# Patient Record
Sex: FEMALE | Race: WHITE | NOT HISPANIC OR LATINO | Employment: FULL TIME | URBAN - METROPOLITAN AREA
[De-identification: names, ages, dates, MRNs, and addresses within clinical notes are randomized per-mention and may not be internally consistent; named-entity substitution may affect disease eponyms.]

---

## 2017-01-04 ENCOUNTER — TRANSCRIBE ORDERS (OUTPATIENT)
Dept: ADMINISTRATIVE | Facility: HOSPITAL | Age: 42
End: 2017-01-04

## 2017-01-04 ENCOUNTER — HOSPITAL ENCOUNTER (OUTPATIENT)
Dept: RADIOLOGY | Facility: HOSPITAL | Age: 42
Discharge: HOME | End: 2017-01-04
Payer: COMMERCIAL

## 2017-01-04 DIAGNOSIS — R05.9 COUGH: ICD-10-CM

## 2017-01-04 DIAGNOSIS — R05.9 COUGH: Primary | ICD-10-CM

## 2017-01-04 PROCEDURE — 71020 HB CHEST X-RAY 2VW FRONTAL&LATL: CPT

## 2017-12-08 ENCOUNTER — HOSPITAL ENCOUNTER (EMERGENCY)
Facility: HOSPITAL | Age: 42
Discharge: HOME | End: 2017-12-08
Attending: EMERGENCY MEDICINE | Admitting: EMERGENCY MEDICINE
Payer: COMMERCIAL

## 2017-12-08 ENCOUNTER — APPOINTMENT (EMERGENCY)
Dept: RADIOLOGY | Facility: HOSPITAL | Age: 42
End: 2017-12-08
Payer: COMMERCIAL

## 2017-12-08 VITALS
TEMPERATURE: 97.8 F | DIASTOLIC BLOOD PRESSURE: 64 MMHG | WEIGHT: 253.8 LBS | RESPIRATION RATE: 18 BRPM | HEART RATE: 68 BPM | OXYGEN SATURATION: 98 % | SYSTOLIC BLOOD PRESSURE: 111 MMHG

## 2017-12-08 DIAGNOSIS — J20.9 ACUTE BRONCHITIS: Primary | ICD-10-CM

## 2017-12-08 LAB
ALBUMIN SERPL BCP-MCNC: 3.5 G/DL (ref 3.5–5)
ALP SERPL-CCNC: 90 U/L (ref 46–116)
ALT SERPL W P-5'-P-CCNC: 27 U/L (ref 12–78)
ANION GAP SERPL CALCULATED.3IONS-SCNC: 8 MMOL/L (ref 4–13)
AST SERPL W P-5'-P-CCNC: 14 U/L (ref 5–45)
ATRIAL RATE: 64 BPM
BASOPHILS # BLD AUTO: 0.04 THOUSANDS/ΜL (ref 0–0.1)
BASOPHILS NFR BLD AUTO: 0 % (ref 0–1)
BILIRUB SERPL-MCNC: 0.6 MG/DL (ref 0.2–1)
BUN SERPL-MCNC: 11 MG/DL (ref 5–25)
CALCIUM SERPL-MCNC: 8.5 MG/DL (ref 8.3–10.1)
CHLORIDE SERPL-SCNC: 106 MMOL/L (ref 100–108)
CO2 SERPL-SCNC: 24 MMOL/L (ref 21–32)
CREAT SERPL-MCNC: 0.55 MG/DL (ref 0.6–1.3)
DEPRECATED D DIMER PPP: <270 NG/ML (FEU) (ref 0–424)
EOSINOPHIL # BLD AUTO: 0.17 THOUSAND/ΜL (ref 0–0.61)
EOSINOPHIL NFR BLD AUTO: 2 % (ref 0–6)
ERYTHROCYTE [DISTWIDTH] IN BLOOD BY AUTOMATED COUNT: 12.5 % (ref 11.6–15.1)
GFR SERPL CREATININE-BSD FRML MDRD: 118 ML/MIN/1.73SQ M
GLUCOSE SERPL-MCNC: 91 MG/DL (ref 65–140)
HCT VFR BLD AUTO: 42.6 % (ref 34.8–46.1)
HGB BLD-MCNC: 14.1 G/DL (ref 11.5–15.4)
LYMPHOCYTES # BLD AUTO: 2.97 THOUSANDS/ΜL (ref 0.6–4.47)
LYMPHOCYTES NFR BLD AUTO: 29 % (ref 14–44)
MCH RBC QN AUTO: 30.8 PG (ref 26.8–34.3)
MCHC RBC AUTO-ENTMCNC: 33.1 G/DL (ref 31.4–37.4)
MCV RBC AUTO: 93 FL (ref 82–98)
MONOCYTES # BLD AUTO: 0.73 THOUSAND/ΜL (ref 0.17–1.22)
MONOCYTES NFR BLD AUTO: 7 % (ref 4–12)
NEUTROPHILS # BLD AUTO: 6.3 THOUSANDS/ΜL (ref 1.85–7.62)
NEUTS SEG NFR BLD AUTO: 62 % (ref 43–75)
P AXIS: 29 DEGREES
PLATELET # BLD AUTO: 297 THOUSANDS/UL (ref 149–390)
PMV BLD AUTO: 11.5 FL (ref 8.9–12.7)
POTASSIUM SERPL-SCNC: 4 MMOL/L (ref 3.5–5.3)
PR INTERVAL: 158 MS
PROT SERPL-MCNC: 7.7 G/DL (ref 6.4–8.2)
QRS AXIS: 51 DEGREES
QRSD INTERVAL: 90 MS
QT INTERVAL: 402 MS
QTC INTERVAL: 409 MS
RBC # BLD AUTO: 4.58 MILLION/UL (ref 3.81–5.12)
SODIUM SERPL-SCNC: 138 MMOL/L (ref 136–145)
T WAVE AXIS: 47 DEGREES
TROPONIN I SERPL-MCNC: <0.02 NG/ML
VENTRICULAR RATE: 62 BPM
WBC # BLD AUTO: 10.21 THOUSAND/UL (ref 4.31–10.16)

## 2017-12-08 PROCEDURE — 96360 HYDRATION IV INFUSION INIT: CPT

## 2017-12-08 PROCEDURE — 99285 EMERGENCY DEPT VISIT HI MDM: CPT

## 2017-12-08 PROCEDURE — 85379 FIBRIN DEGRADATION QUANT: CPT | Performed by: EMERGENCY MEDICINE

## 2017-12-08 PROCEDURE — 71020 HB CHEST X-RAY 2VW FRONTAL&LATL: CPT

## 2017-12-08 PROCEDURE — 80053 COMPREHEN METABOLIC PANEL: CPT | Performed by: EMERGENCY MEDICINE

## 2017-12-08 PROCEDURE — 93005 ELECTROCARDIOGRAM TRACING: CPT | Performed by: EMERGENCY MEDICINE

## 2017-12-08 PROCEDURE — 96361 HYDRATE IV INFUSION ADD-ON: CPT

## 2017-12-08 PROCEDURE — 85025 COMPLETE CBC W/AUTO DIFF WBC: CPT | Performed by: EMERGENCY MEDICINE

## 2017-12-08 PROCEDURE — 84484 ASSAY OF TROPONIN QUANT: CPT | Performed by: EMERGENCY MEDICINE

## 2017-12-08 PROCEDURE — 36415 COLL VENOUS BLD VENIPUNCTURE: CPT | Performed by: EMERGENCY MEDICINE

## 2017-12-08 RX ORDER — AZITHROMYCIN 250 MG/1
TABLET, FILM COATED ORAL
Qty: 6 TABLET | Refills: 0 | Status: SHIPPED | OUTPATIENT
Start: 2017-12-08 | End: 2018-07-18 | Stop reason: ALTCHOICE

## 2017-12-08 RX ADMIN — SODIUM CHLORIDE 1000 ML: 0.9 INJECTION, SOLUTION INTRAVENOUS at 13:30

## 2017-12-08 NOTE — ED PROVIDER NOTES
History  Chief Complaint   Patient presents with    Shortness of Breath     Pt  c/o shortness of breath and cough, fatigue, onset 2 weeks ago  History provided by:  Patient   used: No    Shortness of Breath   Severity:  Moderate  Onset quality:  Gradual  Duration:  2 weeks  Timing:  Constant  Progression:  Worsening  Chronicity:  New  Context: URI    Relieved by:  Nothing  Worsened by: Activity  Ineffective treatments:  None tried  Associated symptoms: chest pain    Associated symptoms: no cough, no fever and no vomiting        None       Past Medical History:   Diagnosis Date    Pneumonia        Past Surgical History:   Procedure Laterality Date    APPENDECTOMY      TONSILLECTOMY         History reviewed  No pertinent family history  I have reviewed and agree with the history as documented  Social History   Substance Use Topics    Smoking status: Never Smoker    Smokeless tobacco: Never Used    Alcohol use No        Review of Systems   Constitutional: Negative for chills and fever  Respiratory: Positive for shortness of breath  Negative for cough  Cardiovascular: Positive for chest pain  Negative for palpitations  Gastrointestinal: Negative for nausea and vomiting  All other systems reviewed and are negative  Physical Exam  ED Triage Vitals [12/08/17 1308]   Temperature Pulse Respirations Blood Pressure SpO2   97 8 °F (36 6 °C) 76 18 132/70 98 %      Temp Source Heart Rate Source Patient Position - Orthostatic VS BP Location FiO2 (%)   Oral Monitor Sitting Left arm --      Pain Score       6           Orthostatic Vital Signs  Vitals:    12/08/17 1308 12/08/17 1345 12/08/17 1400   BP: 132/70  111/64   Pulse: 76 68 68   Patient Position - Orthostatic VS: Sitting         Physical Exam   Constitutional: She is oriented to person, place, and time  She appears well-developed and well-nourished  She appears distressed  HENT:   Head: Normocephalic and atraumatic  Eyes: EOM are normal  Pupils are equal, round, and reactive to light  Neck: Normal range of motion  No JVD present  Cardiovascular: Normal rate, regular rhythm, normal heart sounds and intact distal pulses  Exam reveals no gallop and no friction rub  No murmur heard  Pulmonary/Chest: Effort normal and breath sounds normal  No respiratory distress  She has no wheezes  She has no rales  She exhibits no tenderness  Musculoskeletal: Normal range of motion  She exhibits no tenderness  Neurological: She is alert and oriented to person, place, and time  Skin: Skin is warm and dry  Psychiatric: She has a normal mood and affect  Her behavior is normal  Judgment and thought content normal    Nursing note and vitals reviewed  ED Medications  Medications   sodium chloride 0 9 % bolus 1,000 mL (0 mL Intravenous Stopped 12/8/17 1526)       Diagnostic Studies  Results Reviewed     Procedure Component Value Units Date/Time    D-Dimer [[id number]]  (Normal) Collected:  12/08/17 1330    Lab Status:  Final result Specimen:  Blood from Arm, Right Updated:  12/08/17 1400     D-Dimer, Quant <270 ng/ml (FEU)     Troponin I [[id number]]  (Normal) Collected:  12/08/17 1330    Lab Status:  Final result Specimen:  Blood from Arm, Right Updated:  12/08/17 1359     Troponin I <0 02 ng/mL     Narrative:         [employer] Chemistry analyzer 99% cutoff is > 0 04 ng/mL in network labs    o cTnI 99% cutoff is useful only when applied to patients in the clinical setting of myocardial ischemia  o cTnI 99% cutoff should be interpreted in the context of clinical history, ECG findings and possibly cardiac imaging to establish correct diagnosis  o cTnI 99% cutoff may be suggestive but clearly not indicative of a coronary event without the clinical setting of myocardial ischemia      Comprehensive metabolic panel [[id number]]  (Abnormal) Collected:  12/08/17 1330    Lab Status:  Final result Specimen:  Blood from Arm, Right Updated: 12/08/17 1358     Sodium 138 mmol/L      Potassium 4 0 mmol/L      Chloride 106 mmol/L      CO2 24 mmol/L      Anion Gap 8 mmol/L      BUN 11 mg/dL      Creatinine 0 55 (L) mg/dL      Glucose 91 mg/dL      Calcium 8 5 mg/dL      AST 14 U/L      ALT 27 U/L      Alkaline Phosphatase 90 U/L      Total Protein 7 7 g/dL      Albumin 3 5 g/dL      Total Bilirubin 0 60 mg/dL      eGFR 118 ml/min/1 73sq m     Narrative:         National Kidney Disease Education Program recommendations are as follows:  GFR calculation is accurate only with a steady state creatinine  Chronic Kidney disease less than 60 ml/min/1 73 sq  meters  Kidney failure less than 15 ml/min/1 73 sq  meters  CBC and differential [[id number]]  (Abnormal) Collected:  12/08/17 1330    Lab Status:  Final result Specimen:  Blood from Arm, Right Updated:  12/08/17 1338     WBC 10 21 (H) Thousand/uL      RBC 4 58 Million/uL      Hemoglobin 14 1 g/dL      Hematocrit 42 6 %      MCV 93 fL      MCH 30 8 pg      MCHC 33 1 g/dL      RDW 12 5 %      MPV 11 5 fL      Platelets 610 Thousands/uL      Neutrophils Relative 62 %      Lymphocytes Relative 29 %      Monocytes Relative 7 %      Eosinophils Relative 2 %      Basophils Relative 0 %      Neutrophils Absolute 6 30 Thousands/µL      Lymphocytes Absolute 2 97 Thousands/µL      Monocytes Absolute 0 73 Thousand/µL      Eosinophils Absolute 0 17 Thousand/µL      Basophils Absolute 0 04 Thousands/µL                  XR chest 2 views   ED Interpretation by Yvette Sanchez MD (12/08 4412)   This film was interpreted independently by me  No infiltrate, cardiac silhouette normal, no pleural effusions or pulmonary edema  Final Result by Miley Escobar DO (12/08 1232)      No active pulmonary disease           Workstation performed: [id number]                    Procedures  ECG 12 Lead Documentation  Date/Time: 12/8/2017 1:26 PM  Performed by: Guilherme Dwyer  Authorized by: Guilherme Dwyer     Indications / Diagnosis: Chest pain  ECG reviewed by me, the ED Provider: no    Patient location:  ED  Previous ECG:     Previous ECG:  Compared to current    Comparison ECG info:  11/25/14    Similarity:  No change    Comparison to cardiac monitor: No    Interpretation:     Interpretation: normal    Rate:     ECG rate:  62    ECG rate assessment: normal    Rhythm:     Rhythm: sinus rhythm    Ectopy:     Ectopy: none    QRS:     QRS axis:  Normal    QRS intervals:  Normal  Conduction:     Conduction: normal    ST segments:     ST segments:  Normal  T waves:     T waves: normal               Phone Contacts  ED Phone Contact    ED Course  ED Course                                MDM  Number of Diagnoses or Management Options  Acute bronchitis: new and requires workup  Diagnosis management comments: Background: 38 y o  female presents with shortness of breath, chest pressure - concern for pneumonia because of past history    Differential DX includes but is not limited to: acs, less likely PE, pneumonia, bronchitis    Plan: chest workup, ddimer, chest xray           Amount and/or Complexity of Data Reviewed  Clinical lab tests: ordered and reviewed  Tests in the radiology section of CPT®: reviewed and ordered  Independent visualization of images, tracings, or specimens: yes    Risk of Complications, Morbidity, and/or Mortality  Presenting problems: high  Diagnostic procedures: high  Management options: high    Patient Progress  Patient progress: stable    CritCare Time    Disposition  Final diagnoses:   Acute bronchitis     Time reflects when diagnosis was documented in both MDM as applicable and the Disposition within this note     Time User Action Codes Description Comment    12/8/2017  3:14 PM Clint MEDLEY Add [J20 9] Acute bronchitis       ED Disposition     ED Disposition Condition Comment    Discharge  Farshad Taylor discharge to home/self care      Condition at discharge: Good        Follow-up Information    None       Discharge Medication List as of 12/8/2017  3:15 PM      START taking these medications    Details   azithromycin (ZITHROMAX) 250 mg tablet Take first 2 tablets together on day one, then 1 tablet every day thereafter until finished , Print           No discharge procedures on file      ED Provider  Electronically Signed by           Gaila Leventhal, MD  12/08/17 5149

## 2017-12-08 NOTE — DISCHARGE INSTRUCTIONS
Acute Bronchitis   WHAT YOU SHOULD KNOW:   Acute bronchitis is swelling and irritation in the air passages of your lungs  This irritation may cause you to cough or have other breathing problems  Acute bronchitis often starts because of another viral illness, such as a cold or the flu  The illness spreads from your nose and throat to your windpipe and airways  Bronchitis is often called a chest cold  Acute bronchitis lasts about 2 weeks and is usually not a serious illness  AFTER YOU LEAVE:   Medicines:   · Ibuprofen or acetaminophen:  These medicines help lower a fever  They are available without a doctor's order  Ask your healthcare provider which medicine is right for you  Ask how much to take and how often to take it  Follow directions  These medicines can cause stomach bleeding if not taken correctly  Ibuprofen can cause kidney damage  Do not take ibuprofen if you have kidney disease, an ulcer, or allergies to aspirin  Acetaminophen can cause liver damage  Do not drink alcohol if you take acetaminophen  · Cough medicine: This medicine helps loosen mucus in your lungs and make it easier to cough up  This can help you breathe easier  · Inhalers: You may need one or more inhalers to help you breathe easier and cough less  An inhaler gives your medicine in a mist form so that you can breathe it into your lungs  Ask your healthcare provider to show you how to use your inhaler correctly  · Steroid medicine:  Steroid medicine helps open your air passages so you can breathe easier  · Take your medicine as directed  Call your healthcare provider if you think your medicine is not helping or if you have side effects  Tell him if you are allergic to any medicine  Keep a list of the medicines, vitamins, and herbs you take  Include the amounts, and when and why you take them  Bring the list or the pill bottles to follow-up visits  Carry your medicine list with you in case of an emergency    How to use an inhaler:   · Shake the inhaler well to make sure you get the correct amount of medicine per puff  Remove the cover from your inhaler's mouthpiece  If you are using a spacer, connect your inhaler to the flat end of the spacer  · Exhale as much air from your lungs as you can  Put the mouthpiece in your mouth past your front teeth and rest it on the top of your tongue  Do not block the mouthpiece opening with your tongue  · Breathe in through your mouth at a slow and steady rate  As you do this, press the inhaler to release the puff of medicine  Finish breathing in slowly and deeply as you inhale the medicine  When your lungs are full, hold your breath for 10 seconds  Then breathe out slowly through puckered lips or through your nose  · If you need to take more puffs, wait at least 1 minute between each puff  · Rinse your mouth with water after you use the inhaler  This may keep you from getting a mouth infection or irritation  · Follow the instructions that come with your inhaler to clean it  You should clean your inhaler at least once a week  Ways to care for yourself:   · Avoid alcohol:  Alcohol dulls your urge to cough and sneeze  When you have bronchitis, you need to be able to cough and sneeze to clear your air passages  Alcohol also causes your body to lose fluid  This can make the mucus in your lungs thicker and harder to cough up  · Avoid irritants in the air:  Do not smoke or allow others to smoke around you  Avoid chemicals, fumes, and dust  Wear a face mask if you must work around dust or fumes  Stay inside on days when air pollution levels are high  If you have allergies, stay inside when pollen counts are high  Avoid aerosol products  This includes spray-on deodorant, bug spray, and hair spray  · Drink more liquids:  Most people should drink at least 8 eight-ounce cups of water a day  You may need to drink more liquids when you have acute bronchitis   Liquids help keep your air passages moist and help you cough up mucus  · Get more rest:  You may feel like resting more  Slowly start to do more each day  Rest when you feel it is needed  · Eat healthy foods:  Eat a variety healthy foods every day  Your diet should include fruits, vegetables, breads, and protein (such as chicken, fish, and beans)  Dairy products (such as milk, cheese, and ice cream) can sometimes increase the amount of mucus your body makes  Ask if you should decrease your intake of dairy products  · Use a humidifier:  Use a cool mist humidifier to increase air moisture in your home  This may make it easier for you to breathe and help decrease your cough  Decrease your risk of acute bronchitis:   · Get the vaccinations you need:  Ask your healthcare provider if you should get vaccinated against the flu or pneumonia  · Avoid things that may irritate your lungs:  Stay inside or cover your mouth and nose with a scarf when you are outside during cold weather  You should also stay inside on days when air pollution levels are high  If you have allergies, stay inside when pollen counts are high  Avoid using aerosol products in your home  This includes spray-on deodorant, bug spray, and hair spray  · Avoid the spread of germs:        [facility] your hands often with soap and water  Carry germ-killing gel with you  You can use the gel to clean your hands when there is no soap and water available  ¨ Do not touch your eyes, nose, or mouth unless you have washed your hands first     ¨ Always cover your mouth when you cough  Cough into a tissue or your shirtsleeve so you do not spread germs from your hands  ¨ Try to avoid people who have a cold or the flu  If you are sick, stay away from others as much as possible  Follow up with your healthcare provider as directed:  Write down questions you have so you will remember to ask them during your follow-up visits    Contact your healthcare provider if:   · You have a fever     · Your skin becomes itchy or you have a rash after you take your medicine  · Your breathing problems do not go away or get worse  · Your cough does not get better with treatment  · You cough up blood  · You have questions or concerns about your condition or care  Seek care immediately or call 911 if:   · You faint  · Your lips or fingernails turn blue  · You feel like you are not getting enough air when you breathe  · You have swelling of your lips, tongue, or throat that makes it hard to breathe or swallow  © 2014 [address] [employer] is for End User's use only and may not be sold, redistributed or otherwise used for commercial purposes  All illustrations and images included in CareNotes® are the copyrighted property of [employer]  or [address]  The above information is an  only  It is not intended as medical advice for individual conditions or treatments  Talk to your doctor, nurse or pharmacist before following any medical regimen to see if it is safe and effective for you

## 2018-07-18 ENCOUNTER — OFFICE VISIT (OUTPATIENT)
Dept: URGENT CARE | Age: 43
End: 2018-07-18
Payer: COMMERCIAL

## 2018-07-18 VITALS
HEIGHT: 69 IN | SYSTOLIC BLOOD PRESSURE: 122 MMHG | OXYGEN SATURATION: 98 % | TEMPERATURE: 97.7 F | WEIGHT: 201 LBS | RESPIRATION RATE: 22 BRPM | BODY MASS INDEX: 29.77 KG/M2 | HEART RATE: 84 BPM | DIASTOLIC BLOOD PRESSURE: 79 MMHG

## 2018-07-18 DIAGNOSIS — R05.9 COUGH: ICD-10-CM

## 2018-07-18 DIAGNOSIS — J06.9 ACUTE URI: Primary | ICD-10-CM

## 2018-07-18 RX ORDER — BENZONATATE 100 MG/1
100 CAPSULE ORAL 3 TIMES DAILY PRN
Qty: 20 CAPSULE | Refills: 0 | Status: ON HOLD | OUTPATIENT
Start: 2018-07-18 | End: 2020-11-27

## 2018-07-18 RX ORDER — ALBUTEROL SULFATE 90 UG/1
2 AEROSOL, METERED RESPIRATORY (INHALATION) EVERY 6 HOURS PRN
COMMUNITY
End: 2021-02-10 | Stop reason: HOSPADM

## 2018-07-18 RX ORDER — AZITHROMYCIN 250 MG/1
TABLET, FILM COATED ORAL
Qty: 6 TABLET | Refills: 0 | Status: SHIPPED | OUTPATIENT
Start: 2018-07-18 | End: 2018-07-23

## 2018-07-19 NOTE — PROGRESS NOTES
[facility]        NAME: Vitaliy Solano is a 41 y o  female  : 1975    MRN: [id number]  DATE: 2018  TIME: 9:55 PM    Assessment and Plan   Cough [R05]  1  Cough  XR chest pa & lateral    azithromycin (ZITHROMAX) 250 mg tablet    benzonatate (TESSALON PERLES) 100 mg capsule   2  Acute URI           Patient Instructions     Patient Instructions   No acute findings on chest xray  As we discussed your illness is viral   No antibiotics are indicated at this time  Will give you script for zpack  Only start if symptoms are not improving over the next 2-3 days or you develop worsening symptoms or fevers  Rest and drink extra fluids  OTC cough and cold medications as needed  Cough medication sent to pharmacy  Tylenol or Motrin as needed for pain or fever  Salt water gargles and throat lozenges for sore throat  Follow up with PCP if no improvement  Go to ER with worsening symptoms  Chief Complaint     Chief Complaint   Patient presents with    Cough     4 days ago starated with scratchy throat, now bad cough, trouble breathing couging clear mucous  having fevers and chills  taking no OTC meds         History of Present Illness   Meaghan Rogerssaman Brandon presents to the clinic c/o    This is a 41year old female here today with complaints of cough, congestion, body aches, chills and feeling feverish  No known fevers  She has sore throat and sneezing  She has not been taking any OTC medications  She has headache and feels flushed  Mucous is clear in color  She has history of having pneumonia in the past x 2, 2014 and 2016  Review of Systems   Review of Systems   Constitutional: Positive for activity change, chills and fatigue  Negative for fever  HENT: Positive for congestion, sinus pain, sinus pressure and sore throat  Eyes: Negative for discharge and redness  Respiratory: Positive for cough  Negative for chest tightness, shortness of breath and wheezing  Cardiovascular: Negative for chest pain  Musculoskeletal: Positive for arthralgias  Neurological: Negative  Psychiatric/Behavioral: Negative  Current Medications     No long-term prescriptions on file  Current Allergies     Allergies as of 07/18/2018 - Reviewed 07/18/2018   Allergen Reaction Noted    Penicillins  05/19/2016            The following portions of the patient's history were reviewed and updated as appropriate: allergies, current medications, past family history, past medical history, past social history, past surgical history and problem list     Objective   /79 (BP Location: Right arm)   Pulse 84   Temp 97 7 °F (36 5 °C) (Temporal)   Resp 22   Ht 5' 9" (1 753 m)   Wt 91 2 kg (201 lb)   LMP 07/10/2018   SpO2 98%   BMI 29 68 kg/m²        Physical Exam     Physical Exam   Constitutional: She is oriented to person, place, and time  She appears well-developed and well-nourished  HENT:   Head: Normocephalic  Right Ear: External ear normal    Left Ear: External ear normal    Mouth/Throat: Oropharynx is clear and moist    TMs clear  Posterior pharynx mildly erythemic   Neck: Normal range of motion  Neck supple  Cardiovascular: Normal rate, regular rhythm and normal heart sounds  Pulmonary/Chest: Effort normal and breath sounds normal    Neurological: She is alert and oriented to person, place, and time  Skin: Skin is warm  Psychiatric: She has a normal mood and affect  Her behavior is normal    Nursing note and vitals reviewed

## 2018-07-19 NOTE — PATIENT INSTRUCTIONS
No acute findings on chest xray  As we discussed your illness is viral   No antibiotics are indicated at this time  Will give you script for zpack  Only start if symptoms are not improving over the next 2-3 days or you develop worsening symptoms or fevers  Rest and drink extra fluids  OTC cough and cold medications as needed  Cough medication sent to pharmacy  Tylenol or Motrin as needed for pain or fever  Salt water gargles and throat lozenges for sore throat  Follow up with PCP if no improvement  Go to ER with worsening symptoms  Cold Symptoms   WHAT YOU NEED TO KNOW:   A cold is an infection caused by a virus  The infection causes your upper respiratory system to become inflamed  Common symptoms of a cold include sneezing, dry throat, a stuffy nose, headache, watery eyes, and a cough  Your cough may be dry, or you may cough up mucus  You may also have muscle aches, joint pain, and tiredness  Rarely, you may have a fever  Most colds go away without treatment  DISCHARGE INSTRUCTIONS:   Return to the emergency department if:   · You have increased tiredness and weakness  · You are unable to eat  · Your heart is beating much faster than usual for you  · You see white spots in the back of your throat and your neck is swollen and sore to the touch  · You see pinpoint or larger reddish-purple dots on your skin  Contact your healthcare provider if:   · You have a fever higher than 102°F (38 9°C)  · You have new or worsening shortness of breath  · You have thick nasal drainage for more than 2 days  · Your symptoms do not improve or get worse within 5 days  · You have questions or concerns about your condition or care  Medicines: The following medicines may be suggested by your healthcare provider to decrease your cold symptoms  These medicines are available without a doctor's order  Ask which medicines to take and when to take them  Follow directions    · NSAIDs or acetaminophen  help to bring down a fever or decrease pain  · Decongestants  help decrease nasal stuffiness  · Antihistamines  help decrease sneezing and a runny nose  · Cough suppressants  help decrease how much you cough  · Expectorants  help loosen mucus so you can cough it up  · Take your medicine as directed  Contact your healthcare provider if you think your medicine is not helping or if you have side effects  Tell him of her if you are allergic to any medicine  Keep a list of the medicines, vitamins, and herbs you take  Include the amounts, and when and why you take them  Bring the list or the pill bottles to follow-up visits  Carry your medicine list with you in case of an emergency  Symptom relief: The following may help relieve cold symptoms, such as a dry throat and congestion:  · Gargle with mouthwash or warm salt water as directed  · Suck on throat lozenges or hard candy  · Use a cold or warm vaporizer or humidifier to ease your breathing  · Rest for at least 2 days and then as needed to decrease tiredness and weakness  · Use petroleum based jelly around your nostrils to decrease irritation from blowing your nose  Drink liquids:  Liquids will help thin and loosen thick mucus so you can cough it up  Liquids will also keep you hydrated  Ask your healthcare provider which liquids are best for you and how much to drink each day  Prevent the spread of germs: You can spread your cold germs to others for at least 3 days after your symptoms start  Wash your hands often  Do not share items, such as eating utensils  Cover your nose and mouth when you cough or sneeze using the crook of your elbow instead of your hands  Throw used tissues in the garbage  Do not smoke:  Smoking may worsen your symptoms and increase the length of time you feel sick  Talk with your healthcare provider if you need help to stop smoking    Follow up with your healthcare provider as directed:  Write down your questions so you remember to ask them during your visits  © 2017 [employer] [employer] Information is for End User's use only and may not be sold, redistributed or otherwise used for commercial purposes  All illustrations and images included in CareNotes® are the copyrighted property of [employer]  or [employer]  The above information is an  only  It is not intended as medical advice for individual conditions or treatments  Talk to your doctor, nurse or pharmacist before following any medical regimen to see if it is safe and effective for you

## 2019-02-23 ENCOUNTER — OFFICE VISIT (OUTPATIENT)
Dept: URGENT CARE | Age: 44
End: 2019-02-23
Payer: COMMERCIAL

## 2019-02-23 VITALS
WEIGHT: 199 LBS | SYSTOLIC BLOOD PRESSURE: 110 MMHG | RESPIRATION RATE: 18 BRPM | BODY MASS INDEX: 30.16 KG/M2 | HEART RATE: 91 BPM | TEMPERATURE: 97.8 F | DIASTOLIC BLOOD PRESSURE: 62 MMHG | HEIGHT: 68 IN | OXYGEN SATURATION: 99 %

## 2019-02-23 DIAGNOSIS — J02.9 SORE THROAT: ICD-10-CM

## 2019-02-23 DIAGNOSIS — J32.9 SINOBRONCHITIS: Primary | ICD-10-CM

## 2019-02-23 DIAGNOSIS — J40 SINOBRONCHITIS: Primary | ICD-10-CM

## 2019-02-23 LAB — S PYO AG THROAT QL: NEGATIVE

## 2019-02-23 PROCEDURE — 99213 OFFICE O/P EST LOW 20 MIN: CPT | Performed by: FAMILY MEDICINE

## 2019-02-23 PROCEDURE — 87430 STREP A AG IA: CPT | Performed by: FAMILY MEDICINE

## 2019-02-23 RX ORDER — CLARITHROMYCIN 500 MG/1
500 TABLET, COATED ORAL EVERY 12 HOURS SCHEDULED
Qty: 20 TABLET | Refills: 0 | Status: SHIPPED | OUTPATIENT
Start: 2019-02-23 | End: 2019-03-05

## 2019-02-23 NOTE — PROGRESS NOTES
[facility]        NAME: Viridiana Moyer is a 41 y o  female  : 1975    MRN: [id number]  DATE: 2019  TIME: 12:00 PM    Assessment and Plan   Sinobronchitis [J32 9, J40]  1  Sinobronchitis  clarithromycin (BIAXIN) 500 mg tablet   2  Sore throat  POCT rapid strepA         Patient Instructions       Follow up with PCP in 3-5 days  Proceed to  ER if symptoms worsen  Chief Complaint     Chief Complaint   Patient presents with    Cold Like Symptoms     pt states head/chest congestion, sore throat with hoarse voice onset saturday  History of Present Illness       Patient here for evaluation of nasal congestion, sinus pressure, cough, chest congestion for the past week getting worse  Patient is getting mucopurulent fluid out of her nose as well as when she coughs occasionally  Review of Systems   Review of Systems      Current Medications       Current Outpatient Medications:     albuterol (PROVENTIL HFA,VENTOLIN HFA) 90 mcg/act inhaler, Inhale 2 puffs every 6 (six) hours as needed for wheezing, Disp: , Rfl:     benzonatate (TESSALON PERLES) 100 mg capsule, Take 1 capsule (100 mg total) by mouth 3 (three) times a day as needed for cough, Disp: 20 capsule, Rfl: 0    clarithromycin (BIAXIN) 500 mg tablet, Take 1 tablet (500 mg total) by mouth every 12 (twelve) hours for 10 days, Disp: 20 tablet, Rfl: 0    Current Allergies     Allergies as of 2019 - Reviewed 2019   Allergen Reaction Noted    Penicillins  2016            The following portions of the patient's history were reviewed and updated as appropriate: allergies, current medications, past family history, past medical history, past social history, past surgical history and problem list      Past Medical History:   Diagnosis Date    Pneumonia        Past Surgical History:   Procedure Laterality Date    APPENDECTOMY      TONSILLECTOMY         No family history on file        Medications have been verified  Objective   /62 (BP Location: Left arm, Patient Position: Sitting)   Pulse 91   Temp 97 8 °F (36 6 °C) (Temporal)   Resp 18   Ht 5' 8" (1 727 m)   Wt 90 3 kg (199 lb)   LMP 02/04/2019   SpO2 99%   BMI 30 26 kg/m²        Physical Exam     Physical Exam   Constitutional: She is oriented to person, place, and time  She appears well-developed and well-nourished  No distress  HENT:   Head: Normocephalic and atraumatic  Right Ear: External ear normal    Left Ear: External ear normal    TMs intact bilaterally with clear fluid in the middle ear bilaterally  Bilateral nasal congestion erythema with mucopurulent drainage  Bilateral maxillary sinus tenderness  Bilateral tonsillar erythema with no soft tissue swelling  No exudate  Eyes: Pupils are equal, round, and reactive to light  Conjunctivae and EOM are normal    Pulmonary/Chest: Effort normal  No stridor  No respiratory distress  She has no wheezes  She has no rales  Occasional coarse sounds bilateral upper airway  Lymphadenopathy:     She has cervical adenopathy  Neurological: She is alert and oriented to person, place, and time  Skin: Skin is warm  She is not diaphoretic  Psychiatric: She has a normal mood and affect  Her behavior is normal    Nursing note and vitals reviewed

## 2019-05-03 ENCOUNTER — TRANSCRIBE ORDERS (OUTPATIENT)
Dept: ADMINISTRATIVE | Facility: HOSPITAL | Age: 44
End: 2019-05-03

## 2019-05-03 DIAGNOSIS — Z12.39 BREAST SCREENING, UNSPECIFIED: Primary | ICD-10-CM

## 2019-05-06 ENCOUNTER — HOSPITAL ENCOUNTER (OUTPATIENT)
Dept: RADIOLOGY | Age: 44
Discharge: HOME | End: 2019-05-06
Payer: COMMERCIAL

## 2019-05-06 VITALS — BODY MASS INDEX: 30.81 KG/M2 | HEIGHT: 69 IN | WEIGHT: 208 LBS

## 2019-05-06 DIAGNOSIS — Z12.39 BREAST SCREENING, UNSPECIFIED: ICD-10-CM

## 2019-05-06 PROCEDURE — 77067 SCR MAMMO BI INCL CAD: CPT

## 2020-06-25 ENCOUNTER — APPOINTMENT (OUTPATIENT)
Dept: LAB | Facility: MEDICAL CENTER | Age: 45
End: 2020-06-25
Payer: COMMERCIAL

## 2020-06-25 ENCOUNTER — TRANSCRIBE ORDERS (OUTPATIENT)
Dept: ADMINISTRATIVE | Facility: HOSPITAL | Age: 45
End: 2020-06-25

## 2020-06-25 DIAGNOSIS — E78.2 MIXED HYPERLIPIDEMIA: ICD-10-CM

## 2020-06-25 DIAGNOSIS — Z00.00 WELL ADULT EXAM: ICD-10-CM

## 2020-06-25 DIAGNOSIS — C50.919 MALIGNANT NEOPLASM OF FEMALE BREAST, UNSPECIFIED ESTROGEN RECEPTOR STATUS, UNSPECIFIED LATERALITY, UNSPECIFIED SITE OF BREAST (HCC): Primary | ICD-10-CM

## 2020-06-25 DIAGNOSIS — C50.919 MALIGNANT NEOPLASM OF FEMALE BREAST, UNSPECIFIED ESTROGEN RECEPTOR STATUS, UNSPECIFIED LATERALITY, UNSPECIFIED SITE OF BREAST (HCC): ICD-10-CM

## 2020-06-25 LAB
ALBUMIN SERPL BCP-MCNC: 3.4 G/DL (ref 3.5–5)
ALP SERPL-CCNC: 68 U/L (ref 46–116)
ALT SERPL W P-5'-P-CCNC: 16 U/L (ref 12–78)
ANION GAP SERPL CALCULATED.3IONS-SCNC: 5 MMOL/L (ref 4–13)
AST SERPL W P-5'-P-CCNC: 12 U/L (ref 5–45)
BASOPHILS # BLD AUTO: 0.05 THOUSANDS/ΜL (ref 0–0.1)
BASOPHILS NFR BLD AUTO: 1 % (ref 0–1)
BILIRUB SERPL-MCNC: 0.81 MG/DL (ref 0.2–1)
BUN SERPL-MCNC: 9 MG/DL (ref 5–25)
CALCIUM SERPL-MCNC: 8.3 MG/DL (ref 8.3–10.1)
CHLORIDE SERPL-SCNC: 110 MMOL/L (ref 100–108)
CHOLEST SERPL-MCNC: 178 MG/DL (ref 50–200)
CO2 SERPL-SCNC: 25 MMOL/L (ref 21–32)
CREAT SERPL-MCNC: 0.64 MG/DL (ref 0.6–1.3)
EOSINOPHIL # BLD AUTO: 0.21 THOUSAND/ΜL (ref 0–0.61)
EOSINOPHIL NFR BLD AUTO: 2 % (ref 0–6)
ERYTHROCYTE [DISTWIDTH] IN BLOOD BY AUTOMATED COUNT: 12.7 % (ref 11.6–15.1)
GFR SERPL CREATININE-BSD FRML MDRD: 110 ML/MIN/1.73SQ M
GLUCOSE P FAST SERPL-MCNC: 86 MG/DL (ref 65–99)
HCT VFR BLD AUTO: 41.9 % (ref 34.8–46.1)
HDLC SERPL-MCNC: 52 MG/DL
HGB BLD-MCNC: 13.4 G/DL (ref 11.5–15.4)
IMM GRANULOCYTES # BLD AUTO: 0.05 THOUSAND/UL (ref 0–0.2)
IMM GRANULOCYTES NFR BLD AUTO: 1 % (ref 0–2)
LDLC SERPL CALC-MCNC: 101 MG/DL (ref 0–100)
LYMPHOCYTES # BLD AUTO: 2.77 THOUSANDS/ΜL (ref 0.6–4.47)
LYMPHOCYTES NFR BLD AUTO: 32 % (ref 14–44)
MCH RBC QN AUTO: 31.5 PG (ref 26.8–34.3)
MCHC RBC AUTO-ENTMCNC: 32 G/DL (ref 31.4–37.4)
MCV RBC AUTO: 98 FL (ref 82–98)
MONOCYTES # BLD AUTO: 0.7 THOUSAND/ΜL (ref 0.17–1.22)
MONOCYTES NFR BLD AUTO: 8 % (ref 4–12)
NEUTROPHILS # BLD AUTO: 4.92 THOUSANDS/ΜL (ref 1.85–7.62)
NEUTS SEG NFR BLD AUTO: 56 % (ref 43–75)
NONHDLC SERPL-MCNC: 126 MG/DL
NRBC BLD AUTO-RTO: 0 /100 WBCS
PLATELET # BLD AUTO: 268 THOUSANDS/UL (ref 149–390)
PMV BLD AUTO: 11.7 FL (ref 8.9–12.7)
POTASSIUM SERPL-SCNC: 3.9 MMOL/L (ref 3.5–5.3)
PROT SERPL-MCNC: 7.1 G/DL (ref 6.4–8.2)
RBC # BLD AUTO: 4.26 MILLION/UL (ref 3.81–5.12)
SODIUM SERPL-SCNC: 140 MMOL/L (ref 136–145)
TRIGL SERPL-MCNC: 123 MG/DL
WBC # BLD AUTO: 8.7 THOUSAND/UL (ref 4.31–10.16)

## 2020-06-25 PROCEDURE — 85025 COMPLETE CBC W/AUTO DIFF WBC: CPT | Performed by: INTERNAL MEDICINE

## 2020-06-25 PROCEDURE — 80053 COMPREHEN METABOLIC PANEL: CPT | Performed by: INTERNAL MEDICINE

## 2020-06-25 PROCEDURE — 80061 LIPID PANEL: CPT | Performed by: INTERNAL MEDICINE

## 2020-06-25 PROCEDURE — 81162 BRCA1&2 GEN FULL SEQ DUP/DEL: CPT

## 2020-06-25 PROCEDURE — 36415 COLL VENOUS BLD VENIPUNCTURE: CPT | Performed by: INTERNAL MEDICINE

## 2020-07-09 LAB — MISCELLANEOUS LAB TEST RESULT: NORMAL

## 2020-07-22 ENCOUNTER — TELEPHONE (OUTPATIENT)
Dept: SURGICAL ONCOLOGY | Facility: CLINIC | Age: 45
End: 2020-07-22

## 2020-07-22 ENCOUNTER — TRANSCRIBE ORDERS (OUTPATIENT)
Dept: ADMINISTRATIVE | Facility: HOSPITAL | Age: 45
End: 2020-07-22

## 2020-07-22 DIAGNOSIS — N63.0 BREAST LUMP: Primary | ICD-10-CM

## 2020-07-22 NOTE — TELEPHONE ENCOUNTER
New Patient Encounter    New Patient Intake Form   Patient Details:  Isis Taylor  1975  [id number]    Background Information:  [address] starts by opening a telephone encounter and gathering the following information   Who is calling to schedule? If not self, relationship to patient? provider   Referring Provider Dr Cecily Liu   What is the diagnosis? BRAC1 gene positive    Is this diagnosis confirmed? Yes   When was the diagnosis? 7/22   Is there a confirmed diagnosis from a biopsy/tissue reviewed by pathology? na   Is patient aware of diagnosis? Yes   Is there a personal history and what kind? na   Is there a family history and what kind? na   Reason for visit? New Diagnosis   Have you had any imaging or labs done? If so: when, where? yes  [facility]   Are records in [place]? yes   Was the patient told to bring a disk? no   Does the patient smoke or Vape? no   If yes, how many packs or cartridges per day? na   Scheduling Information:   Preferred [place]: [facility]     Are there any dates/time the patient cannot be seen? na   Miscellaneous: Genetic test results are in labs 6/25   After completing the above information, please route to Financial Counselor and the appropriate Nurse Navigator for review

## 2020-07-30 ENCOUNTER — HOSPITAL ENCOUNTER (OUTPATIENT)
Dept: MAMMOGRAPHY | Facility: CLINIC | Age: 45
Discharge: HOME | End: 2020-07-30
Payer: COMMERCIAL

## 2020-07-30 ENCOUNTER — HOSPITAL ENCOUNTER (OUTPATIENT)
Dept: ULTRASOUND IMAGING | Facility: CLINIC | Age: 45
Discharge: HOME | End: 2020-07-30
Payer: COMMERCIAL

## 2020-07-30 VITALS — WEIGHT: 208 LBS | TEMPERATURE: 97.8 F | BODY MASS INDEX: 31.52 KG/M2 | HEIGHT: 68 IN

## 2020-07-30 DIAGNOSIS — N63.0 BREAST LUMP: ICD-10-CM

## 2020-07-30 PROCEDURE — G0279 TOMOSYNTHESIS, MAMMO: HCPCS

## 2020-07-30 PROCEDURE — 76642 ULTRASOUND BREAST LIMITED: CPT

## 2020-07-30 PROCEDURE — 77066 DX MAMMO INCL CAD BI: CPT

## 2020-09-25 PROBLEM — Z15.01 BRCA1 GENE MUTATION POSITIVE: Status: ACTIVE | Noted: 2020-09-25

## 2020-09-25 PROBLEM — Z15.09 BRCA1 GENE MUTATION POSITIVE: Status: ACTIVE | Noted: 2020-09-25

## 2020-09-30 ENCOUNTER — TELEPHONE (OUTPATIENT)
Dept: SURGICAL ONCOLOGY | Facility: CLINIC | Age: 45
End: 2020-09-30

## 2020-09-30 ENCOUNTER — CONSULT (OUTPATIENT)
Dept: SURGICAL ONCOLOGY | Facility: CLINIC | Age: 45
End: 2020-09-30
Payer: COMMERCIAL

## 2020-09-30 VITALS
HEART RATE: 74 BPM | WEIGHT: 232 LBS | BODY MASS INDEX: 35.16 KG/M2 | RESPIRATION RATE: 18 BRPM | HEIGHT: 68 IN | TEMPERATURE: 98.1 F | SYSTOLIC BLOOD PRESSURE: 112 MMHG | DIASTOLIC BLOOD PRESSURE: 80 MMHG

## 2020-09-30 DIAGNOSIS — Z15.01 BRCA1 GENE MUTATION POSITIVE: Primary | ICD-10-CM

## 2020-09-30 DIAGNOSIS — Z12.39 BREAST CANCER SCREENING, HIGH RISK PATIENT: ICD-10-CM

## 2020-09-30 DIAGNOSIS — Z15.09 BRCA1 GENE MUTATION POSITIVE: Primary | ICD-10-CM

## 2020-09-30 PROCEDURE — 99245 OFF/OP CONSLTJ NEW/EST HI 55: CPT | Performed by: SURGERY

## 2020-09-30 RX ORDER — TOPIRAMATE 50 MG/1
50 TABLET, FILM COATED ORAL EVERY 12 HOURS SCHEDULED
Status: ON HOLD | COMMUNITY
End: 2020-11-27

## 2020-09-30 RX ORDER — FLUOXETINE HYDROCHLORIDE 20 MG/1
20 CAPSULE ORAL DAILY
Status: ON HOLD | COMMUNITY
End: 2020-11-27

## 2020-09-30 NOTE — PROGRESS NOTES
Surgical Oncology Consult Note       [address]  [facility]  [address] [facility] [address]    Monet Kauffman Brandon  1975  [id number]  [address] [facility] [facility]  [facility]  [address] [facility] [address]      Chief Complaint:     Chief Complaint   Patient presents with    Consult     BRCA-1 Positive       Assessment and Plan:   Assessment/Plan   Patient has a new diagnosis of BRCA 1  She is interested her options  She has not met with a Genetics counselor  She was just told she was positive by her physician  We reviewed her options  I have recommended a consult with plastic surgery as well as gynecologic oncology  I have also coordinated appointment with our Genetics counselor for further questions and answers  I explained she may bring her daughters to this visit if they wish  She has a normal mammogram and ultrasound of the breast   I have recommended an MRI  We will see her back in 6 months  Oncology History:     Oncology History    No history exists  History of Present Illness: This is a 61-year-old woman whose family history is relatively significant  Her mother passed away at a young age from a unknown cause  The patient was 18 at that time  The mother's brother was tested was positive for BRCA-1 the mid paternal grandmother was diagnosed with ovarian cancer  The patient has 2 sisters 1 was tested positive and the 2nd 1 was tested negative  The patient did not opt for testing when they found a BRCA gene in their family  However she felt a lump essentially outside the breast which turned out to be most likely a skin abnormality however this caused her to have enough concern to get genetic testing and she was found to have a deleterious mutation in the BRCA1 gene  Patient found out that she was positive a couple of weeks ago and presents now for discussion    She works as a  and has 3 children 2 daughters, the oldest is 22years old and the other 3is 25years old  Her son is 15years old  She presents now for information and discussion  Review of Systems:   Review of Systems   Constitutional: Negative for activity change, appetite change and fatigue  HENT: Negative  Eyes: Negative  Respiratory: Negative for cough, shortness of breath and wheezing  Cardiovascular: Negative for chest pain and leg swelling  Gastrointestinal: Negative  Endocrine: Negative  Genitourinary: Negative  Musculoskeletal:        No new changes or complaints of bone pain   Skin: Negative  Allergic/Immunologic: Negative  Neurological: Negative  Hematological: Negative  Psychiatric/Behavioral: Negative          Past Medical History:      Patient Active Problem List   Diagnosis    Sinobronchitis    BRCA1 gene mutation positive        Past Medical History:   Diagnosis Date    Pneumonia         Past Surgical History:   Procedure Laterality Date    APPENDECTOMY      TONSILLECTOMY          Family History   Problem Relation Age of Onset   [facility] Cancer Father         Age at [address] unknown; type unknown    Cancer Maternal Grandfather     No Known Problems Maternal Aunt     No Known Problems Paternal Aunt     No Known Problems Paternal Aunt     Breast cancer Cousin 36    BRCA1 Positive Sister     No Known Problems Daughter     No Known Problems Sister     No Known Problems Daughter     BRCA1 Positive Maternal Uncle     No Known Problems Paternal Uncle     No Known Problems Paternal Aunt     No Known Problems Paternal Uncle     Ovarian cancer Other 46        Social History     Socioeconomic History    Marital status: /Civil Union     Spouse name: Not on file    Number of children: Not on file    Years of education: Not on file    Highest education level: Not on file   Occupational History    Not on file   Social Needs    Financial resource strain: Not on file    Food insecurity     Worry: Not on file     Inability: Not on file    Transportation needs     Medical: Not on file     Non-medical: Not on file   Tobacco Use    Smoking status: Never Smoker    Smokeless tobacco: Never Used   Substance and Sexual Activity    Alcohol use: Yes     Alcohol/week: 1 0 standard drinks     Types: 1 Standard drinks or equivalent per week    Drug use: No    Sexual activity: Not on file   Lifestyle    Physical activity     Days per week: Not on file     Minutes per session: Not on file    Stress: Not on file   Relationships    Social connections     Talks on phone: Not on file     Gets together: Not on file     Attends [employer] service: Not on file     Active member of club or organization: Not on file     Attends meetings of clubs or organizations: Not on file     Relationship status: Not on file    Intimate partner violence     Fear of current or ex partner: Not on file     Emotionally abused: Not on file     Physically abused: Not on file     Forced sexual activity: Not on file   Other Topics Concern    Not on file   Social History Narrative    Not on file        Current Outpatient Medications:     albuterol (PROVENTIL HFA,VENTOLIN HFA) 90 mcg/act inhaler, Inhale 2 puffs every 6 (six) hours as needed for wheezing, Disp: , Rfl:     benzonatate (TESSALON PERLES) 100 mg capsule, Take 1 capsule (100 mg total) by mouth 3 (three) times a day as needed for cough, Disp: 20 capsule, Rfl: 0    FLUoxetine (PROzac) 20 mg capsule, Take 20 mg by mouth daily, Disp: , Rfl:     topiramate (TOPAMAX) 50 MG tablet, Take 50 mg by mouth every 12 (twelve) hours, Disp: , Rfl:      Allergies   Allergen Reactions    Penicillins        Physical Exam:     Vitals:    09/30/20 0811   BP: 112/80   Pulse: 74   Resp: 18   Temp: 98 1 °F (36 7 °C)     Physical Exam  Vitals signs reviewed  Exam conducted with a chaperone present     Constitutional:       Appearance: She is well-developed  HENT:      Head: Normocephalic and atraumatic  Eyes:      Pupils: Pupils are equal, round, and reactive to light  Neck:      Musculoskeletal: Normal range of motion and neck supple  Thyroid: No thyromegaly  Vascular: No JVD  Trachea: No tracheal deviation  Cardiovascular:      Rate and Rhythm: Normal rate and regular rhythm  Heart sounds: Normal heart sounds  No murmur  No friction rub  No gallop  Pulmonary:      Effort: Pulmonary effort is normal  No respiratory distress  Breath sounds: Normal breath sounds  No wheezing or rales  Chest:      Comments: The right breast was examined in the sitting and supine position  There are no worrisome skin changes, tenderness, inverted nipple, nipple discharge, swelling, bleeding or evidence of a mass in any quadrant  Esther survey demonstrated no evidence of any clinically suspicious axillary, pectoral or paraclavicular lymph nodes  The left breast was examined in the sitting and supine position  There are no worrisome skin changes, tenderness, inverted nipple, nipple discharge, swelling, bleeding or evidence of a mass in any quadrant  There is slightly increased parenchymal tissue in the upper outer quadrant of the left breast compared to the right  Esther survey demonstrated no evidence of any clinically suspicious axillary, pectoral or paraclavicular lymph nodes  Abdominal:      General: There is no distension  Palpations: Abdomen is soft  There is no hepatomegaly or mass  Tenderness: There is no abdominal tenderness  There is no guarding or rebound  Musculoskeletal: Normal range of motion  General: No tenderness  Lymphadenopathy:      Cervical: No cervical adenopathy  Skin:     General: Skin is warm and dry  Findings: No erythema or rash  Neurological:      Mental Status: She is alert and oriented to person, place, and time  Cranial Nerves: No cranial nerve deficit  Psychiatric:         Behavior: Behavior normal          Results:   I reviewed her genetic results  The patient has not been provided a copy of that report  We did that for her  Discussion/Summary:   Had a long discussion with the patient lasting approximately 45 minutes reviewing the screening options  I explained an MRI would be recommended in addition to the annual mammography as a minimum this would be done in concert with clinical breast exams twice a year  We talked about chemoprevention though this is cell then used in the scenario  And we talked about prophylactic surgery  In addition of this we talked about different types of reconstruction time required off work  We also talked about the possibility of developing a cancer while in a screening regimen which may require more time off then prophylactic surgery which was a concern of hers  Our plan at this point is for her to see gynecologic oncology to address her ovarian follow-up/treatment, see a plastic surgeon for educational session, obtain an MRI of her breast to clear them, and to see Genetics counselor for further discussion and questions  The patient was instructed to call me with any questions or concerns otherwise we would see her back in 6 months for further planning  Also explained that if she wanted to move that appointment up she is welcome to do so  Advance Care Planning/Advance Directives:  I discussed the disease status, treatment plans and follow-up with the patient

## 2020-09-30 NOTE — TELEPHONE ENCOUNTER
New Patient Encounter    New Patient Intake Form   Patient Details:  Isis Taylor  1975  [id number]    Background Information:  [address] starts by opening a telephone encounter and gathering the following information   Who is calling to schedule? If not self, relationship to patient? provider   Referring Provider Keila Song   What is the diagnosis? BRAC1 gene   Is this diagnosis confirmed? Yes   When was the diagnosis? na   Is there a confirmed diagnosis from a biopsy/tissue reviewed by pathology? na   Were outside slides requested? NA   Is patient aware of diagnosis? Yes   Is there a personal history and what kind? na   Is there a family history and what kind? na   Reason for visit? New Diagnosis   Have you had any imaging or labs done? If so: when, where? yes  [facility]   Are records in [place]? yes   If patient has a prior history of breast cancer were old records obtained? NA   Was the patient told to bring a disk? no   Does the patient smoke or Vape? no   If yes, how many packs or cartridges per day? na   Scheduling Information:   Preferred [place]: [facility]     Are there any dates/time the patient cannot be seen? na   Miscellaneous: na   After completing the above information, please route to Financial Counselor and the appropriate Nurse Navigator for review

## 2020-10-07 ENCOUNTER — TELEPHONE (OUTPATIENT)
Dept: HEMATOLOGY ONCOLOGY | Facility: CLINIC | Age: 45
End: 2020-10-07

## 2020-10-18 ENCOUNTER — HOSPITAL ENCOUNTER (OUTPATIENT)
Dept: RADIOLOGY | Facility: HOSPITAL | Age: 45
Discharge: HOME | End: 2020-10-18
Attending: SURGERY
Payer: COMMERCIAL

## 2020-10-18 DIAGNOSIS — Z15.01 BRCA1 GENE MUTATION POSITIVE: ICD-10-CM

## 2020-10-18 DIAGNOSIS — Z15.09 BRCA1 GENE MUTATION POSITIVE: ICD-10-CM

## 2020-10-18 PROCEDURE — 77049 MRI BREAST C-+ W/CAD BI: CPT

## 2020-10-18 PROCEDURE — G1004 CDSM NDSC: HCPCS

## 2020-10-18 PROCEDURE — C8908 MRI W/O FOL W/CONT, BREAST,: HCPCS

## 2020-10-18 PROCEDURE — A9585 GADOBUTROL INJECTION: HCPCS | Performed by: SURGERY

## 2020-10-18 RX ADMIN — GADOBUTROL 10 ML: 604.72 INJECTION INTRAVENOUS at 14:45

## 2020-10-19 ENCOUNTER — TELEPHONE (OUTPATIENT)
Dept: GYNECOLOGIC ONCOLOGY | Facility: CLINIC | Age: 45
End: 2020-10-19

## 2020-10-22 ENCOUNTER — CONSULT (OUTPATIENT)
Dept: GYNECOLOGIC ONCOLOGY | Facility: CLINIC | Age: 45
End: 2020-10-22
Payer: COMMERCIAL

## 2020-10-22 VITALS
HEIGHT: 67 IN | SYSTOLIC BLOOD PRESSURE: 118 MMHG | BODY MASS INDEX: 36.41 KG/M2 | WEIGHT: 232 LBS | TEMPERATURE: 98.5 F | RESPIRATION RATE: 18 BRPM | HEART RATE: 82 BPM | DIASTOLIC BLOOD PRESSURE: 70 MMHG

## 2020-10-22 DIAGNOSIS — Z15.01 BRCA1 GENE MUTATION POSITIVE: ICD-10-CM

## 2020-10-22 DIAGNOSIS — Z15.09 BRCA1 GENE MUTATION POSITIVE: ICD-10-CM

## 2020-10-22 PROCEDURE — 88175 CYTOPATH C/V AUTO FLUID REDO: CPT | Performed by: OBSTETRICS & GYNECOLOGY

## 2020-10-22 PROCEDURE — 87624 HPV HI-RISK TYP POOLED RSLT: CPT | Performed by: OBSTETRICS & GYNECOLOGY

## 2020-10-22 PROCEDURE — 99245 OFF/OP CONSLTJ NEW/EST HI 55: CPT | Performed by: OBSTETRICS & GYNECOLOGY

## 2020-10-22 RX ORDER — CLINDAMYCIN PHOSPHATE 900 MG/50ML
900 INJECTION INTRAVENOUS ONCE
Status: CANCELLED | OUTPATIENT
Start: 2020-11-27 | End: 2020-10-22

## 2020-10-22 RX ORDER — GABAPENTIN 100 MG/1
100 CAPSULE ORAL ONCE
Status: CANCELLED | OUTPATIENT
Start: 2020-10-22 | End: 2020-10-22

## 2020-10-22 RX ORDER — HEPARIN SODIUM 5000 [USP'U]/ML
5000 INJECTION, SOLUTION INTRAVENOUS; SUBCUTANEOUS
Status: CANCELLED | OUTPATIENT
Start: 2020-10-22 | End: 2020-10-23

## 2020-10-22 RX ORDER — SODIUM CHLORIDE, SODIUM LACTATE, POTASSIUM CHLORIDE, CALCIUM CHLORIDE 600; 310; 30; 20 MG/100ML; MG/100ML; MG/100ML; MG/100ML
125 INJECTION, SOLUTION INTRAVENOUS CONTINUOUS
Status: CANCELLED | OUTPATIENT
Start: 2020-10-22

## 2020-10-22 RX ORDER — ACETAMINOPHEN 325 MG/1
975 TABLET ORAL ONCE
Status: CANCELLED | OUTPATIENT
Start: 2020-10-22 | End: 2020-10-22

## 2020-10-26 LAB
HPV HR 12 DNA CVX QL NAA+PROBE: NEGATIVE
HPV16 DNA CVX QL NAA+PROBE: NEGATIVE
HPV18 DNA CVX QL NAA+PROBE: NEGATIVE

## 2020-10-29 ENCOUNTER — LAB REQUISITION (OUTPATIENT)
Dept: LAB | Facility: HOSPITAL | Age: 45
End: 2020-10-29
Payer: COMMERCIAL

## 2020-10-29 ENCOUNTER — LAB (OUTPATIENT)
Dept: LAB | Facility: CLINIC | Age: 45
End: 2020-10-29
Payer: COMMERCIAL

## 2020-10-29 ENCOUNTER — HOSPITAL ENCOUNTER (OUTPATIENT)
Dept: RADIOLOGY | Facility: HOSPITAL | Age: 45
Discharge: HOME | End: 2020-10-29
Attending: OBSTETRICS & GYNECOLOGY
Payer: COMMERCIAL

## 2020-10-29 ENCOUNTER — OFFICE VISIT (OUTPATIENT)
Dept: LAB | Facility: CLINIC | Age: 45
End: 2020-10-29
Payer: COMMERCIAL

## 2020-10-29 ENCOUNTER — HOSPITAL ENCOUNTER (OUTPATIENT)
Dept: ULTRASOUND IMAGING | Facility: HOSPITAL | Age: 45
Discharge: HOME | End: 2020-10-29
Attending: OBSTETRICS & GYNECOLOGY
Payer: COMMERCIAL

## 2020-10-29 ENCOUNTER — TRANSCRIBE ORDERS (OUTPATIENT)
Dept: LAB | Facility: CLINIC | Age: 45
End: 2020-10-29

## 2020-10-29 DIAGNOSIS — Z15.01 BRCA1 GENE MUTATION POSITIVE: ICD-10-CM

## 2020-10-29 DIAGNOSIS — Z15.09 BRCA1 GENE MUTATION POSITIVE: ICD-10-CM

## 2020-10-29 DIAGNOSIS — Z01.818 OTHER SPECIFIED PRE-OPERATIVE EXAMINATION: ICD-10-CM

## 2020-10-29 DIAGNOSIS — Z01.818 ENCOUNTER FOR OTHER PREPROCEDURAL EXAMINATION: ICD-10-CM

## 2020-10-29 DIAGNOSIS — Z01.818 OTHER SPECIFIED PRE-OPERATIVE EXAMINATION: Primary | ICD-10-CM

## 2020-10-29 LAB
ANION GAP SERPL CALCULATED.3IONS-SCNC: 10 MMOL/L (ref 4–13)
APTT PPP: 29 SECONDS (ref 23–37)
ATRIAL RATE: 62 BPM
BUN SERPL-MCNC: 8 MG/DL (ref 5–25)
CALCIUM SERPL-MCNC: 8.8 MG/DL (ref 8.3–10.1)
CANCER AG125 SERPL-ACNC: 9.2 U/ML (ref 0–30)
CHLORIDE SERPL-SCNC: 107 MMOL/L (ref 100–108)
CO2 SERPL-SCNC: 23 MMOL/L (ref 21–32)
CREAT SERPL-MCNC: 0.6 MG/DL (ref 0.6–1.3)
ERYTHROCYTE [DISTWIDTH] IN BLOOD BY AUTOMATED COUNT: 12.5 % (ref 11.6–15.1)
GFR SERPL CREATININE-BSD FRML MDRD: 112 ML/MIN/1.73SQ M
GLUCOSE SERPL-MCNC: 89 MG/DL (ref 65–140)
HCT VFR BLD AUTO: 43.1 % (ref 34.8–46.1)
HGB BLD-MCNC: 14 G/DL (ref 11.5–15.4)
INR PPP: 0.92 (ref 0.84–1.19)
MCH RBC QN AUTO: 31.2 PG (ref 26.8–34.3)
MCHC RBC AUTO-ENTMCNC: 32.5 G/DL (ref 31.4–37.4)
MCV RBC AUTO: 96 FL (ref 82–98)
P AXIS: 43 DEGREES
PLATELET # BLD AUTO: 311 THOUSANDS/UL (ref 149–390)
PMV BLD AUTO: 11.5 FL (ref 8.9–12.7)
POTASSIUM SERPL-SCNC: 3.5 MMOL/L (ref 3.5–5.3)
PR INTERVAL: 152 MS
PROTHROMBIN TIME: 12.5 SECONDS (ref 11.6–14.5)
QRS AXIS: 64 DEGREES
QRSD INTERVAL: 90 MS
QT INTERVAL: 424 MS
QTC INTERVAL: 430 MS
RBC # BLD AUTO: 4.49 MILLION/UL (ref 3.81–5.12)
SODIUM SERPL-SCNC: 140 MMOL/L (ref 136–145)
T WAVE AXIS: 46 DEGREES
VENTRICULAR RATE: 62 BPM
WBC # BLD AUTO: 7.81 THOUSAND/UL (ref 4.31–10.16)

## 2020-10-29 PROCEDURE — 83036 HEMOGLOBIN GLYCOSYLATED A1C: CPT

## 2020-10-29 PROCEDURE — 86901 BLOOD TYPING SEROLOGIC RH(D): CPT | Performed by: OBSTETRICS & GYNECOLOGY

## 2020-10-29 PROCEDURE — 85610 PROTHROMBIN TIME: CPT

## 2020-10-29 PROCEDURE — 71046 X-RAY EXAM CHEST 2 VIEWS: CPT

## 2020-10-29 PROCEDURE — 85730 THROMBOPLASTIN TIME PARTIAL: CPT

## 2020-10-29 PROCEDURE — 36415 COLL VENOUS BLD VENIPUNCTURE: CPT

## 2020-10-29 PROCEDURE — 85027 COMPLETE CBC AUTOMATED: CPT

## 2020-10-29 PROCEDURE — 76856 US EXAM PELVIC COMPLETE: CPT

## 2020-10-29 PROCEDURE — 86304 IMMUNOASSAY TUMOR CA 125: CPT

## 2020-10-29 PROCEDURE — 76830 TRANSVAGINAL US NON-OB: CPT

## 2020-10-29 PROCEDURE — 86850 RBC ANTIBODY SCREEN: CPT | Performed by: OBSTETRICS & GYNECOLOGY

## 2020-10-29 PROCEDURE — 93005 ELECTROCARDIOGRAM TRACING: CPT

## 2020-10-29 PROCEDURE — 86900 BLOOD TYPING SEROLOGIC ABO: CPT | Performed by: OBSTETRICS & GYNECOLOGY

## 2020-10-29 PROCEDURE — 93010 ELECTROCARDIOGRAM REPORT: CPT | Performed by: INTERNAL MEDICINE

## 2020-10-29 PROCEDURE — 80048 BASIC METABOLIC PNL TOTAL CA: CPT

## 2020-10-30 LAB
EST. AVERAGE GLUCOSE BLD GHB EST-MCNC: 97 MG/DL
HBA1C MFR BLD: 5 %

## 2020-11-02 LAB
LAB AP GYN PRIMARY INTERPRETATION: NORMAL
Lab: NORMAL

## 2020-11-24 ENCOUNTER — TELEPHONE (OUTPATIENT)
Dept: SURGICAL ONCOLOGY | Facility: CLINIC | Age: 45
End: 2020-11-24

## 2020-11-25 LAB
ABO GROUP BLD: NORMAL
BLD GP AB SCN SERPL QL: NEGATIVE
RH BLD: POSITIVE
SPECIMEN EXPIRATION DATE: NORMAL

## 2020-11-26 ENCOUNTER — ANESTHESIA EVENT (OUTPATIENT)
Dept: PERIOP | Facility: HOSPITAL | Age: 45
End: 2020-11-26
Payer: COMMERCIAL

## 2020-11-27 ENCOUNTER — ANESTHESIA (OUTPATIENT)
Dept: PERIOP | Facility: HOSPITAL | Age: 45
End: 2020-11-27
Payer: COMMERCIAL

## 2020-11-27 ENCOUNTER — HOSPITAL ENCOUNTER (OUTPATIENT)
Facility: HOSPITAL | Age: 45
Setting detail: OUTPATIENT SURGERY
Discharge: HOME | End: 2020-11-27
Attending: OBSTETRICS & GYNECOLOGY | Admitting: OBSTETRICS & GYNECOLOGY
Payer: COMMERCIAL

## 2020-11-27 VITALS
DIASTOLIC BLOOD PRESSURE: 56 MMHG | RESPIRATION RATE: 16 BRPM | OXYGEN SATURATION: 97 % | WEIGHT: 232 LBS | SYSTOLIC BLOOD PRESSURE: 116 MMHG | BODY MASS INDEX: 36.41 KG/M2 | TEMPERATURE: 97.8 F | HEART RATE: 76 BPM | HEIGHT: 67 IN

## 2020-11-27 VITALS — HEART RATE: 100 BPM

## 2020-11-27 DIAGNOSIS — Z15.09 BRCA1 GENE MUTATION POSITIVE: Primary | ICD-10-CM

## 2020-11-27 DIAGNOSIS — Z15.01 BRCA1 GENE MUTATION POSITIVE: Primary | ICD-10-CM

## 2020-11-27 LAB
ABO GROUP BLD: NORMAL
EXT PREGNANCY TEST URINE: NEGATIVE
EXT. CONTROL: NORMAL
GLUCOSE SERPL-MCNC: 102 MG/DL (ref 65–140)
RH BLD: POSITIVE

## 2020-11-27 PROCEDURE — 88112 CYTOPATH CELL ENHANCE TECH: CPT | Performed by: PATHOLOGY

## 2020-11-27 PROCEDURE — 88307 TISSUE EXAM BY PATHOLOGIST: CPT | Performed by: PATHOLOGY

## 2020-11-27 PROCEDURE — 99024 POSTOP FOLLOW-UP VISIT: CPT | Performed by: OBSTETRICS & GYNECOLOGY

## 2020-11-27 PROCEDURE — 81025 URINE PREGNANCY TEST: CPT | Performed by: OBSTETRICS & GYNECOLOGY

## 2020-11-27 PROCEDURE — 82948 REAGENT STRIP/BLOOD GLUCOSE: CPT

## 2020-11-27 PROCEDURE — 88305 TISSUE EXAM BY PATHOLOGIST: CPT | Performed by: PATHOLOGY

## 2020-11-27 PROCEDURE — 58571 TLH W/T/O 250 G OR LESS: CPT | Performed by: OBSTETRICS & GYNECOLOGY

## 2020-11-27 RX ORDER — OXYCODONE HYDROCHLORIDE 5 MG/1
5 TABLET ORAL EVERY 4 HOURS PRN
Status: DISCONTINUED | OUTPATIENT
Start: 2020-11-27 | End: 2020-11-27 | Stop reason: HOSPADM

## 2020-11-27 RX ORDER — ALBUTEROL SULFATE 90 UG/1
2 AEROSOL, METERED RESPIRATORY (INHALATION) EVERY 6 HOURS PRN
Status: DISCONTINUED | OUTPATIENT
Start: 2020-11-27 | End: 2020-11-27 | Stop reason: HOSPADM

## 2020-11-27 RX ORDER — ACETAMINOPHEN 325 MG/1
975 TABLET ORAL ONCE
Status: COMPLETED | OUTPATIENT
Start: 2020-11-27 | End: 2020-11-27

## 2020-11-27 RX ORDER — CEFAZOLIN SODIUM 2 G/50ML
2000 SOLUTION INTRAVENOUS ONCE
Status: COMPLETED | OUTPATIENT
Start: 2020-11-27 | End: 2020-11-27

## 2020-11-27 RX ORDER — GABAPENTIN 100 MG/1
100 CAPSULE ORAL ONCE
Status: COMPLETED | OUTPATIENT
Start: 2020-11-27 | End: 2020-11-27

## 2020-11-27 RX ORDER — IBUPROFEN 200 MG
600 TABLET ORAL EVERY 6 HOURS PRN
Refills: 0
Start: 2020-11-27 | End: 2021-01-14 | Stop reason: HOSPADM

## 2020-11-27 RX ORDER — LIDOCAINE HYDROCHLORIDE 10 MG/ML
INJECTION, SOLUTION EPIDURAL; INFILTRATION; INTRACAUDAL; PERINEURAL AS NEEDED
Status: DISCONTINUED | OUTPATIENT
Start: 2020-11-27 | End: 2020-11-27

## 2020-11-27 RX ORDER — IBUPROFEN 600 MG/1
600 TABLET ORAL EVERY 6 HOURS PRN
Status: DISCONTINUED | OUTPATIENT
Start: 2020-11-27 | End: 2020-11-27 | Stop reason: HOSPADM

## 2020-11-27 RX ORDER — ONDANSETRON 2 MG/ML
4 INJECTION INTRAMUSCULAR; INTRAVENOUS ONCE AS NEEDED
Status: DISCONTINUED | OUTPATIENT
Start: 2020-11-27 | End: 2020-11-27 | Stop reason: HOSPADM

## 2020-11-27 RX ORDER — HYDROMORPHONE HCL 110MG/55ML
PATIENT CONTROLLED ANALGESIA SYRINGE INTRAVENOUS AS NEEDED
Status: DISCONTINUED | OUTPATIENT
Start: 2020-11-27 | End: 2020-11-27

## 2020-11-27 RX ORDER — SODIUM CHLORIDE, SODIUM LACTATE, POTASSIUM CHLORIDE, CALCIUM CHLORIDE 600; 310; 30; 20 MG/100ML; MG/100ML; MG/100ML; MG/100ML
125 INJECTION, SOLUTION INTRAVENOUS CONTINUOUS
Status: DISCONTINUED | OUTPATIENT
Start: 2020-11-27 | End: 2020-11-27

## 2020-11-27 RX ORDER — CLINDAMYCIN PHOSPHATE 900 MG/50ML
900 INJECTION INTRAVENOUS ONCE
Status: DISCONTINUED | OUTPATIENT
Start: 2020-11-27 | End: 2020-11-27

## 2020-11-27 RX ORDER — METOCLOPRAMIDE HYDROCHLORIDE 5 MG/ML
INJECTION INTRAMUSCULAR; INTRAVENOUS AS NEEDED
Status: DISCONTINUED | OUTPATIENT
Start: 2020-11-27 | End: 2020-11-27

## 2020-11-27 RX ORDER — CEFAZOLIN SODIUM 2 G/50ML
2000 SOLUTION INTRAVENOUS ONCE
Status: CANCELLED | OUTPATIENT
Start: 2020-11-27

## 2020-11-27 RX ORDER — MIDAZOLAM HYDROCHLORIDE 2 MG/2ML
INJECTION, SOLUTION INTRAMUSCULAR; INTRAVENOUS AS NEEDED
Status: DISCONTINUED | OUTPATIENT
Start: 2020-11-27 | End: 2020-11-27

## 2020-11-27 RX ORDER — ACETAMINOPHEN 325 MG/1
650 TABLET ORAL EVERY 6 HOURS PRN
Status: DISCONTINUED | OUTPATIENT
Start: 2020-11-27 | End: 2020-11-27 | Stop reason: HOSPADM

## 2020-11-27 RX ORDER — FENTANYL CITRATE 50 UG/ML
INJECTION, SOLUTION INTRAMUSCULAR; INTRAVENOUS AS NEEDED
Status: DISCONTINUED | OUTPATIENT
Start: 2020-11-27 | End: 2020-11-27

## 2020-11-27 RX ORDER — ONDANSETRON 2 MG/ML
INJECTION INTRAMUSCULAR; INTRAVENOUS AS NEEDED
Status: DISCONTINUED | OUTPATIENT
Start: 2020-11-27 | End: 2020-11-27

## 2020-11-27 RX ORDER — GLYCOPYRROLATE 0.2 MG/ML
INJECTION INTRAMUSCULAR; INTRAVENOUS AS NEEDED
Status: DISCONTINUED | OUTPATIENT
Start: 2020-11-27 | End: 2020-11-27

## 2020-11-27 RX ORDER — ROCURONIUM BROMIDE 10 MG/ML
INJECTION, SOLUTION INTRAVENOUS AS NEEDED
Status: DISCONTINUED | OUTPATIENT
Start: 2020-11-27 | End: 2020-11-27

## 2020-11-27 RX ORDER — KETOROLAC TROMETHAMINE 30 MG/ML
INJECTION, SOLUTION INTRAMUSCULAR; INTRAVENOUS AS NEEDED
Status: DISCONTINUED | OUTPATIENT
Start: 2020-11-27 | End: 2020-11-27

## 2020-11-27 RX ORDER — ONDANSETRON 2 MG/ML
4 INJECTION INTRAMUSCULAR; INTRAVENOUS EVERY 6 HOURS PRN
Status: DISCONTINUED | OUTPATIENT
Start: 2020-11-27 | End: 2020-11-27 | Stop reason: HOSPADM

## 2020-11-27 RX ORDER — NEOSTIGMINE METHYLSULFATE 1 MG/ML
INJECTION INTRAVENOUS AS NEEDED
Status: DISCONTINUED | OUTPATIENT
Start: 2020-11-27 | End: 2020-11-27

## 2020-11-27 RX ORDER — ACETAMINOPHEN 325 MG/1
650 TABLET ORAL EVERY 6 HOURS PRN
Refills: 0
Start: 2020-11-27 | End: 2021-01-14 | Stop reason: HOSPADM

## 2020-11-27 RX ORDER — LIDOCAINE HYDROCHLORIDE 10 MG/ML
0.5 INJECTION, SOLUTION EPIDURAL; INFILTRATION; INTRACAUDAL; PERINEURAL ONCE AS NEEDED
Status: COMPLETED | OUTPATIENT
Start: 2020-11-27 | End: 2020-11-27

## 2020-11-27 RX ORDER — PROPOFOL 10 MG/ML
INJECTION, EMULSION INTRAVENOUS AS NEEDED
Status: DISCONTINUED | OUTPATIENT
Start: 2020-11-27 | End: 2020-11-27

## 2020-11-27 RX ORDER — OXYCODONE HYDROCHLORIDE 5 MG/1
10 TABLET ORAL EVERY 4 HOURS PRN
Status: DISCONTINUED | OUTPATIENT
Start: 2020-11-27 | End: 2020-11-27 | Stop reason: HOSPADM

## 2020-11-27 RX ORDER — SCOLOPAMINE TRANSDERMAL SYSTEM 1 MG/1
1 PATCH, EXTENDED RELEASE TRANSDERMAL ONCE
Status: DISCONTINUED | OUTPATIENT
Start: 2020-11-27 | End: 2020-11-27

## 2020-11-27 RX ORDER — BUPIVACAINE HYDROCHLORIDE 2.5 MG/ML
INJECTION, SOLUTION EPIDURAL; INFILTRATION; INTRACAUDAL AS NEEDED
Status: DISCONTINUED | OUTPATIENT
Start: 2020-11-27 | End: 2020-11-27 | Stop reason: HOSPADM

## 2020-11-27 RX ORDER — DEXAMETHASONE SODIUM PHOSPHATE 10 MG/ML
INJECTION, SOLUTION INTRAMUSCULAR; INTRAVENOUS AS NEEDED
Status: DISCONTINUED | OUTPATIENT
Start: 2020-11-27 | End: 2020-11-27

## 2020-11-27 RX ORDER — FENTANYL CITRATE/PF 50 MCG/ML
25 SYRINGE (ML) INJECTION
Status: DISCONTINUED | OUTPATIENT
Start: 2020-11-27 | End: 2020-11-27 | Stop reason: HOSPADM

## 2020-11-27 RX ORDER — HEPARIN SODIUM 5000 [USP'U]/ML
5000 INJECTION, SOLUTION INTRAVENOUS; SUBCUTANEOUS
Status: COMPLETED | OUTPATIENT
Start: 2020-11-27 | End: 2020-11-27

## 2020-11-27 RX ADMIN — SODIUM CHLORIDE, SODIUM LACTATE, POTASSIUM CHLORIDE, AND CALCIUM CHLORIDE 125 ML/HR: .6; .31; .03; .02 INJECTION, SOLUTION INTRAVENOUS at 07:29

## 2020-11-27 RX ADMIN — LIDOCAINE HYDROCHLORIDE 0.5 ML: 10 INJECTION, SOLUTION EPIDURAL; INFILTRATION; INTRACAUDAL; PERINEURAL at 07:30

## 2020-11-27 RX ADMIN — FENTANYL CITRATE 50 MCG: 50 INJECTION INTRAMUSCULAR; INTRAVENOUS at 09:59

## 2020-11-27 RX ADMIN — NEOSTIGMINE METHYLSULFATE 4 MG: 1 INJECTION INTRAVENOUS at 11:31

## 2020-11-27 RX ADMIN — CEFAZOLIN SODIUM 2000 MG: 2 SOLUTION INTRAVENOUS at 09:25

## 2020-11-27 RX ADMIN — SCOPALAMINE 1 PATCH: 1 PATCH, EXTENDED RELEASE TRANSDERMAL at 07:09

## 2020-11-27 RX ADMIN — GLYCOPYRROLATE 0.2 MG: 0.2 INJECTION, SOLUTION INTRAMUSCULAR; INTRAVENOUS at 09:59

## 2020-11-27 RX ADMIN — ACETAMINOPHEN 975 MG: 325 TABLET, FILM COATED ORAL at 07:09

## 2020-11-27 RX ADMIN — SODIUM CHLORIDE, SODIUM LACTATE, POTASSIUM CHLORIDE, AND CALCIUM CHLORIDE: .6; .31; .03; .02 INJECTION, SOLUTION INTRAVENOUS at 10:51

## 2020-11-27 RX ADMIN — HEPARIN SODIUM 5000 UNITS: 5000 INJECTION INTRAVENOUS; SUBCUTANEOUS at 08:00

## 2020-11-27 RX ADMIN — DEXAMETHASONE SODIUM PHOSPHATE 10 MG: 10 INJECTION, SOLUTION INTRAMUSCULAR; INTRAVENOUS at 09:47

## 2020-11-27 RX ADMIN — ONDANSETRON 4 MG: 2 INJECTION INTRAMUSCULAR; INTRAVENOUS at 11:31

## 2020-11-27 RX ADMIN — KETOROLAC TROMETHAMINE 15 MG: 30 INJECTION, SOLUTION INTRAMUSCULAR at 11:31

## 2020-11-27 RX ADMIN — MIDAZOLAM 2 MG: 1 INJECTION INTRAMUSCULAR; INTRAVENOUS at 09:25

## 2020-11-27 RX ADMIN — FENTANYL CITRATE 50 MCG: 50 INJECTION INTRAMUSCULAR; INTRAVENOUS at 09:35

## 2020-11-27 RX ADMIN — HYDROMORPHONE HYDROCHLORIDE 0.5 MG: 2 INJECTION, SOLUTION INTRAMUSCULAR; INTRAVENOUS; SUBCUTANEOUS at 11:16

## 2020-11-27 RX ADMIN — HYDROMORPHONE HYDROCHLORIDE 0.5 MG: 2 INJECTION, SOLUTION INTRAMUSCULAR; INTRAVENOUS; SUBCUTANEOUS at 11:27

## 2020-11-27 RX ADMIN — LIDOCAINE HYDROCHLORIDE 50 MG: 10 INJECTION, SOLUTION EPIDURAL; INFILTRATION; INTRACAUDAL; PERINEURAL at 09:35

## 2020-11-27 RX ADMIN — GLYCOPYRROLATE 0.6 MG: 0.2 INJECTION, SOLUTION INTRAMUSCULAR; INTRAVENOUS at 11:31

## 2020-11-27 RX ADMIN — ROCURONIUM BROMIDE 10 MG: 50 INJECTION, SOLUTION INTRAVENOUS at 10:19

## 2020-11-27 RX ADMIN — ROCURONIUM BROMIDE 5 MG: 50 INJECTION, SOLUTION INTRAVENOUS at 11:15

## 2020-11-27 RX ADMIN — PROPOFOL 180 MG: 10 INJECTION, EMULSION INTRAVENOUS at 09:35

## 2020-11-27 RX ADMIN — GABAPENTIN 100 MG: 100 CAPSULE ORAL at 07:09

## 2020-11-27 RX ADMIN — ROCURONIUM BROMIDE 10 MG: 50 INJECTION, SOLUTION INTRAVENOUS at 11:02

## 2020-11-27 RX ADMIN — METOCLOPRAMIDE 10 MG: 5 INJECTION, SOLUTION INTRAMUSCULAR; INTRAVENOUS at 09:57

## 2020-11-27 RX ADMIN — ROCURONIUM BROMIDE 10 MG: 50 INJECTION, SOLUTION INTRAVENOUS at 10:39

## 2020-11-27 RX ADMIN — ROCURONIUM BROMIDE 50 MG: 50 INJECTION, SOLUTION INTRAVENOUS at 09:36

## 2020-12-02 DIAGNOSIS — E89.41 SYMPTOMATIC POSTSURGICAL MENOPAUSE: Primary | ICD-10-CM

## 2020-12-02 RX ORDER — ESTRADIOL 0.04 MG/D
1 FILM, EXTENDED RELEASE TRANSDERMAL 2 TIMES WEEKLY
Qty: 8 PATCH | Refills: 3 | Status: SHIPPED | OUTPATIENT
Start: 2020-12-03 | End: 2020-12-04 | Stop reason: SDUPTHER

## 2020-12-04 ENCOUNTER — TELEPHONE (OUTPATIENT)
Dept: GYNECOLOGIC ONCOLOGY | Facility: CLINIC | Age: 45
End: 2020-12-04

## 2020-12-04 DIAGNOSIS — E89.41 SYMPTOMATIC POSTSURGICAL MENOPAUSE: ICD-10-CM

## 2020-12-04 RX ORDER — ESTRADIOL 0.04 MG/D
1 FILM, EXTENDED RELEASE TRANSDERMAL 2 TIMES WEEKLY
Qty: 8 PATCH | Refills: 3 | Status: SHIPPED | OUTPATIENT
Start: 2020-12-07 | End: 2020-12-17 | Stop reason: ALTCHOICE

## 2020-12-17 ENCOUNTER — TELEMEDICINE (OUTPATIENT)
Dept: GYNECOLOGIC ONCOLOGY | Facility: CLINIC | Age: 45
End: 2020-12-17

## 2020-12-17 DIAGNOSIS — Z15.01 BRCA1 GENE MUTATION POSITIVE: ICD-10-CM

## 2020-12-17 DIAGNOSIS — E89.41 SYMPTOMATIC POSTSURGICAL MENOPAUSE: Primary | ICD-10-CM

## 2020-12-17 DIAGNOSIS — Z15.09 BRCA1 GENE MUTATION POSITIVE: ICD-10-CM

## 2020-12-17 PROCEDURE — 99024 POSTOP FOLLOW-UP VISIT: CPT | Performed by: OBSTETRICS & GYNECOLOGY

## 2020-12-17 RX ORDER — ESTRADIOL 0.05 MG/D
1 FILM, EXTENDED RELEASE TRANSDERMAL 2 TIMES WEEKLY
Qty: 8 PATCH | Refills: 5 | Status: SHIPPED | OUTPATIENT
Start: 2020-12-17 | End: 2021-02-10 | Stop reason: HOSPADM

## 2021-01-13 ENCOUNTER — OFFICE VISIT (OUTPATIENT)
Dept: SURGICAL ONCOLOGY | Facility: CLINIC | Age: 46
End: 2021-01-13
Payer: COMMERCIAL

## 2021-01-13 VITALS
SYSTOLIC BLOOD PRESSURE: 120 MMHG | DIASTOLIC BLOOD PRESSURE: 76 MMHG | RESPIRATION RATE: 18 BRPM | HEART RATE: 74 BPM | TEMPERATURE: 98.5 F | BODY MASS INDEX: 37.35 KG/M2 | HEIGHT: 67 IN | WEIGHT: 238 LBS

## 2021-01-13 DIAGNOSIS — Z01.818 PREOP EXAMINATION: Primary | ICD-10-CM

## 2021-01-13 DIAGNOSIS — Z01.818 PREOPERATIVE TESTING: ICD-10-CM

## 2021-01-13 DIAGNOSIS — Z15.01 BRCA1 GENE MUTATION POSITIVE: ICD-10-CM

## 2021-01-13 DIAGNOSIS — Z15.09 BRCA1 GENE MUTATION POSITIVE: ICD-10-CM

## 2021-01-13 PROCEDURE — 99214 OFFICE O/P EST MOD 30 MIN: CPT | Performed by: SURGERY

## 2021-01-13 NOTE — PROGRESS NOTES
Surgical Oncology Follow Up       [address]  [facility] [place]  [address]  [facility]  [place] [address]    Emely Sheikhrashel Taylor  1975  [id number]  [address] [facility]  [facility] [place]  [address] [facility] [place] [address]    No chief complaint on file  Assessment & Plan:   And presents today for a follow-up visit  She has had a prophylactic, laparoscopic hysterectomy and bilateral salpingo oophorectomy  She tolerated the surgery quite well  She is here undergo the process of informed consent for bilateral mastectomy for her BRCA 1 mutation  She has discussed this previously with Ce Hawley   This would be removing the nipple  I would not perform sentinel lymph node biopsies  The patient I went through the process of informed consent for this surgery  All questions were answered the patient's satisfaction  The surgery is currently scheduled for February 9th  Cancer History:     Oncology History    No history exists  Interval History:   The patient is anxious regarding her surgery  She has had MRI recently of the breast which showed no worrisome findings  Review of Systems:   Review of Systems   Constitutional: Negative for activity change, appetite change and fatigue  HENT: Negative  Eyes: Negative  Respiratory: Negative for cough, shortness of breath and wheezing  Cardiovascular: Negative for chest pain and leg swelling  Gastrointestinal: Negative  Endocrine: Negative  Genitourinary: Negative  Musculoskeletal:        No new changes or complaints of bone pain   Skin: Negative  Allergic/Immunologic: Negative  Neurological: Negative  Hematological: Negative  Psychiatric/Behavioral: Negative          Past Medical History     Patient Active Problem List   Diagnosis    Sinobronchitis    BRCA1 gene mutation positive    Symptomatic postsurgical menopause     Past Medical History:   Diagnosis Date    Pneumonia     PONV (postoperative nausea and vomiting)      Past Surgical History:   Procedure Laterality Date    APPENDECTOMY      DILATION AND CURETTAGE OF UTERUS  1997    HYSTERECTOMY N/A 11/27/2020    Procedure: TOTAL LAPAROSCOPIC HYSTERECTOMY, BILATERAL SALPINGO-OOPHORECTOMY, cystoscopy;  Surgeon: Boris Montiel MD;  [facility]: [facility];  Service: Gynecology Oncology    TONSILLECTOMY      TYMPANOSTOMY TUBE PLACEMENT Bilateral      Family History   Problem Relation Age of Onset    Cancer Father         Age at [address] unknown; type unknown    Cancer Maternal Grandfather     No Known Problems Maternal Aunt     No Known Problems Paternal Aunt     No Known Problems Paternal Aunt     Breast cancer Cousin 36    BRCA1 Positive Sister     No Known Problems Daughter     No Known Problems Sister     No Known Problems Daughter     BRCA1 Positive Maternal Uncle     No Known Problems Paternal Uncle     No Known Problems Paternal Aunt     No Known Problems Paternal Uncle     Ovarian cancer Other 46     Social History     Socioeconomic History    Marital status: /Civil Union     Spouse name: Not on file    Number of children: Not on file    Years of education: Not on file    Highest education level: Not on file   Occupational History    Not on file   Social Needs    Financial resource strain: Not on file    Food insecurity     Worry: Not on file     Inability: Not on file    Transportation needs     Medical: Not on file     Non-medical: Not on file   Tobacco Use    Smoking status: Never Smoker    Smokeless tobacco: Never Used   Substance and Sexual Activity    Alcohol use:  Yes     Alcohol/week: 1 0 standard drinks     Types: 1 Standard drinks or equivalent per week     Comment: social    Drug use: No    Sexual activity: Not on file   Lifestyle    Physical activity     Days per week: Not on file     Minutes per session: Not on file    Stress: Not on file   Relationships    Social connections     Talks on phone: Not on file     Gets together: Not on file     Attends [employer] service: Not on file     Active member of club or organization: Not on file     Attends meetings of clubs or organizations: Not on file     Relationship status: Not on file    Intimate partner violence     Fear of current or ex partner: Not on file     Emotionally abused: Not on file     Physically abused: Not on file     Forced sexual activity: Not on file   Other Topics Concern    Not on file   Social History Narrative    Not on file       Current Outpatient Medications:     acetaminophen (TYLENOL) 325 mg tablet, Take 2 tablets (650 mg total) by mouth every 6 (six) hours as needed for mild pain, Disp:  , Rfl: 0    albuterol (PROVENTIL HFA,VENTOLIN HFA) 90 mcg/act inhaler, Inhale 2 puffs every 6 (six) hours as needed for wheezing, Disp: , Rfl:     estradiol (VIVELLE-DOT) 0 05 MG/24HR, Place 1 patch on the skin 2 (two) times a week, Disp: 8 patch, Rfl: 5    ibuprofen (MOTRIN) 200 mg tablet, Take 3 tablets (600 mg total) by mouth every 6 (six) hours as needed for moderate pain or cramping, Disp: , Rfl: 0  Allergies   Allergen Reactions    Penicillins Rash     Childhood reaction; tolerated Ancef       Physical Exam:     Vitals:    01/13/21 1308   BP: 120/76   Pulse: 74   Resp: 18   Temp: 98 5 °F (36 9 °C)     Physical Exam  Vitals signs reviewed  Constitutional:       Appearance: She is well-developed  HENT:      Head: Normocephalic and atraumatic  Eyes:      Pupils: Pupils are equal, round, and reactive to light  Neck:      Musculoskeletal: Normal range of motion and neck supple  Thyroid: No thyromegaly  Vascular: No JVD  Trachea: No tracheal deviation  Cardiovascular:      Rate and Rhythm: Normal rate and regular rhythm  Heart sounds: Normal heart sounds  No murmur  No friction rub  No gallop      Pulmonary:      Effort: Pulmonary effort is normal  No respiratory distress  Breath sounds: Normal breath sounds  No wheezing or rales  Chest:      Comments: The right breast was examined in the sitting and supine position  There are no worrisome skin changes, tenderness, inverted nipple, nipple discharge, swelling, bleeding or evidence of a mass in any quadrant  Esther survey demonstrated no evidence of any clinically suspicious axillary, pectoral or paraclavicular lymph nodes  The left breast was examined in the sitting and supine position  There are no worrisome skin changes, tenderness, inverted nipple, nipple discharge, swelling, bleeding or evidence of a mass in any quadrant  Esther survey demonstrated no evidence of any clinically suspicious axillary, pectoral or paraclavicular lymph nodes  Abdominal:      General: There is no distension  Palpations: Abdomen is soft  There is no hepatomegaly or mass  Tenderness: There is no abdominal tenderness  There is no guarding or rebound  Musculoskeletal: Normal range of motion  General: No tenderness  Lymphadenopathy:      Cervical: No cervical adenopathy  Skin:     General: Skin is warm and dry  Findings: No erythema or rash  Neurological:      Mental Status: She is alert and oriented to person, place, and time  Cranial Nerves: No cranial nerve deficit  Psychiatric:         Behavior: Behavior normal            Results & Discussion:   The patient I as well as her  went through the description of the procedure as well as removing the nipples  I explained she could have nipple sparing surgery from my perspective however she informs me that this was not advised by Dr Zulma Alatorre       The patient and I underwent the process of consent for bilateral mastectomies    The complications outlined on the consent including relatively minor problems (wound infection, wound healing problems, hematomas, scarring, chronic discomfort/pain), moderate problems (injury to nerves or blood vessels, allergic reactions) and major complications (extensive blood loss requiring transfusions or possible addtional surgeries, cardiac arrest, stroke and possible death)  We also reviewed specific complications as outlined on the consent form including possible development of breast cancer in the future  All questions were answered to the patient's satisfaction  We will coordinate the surgery at our next mutual convience  Advance Care Planning/Advance Directives:  I discussed the disease status, treatment plans and follow-up with the patient

## 2021-01-13 NOTE — PATIENT INSTRUCTIONS
Pre-Surgery Instructions:   Medication Instructions    acetaminophen (TYLENOL) 325 mg tablet Take morning of surgery as needed    albuterol (PROVENTIL HFA,VENTOLIN HFA) 90 mcg/act inhaler Take morning of surgery as needed    estradiol (VIVELLE-DOT) 0 05 MG/24HR Take morning of surgery if that is when you normally take it    ibuprofen (MOTRIN) 200 mg tablet Stop taking 1 week prior to surgery           Mastectomy   AMBULATORY CARE:   What you need to know about a mastectomy:  A mastectomy is surgery to remove all or part of one or both breasts  Tissue, lymph nodes, or muscle near the breast may also be removed  A mastectomy may be done to treat breast cancer or prevent the cancer from spreading  The type of mastectomy used depends on the size of the tumor and if the cancer has spread  A mastectomy can also be done to prevent breast cancer  This may be a choice if you are at high risk for breast cancer  How to prepare for a mastectomy:   · Your surgeon will talk to you about how to prepare for surgery  He or she may tell you not to eat or drink anything after midnight on the day before your surgery  Arrange for someone to drive you home and stay with you after surgery  This person can help care for you and watch for complications from surgery  · Tell your surgeon about all medicines you currently take  He or she will tell you if you need to stop any medicine for the surgery, and when to stop  You may need to stop taking aspirin or blood thinners several days before surgery  He or she will tell you which medicines to take or not take on the day of surgery  · Tell your healthcare provider or surgeon about all your allergies, including allergic reactions to medicine, anesthesia, or antibiotics  What will happen during a mastectomy:   · You will be given general anesthesia to keep you asleep and free from pain during surgery  You may be given an antibiotic to help prevent a bacterial infection      · Your surgeon will make an incision over your breast  He or she will remove the tumor and breast tissue  The nipple and areola (area around the nipple) may be removed  Surgery that leaves these in place is called nipple-sparing mastectomy  Your surgeon may also remove muscle from behind your breast  If lymph nodes will be taken, a small incision will be made in your armpit  The lymph nodes will be removed and tested for cancer  · Your surgeon may leave extra skin if you are having reconstruction surgery  This surgery is called skin-sparing mastectomy  Reconstruction surgery helps make the breast look more natural  It is done right after the mastectomy  · One or more drains may be inserted near your incision  A drain removes extra fluid and helps your incision heal  Your surgeon will close your incision with stitches and cover it with a bandage  He or she may also wrap a tight-fitting bandage around both of your breasts  This may decrease swelling, bleeding, and pain  What to expect after a mastectomy:   · You will be helped to walk around after surgery to help prevent blood clots  · Healthcare providers will monitor you until you are awake  You may need to spend 1 to 2 nights in the hospital     · You may have trouble moving the arm closest to your mastectomy  This should get better in a few days  Risks of a mastectomy:  You may bleed more than expected or develop an infection  Nerves, blood vessels, and muscles may be damaged during your surgery  Blood or fluid may collect under your skin  You may need other procedures to remove the fluid or blood  You may have swelling in the arm closest to the mastectomy or where lymph nodes were removed  This swelling is called lymphedema  Lymphedema may cause tingling, numbness, stiffness, and weakness in your arm  This may be permanent  You may get a blood clot in your arm or leg  The blood clot may travel to your heart, lungs, or brain   This may become life-threatening  Call your local emergency number (911 in the [address]) for any of the following:   · You feel lightheaded, short of breath, and have chest pain  · You have trouble breathing  · You cough up blood  Seek care immediately if:   · Blood soaks through your bandage  · Your stitches come apart  · Your bruise suddenly gets bigger  · Your leg or arm is larger than usual and painful  Call your doctor or surgeon if:   · You have a fever or chills  · Your wound is red, swollen, or draining pus  · You have nausea or are vomiting  · Your skin is itchy, swollen, or you have a rash  · Your pain does not get better after you take pain medicine  · Your drain falls out or stops draining fluid  · Your drain has pus or foul-smelling fluid coming out of it  · You have numbness, tingling, or swelling in your arm or hand  · You feel very sad or anxious, or are having trouble coping with changes from the mastectomy  · You have questions or concerns about your condition or care  Medicines: You may need any of the following:  · Antibiotics  help prevent a bacterial infection  · Prescription pain medicine  may be given  Ask your healthcare provider how to take this medicine safely  Some prescription pain medicines contain acetaminophen  Do not take other medicines that contain acetaminophen without talking to your healthcare provider  Too much acetaminophen may cause liver damage  Prescription pain medicine may cause constipation  Ask your healthcare provider how to prevent or treat constipation  · NSAIDs , such as ibuprofen, help decrease swelling, pain, and fever  NSAIDs can cause stomach bleeding or kidney problems in certain people  If you take blood thinner medicine, always ask your healthcare provider if NSAIDs are safe for you  Always read the medicine label and follow directions  · Take your medicine as directed    Contact your healthcare provider if you think your medicine is not helping or if you have side effects  Tell him or her if you are allergic to any medicine  Keep a list of the medicines, vitamins, and herbs you take  Include the amounts, and when and why you take them  Bring the list or the pill bottles to follow-up visits  Carry your medicine list with you in case of an emergency  Care for your wound as directed: If you have a tight-fitting bandage, you can remove it in 24 to 48 hours, or as directed  Ask your healthcare provider when your incision can get wet  You may need to take a sponge bath until your drain is removed  Carefully wash around the incision with soap and water  It is okay to allow the soap and water to gently run over your incision  Gently pat dry the area and put on new, clean bandages as directed  Change your bandages when they get wet or dirty  If lymph nodes were removed from your armpit, ask your healthcare provider when you can wear deodorant  Check your incision every day for redness, pus, or swelling  Self-care:   · Apply ice  on your incision for 15 to 20 minutes every hour or as directed  Use an ice pack, or put crushed ice in a plastic bag  Cover it with a towel  Ice helps prevent tissue damage and decreases swelling and pain  · Elevate  your arm nearest to your incision above the level of your heart  Do this as often as you can  This will help decrease swelling and pain  Prop your arm on pillows or blankets to keep it elevated comfortably  · Rest  as directed  Do not lift anything heavier than 5 pounds  Do not push or pull with your arms  You can use your arms to groom, eat, and bathe  Take short walks around the house  Gradually walk further as you feel better  Ask your healthcare provider when you can return to your normal activities  · Do not sleep on your stomach  This will put too much pressure on your incision  Sleep on your back or on the opposite side of your incision  · Empty your drain  as directed   You may need to write down how much fluid you empty from your drain each day  Ask your healthcare provider for more information about how to empty your drain  · Wear a supportive bra  as directed  Wait until you remove the tight-fitting bandage to wear a bra  You may be given a surgical bra or told to wear a sports bra  A supportive bra may help hold your bandages in place  It may also help with swelling and pain  Do not  wear bras with lace or underwire  They may rub against your incision and cause discomfort  Arm stretches: Your healthcare provider may show you how to do arm stretches  Arm stretches may prevent stiff arms or shoulders  You may need to wait until after your drains are removed to begin stretching  Do not do arm stretches until your healthcare provider says it is okay  Ask your healthcare provider for more information about arm stretches  Follow up with your doctor or surgeon as directed:  Write down your questions so you remember to ask them during your visits  For support and more information:  You may have difficulty coping with the changes to your body  Talk to your family or friends about how you are feeling  Ask your healthcare provider about support groups  It may be helpful to talk with other women who have had a mastectomy  · [address] [place] [address]  Phone: [phone]  Web Address: http://[employer]/  org  · [address] [place] [address]  Phone: 3- 831 - [phone]  Web Address: http://[employer]/  [place] Information is for End User's use only and may not be sold, redistributed or otherwise used for commercial purposes  All illustrations and images included in CareNotes® are the copyrighted property of [employer]  or [address] [place] [address]  The above information is an  only   It is not intended as medical advice for individual conditions or treatments  Talk to your doctor, nurse or pharmacist before following any medical regimen to see if it is safe and effective for you  Olayinka-Smith Drain Care   AMBULATORY CARE:   A Olayinka-Smith (ELLE) drain  is used to remove fluids that build up in an area of your body after surgery  The ELLE drain is a bulb shaped device connected to a tube  One end of the tube is placed inside you during surgery  The other end comes out through a small cut in your skin  The bulb is connected to this end  You may have a stitch to hold the tube in place  Seek care immediately if:   · Your ELLE drain breaks or comes out  · You have cloudy yellow or brown drainage from your ELLE drain site, or the drainage smells bad  Contact your healthcare provider if:   · You drain less than 30 milliliters (2 tablespoons) in 24 hours  This may mean your drain can be removed  · You suddenly stop draining fluid or think your ELLE drain is blocked  · You have a fever higher than 101 5°F (38 6°C)  · You have increased pain, redness, or swelling around the drain site  · You have questions about your ELLE drain care  How a Olayinka-Smith drain works: The ELLE drain removes fluids by creating suction in the tube  The bulb is squeezed flat and connected to the tube that sticks out of your body  The bulb expands as it fills with fluid  How to change the bandage around your Olayinka-Smith drain:  If you have a bandage, change it once a day  You may need to change your bandage more than once a day if it gets completely wet  · Wash your hands with soap and water  · Loosen the tape and gently remove the old bandage  Throw the old bandage into a plastic trash bag  · Use soap and water or saline (salt water) solution to clean your ELLE drain site  Dip a cotton swab or gauze pad in the solution and gently clean your skin  · Pat the area dry  · Place a new bandage on your ELLE drain site and secure it to your skin with medical tape  · Wash your hands  How to empty the Olayinka-Smith drain:  Empty the bulb when it is half full or every 8 to 12 hours  · Wash your hands with soap and water  · Remove the plug from the bulb  · Pour the fluid into a measuring cup  · Clean the plug with an alcohol swab or a cotton ball dipped in rubbing alcohol  · Squeeze the bulb flat and put the plug back in  The bulb should stay flat until it starts to fill with fluid again  · Measure the amount of fluid you pour out  Write down how much fluid you empty from the ELLE drain and the date and time you collected it  · Flush the fluid down the toilet  Wash your hands  Clear clogged tubing: Use the following steps to clear your Olayinka-Smith tubing:  · Hold the tubing between your thumb and first finger at the place closest to your skin  This hand will prevent the tube from being pulled out of your skin  · Use your other thumb and first finger to slide the clog down the tubing toward the bulb  You may have to repeat the sliding until the tubing is unclogged  Olayinka-Smith drain removal:  The amount of fluid that you drain will decrease as your wound heals  The ELLE drain usually is removed when less than 30 milliliters (2 tablespoons) is collected in 24 hours  Ask your healthcare provider when and how your ELLE drain will be removed  Follow up with your healthcare provider as directed:  Write down your questions so you remember to ask them during your visits  © Copyright [address] Information is for End User's use only and may not be sold, redistributed or otherwise used for commercial purposes  All illustrations and images included in CareNotes® are the copyrighted property of [employer]  or [facility]   The above information is an  only  It is not intended as medical advice for individual conditions or treatments   Talk to your doctor, nurse or pharmacist before following any medical regimen to see if it is safe and effective for you

## 2021-01-13 NOTE — H&P (VIEW-ONLY)
Surgical Oncology Follow Up       [address]  [facility] [place]  [address]  [facility]  [place] [address]    Tereso Guard Difrancesco  1975  [phone]  [address] [facility]  [facility]  [address] [place] [address]    No chief complaint on file  Assessment & Plan:   And presents today for a follow-up visit  She has had a prophylactic, laparoscopic hysterectomy and bilateral salpingo oophorectomy  She tolerated the surgery quite well  She is here undergo the process of informed consent for bilateral mastectomy for her BRCA 1 mutation  She has discussed this previously with Melo Hawley   This would be removing the nipple  I would not perform sentinel lymph node biopsies  The patient I went through the process of informed consent for this surgery  All questions were answered the patient's satisfaction  The surgery is currently scheduled for February 9th  Cancer History:     Oncology History    No history exists  Interval History:   The patient is anxious regarding her surgery  She has had MRI recently of the breast which showed no worrisome findings  Review of Systems:   Review of Systems   Constitutional: Negative for activity change, appetite change and fatigue  HENT: Negative  Eyes: Negative  Respiratory: Negative for cough, shortness of breath and wheezing  Cardiovascular: Negative for chest pain and leg swelling  Gastrointestinal: Negative  Endocrine: Negative  Genitourinary: Negative  Musculoskeletal:        No new changes or complaints of bone pain   Skin: Negative  Allergic/Immunologic: Negative  Neurological: Negative  Hematological: Negative  Psychiatric/Behavioral: Negative          Past Medical History     Patient Active Problem List   Diagnosis    Sinobronchitis    BRCA1 gene mutation positive    Symptomatic postsurgical menopause     Past Medical History:   Diagnosis Date    Pneumonia     PONV (postoperative nausea and vomiting)      Past Surgical History:   Procedure Laterality Date    APPENDECTOMY      DILATION AND CURETTAGE OF UTERUS  1997    HYSTERECTOMY N/A 11/27/2020    Procedure: TOTAL LAPAROSCOPIC HYSTERECTOMY, BILATERAL SALPINGO-OOPHORECTOMY, cystoscopy;  Surgeon: Lisandro Isbell MD;  [facility]: [facility];  Service: Gynecology Oncology    TONSILLECTOMY      TYMPANOSTOMY TUBE PLACEMENT Bilateral      Family History   Problem Relation Age of Onset    Cancer Father         Age at [address] unknown; type unknown    Cancer Maternal Grandfather     No Known Problems Maternal Aunt     No Known Problems Paternal Aunt     No Known Problems Paternal Aunt     Breast cancer Cousin 36    BRCA1 Positive Sister     No Known Problems Daughter     No Known Problems Sister     No Known Problems Daughter     BRCA1 Positive Maternal Uncle     No Known Problems Paternal Uncle     No Known Problems Paternal Aunt     No Known Problems Paternal Uncle     Ovarian cancer Other 46     Social History     Socioeconomic History    Marital status: /Civil Union     Spouse name: Not on file    Number of children: Not on file    Years of education: Not on file    Highest education level: Not on file   Occupational History    Not on file   Social Needs    Financial resource strain: Not on file    Food insecurity     Worry: Not on file     Inability: Not on file    Transportation needs     Medical: Not on file     Non-medical: Not on file   Tobacco Use    Smoking status: Never Smoker    Smokeless tobacco: Never Used   Substance and Sexual Activity    Alcohol use:  Yes     Alcohol/week: 1 0 standard drinks     Types: 1 Standard drinks or equivalent per week     Comment: social    Drug use: No    Sexual activity: Not on file   Lifestyle    Physical activity     Days per week: Not on file     Minutes per session: Not on file    Stress: Not on file   Relationships    Social connections     Talks on phone: Not on file     Gets together: Not on file     Attends [employer] service: Not on file     Active member of club or organization: Not on file     Attends meetings of clubs or organizations: Not on file     Relationship status: Not on file    Intimate partner violence     Fear of current or ex partner: Not on file     Emotionally abused: Not on file     Physically abused: Not on file     Forced sexual activity: Not on file   Other Topics Concern    Not on file   Social History Narrative    Not on file       Current Outpatient Medications:     acetaminophen (TYLENOL) 325 mg tablet, Take 2 tablets (650 mg total) by mouth every 6 (six) hours as needed for mild pain, Disp:  , Rfl: 0    albuterol (PROVENTIL HFA,VENTOLIN HFA) 90 mcg/act inhaler, Inhale 2 puffs every 6 (six) hours as needed for wheezing, Disp: , Rfl:     estradiol (VIVELLE-DOT) 0 05 MG/24HR, Place 1 patch on the skin 2 (two) times a week, Disp: 8 patch, Rfl: 5    ibuprofen (MOTRIN) 200 mg tablet, Take 3 tablets (600 mg total) by mouth every 6 (six) hours as needed for moderate pain or cramping, Disp: , Rfl: 0  Allergies   Allergen Reactions    Penicillins Rash     Childhood reaction; tolerated Ancef       Physical Exam:     Vitals:    01/13/21 1308   BP: 120/76   Pulse: 74   Resp: 18   Temp: 98 5 °F (36 9 °C)     Physical Exam  Vitals signs reviewed  Constitutional:       Appearance: She is well-developed  HENT:      Head: Normocephalic and atraumatic  Eyes:      Pupils: Pupils are equal, round, and reactive to light  Neck:      Musculoskeletal: Normal range of motion and neck supple  Thyroid: No thyromegaly  Vascular: No JVD  Trachea: No tracheal deviation  Cardiovascular:      Rate and Rhythm: Normal rate and regular rhythm  Heart sounds: Normal heart sounds  No murmur  No friction rub  No gallop      Pulmonary:      Effort: Pulmonary effort is normal  No respiratory distress  Breath sounds: Normal breath sounds  No wheezing or rales  Chest:      Comments: The right breast was examined in the sitting and supine position  There are no worrisome skin changes, tenderness, inverted nipple, nipple discharge, swelling, bleeding or evidence of a mass in any quadrant  Esther survey demonstrated no evidence of any clinically suspicious axillary, pectoral or paraclavicular lymph nodes  The left breast was examined in the sitting and supine position  There are no worrisome skin changes, tenderness, inverted nipple, nipple discharge, swelling, bleeding or evidence of a mass in any quadrant  Esther survey demonstrated no evidence of any clinically suspicious axillary, pectoral or paraclavicular lymph nodes  Abdominal:      General: There is no distension  Palpations: Abdomen is soft  There is no hepatomegaly or mass  Tenderness: There is no abdominal tenderness  There is no guarding or rebound  Musculoskeletal: Normal range of motion  General: No tenderness  Lymphadenopathy:      Cervical: No cervical adenopathy  Skin:     General: Skin is warm and dry  Findings: No erythema or rash  Neurological:      Mental Status: She is alert and oriented to person, place, and time  Cranial Nerves: No cranial nerve deficit  Psychiatric:         Behavior: Behavior normal            Results & Discussion:   The patient I as well as her  went through the description of the procedure as well as removing the nipples  I explained she could have nipple sparing surgery from my perspective however she informs me that this was not advised by Dr Marlena Olivas       The patient and I underwent the process of consent for bilateral mastectomies    The complications outlined on the consent including relatively minor problems (wound infection, wound healing problems, hematomas, scarring, chronic discomfort/pain), moderate problems (injury to nerves or blood vessels, allergic reactions) and major complications (extensive blood loss requiring transfusions or possible addtional surgeries, cardiac arrest, stroke and possible death)  We also reviewed specific complications as outlined on the consent form including possible development of breast cancer in the future  All questions were answered to the patient's satisfaction  We will coordinate the surgery at our next mutual convience  Advance Care Planning/Advance Directives:  I discussed the disease status, treatment plans and follow-up with the patient

## 2021-01-14 ENCOUNTER — OFFICE VISIT (OUTPATIENT)
Dept: GYNECOLOGIC ONCOLOGY | Facility: CLINIC | Age: 46
End: 2021-01-14

## 2021-01-14 VITALS
DIASTOLIC BLOOD PRESSURE: 72 MMHG | HEIGHT: 67 IN | BODY MASS INDEX: 37.35 KG/M2 | TEMPERATURE: 99 F | SYSTOLIC BLOOD PRESSURE: 102 MMHG | WEIGHT: 238 LBS | HEART RATE: 68 BPM | RESPIRATION RATE: 18 BRPM

## 2021-01-14 DIAGNOSIS — Z15.09 BRCA1 GENE MUTATION POSITIVE: Primary | ICD-10-CM

## 2021-01-14 DIAGNOSIS — Z15.01 BRCA1 GENE MUTATION POSITIVE: Primary | ICD-10-CM

## 2021-01-14 PROCEDURE — 99024 POSTOP FOLLOW-UP VISIT: CPT | Performed by: OBSTETRICS & GYNECOLOGY

## 2021-01-14 NOTE — PROGRESS NOTES
Assessment/Plan:    Problem List Items Addressed This Visit        Other    BRCA1 gene mutation positive - Primary     61-year-old with a BRCA 1 mutation status post total laparoscopic hysterectomy, bilateral salpingo-oophorectomy, cystoscopy on 11/27/2020  Final pathology was without evidence of malignancy or dysplasia  Cytology was negative  She is recovering well from surgery  Performance status is 0  Menopausal symptoms are controlled well with Vivelle at 0 05 mg transdermally twice weekly  1  I discussed that there is no well defined surveillance regimen and that she should continue to have annual examinations  I discussed warning signs  2  She can return to normal activity  CHIEF COMPLAINT:  Postoperative evaluation       Problem:  Cancer Staging  No matching staging information was found for the patient  Previous therapy:  Oncology History    No history exists  Patient ID: Judi Aguilera is a 39 y o  female  61-year-old returns for postoperative evaluation  She has no new complaints  She is back to her normal level of activity  The following portions of the patient's history were reviewed and updated as appropriate: allergies, current medications, past family history, past medical history, past social history, past surgical history and problem list     Review of Systems      Current Outpatient Medications:     albuterol (PROVENTIL HFA,VENTOLIN HFA) 90 mcg/act inhaler, Inhale 2 puffs every 6 (six) hours as needed for wheezing, Disp: , Rfl:     estradiol (VIVELLE-DOT) 0 05 MG/24HR, Place 1 patch on the skin 2 (two) times a week, Disp: 8 patch, Rfl: 5    Objective:    Blood pressure 102/72, pulse 68, temperature 99 °F (37 2 °C), resp  rate 18, height 5' 7" (1 702 m), weight 108 kg (238 lb), last menstrual period 10/15/2020, not currently breastfeeding  Body mass index is 37 28 kg/m²  Body surface area is 2 18 meters squared      Physical Exam  Constitutional: Appearance: She is well-developed  Abdominal:      General: There is no distension  Palpations: Abdomen is soft  Tenderness: There is no abdominal tenderness  There is no guarding or rebound  Genitourinary:     Comments: Vaginal apex healing well  Mobile     Skin:     Comments: Incisions clean, dry, intact

## 2021-01-14 NOTE — ASSESSMENT & PLAN NOTE
70-year-old with a BRCA 1 mutation status post total laparoscopic hysterectomy, bilateral salpingo-oophorectomy, cystoscopy on 11/27/2020  Final pathology was without evidence of malignancy or dysplasia  Cytology was negative  She is recovering well from surgery  Performance status is 0  Menopausal symptoms are controlled well with Vivelle at 0 05 mg transdermally twice weekly  1  I discussed that there is no well defined surveillance regimen and that she should continue to have annual examinations  I discussed warning signs  2  She can return to normal activity

## 2021-01-21 ENCOUNTER — LAB (OUTPATIENT)
Dept: LAB | Facility: CLINIC | Age: 46
End: 2021-01-21
Payer: COMMERCIAL

## 2021-01-21 DIAGNOSIS — Z01.818 PREOPERATIVE TESTING: ICD-10-CM

## 2021-01-21 DIAGNOSIS — Z15.09 BRCA1 GENE MUTATION POSITIVE: ICD-10-CM

## 2021-01-21 DIAGNOSIS — Z15.01 BRCA1 GENE MUTATION POSITIVE: ICD-10-CM

## 2021-01-21 LAB
ALBUMIN SERPL BCP-MCNC: 3.6 G/DL (ref 3.5–5)
ALP SERPL-CCNC: 59 U/L (ref 46–116)
ALT SERPL W P-5'-P-CCNC: 20 U/L (ref 12–78)
ANION GAP SERPL CALCULATED.3IONS-SCNC: 8 MMOL/L (ref 4–13)
AST SERPL W P-5'-P-CCNC: 15 U/L (ref 5–45)
BASOPHILS # BLD AUTO: 0.06 THOUSANDS/ΜL (ref 0–0.1)
BASOPHILS NFR BLD AUTO: 1 % (ref 0–1)
BILIRUB SERPL-MCNC: 1.01 MG/DL (ref 0.2–1)
BUN SERPL-MCNC: 12 MG/DL (ref 5–25)
CALCIUM SERPL-MCNC: 8.8 MG/DL (ref 8.3–10.1)
CHLORIDE SERPL-SCNC: 105 MMOL/L (ref 100–108)
CO2 SERPL-SCNC: 27 MMOL/L (ref 21–32)
CREAT SERPL-MCNC: 0.58 MG/DL (ref 0.6–1.3)
EOSINOPHIL # BLD AUTO: 0.12 THOUSAND/ΜL (ref 0–0.61)
EOSINOPHIL NFR BLD AUTO: 2 % (ref 0–6)
ERYTHROCYTE [DISTWIDTH] IN BLOOD BY AUTOMATED COUNT: 12.3 % (ref 11.6–15.1)
GFR SERPL CREATININE-BSD FRML MDRD: 113 ML/MIN/1.73SQ M
GLUCOSE SERPL-MCNC: 88 MG/DL (ref 65–140)
HCT VFR BLD AUTO: 41.7 % (ref 34.8–46.1)
HGB BLD-MCNC: 13.3 G/DL (ref 11.5–15.4)
IMM GRANULOCYTES # BLD AUTO: 0.02 THOUSAND/UL (ref 0–0.2)
IMM GRANULOCYTES NFR BLD AUTO: 0 % (ref 0–2)
LYMPHOCYTES # BLD AUTO: 2.81 THOUSANDS/ΜL (ref 0.6–4.47)
LYMPHOCYTES NFR BLD AUTO: 37 % (ref 14–44)
MCH RBC QN AUTO: 30.9 PG (ref 26.8–34.3)
MCHC RBC AUTO-ENTMCNC: 31.9 G/DL (ref 31.4–37.4)
MCV RBC AUTO: 97 FL (ref 82–98)
MONOCYTES # BLD AUTO: 0.53 THOUSAND/ΜL (ref 0.17–1.22)
MONOCYTES NFR BLD AUTO: 7 % (ref 4–12)
NEUTROPHILS # BLD AUTO: 4.15 THOUSANDS/ΜL (ref 1.85–7.62)
NEUTS SEG NFR BLD AUTO: 53 % (ref 43–75)
NRBC BLD AUTO-RTO: 0 /100 WBCS
PLATELET # BLD AUTO: 269 THOUSANDS/UL (ref 149–390)
PMV BLD AUTO: 11.6 FL (ref 8.9–12.7)
POTASSIUM SERPL-SCNC: 4.2 MMOL/L (ref 3.5–5.3)
PROT SERPL-MCNC: 7.6 G/DL (ref 6.4–8.2)
RBC # BLD AUTO: 4.31 MILLION/UL (ref 3.81–5.12)
SODIUM SERPL-SCNC: 140 MMOL/L (ref 136–145)
WBC # BLD AUTO: 7.69 THOUSAND/UL (ref 4.31–10.16)

## 2021-01-21 PROCEDURE — 36415 COLL VENOUS BLD VENIPUNCTURE: CPT

## 2021-01-21 PROCEDURE — 85025 COMPLETE CBC W/AUTO DIFF WBC: CPT

## 2021-01-21 PROCEDURE — 80053 COMPREHEN METABOLIC PANEL: CPT

## 2021-02-01 NOTE — PRE-PROCEDURE INSTRUCTIONS
Pre-Surgery Instructions:   Medication Instructions    albuterol (PROVENTIL HFA,VENTOLIN HFA) 90 mcg/act inhaler Instructed to take as needed including DOS    estradiol (VIVELLE-DOT) 0 05 MG/24HR Instructed to take per normal schedule    Probiotic Product (PROBIOTIC PO) Patient was instructed by Physician and understands  Pre op,medications and showering instructions reviewed-Patient has hibiclens  Pt  States she has been instructed to continue her vit/supp and probiotic by both surgeons  Pt  Verbalized an understanding of all instructions reviewed and offers no concerns at this time

## 2021-02-03 DIAGNOSIS — Z15.01 BRCA1 GENE MUTATION POSITIVE: ICD-10-CM

## 2021-02-03 DIAGNOSIS — Z15.09 BRCA1 GENE MUTATION POSITIVE: ICD-10-CM

## 2021-02-03 DIAGNOSIS — Z01.818 PREOPERATIVE TESTING: ICD-10-CM

## 2021-02-03 DIAGNOSIS — U07.1 COVID-19: Primary | ICD-10-CM

## 2021-02-03 PROCEDURE — U0003 INFECTIOUS AGENT DETECTION BY NUCLEIC ACID (DNA OR RNA); SEVERE ACUTE RESPIRATORY SYNDROME CORONAVIRUS 2 (SARS-COV-2) (CORONAVIRUS DISEASE [COVID-19]), AMPLIFIED PROBE TECHNIQUE, MAKING USE OF HIGH THROUGHPUT TECHNOLOGIES AS DESCRIBED BY CMS-2020-01-R: HCPCS

## 2021-02-03 PROCEDURE — U0005 INFEC AGEN DETEC AMPLI PROBE: HCPCS

## 2021-02-04 LAB — SARS-COV-2 RNA RESP QL NAA+PROBE: NEGATIVE

## 2021-02-08 ENCOUNTER — ANESTHESIA EVENT (OUTPATIENT)
Dept: PERIOP | Facility: HOSPITAL | Age: 46
End: 2021-02-08
Payer: COMMERCIAL

## 2021-02-09 ENCOUNTER — HOSPITAL ENCOUNTER (OUTPATIENT)
Facility: HOSPITAL | Age: 46
Setting detail: OUTPATIENT SURGERY
LOS: 1 days | Discharge: HOME | End: 2021-02-10
Attending: SURGERY | Admitting: PLASTIC SURGERY
Payer: COMMERCIAL

## 2021-02-09 ENCOUNTER — ANESTHESIA (OUTPATIENT)
Dept: PERIOP | Facility: HOSPITAL | Age: 46
End: 2021-02-09
Payer: COMMERCIAL

## 2021-02-09 VITALS — HEART RATE: 96 BPM

## 2021-02-09 DIAGNOSIS — Z15.09 BRCA1 GENE MUTATION POSITIVE: ICD-10-CM

## 2021-02-09 DIAGNOSIS — Z15.01 BRCA1 GENE MUTATION POSITIVE: ICD-10-CM

## 2021-02-09 PROBLEM — J45.909 ASTHMA: Chronic | Status: ACTIVE | Noted: 2021-02-09

## 2021-02-09 PROBLEM — F41.9 ANXIETY: Chronic | Status: ACTIVE | Noted: 2021-02-09

## 2021-02-09 PROBLEM — E66.9 OBESITY: Chronic | Status: ACTIVE | Noted: 2021-02-09

## 2021-02-09 LAB
ANION GAP SERPL CALCULATED.3IONS-SCNC: 10 MMOL/L (ref 4–13)
BUN SERPL-MCNC: 7 MG/DL (ref 5–25)
CALCIUM SERPL-MCNC: 7.8 MG/DL (ref 8.3–10.1)
CHLORIDE SERPL-SCNC: 105 MMOL/L (ref 100–108)
CO2 SERPL-SCNC: 23 MMOL/L (ref 21–32)
CREAT SERPL-MCNC: 0.63 MG/DL (ref 0.6–1.3)
GFR SERPL CREATININE-BSD FRML MDRD: 110 ML/MIN/1.73SQ M
GLUCOSE SERPL-MCNC: 181 MG/DL (ref 65–140)
POTASSIUM SERPL-SCNC: 3.5 MMOL/L (ref 3.5–5.3)
SODIUM SERPL-SCNC: 138 MMOL/L (ref 136–145)

## 2021-02-09 PROCEDURE — 19303 MAST SIMPLE COMPLETE: CPT | Performed by: SURGERY

## 2021-02-09 PROCEDURE — C1781 MESH (IMPLANTABLE): HCPCS | Performed by: SURGERY

## 2021-02-09 PROCEDURE — 88307 TISSUE EXAM BY PATHOLOGIST: CPT | Performed by: PATHOLOGY

## 2021-02-09 PROCEDURE — 80048 BASIC METABOLIC PNL TOTAL CA: CPT | Performed by: PLASTIC SURGERY

## 2021-02-09 PROCEDURE — C1789 PROSTHESIS, BREAST, IMP: HCPCS | Performed by: SURGERY

## 2021-02-09 PROCEDURE — C9290 INJ, BUPIVACAINE LIPOSOME: HCPCS | Performed by: ANESTHESIOLOGY

## 2021-02-09 DEVICE — (320 SQ CM) FLEXHD PLIABLE BRST 16 X 20CM 0.7-1.4MM THCK: Type: IMPLANTABLE DEVICE | Site: CHEST | Status: FUNCTIONAL

## 2021-02-09 DEVICE — (128SQ CM) IMPLANT FLEXHD PLIABLE BRST 8 X 16CM 0.7-1.4MM THCK: Type: IMPLANTABLE DEVICE | Site: CHEST | Status: FUNCTIONAL

## 2021-02-09 DEVICE — SMOOTH, ULTRA HIGH PROFILE, SUTURE TABS, INTEGRAL INJECTION DOME, 850CC
Type: IMPLANTABLE DEVICE | Site: CHEST | Status: FUNCTIONAL
Brand: ARTOURA BREAST TISSUE EXPANDER

## 2021-02-09 RX ORDER — ROCURONIUM BROMIDE 10 MG/ML
INJECTION, SOLUTION INTRAVENOUS AS NEEDED
Status: DISCONTINUED | OUTPATIENT
Start: 2021-02-09 | End: 2021-02-09

## 2021-02-09 RX ORDER — PROPOFOL 10 MG/ML
INJECTION, EMULSION INTRAVENOUS AS NEEDED
Status: DISCONTINUED | OUTPATIENT
Start: 2021-02-09 | End: 2021-02-09

## 2021-02-09 RX ORDER — SODIUM CHLORIDE, SODIUM LACTATE, POTASSIUM CHLORIDE, CALCIUM CHLORIDE 600; 310; 30; 20 MG/100ML; MG/100ML; MG/100ML; MG/100ML
125 INJECTION, SOLUTION INTRAVENOUS CONTINUOUS
Status: DISCONTINUED | OUTPATIENT
Start: 2021-02-09 | End: 2021-02-10 | Stop reason: HOSPADM

## 2021-02-09 RX ORDER — ALBUTEROL SULFATE 90 UG/1
2 AEROSOL, METERED RESPIRATORY (INHALATION) EVERY 6 HOURS PRN
Status: DISCONTINUED | OUTPATIENT
Start: 2021-02-09 | End: 2021-02-10 | Stop reason: HOSPADM

## 2021-02-09 RX ORDER — BUPIVACAINE HYDROCHLORIDE 2.5 MG/ML
INJECTION, SOLUTION EPIDURAL; INFILTRATION; INTRACAUDAL AS NEEDED
Status: DISCONTINUED | OUTPATIENT
Start: 2021-02-09 | End: 2021-02-09 | Stop reason: HOSPADM

## 2021-02-09 RX ORDER — PROMETHAZINE HYDROCHLORIDE 25 MG/ML
12.5 INJECTION, SOLUTION INTRAMUSCULAR; INTRAVENOUS ONCE AS NEEDED
Status: DISCONTINUED | OUTPATIENT
Start: 2021-02-09 | End: 2021-02-09 | Stop reason: HOSPADM

## 2021-02-09 RX ORDER — VANCOMYCIN HYDROCHLORIDE 1 G/200ML
INJECTION, SOLUTION INTRAVENOUS AS NEEDED
Status: DISCONTINUED | OUTPATIENT
Start: 2021-02-09 | End: 2021-02-09

## 2021-02-09 RX ORDER — ALBUTEROL SULFATE 2.5 MG/3ML
2.5 SOLUTION RESPIRATORY (INHALATION) ONCE AS NEEDED
Status: DISCONTINUED | OUTPATIENT
Start: 2021-02-09 | End: 2021-02-09 | Stop reason: HOSPADM

## 2021-02-09 RX ORDER — DEXAMETHASONE SODIUM PHOSPHATE 4 MG/ML
INJECTION, SOLUTION INTRA-ARTICULAR; INTRALESIONAL; INTRAMUSCULAR; INTRAVENOUS; SOFT TISSUE AS NEEDED
Status: DISCONTINUED | OUTPATIENT
Start: 2021-02-09 | End: 2021-02-09

## 2021-02-09 RX ORDER — NEOSTIGMINE METHYLSULFATE 1 MG/ML
INJECTION INTRAVENOUS AS NEEDED
Status: DISCONTINUED | OUTPATIENT
Start: 2021-02-09 | End: 2021-02-09

## 2021-02-09 RX ORDER — MAGNESIUM HYDROXIDE 1200 MG/15ML
LIQUID ORAL AS NEEDED
Status: DISCONTINUED | OUTPATIENT
Start: 2021-02-09 | End: 2021-02-09 | Stop reason: HOSPADM

## 2021-02-09 RX ORDER — SACCHAROMYCES BOULARDII 250 MG
250 CAPSULE ORAL 2 TIMES DAILY
Status: DISCONTINUED | OUTPATIENT
Start: 2021-02-09 | End: 2021-02-10 | Stop reason: HOSPADM

## 2021-02-09 RX ORDER — CLINDAMYCIN PHOSPHATE 900 MG/50ML
900 INJECTION INTRAVENOUS ONCE
Status: DISCONTINUED | OUTPATIENT
Start: 2021-02-09 | End: 2021-02-09 | Stop reason: HOSPADM

## 2021-02-09 RX ORDER — LIDOCAINE HYDROCHLORIDE 10 MG/ML
0.5 INJECTION, SOLUTION EPIDURAL; INFILTRATION; INTRACAUDAL; PERINEURAL ONCE AS NEEDED
Status: DISCONTINUED | OUTPATIENT
Start: 2021-02-09 | End: 2021-02-09 | Stop reason: HOSPADM

## 2021-02-09 RX ORDER — FENTANYL CITRATE/PF 50 MCG/ML
25 SYRINGE (ML) INJECTION
Status: DISCONTINUED | OUTPATIENT
Start: 2021-02-09 | End: 2021-02-09 | Stop reason: HOSPADM

## 2021-02-09 RX ORDER — KETAMINE HYDROCHLORIDE 50 MG/ML
INJECTION, SOLUTION, CONCENTRATE INTRAMUSCULAR; INTRAVENOUS AS NEEDED
Status: DISCONTINUED | OUTPATIENT
Start: 2021-02-09 | End: 2021-02-09

## 2021-02-09 RX ORDER — MIDAZOLAM HYDROCHLORIDE 2 MG/2ML
INJECTION, SOLUTION INTRAMUSCULAR; INTRAVENOUS AS NEEDED
Status: DISCONTINUED | OUTPATIENT
Start: 2021-02-09 | End: 2021-02-09

## 2021-02-09 RX ORDER — ONDANSETRON 2 MG/ML
INJECTION INTRAMUSCULAR; INTRAVENOUS AS NEEDED
Status: DISCONTINUED | OUTPATIENT
Start: 2021-02-09 | End: 2021-02-09

## 2021-02-09 RX ORDER — DIPHENHYDRAMINE HCL 25 MG
50 TABLET ORAL EVERY 6 HOURS PRN
Status: DISCONTINUED | OUTPATIENT
Start: 2021-02-09 | End: 2021-02-10 | Stop reason: HOSPADM

## 2021-02-09 RX ORDER — LIDOCAINE HYDROCHLORIDE 10 MG/ML
INJECTION, SOLUTION EPIDURAL; INFILTRATION; INTRACAUDAL; PERINEURAL AS NEEDED
Status: DISCONTINUED | OUTPATIENT
Start: 2021-02-09 | End: 2021-02-09

## 2021-02-09 RX ORDER — MORPHINE SULFATE 4 MG/ML
3 INJECTION, SOLUTION INTRAMUSCULAR; INTRAVENOUS
Status: DISCONTINUED | OUTPATIENT
Start: 2021-02-09 | End: 2021-02-10 | Stop reason: HOSPADM

## 2021-02-09 RX ORDER — ONDANSETRON 2 MG/ML
4 INJECTION INTRAMUSCULAR; INTRAVENOUS EVERY 6 HOURS PRN
Status: DISCONTINUED | OUTPATIENT
Start: 2021-02-09 | End: 2021-02-10 | Stop reason: HOSPADM

## 2021-02-09 RX ORDER — SODIUM CHLORIDE, SODIUM LACTATE, POTASSIUM CHLORIDE, CALCIUM CHLORIDE 600; 310; 30; 20 MG/100ML; MG/100ML; MG/100ML; MG/100ML
100 INJECTION, SOLUTION INTRAVENOUS CONTINUOUS
Status: DISCONTINUED | OUTPATIENT
Start: 2021-02-09 | End: 2021-02-10 | Stop reason: HOSPADM

## 2021-02-09 RX ORDER — GLYCOPYRROLATE 0.2 MG/ML
INJECTION INTRAMUSCULAR; INTRAVENOUS AS NEEDED
Status: DISCONTINUED | OUTPATIENT
Start: 2021-02-09 | End: 2021-02-09

## 2021-02-09 RX ORDER — DOCUSATE SODIUM 100 MG/1
100 CAPSULE, LIQUID FILLED ORAL 2 TIMES DAILY
Status: DISCONTINUED | OUTPATIENT
Start: 2021-02-09 | End: 2021-02-09 | Stop reason: SDUPTHER

## 2021-02-09 RX ORDER — SODIUM CHLORIDE, SODIUM LACTATE, POTASSIUM CHLORIDE, CALCIUM CHLORIDE 600; 310; 30; 20 MG/100ML; MG/100ML; MG/100ML; MG/100ML
INJECTION, SOLUTION INTRAVENOUS CONTINUOUS PRN
Status: DISCONTINUED | OUTPATIENT
Start: 2021-02-09 | End: 2021-02-09

## 2021-02-09 RX ORDER — EPHEDRINE SULFATE 50 MG/ML
INJECTION INTRAVENOUS AS NEEDED
Status: DISCONTINUED | OUTPATIENT
Start: 2021-02-09 | End: 2021-02-09

## 2021-02-09 RX ORDER — ALBUMIN, HUMAN INJ 5% 5 %
SOLUTION INTRAVENOUS CONTINUOUS PRN
Status: DISCONTINUED | OUTPATIENT
Start: 2021-02-09 | End: 2021-02-09

## 2021-02-09 RX ORDER — HYDROMORPHONE HCL/PF 1 MG/ML
0.2 SYRINGE (ML) INJECTION
Status: DISCONTINUED | OUTPATIENT
Start: 2021-02-09 | End: 2021-02-09 | Stop reason: HOSPADM

## 2021-02-09 RX ORDER — HYDROMORPHONE HCL/PF 1 MG/ML
0.5 SYRINGE (ML) INJECTION
Status: DISCONTINUED | OUTPATIENT
Start: 2021-02-09 | End: 2021-02-09 | Stop reason: HOSPADM

## 2021-02-09 RX ORDER — HYDROMORPHONE HCL/PF 1 MG/ML
SYRINGE (ML) INJECTION AS NEEDED
Status: DISCONTINUED | OUTPATIENT
Start: 2021-02-09 | End: 2021-02-09

## 2021-02-09 RX ORDER — FENTANYL CITRATE 50 UG/ML
INJECTION, SOLUTION INTRAMUSCULAR; INTRAVENOUS AS NEEDED
Status: DISCONTINUED | OUTPATIENT
Start: 2021-02-09 | End: 2021-02-09

## 2021-02-09 RX ORDER — HYDRALAZINE HYDROCHLORIDE 20 MG/ML
5 INJECTION INTRAMUSCULAR; INTRAVENOUS
Status: DISCONTINUED | OUTPATIENT
Start: 2021-02-09 | End: 2021-02-09 | Stop reason: HOSPADM

## 2021-02-09 RX ORDER — OXYCODONE HYDROCHLORIDE AND ACETAMINOPHEN 5; 325 MG/1; MG/1
2 TABLET ORAL EVERY 4 HOURS PRN
Status: DISCONTINUED | OUTPATIENT
Start: 2021-02-09 | End: 2021-02-10 | Stop reason: HOSPADM

## 2021-02-09 RX ORDER — DOCUSATE SODIUM 100 MG/1
100 CAPSULE, LIQUID FILLED ORAL 2 TIMES DAILY
Status: DISCONTINUED | OUTPATIENT
Start: 2021-02-09 | End: 2021-02-10 | Stop reason: HOSPADM

## 2021-02-09 RX ORDER — LABETALOL 20 MG/4 ML (5 MG/ML) INTRAVENOUS SYRINGE
10
Status: DISCONTINUED | OUTPATIENT
Start: 2021-02-09 | End: 2021-02-09 | Stop reason: HOSPADM

## 2021-02-09 RX ORDER — ONDANSETRON 2 MG/ML
4 INJECTION INTRAMUSCULAR; INTRAVENOUS ONCE AS NEEDED
Status: DISCONTINUED | OUTPATIENT
Start: 2021-02-09 | End: 2021-02-09 | Stop reason: HOSPADM

## 2021-02-09 RX ORDER — SODIUM CHLORIDE 9 MG/ML
INJECTION INTRAVENOUS AS NEEDED
Status: DISCONTINUED | OUTPATIENT
Start: 2021-02-09 | End: 2021-02-09 | Stop reason: HOSPADM

## 2021-02-09 RX ORDER — SODIUM CHLORIDE 9 MG/ML
INJECTION, SOLUTION INTRAVENOUS CONTINUOUS PRN
Status: DISCONTINUED | OUTPATIENT
Start: 2021-02-09 | End: 2021-02-09

## 2021-02-09 RX ORDER — METOCLOPRAMIDE HYDROCHLORIDE 5 MG/ML
10 INJECTION INTRAMUSCULAR; INTRAVENOUS ONCE AS NEEDED
Status: DISCONTINUED | OUTPATIENT
Start: 2021-02-09 | End: 2021-02-09 | Stop reason: HOSPADM

## 2021-02-09 RX ADMIN — MIDAZOLAM HYDROCHLORIDE 2 MG: 1 INJECTION, SOLUTION INTRAMUSCULAR; INTRAVENOUS at 12:31

## 2021-02-09 RX ADMIN — ONDANSETRON 4 MG: 2 INJECTION INTRAMUSCULAR; INTRAVENOUS at 12:38

## 2021-02-09 RX ADMIN — SODIUM CHLORIDE, SODIUM LACTATE, POTASSIUM CHLORIDE, AND CALCIUM CHLORIDE 125 ML/HR: .6; .31; .03; .02 INJECTION, SOLUTION INTRAVENOUS at 20:21

## 2021-02-09 RX ADMIN — SODIUM CHLORIDE: 0.9 INJECTION, SOLUTION INTRAVENOUS at 12:40

## 2021-02-09 RX ADMIN — FENTANYL CITRATE 25 MCG: 50 INJECTION INTRAMUSCULAR; INTRAVENOUS at 18:08

## 2021-02-09 RX ADMIN — ROCURONIUM BROMIDE 30 MG: 10 INJECTION, SOLUTION INTRAVENOUS at 14:52

## 2021-02-09 RX ADMIN — EPHEDRINE SULFATE 5 MG: 50 INJECTION, SOLUTION INTRAVENOUS at 13:31

## 2021-02-09 RX ADMIN — OXYCODONE HYDROCHLORIDE AND ACETAMINOPHEN 2 TABLET: 5; 325 TABLET ORAL at 21:42

## 2021-02-09 RX ADMIN — ROCURONIUM BROMIDE 20 MG: 10 INJECTION, SOLUTION INTRAVENOUS at 13:09

## 2021-02-09 RX ADMIN — DEXAMETHASONE SODIUM PHOSPHATE 4 MG: 4 INJECTION INTRA-ARTICULAR; INTRALESIONAL; INTRAMUSCULAR; INTRAVENOUS; SOFT TISSUE at 12:38

## 2021-02-09 RX ADMIN — HYDROMORPHONE HYDROCHLORIDE 0.5 MG: 1 INJECTION, SOLUTION INTRAMUSCULAR; INTRAVENOUS; SUBCUTANEOUS at 14:06

## 2021-02-09 RX ADMIN — ROCURONIUM BROMIDE 30 MG: 10 INJECTION, SOLUTION INTRAVENOUS at 13:49

## 2021-02-09 RX ADMIN — ROCURONIUM BROMIDE 20 MG: 10 INJECTION, SOLUTION INTRAVENOUS at 14:22

## 2021-02-09 RX ADMIN — HYDROMORPHONE HYDROCHLORIDE 0.5 MG: 1 INJECTION, SOLUTION INTRAMUSCULAR; INTRAVENOUS; SUBCUTANEOUS at 14:55

## 2021-02-09 RX ADMIN — ROCURONIUM BROMIDE 20 MG: 10 INJECTION, SOLUTION INTRAVENOUS at 16:00

## 2021-02-09 RX ADMIN — VANCOMYCIN HYDROCHLORIDE 1000 MG: 1 INJECTION, SOLUTION INTRAVENOUS at 12:22

## 2021-02-09 RX ADMIN — FENTANYL CITRATE 25 MCG: 50 INJECTION INTRAMUSCULAR; INTRAVENOUS at 18:32

## 2021-02-09 RX ADMIN — SODIUM CHLORIDE, SODIUM LACTATE, POTASSIUM CHLORIDE, AND CALCIUM CHLORIDE: .6; .31; .03; .02 INJECTION, SOLUTION INTRAVENOUS at 15:46

## 2021-02-09 RX ADMIN — HYDROMORPHONE HYDROCHLORIDE 0.2 MG: 1 INJECTION, SOLUTION INTRAMUSCULAR; INTRAVENOUS; SUBCUTANEOUS at 18:41

## 2021-02-09 RX ADMIN — FENTANYL CITRATE 25 MCG: 50 INJECTION INTRAMUSCULAR; INTRAVENOUS at 18:13

## 2021-02-09 RX ADMIN — ALBUMIN HUMAN: 0.05 INJECTION, SOLUTION INTRAVENOUS at 14:44

## 2021-02-09 RX ADMIN — NEOSTIGMINE METHYLSULFATE 3 MG: 1 INJECTION INTRAVENOUS at 17:10

## 2021-02-09 RX ADMIN — ROCURONIUM BROMIDE 50 MG: 10 INJECTION, SOLUTION INTRAVENOUS at 12:36

## 2021-02-09 RX ADMIN — SODIUM CHLORIDE, SODIUM LACTATE, POTASSIUM CHLORIDE, AND CALCIUM CHLORIDE: .6; .31; .03; .02 INJECTION, SOLUTION INTRAVENOUS at 11:11

## 2021-02-09 RX ADMIN — GLYCOPYRROLATE 0.2 MG: 0.2 INJECTION, SOLUTION INTRAMUSCULAR; INTRAVENOUS at 17:10

## 2021-02-09 RX ADMIN — PROPOFOL 150 MG: 10 INJECTION, EMULSION INTRAVENOUS at 12:36

## 2021-02-09 RX ADMIN — KETAMINE HYDROCHLORIDE 50 MG: 50 INJECTION INTRAMUSCULAR; INTRAVENOUS at 12:36

## 2021-02-09 RX ADMIN — LIDOCAINE HYDROCHLORIDE 50 MG: 10 INJECTION, SOLUTION EPIDURAL; INFILTRATION; INTRACAUDAL at 12:36

## 2021-02-09 RX ADMIN — EPHEDRINE SULFATE 10 MG: 50 INJECTION, SOLUTION INTRAVENOUS at 13:36

## 2021-02-09 RX ADMIN — Medication 250 MG: at 20:21

## 2021-02-09 RX ADMIN — DOCUSATE SODIUM 100 MG: 100 CAPSULE, LIQUID FILLED ORAL at 20:21

## 2021-02-09 RX ADMIN — FENTANYL CITRATE 100 MCG: 50 INJECTION, SOLUTION INTRAMUSCULAR; INTRAVENOUS at 12:36

## 2021-02-09 NOTE — ANESTHESIA PREPROCEDURE EVALUATION
Procedure:  BREAST MASTECTOMY (Bilateral Breast)  BREAST INSERTION/PLACEMENT TISSUE EXPANDER (EXCHANGE) (Bilateral Breast)  RECONSTRUCTION BREAST W/ IMPLANT (Bilateral Breast)    Relevant Problems   ANESTHESIA (within normal limits)      NEURO/PSYCH   (+) Anxiety      PULMONARY   (+) Asthma      Other   (+) BRCA1 gene mutation positive   (+) Obesity        Physical Exam    Airway    Mallampati score: II  TM Distance: >3 FB  Neck ROM: full     Dental   No notable dental hx     Cardiovascular  Rhythm: regular, Rate: normal, Cardiovascular exam normal    Pulmonary  Pulmonary exam normal Breath sounds clear to auscultation,     Other Findings        Anesthesia Plan  ASA Score- 2     Anesthesia Type- general and regional with ASA Monitors  Additional Monitors:   Airway Plan: ETT  Plan Factors-Exercise tolerance (METS): >4 METS  Chart reviewed  Existing labs reviewed  Patient summary reviewed  Patient is not a current smoker  Induction- intravenous  Postoperative Plan- Plan for postoperative opioid use  Planned trial extubation    Informed Consent- Anesthetic plan and risks discussed with patient  I personally reviewed this patient with the CRNA  Discussed and agreed on the Anesthesia Plan with the CRNA  Sara Franklin

## 2021-02-09 NOTE — OP NOTE
OPERATIVE REPORT  PATIENT NAME: Tracey Mooney    :  1975  MRN: [id number]  Pt Location: AN OR ROOM 03    SURGERY DATE: 2021    Surgeon(s) and Role:  Panel 1:     * Stacey Bethea MD - Primary     * Marisol Maya MD - Assisting  Panel 2:     * Alejandra Langley MD - Primary    Preop Diagnosis:  BRCA1 gene mutation positive [Z15 , Z15 ]    Post-Op Diagnosis Codes:     * BRCA1 gene mutation positive [Z15 , Z15 09]    Procedure(s) (LRB):  BREAST MASTECTOMY (Bilateral)  BREAST INSERTION/PLACEMENT TISSUE EXPANDER (EXCHANGE) (Bilateral)  RECONSTRUCTION BREAST W/ IMPLANT (Bilateral)    Specimen(s):  ID Type Source Tests Collected by Time Destination   1 : RIGHT BREAST - Stitches short-superior, long-lateral, medium-medial Tissue Breast, Right TISSUE EXAM Stacey Bethea MD 2021 1339    2 : LEFT BREAST - Stitches short-superior, long-lateral, medium-medial Tissue Breast, Left TISSUE EXAM Stacey Bethea MD 2021 1343        Estimated Blood Loss:   Minimal    Drains:  Urethral Catheter 16 Fr  (Active)   Number of days: 0       Anesthesia Type:   General    Operative Indications:  BRCA1 gene mutation positive [, ]      Operative Findings:  Normal anatomy    Complications:   None    Procedure and Technique:  The patient was brought to the operation room and placed under general anesthesia   Attention was paid to ensure appropriate padding and correct positioning  I initiated a time-out, identifying the patient, the correct sides and the above procedure   All parties agreed with the time out      Surgery was initiated on the right side     The incision was sharply incised   Thin flaps were then elevated using electrocautery starting superiorly and then continuing medially, inferiorly and finally laterally   The tail of Riggins was then dissected away from the axilla proper leaving the breast tethered to the underlying musculature   The breast was then removed from the muscles in a sub-facial plane   The breast was oriented with sutures (short=superior; medium=medial; long=lateral)  The breast was sent to pathology in formalin for permanent pathologic evaluation  Meticulous hemostasis was maintained with electrocautery       Attention was then turned to the left side  Again the elliptical incision was incised sharply with  Thin flaps were then elevated using electrocautery starting superiorly and then continuing medially, inferiorly and finally laterally   The tail of Riggins was then dissected away from the axilla proper leaving the breast tethered to the underlying musculature   The breast was then removed from the muscles in a sub-facial plane   The breast was oriented with sutures (short=superior; medium=medial; long=lateral)  The breast was sent to pathology in formalin for permanent pathologic evaluation  Meticulous hemostasis was maintained with electrocautery       Dr Gigi Monreal then entered the room to initiate reconstruction at this point in time the counts were correct x2       I was present for the entire procedure    Patient Disposition:  PACU     SIGNATURE: Jc Wlil MD  DATE: February 9, 2021  TIME: 2:31 PM

## 2021-02-09 NOTE — ANESTHESIA POSTPROCEDURE EVALUATION
Post-Op Assessment Note    CV Status:  Stable  Pain Score: 0    Pain management: adequate     Mental Status:  Alert and awake   Hydration Status:  Euvolemic   PONV Controlled:  Controlled   Airway Patency:  Patent      Post Op Vitals Reviewed: Yes      Staff: Anesthesiologist, CRNA         No complications documented      BP   104/56   Temp  97 8   Pulse  62   Resp   20   SpO2   97%

## 2021-02-09 NOTE — DISCHARGE INSTRUCTIONS
[address], [address], [address] [facility], [facility], [address]    [facility]       No heavy lifting >20 pounds    Do not raise hands above head    Consider using button up shirts so you don't have to raise your hands above your head to put the shirt on    Wear the surgical bra at all times except the shower   If the bra is tight and is leaving marks on the skin unclasp the bottom clasps of the bra    No ice or heating pack to chest    Ok to shower    Measure drain output three times per day    Keep the drains secured below the level of the breast  Securing at the waist level is best    No bathing    Leave the transparent and [place] dressing over the drain site in place until you're seen in the office    Apply antibiotic ointment to drain site three times/day if the transparent and [place] dressing falls off    Do not lay on stomach    Do not lay on your sides    Use a necklace or lanyard in the shower to support drains    No smoking    No pushing or pulling    No repetitive arm movements such as cleaning, gardening etc    Call the office for an appointment in 5-14 days - [phone]

## 2021-02-09 NOTE — INTERVAL H&P NOTE
H&P reviewed  After examining the patient I find no changes in the patients condition since the H&P had been written      Vitals:    02/09/21 1108   BP: 116/65   Pulse: 58   Resp: 18   Temp: (!) 97 °F (36 1 °C)   SpO2: 98%

## 2021-02-09 NOTE — OP NOTE
OPERATIVE REPORT  PATIENT NAME: Sherif Ludwig    :  1975  MRN: [id number]  Pt Location: AN OR ROOM 03    SURGERY DATE: 2021    Surgeon(s) and Role:  Panel 1:     * Bri Verma MD - Primary     * Shawn Parmar MD - Assisting  Panel 2:     * Nikolay Olivas MD - Primary    Preop Diagnosis:  BRCA1 gene mutation positive [Z15 , Z15 ]    Post-Op Diagnosis Codes:     * BRCA1 gene mutation positive [Z15 , Z15 09]    Procedure(s) (LRB):  BREAST MASTECTOMY (Bilateral)  BREAST INSERTION/PLACEMENT TISSUE EXPANDER (EXCHANGE) (Bilateral)  RECONSTRUCTION BREAST W/ IMPLANT (Bilateral)    Specimen(s):  ID Type Source Tests Collected by Time Destination   1 : RIGHT BREAST - Stitches short-superior, long-lateral, medium-medial Tissue Breast, Right TISSUE EXAM Bri Verma MD 2021 1339    2 : LEFT BREAST - Stitches short-superior, long-lateral, medium-medial Tissue Breast, Left TISSUE EXAM Bri Verma MD 2021 1343        Estimated Blood Loss:   Minimal    Drains:  Closed/Suction Drain Left;Medial Chest Bulb 15 Fr  (Active)   Number of days: 0       Closed/Suction Drain Left;Lateral Chest Bulb 15 Fr  (Active)   Number of days: 0       Closed/Suction Drain Right;Medial Chest Bulb 15 Fr  (Active)   Number of days: 0       Closed/Suction Drain Right;Lateral Chest 15 Fr  (Active)   Number of days: 0       [REMOVED] Urethral Catheter 16 Fr  (Removed)   Number of days: 0       Anesthesia Type:   General    Operative Indications:  BRCA1 gene mutation positive [Z15 01, ]      Operative Findings:      Complications:   None    Procedure and Technique:  The patient was marked while standing prior to surgery  The patient was brought to the operating room and placed supine on the operating room table  Time out procedure was performed, SCDs were applied and IV antibiotics were given  The patient underwent bilateral mastectomy and will be dictated separately   The chest was re-prepped and draped using standard surgical technique  Attention was turned to the right breast  The wound was examined and excellent hemostasis was obtained  The Inframammary fold was reconstructed using an internal champ flap  A 2-0 PDS suture was used to suture the deep dermis of the marked area of the inframammary fold  The skin was elevated 1 cm and secured to rib periosteum, this was performed across the length of the inframammary fold for and total flap plus elevation of 14cmsq  The superior extent of the dissection was determined by the height of the expander  Flexhd was rehydrated according to protocol  This was cut to the shape of the inframammary fold  The dermal element side was placed facing the skin  The flexhd was inset to the inframammary fold and borders of the new pocket using 2-0 PDS in hoizontal mattress technique  15 [place] round tae drains  were tunneled laterally and brought out through the lateral chest  These were secured with a 3-0 nylon suture  Excellent hemostasis was achieved  The pocket was irrigated with antibiotic solution and allowed to dwell for 5 minutes  Attention was turned to the left breast  The wound was examined and excellent hemostasis was obtained  The Inframammary fold was reconstructed using an internal champ flap  A 2-0 PDS suture was used to suture the deep dermis of the marked area of the inframammary fold  The skin was elevated 1 cm and secured to rib periosteum, this was performed across the length of the inframammary fold for and total flap plus elevation of 14cmsq  The superior extent of the dissection was determined by the height of the expander  Flexhd was rehydrated according to protocol  This was cut to the shape of the inframammary fold  The dermal element side was placed facing the skin  The flexhd was inset to the inframammary fold and borders of the new pocket using 2-0 PDS in hoizontal mattress technique   15 [place] round tae drains were tunneled laterally and brought out through the lateral chest  These were secured with a 3-0 nylon suture  Excellent hemostasis was achieved  The pocket was irrigated with antibiotic solution and allowed to dwell for 5 minutes  The skin was re prepped, all surgical team members changed their gloves  [place] artoura 850ml expanders were prepared  All air was removed from the devices  200 ml of saline was injected into each device  The expanders were placed into the breast pockets  The expander tabs were inset to the inframammary folds using 2-0 PDS suture  Flex HD was placed to cover the entire anterior surface of the implants  The flex HD was inset using 2-0 and 3-0 PDS suture in interrupted technique  The skin was closed using 3-0 vicryl and 3-0 PDS suture in interrupted deep dermal technique  The superficial skin was closed using 3-0 monocryl suture in running subcuticular technique  The wounds were cleaned and dried  Benzoin, steri strips and skin glue were applied  Biopatches were placed around the drain sites  Sterile gauze and a surgical bra was placed  The pa assisted with obtaining hemostasis and retracting  The patient tolerated the procedure well and was taken to the recovery room in stable condition         I was present for the entire procedure and A qualified resident physician was not available    Patient Disposition:  hemodynamically stable and extubated and stable    SIGNATURE: Tyree Elizondo MD  DATE: February 9, 2021  TIME: 5:37 PM

## 2021-02-10 VITALS
DIASTOLIC BLOOD PRESSURE: 61 MMHG | RESPIRATION RATE: 17 BRPM | SYSTOLIC BLOOD PRESSURE: 115 MMHG | HEART RATE: 71 BPM | TEMPERATURE: 98.1 F | BODY MASS INDEX: 37.35 KG/M2 | HEIGHT: 67 IN | WEIGHT: 238 LBS | OXYGEN SATURATION: 97 %

## 2021-02-10 PROCEDURE — 99024 POSTOP FOLLOW-UP VISIT: CPT | Performed by: SURGERY

## 2021-02-10 RX ADMIN — SODIUM CHLORIDE, SODIUM LACTATE, POTASSIUM CHLORIDE, AND CALCIUM CHLORIDE 125 ML/HR: .6; .31; .03; .02 INJECTION, SOLUTION INTRAVENOUS at 04:07

## 2021-02-10 RX ADMIN — OXYCODONE HYDROCHLORIDE AND ACETAMINOPHEN 2 TABLET: 5; 325 TABLET ORAL at 12:30

## 2021-02-10 RX ADMIN — Medication 250 MG: at 08:43

## 2021-02-10 RX ADMIN — MORPHINE SULFATE 3 MG: 4 INJECTION INTRAVENOUS at 00:45

## 2021-02-10 RX ADMIN — DOCUSATE SODIUM 100 MG: 100 CAPSULE, LIQUID FILLED ORAL at 08:43

## 2021-02-10 RX ADMIN — VANCOMYCIN HYDROCHLORIDE 1500 MG: 5 INJECTION, POWDER, LYOPHILIZED, FOR SOLUTION INTRAVENOUS at 03:27

## 2021-02-10 RX ADMIN — ONDANSETRON 4 MG: 2 INJECTION INTRAMUSCULAR; INTRAVENOUS at 08:46

## 2021-02-10 RX ADMIN — VANCOMYCIN HYDROCHLORIDE 1500 MG: 5 INJECTION, POWDER, LYOPHILIZED, FOR SOLUTION INTRAVENOUS at 12:30

## 2021-02-10 RX ADMIN — MORPHINE SULFATE 3 MG: 4 INJECTION INTRAVENOUS at 07:03

## 2021-02-10 RX ADMIN — OXYCODONE HYDROCHLORIDE AND ACETAMINOPHEN 2 TABLET: 5; 325 TABLET ORAL at 02:20

## 2021-02-10 RX ADMIN — MORPHINE SULFATE 3 MG: 4 INJECTION INTRAVENOUS at 08:34

## 2021-02-10 RX ADMIN — ENOXAPARIN SODIUM 40 MG: 40 INJECTION SUBCUTANEOUS at 08:43

## 2021-02-10 NOTE — CASE MANAGEMENT
[facility] able [facility] pt as their census is capped  [facility] pt for [facility] and will be able to see pt tomorrow  Cm met with pt and discussed this with her    She is aware of [facility] and contact information has been placed on the AVS

## 2021-02-10 NOTE — PROGRESS NOTES
Progress Note - Oncology Surgery   Anastacia Ramrancesco 39 y o  female MRN: [id number]  Unit/Bed#: S -01 Encounter: [id number]    Assessment:  45F w/ BRCA 1 now s/p prophylactic b/l mastectomy with reconstruction tissue expander on 2/11    Plan:  Diet as tolerated  Anticipate discharge today  ELLE drains with serosanguineous drainage mostly sanguinous minimal however    Subjective/Objective   Chief Complaint:     Subjective:  No acute events overnight  Patient resting comfortably in bed at this time  Objective:     Blood pressure 107/58, pulse 76, temperature 98 6 °F (37 °C), temperature source Oral, resp  rate 18, height 5' 7" (1 702 m), weight 108 kg (238 lb), last menstrual period 10/15/2020, SpO2 95 %, not currently breastfeeding  ,Body mass index is 37 28 kg/m²  Intake/Output Summary (Last 24 hours) at 2/10/2021 6109  Last data filed at 2/10/2021 6912  Gross per 24 hour   Intake 4670 83 ml   Output 1623 ml   Net 3047 83 ml       Invasive Devices     Peripheral Intravenous Line            Peripheral IV 02/09/21 Left Hand less than 1 day    Peripheral IV 02/09/21 Right Hand less than 1 day          Drain            Closed/Suction Drain Left;Lateral Chest Bulb 15 Fr  less than 1 day    Closed/Suction Drain Left;Medial Chest Bulb 15 Fr  less than 1 day    Closed/Suction Drain Right;Lateral Chest 15 Fr  less than 1 day    Closed/Suction Drain Right;Medial Chest Bulb 15 Fr  less than 1 day                Physical Exam: General: AAOx3  Head: normocephalic, atraumatic  Neck: supple, trachea midline  Respiratory: BS b/l  Chest:  Supportive bra in place, ELLE drains to bilaterally for total with serosanguineous mostly sanguinous drainage however minimal  Breast incisions clean dry and intact with Histoacryl  Abdomen: Soft, NT, no rebound/guarding  Heart: RRR, S1s2  Ext: Warm no cyanosis         Lab, Imaging and other studies:  I have personally reviewed pertinent lab results    , CBC: No results found for: WBC, HGB, HCT, MCV, PLT, ADJUSTEDWBC, MCH, MCHC, RDW, MPV, NRBC, CMP:   Lab Results   Component Value Date    SODIUM 138 02/09/2021    K 3 5 02/09/2021     02/09/2021    CO2 23 02/09/2021    BUN 7 02/09/2021    CREATININE 0 63 02/09/2021    CALCIUM 7 8 (L) 02/09/2021    EGFR 110 02/09/2021     VTE Pharmacologic Prophylaxis: Heparin  VTE Mechanical Prophylaxis: sequential compression device

## 2021-02-10 NOTE — DISCHARGE SUMMARY
Discharge Summary - Medical Agusto Ramrancesco 39 y o  female MRN: [id number]    [facility]  MED SURG Room / Bed: S /S -01 Encounter: [id number]    BRIEF OVERVIEW  Admitting Provider: Alfredo Mendenhall MD  Discharge Provider: Alfredo Mendenhall MD  Primary Care Physician at Discharge: Loreto Florian [address] [facility] [address]    Discharge To: Home      Admission Date: 2/9/2021     Discharge Date: No discharge date for patient encounter  Code Status: Level 1 - Full Code  Advance Directive and Living Will: <no information>  Power of :        Primary Discharge Diagnosis  Principal Problem:    BRCA1 gene mutation positive  Active Problems:    Anxiety    Asthma    Obesity  Resolved Problems:    * No resolved hospital problems   *        Discharge Disposition: [address] [place] [address]    Presenting Problem/History of Present Illness  BRCA1 gene mutation positive      Discharge Condition: stable    Patient tolerated the procedure well, recovered and was discharged home in stable condition    Alfredo Mendenhall MD  2/10/2021  7:21 AM

## 2021-02-10 NOTE — RESPIRATORY THERAPY NOTE
RT Protocol Note  Nic Taylor 39 y o  female MRN: [id number]  Unit/Bed#: S -01 Encounter: [id number]    Assessment    Active Problems:    Anxiety    Asthma    Obesity      Home Pulmonary Medications:  NA       Past Medical History:   Diagnosis Date    Pneumonia     2019    PONV (postoperative nausea and vomiting)      Social History     Socioeconomic History    Marital status: /Civil Union     Spouse name: None    Number of children: None    Years of education: None    Highest education level: None   Occupational History    None   Social Needs    Financial resource strain: None    Food insecurity     Worry: None     Inability: None    Transportation needs     Medical: None     Non-medical: None   Tobacco Use    Smoking status: Never Smoker    Smokeless tobacco: Never Used   Substance and Sexual Activity    Alcohol use:  Yes     Alcohol/week: 1 0 standard drinks     Types: 1 Standard drinks or equivalent per week     Frequency: Monthly or less     Comment: social    Drug use: No    Sexual activity: None   Lifestyle    Physical activity     Days per week: None     Minutes per session: None    Stress: None   Relationships    Social connections     Talks on phone: None     Gets together: None     Attends Anabaptism service: None     Active member of club or organization: None     Attends meetings of clubs or organizations: None     Relationship status: None    Intimate partner violence     Fear of current or ex partner: None     Emotionally abused: None     Physically abused: None     Forced sexual activity: None   Other Topics Concern    None   Social History Narrative    None       Subjective         Objective    Physical Exam:   Assessment Type: Assess only  General Appearance: Alert, Awake  Respiratory Pattern: Normal  Chest Assessment: Chest expansion symmetrical  Bilateral Breath Sounds: Clear, Diminished  Cough: None  O2 Device: (P) 2L NC    Vitals:  Blood pressure 109/59, pulse 68, temperature 98 2 °F (36 8 °C), resp  rate 16, height 5' 7" (1 702 m), weight 108 kg (238 lb), last menstrual period 10/15/2020, SpO2 99 %, not currently breastfeeding  Imaging and other studies: I have personally reviewed pertinent reports  O2 Device: (P) 2L NC     Plan         Resp Comments: (P) Pt is assessed per protocol; BS are decreased and clear; spo2 99 on RA & RR 16   Pt has no home pulmonary regimen and denies any respiratory distress at this time  Albuterol MDI 2p Q6PRN is ordered for SOB; pt will continue to be monitored

## 2021-02-10 NOTE — CASE MANAGEMENT
Cm met with pt in order to review and assist with DC needs  Pt will be discharged with ELLE drains  Cm offered [facility] nursing services for home and pt would like to have these services arranged  Cm reviewed freedom of choice  Pt requested a referral be made to [address] [place] [address]  Referral has been made however they are not able to accept as their census is too high  Cm reviewed this with pt and she requested referrals be made to [facility] and [facility] as we reviewed the list together  Referrals have been made

## 2021-02-10 NOTE — PLAN OF CARE
Problem: Potential for Falls  Goal: Patient will remain free of falls  Description: INTERVENTIONS:  - Assess patient frequently for physical needs  -  Identify cognitive and physical deficits and behaviors that affect risk of falls    -  [place] fall precautions as indicated by assessment   - Educate patient/family on patient safety including physical limitations  - Instruct patient to call for assistance with activity based on assessment  - Modify environment to reduce risk of injury  - Consider OT/PT consult to assist with strengthening/mobility  Outcome: Progressing     Problem: PAIN - ADULT  Goal: Verbalizes/displays adequate comfort level or baseline comfort level  Description: Interventions:  - Encourage patient to monitor pain and request assistance  - Assess pain using appropriate pain scale  - Administer analgesics based on type and severity of pain and evaluate response  - Implement non-pharmacological measures as appropriate and evaluate response  - Consider cultural and social influences on pain and pain management  - Notify physician/advanced practitioner if interventions unsuccessful or patient reports new pain  Outcome: Progressing     Problem: INFECTION - ADULT  Goal: Absence or prevention of progression during hospitalization  Description: INTERVENTIONS:  - Assess and monitor for signs and symptoms of infection  - Monitor lab/diagnostic results  - Monitor all insertion sites, i e  indwelling lines, tubes, and drains  - Monitor endotracheal if appropriate and nasal secretions for changes in amount and color  - [place] appropriate cooling/warming therapies per order  - Administer medications as ordered  - Instruct and encourage patient and family to use good hand hygiene technique  - Identify and instruct in appropriate isolation precautions for identified infection/condition  Outcome: Progressing  Goal: Absence of fever/infection during neutropenic period  Description: INTERVENTIONS:  - Monitor WBC    Outcome: Progressing     Problem: SAFETY ADULT  Goal: Patient will remain free of falls  Description: INTERVENTIONS:  - Assess patient frequently for physical needs  -  Identify cognitive and physical deficits and behaviors that affect risk of falls    -  [place] fall precautions as indicated by assessment   - Educate patient/family on patient safety including physical limitations  - Instruct patient to call for assistance with activity based on assessment  - Modify environment to reduce risk of injury  - Consider OT/PT consult to assist with strengthening/mobility  Outcome: Progressing  Goal: Maintain or return to baseline ADL function  Description: INTERVENTIONS:  -  Assess patient's ability to carry out ADLs; assess patient's baseline for ADL function and identify physical deficits which impact ability to perform ADLs (bathing, care of mouth/teeth, toileting, grooming, dressing, etc )  - Assess/evaluate cause of self-care deficits   - Assess range of motion  - Assess patient's mobility; develop plan if impaired  - Assess patient's need for assistive devices and provide as appropriate  - Encourage maximum independence but intervene and supervise when necessary  - Involve family in performance of ADLs  - Assess for home care needs following discharge   - Consider OT consult to assist with ADL evaluation and planning for discharge  - Provide patient education as appropriate  Outcome: Progressing  Goal: Maintain or return mobility status to optimal level  Description: INTERVENTIONS:  - Assess patient's baseline mobility status (ambulation, transfers, stairs, etc )    - Identify cognitive and physical deficits and behaviors that affect mobility  - Identify mobility aids required to assist with transfers and/or ambulation (gait belt, sit-to-stand, lift, walker, cane, etc )  - [place] fall precautions as indicated by assessment  - Record patient progress and toleration of activity level on Mobility SBAR; progress patient to next Phase/Stage  - Instruct patient to call for assistance with activity based on assessment  - Consider rehabilitation consult to assist with strengthening/weightbearing, etc   Outcome: Progressing     Problem: DISCHARGE PLANNING  Goal: Discharge to home or other facility with appropriate resources  Description: INTERVENTIONS:  - Identify barriers to discharge w/patient and caregiver  - Arrange for needed discharge resources and transportation as appropriate  - Identify discharge learning needs (meds, wound care, etc )  - Arrange for interpretive services to assist at discharge as needed  - Refer to Case Management Department for coordinating discharge planning if the patient needs post-hospital services based on physician/advanced practitioner order or complex needs related to functional status, cognitive ability, or social support system  Outcome: Progressing

## 2021-02-10 NOTE — QUICK NOTE
Post Op Check    The patient was seen in PACU as she is still awaiting her bed on the 4th floor  She was still somewhat sedated however was able to converse  She has some discomfort at her chest/breast however this is expected post operatively      She has no complaints of SOB    Breast: Incisions are c/d/i with histocryl  Support bra is in place  4x ELLE drains with sanguinous drainage, however minimal  No hematoma noted on breasts

## 2021-02-10 NOTE — PROGRESS NOTES
Progress Note - General Surgery   Ganesh Estephanie Leannarancesco 39 y o  female MRN: [id number]  Unit/Bed#: S -01 Encounter: [id number]        Subjective/Objective   Chief Complaint:     Subjective: pain controlled, ambulating, voiding    Objective: skin well perfused, no hematoma    Assessment:  S/p b/l mastectomy/ te recon    Plan:  abx  dvt prophylaxis  Instructions expalined  F/u as outpt      Blood pressure 107/58, pulse 76, temperature 98 6 °F (37 °C), temperature source Oral, resp  rate 18, height 5' 7" (1 702 m), weight 108 kg (238 lb), last menstrual period 10/15/2020, SpO2 95 %, not currently breastfeeding  ,Body mass index is 37 28 kg/m²        Intake/Output Summary (Last 24 hours) at 2/10/2021 0719  Last data filed at 2/10/2021 0657  Gross per 24 hour   Intake 4670 83 ml   Output 1623 ml   Net 3047 83 ml       Invasive Devices     Peripheral Intravenous Line            Peripheral IV 02/09/21 Left Hand less than 1 day    Peripheral IV 02/09/21 Right Hand less than 1 day          Drain            Closed/Suction Drain Left;Lateral Chest Bulb 15 Fr  less than 1 day    Closed/Suction Drain Left;Medial Chest Bulb 15 Fr  less than 1 day    Closed/Suction Drain Right;Lateral Chest 15 Fr  less than 1 day    Closed/Suction Drain Right;Medial Chest Bulb 15 Fr  less than 1 day                      Rome Hawley MD  2/10/2021  7:19 AM

## 2021-02-22 ENCOUNTER — TELEPHONE (OUTPATIENT)
Dept: SURGICAL ONCOLOGY | Facility: CLINIC | Age: 46
End: 2021-02-22

## 2021-02-23 PROBLEM — D24.2 INTRADUCTAL PAPILLOMA OF BREAST, LEFT: Status: ACTIVE | Noted: 2021-02-23

## 2021-02-23 PROBLEM — D24.2 INTRADUCTAL PAPILLOMA OF BREAST, LEFT: Status: RESOLVED | Noted: 2021-02-23 | Resolved: 2021-02-23

## 2021-02-24 ENCOUNTER — OFFICE VISIT (OUTPATIENT)
Dept: SURGICAL ONCOLOGY | Facility: CLINIC | Age: 46
End: 2021-02-24

## 2021-02-24 ENCOUNTER — TELEPHONE (OUTPATIENT)
Dept: GYNECOLOGIC ONCOLOGY | Facility: CLINIC | Age: 46
End: 2021-02-24

## 2021-02-24 VITALS
WEIGHT: 231 LBS | BODY MASS INDEX: 36.26 KG/M2 | HEIGHT: 67 IN | SYSTOLIC BLOOD PRESSURE: 102 MMHG | HEART RATE: 72 BPM | TEMPERATURE: 98.4 F | RESPIRATION RATE: 16 BRPM | DIASTOLIC BLOOD PRESSURE: 68 MMHG

## 2021-02-24 DIAGNOSIS — Z90.13 STATUS POST MASTECTOMY, BILATERAL: Primary | ICD-10-CM

## 2021-02-24 DIAGNOSIS — D24.2 INTRADUCTAL PAPILLOMA OF BREAST, LEFT: ICD-10-CM

## 2021-02-24 DIAGNOSIS — Z15.09 BRCA1 GENE MUTATION POSITIVE: ICD-10-CM

## 2021-02-24 DIAGNOSIS — Z15.01 BRCA1 GENE MUTATION POSITIVE: ICD-10-CM

## 2021-02-24 PROCEDURE — 3008F BODY MASS INDEX DOCD: CPT | Performed by: SURGERY

## 2021-02-24 PROCEDURE — 99024 POSTOP FOLLOW-UP VISIT: CPT | Performed by: SURGERY

## 2021-02-24 NOTE — TELEPHONE ENCOUNTER
Phone call to patient to reschedule appointment on 1/13/22 with Alecia Hansen for another day due to provider being out of the office  Left message to return my call

## 2021-02-24 NOTE — PROGRESS NOTES
Surgical Oncology Breast Post-Op       [address]  [facility]  [address] [facility] [address]    Yimi Juarez Brandon  1975  [id number]  [address] [facility]  [facility]  [address] [address]    Chief Complaint:   Zachary Pemberton is seen for a post-operative visit of her recent breast surgery  Oncology History:     Oncology History    No history exists  Assessment & Plan:   Assessment/Plan      Patient presents for postoperative visit  She she is seen Dr Luna Lopes later today  Her drains are putting out small amount of serous fluid only  She is hopeful that he will remove them  Overall she is in very good spirits her pathology showed no evidence of malignancy  She has already seen this on my chart  There is no evidence of infection  I will see her back in 3 months  History of Present Illness:     Patient had a BRCA 1 mutation and decided to undergo bilateral prophylactic mastectomy  She has had reconstruction with Dr Shraddha Robertson     Interval History:   See Assessment & Plan    Review of Systems:   Review of Systems   All other systems reviewed and are negative        Past Medical History:     Patient Active Problem List   Diagnosis    Sinobronchitis    BRCA1 gene mutation positive    Symptomatic postsurgical menopause    Anxiety    Asthma    Obesity     Past Medical History:   Diagnosis Date    Intraductal papilloma of breast, left 2/23/2021    Pneumonia     2019    PONV (postoperative nausea and vomiting)      Past Surgical History:   Procedure Laterality Date    APPENDECTOMY      DILATION AND CURETTAGE OF UTERUS  1997    HYSTERECTOMY N/A 11/27/2020    Procedure: TOTAL LAPAROSCOPIC HYSTERECTOMY, BILATERAL SALPINGO-OOPHORECTOMY, cystoscopy;  Surgeon: Brunilda Trinidad MD;  [facility];  Service: Gynecology Oncology    NV IMPLNT BIO IMPLNT FOR SOFT TISSUE REINFORCEMENT Bilateral 2/9/2021    Procedure: RECONSTRUCTION BREAST W/ IMPLANT;  Surgeon: Erlin Cabrera MD;  [facility];  Service: Plastics    WA MASTECTOMY, SIMPLE, COMPLETE Bilateral 2/9/2021    Procedure: BREAST MASTECTOMY;  Surgeon: Norman iSlva MD;  [facility];  Service: Surgical Oncology    WA TISSUE EXPANDER PLACEMENT BREAST RECONSTRUCTION Bilateral 2/9/2021    Procedure: BREAST INSERTION/PLACEMENT TISSUE EXPANDER (EXCHANGE); Surgeon: Erlin Cabrera MD;  [facility];  Service: Plastics    TONSILLECTOMY      TYMPANOSTOMY TUBE PLACEMENT Bilateral      Family History   Problem Relation Age of Onset   Beau Placer Cancer Father         Age at [address] unknown; type unknown    Cancer Maternal Grandfather     No Known Problems Maternal Aunt     No Known Problems Paternal Aunt     No Known Problems Paternal Aunt     Breast cancer Cousin 36    BRCA1 Positive Sister     No Known Problems Daughter     No Known Problems Sister     No Known Problems Daughter     BRCA1 Positive Maternal Uncle     No Known Problems Paternal Uncle     No Known Problems Paternal Aunt     No Known Problems Paternal Uncle     Ovarian cancer Other 46     Social History     Socioeconomic History    Marital status: /Civil Union     Spouse name: Not on file    Number of children: Not on file    Years of education: Not on file    Highest education level: Not on file   Occupational History    Not on file   Social Needs    Financial resource strain: Not on file    Food insecurity     Worry: Not on file     Inability: Not on file    Transportation needs     Medical: Not on file     Non-medical: Not on file   Tobacco Use    Smoking status: Never Smoker    Smokeless tobacco: Never Used   Substance and Sexual Activity    Alcohol use:  Yes     Alcohol/week: 1 0 standard drinks     Types: 1 Standard drinks or equivalent per week     Frequency: Monthly or less     Comment: social    Drug use: No    Sexual activity: Not on file   Lifestyle    Physical activity     Days per week: Not on file     Minutes per session: Not on file    Stress: Not on file   Relationships    Social connections     Talks on phone: Not on file     Gets together: Not on file     Attends Yazidi service: Not on file     Active member of club or organization: Not on file     Attends meetings of clubs or organizations: Not on file     Relationship status: Not on file    Intimate partner violence     Fear of current or ex partner: Not on file     Emotionally abused: Not on file     Physically abused: Not on file     Forced sexual activity: Not on file   Other Topics Concern    Not on file   Social History Narrative    Not on file       Current Outpatient Medications:     Multiple Vitamins-Minerals (ONE-A-DAY WOMENS PO), Take by mouth, Disp: , Rfl:     Probiotic Product (Pro-biotic Blend) CAPS, Take by mouth, Disp: , Rfl:   Allergies   Allergen Reactions    Penicillins Rash     Childhood reaction; tolerated Ancef       Physical Exams:     Vitals:    02/24/21 1111   BP: 102/68   Pulse: 72   Resp: 16   Temp: 98 4 °F (36 9 °C)     Physical Exam  Chest:      Comments:   Examination of both breast demonstrate very good breast skin texture / health  There is no evidence of infection  She has serous fluid only from her drain  Results & Discussion:     I reviewed her pathology with her  She has reviewed her pathology on line  I have provided her written copy  All questions were answered the patient's satisfaction  We will see her back in 3 months and then discuss interval follow-up with her being either 6 months or 12 months

## 2021-03-05 DIAGNOSIS — E89.41 SYMPTOMATIC POSTSURGICAL MENOPAUSE: Primary | ICD-10-CM

## 2021-03-05 RX ORDER — ESTRADIOL 0.05 MG/D
1 FILM, EXTENDED RELEASE TRANSDERMAL 2 TIMES WEEKLY
Qty: 8 PATCH | Refills: 3 | Status: SHIPPED | OUTPATIENT
Start: 2021-03-08 | End: 2021-06-21

## 2021-03-10 DIAGNOSIS — Z23 ENCOUNTER FOR IMMUNIZATION: ICD-10-CM

## 2021-03-26 ENCOUNTER — IMMUNIZATIONS (OUTPATIENT)
Dept: FAMILY MEDICINE CLINIC | Facility: HOSPITAL | Age: 46
End: 2021-03-26

## 2021-03-26 DIAGNOSIS — Z23 ENCOUNTER FOR IMMUNIZATION: Primary | ICD-10-CM

## 2021-03-26 PROCEDURE — 91300 SARS-COV-2 / COVID-19 MRNA VACCINE (PFIZER-BIONTECH) 30 MCG: CPT

## 2021-03-26 PROCEDURE — 0001A SARS-COV-2 / COVID-19 MRNA VACCINE (PFIZER-BIONTECH) 30 MCG: CPT

## 2021-04-16 ENCOUNTER — IMMUNIZATIONS (OUTPATIENT)
Dept: FAMILY MEDICINE CLINIC | Facility: HOSPITAL | Age: 46
End: 2021-04-16

## 2021-04-16 DIAGNOSIS — Z23 ENCOUNTER FOR IMMUNIZATION: Primary | ICD-10-CM

## 2021-04-16 PROCEDURE — 91300 SARS-COV-2 / COVID-19 MRNA VACCINE (PFIZER-BIONTECH) 30 MCG: CPT

## 2021-04-16 PROCEDURE — 0002A SARS-COV-2 / COVID-19 MRNA VACCINE (PFIZER-BIONTECH) 30 MCG: CPT

## 2021-04-27 ENCOUNTER — TRANSCRIBE ORDERS (OUTPATIENT)
Dept: LAB | Facility: CLINIC | Age: 46
End: 2021-04-27

## 2021-04-27 ENCOUNTER — APPOINTMENT (OUTPATIENT)
Dept: LAB | Facility: CLINIC | Age: 46
End: 2021-04-27
Payer: COMMERCIAL

## 2021-04-27 DIAGNOSIS — Z01.812 PRE-OPERATIVE LABORATORY EXAMINATION: Primary | ICD-10-CM

## 2021-04-27 DIAGNOSIS — N65.0 DEFORMITY OF RECONSTRUCTED BREAST: ICD-10-CM

## 2021-04-27 DIAGNOSIS — Z01.818 OTHER SPECIFIED PRE-OPERATIVE EXAMINATION: ICD-10-CM

## 2021-04-27 DIAGNOSIS — Z01.812 PRE-OPERATIVE LABORATORY EXAMINATION: ICD-10-CM

## 2021-04-27 LAB
ANION GAP SERPL CALCULATED.3IONS-SCNC: 8 MMOL/L (ref 4–13)
BUN SERPL-MCNC: 14 MG/DL (ref 5–25)
CALCIUM SERPL-MCNC: 8.5 MG/DL (ref 8.3–10.1)
CHLORIDE SERPL-SCNC: 107 MMOL/L (ref 100–108)
CO2 SERPL-SCNC: 27 MMOL/L (ref 21–32)
CREAT SERPL-MCNC: 0.73 MG/DL (ref 0.6–1.3)
ERYTHROCYTE [DISTWIDTH] IN BLOOD BY AUTOMATED COUNT: 13.4 % (ref 11.6–15.1)
GFR SERPL CREATININE-BSD FRML MDRD: 100 ML/MIN/1.73SQ M
GLUCOSE SERPL-MCNC: 86 MG/DL (ref 65–140)
HCT VFR BLD AUTO: 42.6 % (ref 34.8–46.1)
HGB BLD-MCNC: 13.6 G/DL (ref 11.5–15.4)
MCH RBC QN AUTO: 30 PG (ref 26.8–34.3)
MCHC RBC AUTO-ENTMCNC: 31.9 G/DL (ref 31.4–37.4)
MCV RBC AUTO: 94 FL (ref 82–98)
PLATELET # BLD AUTO: 280 THOUSANDS/UL (ref 149–390)
PMV BLD AUTO: 11 FL (ref 8.9–12.7)
POTASSIUM SERPL-SCNC: 3.9 MMOL/L (ref 3.5–5.3)
RBC # BLD AUTO: 4.54 MILLION/UL (ref 3.81–5.12)
SODIUM SERPL-SCNC: 142 MMOL/L (ref 136–145)
WBC # BLD AUTO: 9.94 THOUSAND/UL (ref 4.31–10.16)

## 2021-04-27 PROCEDURE — 36415 COLL VENOUS BLD VENIPUNCTURE: CPT

## 2021-04-27 PROCEDURE — 80048 BASIC METABOLIC PNL TOTAL CA: CPT

## 2021-04-27 PROCEDURE — 85027 COMPLETE CBC AUTOMATED: CPT

## 2021-04-29 RX ORDER — ACETAMINOPHEN AND CODEINE PHOSPHATE 300; 30 MG/1; MG/1
1 TABLET ORAL EVERY 4 HOURS PRN
Status: ON HOLD | COMMUNITY
Start: 2021-02-25 | End: 2021-08-06 | Stop reason: ALTCHOICE

## 2021-05-07 ENCOUNTER — OFFICE VISIT (OUTPATIENT)
Dept: BARIATRICS | Facility: CLINIC | Age: 46
End: 2021-05-07
Payer: COMMERCIAL

## 2021-05-07 VITALS
BODY MASS INDEX: 35.74 KG/M2 | HEART RATE: 88 BPM | DIASTOLIC BLOOD PRESSURE: 86 MMHG | SYSTOLIC BLOOD PRESSURE: 130 MMHG | HEIGHT: 68 IN | WEIGHT: 235.8 LBS | TEMPERATURE: 97.7 F

## 2021-05-07 DIAGNOSIS — Z15.01 BRCA1 GENE MUTATION POSITIVE: ICD-10-CM

## 2021-05-07 DIAGNOSIS — R79.89 HIGH SERUM LOW-DENSITY LIPOPROTEIN (LDL): ICD-10-CM

## 2021-05-07 DIAGNOSIS — Z15.09 BRCA1 GENE MUTATION POSITIVE: ICD-10-CM

## 2021-05-07 DIAGNOSIS — E66.01 SEVERE OBESITY (BMI 35.0-39.9) WITH COMORBIDITY (HCC): Primary | ICD-10-CM

## 2021-05-07 DIAGNOSIS — R53.83 FATIGUE, UNSPECIFIED TYPE: ICD-10-CM

## 2021-05-07 DIAGNOSIS — F41.9 ANXIETY: Chronic | ICD-10-CM

## 2021-05-07 PROCEDURE — 99244 OFF/OP CNSLTJ NEW/EST MOD 40: CPT | Performed by: INTERNAL MEDICINE

## 2021-05-07 NOTE — PROGRESS NOTES
Assessment/Plan:  Milena Conway was seen today for consult  Diagnoses and all orders for this visit:      BRCA1 gene mutation positive  Patient is s/p bilateral mastectomy and hysterectomy  On estradiol    High serum low-density lipoprotein (LDL)  Can improve with weight loss    Fatigue, unspecified type  -     TSH, 3rd generation with Free T4 reflex; Future  -     Vitamin D 25 hydroxy; Future    Severe obesity (BMI 35 0-39  9) with comorbidity ([address] )  -     TSH, 3rd generation with Free T4 reflex; Future  -     Vitamin D 25 hydroxy; Future  Will rule out hypothyroidism and vitamin D deficiency that may be contributing to her fatigue  Discussed starting phentermine and topiramate at next visit after her surgery  - Discussed options of HealthyCORE-Intensive Lifestyle Intervention Program, Very Low Calorie Diet-VLCD and Conservative Program and the role of weight loss medications  - Explained the importance of making lifestyle changes first before starting any anti-obesity medications  Patient should demonstrate lifestyle changes first before anti-obesity medication can be initiated  - Patient is interested in pursuing Conservative Program  - Initial weight loss goal of 5-10% weight loss for improved health    Goals:  Do not skip any meals! Food log (ie ) www myfitnesspal com,sparkpeople  com,loseit com,calorieking  com,etc  baritastic (use skinnytaste  com or smartphone marcie YouTern for recipes)  No sugary beverages  At least 64oz of water daily  Increase physical activity by 10 minutes daily   Gradually increase physical activity to a goal of 5 days per week for 30 minutes of MODERATE intensity PLUS 2 days per week of FULL BODY resistance training (use smartphone apps TravelZeeky, Home Workout, etc )  Try to drink her protein shake during the day so she is not skipping her lunch  Goal protein 90-100g per day  Goal carbohydrates 100-120g per day   1500 calories     45 minute visit, >50% face-to-face time spent counseling patient on nonsurgical interventions for the treatment of excess weight  Discussed in detail nonsurgical options including intensive lifestyle intervention program, very low-calorie diet program and conservative program   Discussed the role of weight loss medications  Counseled patient on diet behavior and exercise modification for weight loss  Follow up in approximately 1 month with Non-Surgical Physician/Advanced Practitioner  Subjective:   Chief Complaint   Patient presents with    Consult     Patient is here for initial MWM consult with Dr Bassam Hess  BMI 35 59  Negative stop bang (1/8)  Patient ID: Bridget Siu  is a 42 y o  female with excess weight/obesity here to pursue weight management  Past Medical History:   Diagnosis Date    BRCA1-associated protein-1 tumor predisposition syndrome     Intraductal papilloma of breast, left 2/23/2021    Obesity (BMI 30-39  9)     Pneumonia     2019    PONV (postoperative nausea and vomiting)      Past Surgical History:   Procedure Laterality Date    APPENDECTOMY      DILATION AND CURETTAGE OF UTERUS  1997    HYSTERECTOMY N/A 11/27/2020    Procedure: TOTAL LAPAROSCOPIC HYSTERECTOMY, BILATERAL SALPINGO-OOPHORECTOMY, cystoscopy;  Surgeon: Benny Leon MD;  [facility];  Service: Gynecology Oncology    AK IMPLNT BIO IMPLNT FOR SOFT TISSUE REINFORCEMENT Bilateral 2/9/2021    Procedure: RECONSTRUCTION BREAST W/ IMPLANT;  Surgeon: Karely Frey MD;  [facility];  Service: Plastics    AK MASTECTOMY, SIMPLE, COMPLETE Bilateral 2/9/2021    Procedure: BREAST MASTECTOMY;  Surgeon: Manuel Call MD;  [facility];  Service: Surgical Oncology    AK TISSUE EXPANDER PLACEMENT BREAST RECONSTRUCTION Bilateral 2/9/2021    Procedure: BREAST INSERTION/PLACEMENT TISSUE EXPANDER (EXCHANGE);   Surgeon: Karely Frey MD;  [facility];  Service: Plastics    TONSILLECTOMY      TYMPANOSTOMY TUBE PLACEMENT Bilateral        HPI:  Wt Readings from Last 20 Encounters:   21 107 kg (235 lb 12 8 oz)   21 105 kg (231 lb)   21 108 kg (238 lb)   21 108 kg (238 lb)   21 108 kg (238 lb)   20 105 kg (232 lb)   10/22/20 105 kg (232 lb)   20 105 kg (232 lb)   20 94 3 kg (208 lb)   19 94 3 kg (208 lb)   19 90 3 kg (199 lb)   18 91 2 kg (201 lb)   17 115 kg (253 lb 12 8 oz)   16 113 kg (250 lb)     Severity: Severe  Onset: after her first child 26 years ago- was around 170 lbs   Was addicted to regular soda for awhile, causing a lot of weight gain  Lost 50 lbs on phentermine/topamax when she was on them for 8 months  Got bilateral mastectomy and hysterectomy   Tried [employer] - lost 20 lbs but gained back  Didn't like keto diet    Modifiers: Diet and Exercise  Contributing factors: Poor Food Choices and Insufficient Physical Activity  Associated symptoms: comorbid conditions  Goal weight: 180   Works as a    B- egg whites, banana, coffee with creamer  S-  L- skips  S-   D- ground turkey tacos   S-    Hydration: no water, sugar free orange soda  Alcohol: once in a while   Smoking: no  Exercise: on her feet at work  Has 2 more breast reconstruction surgeries- was told not to use her arms when exercising  Sleep: usually 8 hours  STOP ban/8    The following portions of the patient's history were reviewed and updated as appropriate: allergies, current medications, past family history, past medical history, past social history, past surgical history, and problem list     Review of Systems   Constitutional: Negative for appetite change, chills and fever  HENT: Negative for rhinorrhea and sore throat  Respiratory: Negative for cough, chest tightness and shortness of breath  Cardiovascular: Negative for chest pain and leg swelling  Gastrointestinal: Negative for abdominal pain, constipation, diarrhea, nausea and vomiting     Endocrine: Negative for cold intolerance and heat intolerance  Genitourinary: Negative for difficulty urinating  Musculoskeletal: Negative for arthralgias  Skin: Negative for color change  Neurological: Negative for dizziness, numbness and headaches  Psychiatric/Behavioral: Negative for sleep disturbance  The patient is not nervous/anxious  All other systems reviewed and are negative  Objective:  /86 (BP Location: Right arm, Patient Position: Sitting, Cuff Size: Standard)   Pulse 88   Temp 97 7 °F (36 5 °C) (Temporal)   Ht 5' 8 25" (1 734 m)   Wt 107 kg (235 lb 12 8 oz)   LMP 10/15/2020 (Exact Date)   BMI 35 59 kg/m²   Constitutional: Well-developed, well-nourished and obese Body mass index is 35 59 kg/m²  Clari Irby HEENT: No conjunctival pallor or jaundice  Pulmonary: No increased work of breathing or signs of respiratory distress  CV: Normal rate, well-perfused  GI: Obese  Non-distended  MSK: No edema   Neuro: Oriented to person, place and time  Normal Speech  Normal gait  Psych: Normal affect and mood       Labs and Imaging  Lab Results   Component Value Date    WBC 9 94 04/27/2021    HGB 13 6 04/27/2021    HCT 42 6 04/27/2021    MCV 94 04/27/2021     04/27/2021     Lab Results   Component Value Date     11/27/2014    SODIUM 142 04/27/2021    K 3 9 04/27/2021     04/27/2021    CO2 27 04/27/2021    ANIONGAP 2 (L) 11/27/2014    AGAP 8 04/27/2021    BUN 14 04/27/2021    CREATININE 0 73 04/27/2021    GLUC 86 04/27/2021    GLUF 86 06/25/2020    CALCIUM 8 5 04/27/2021    AST 15 01/21/2021    ALT 20 01/21/2021    ALKPHOS 59 01/21/2021    TP 7 6 01/21/2021    TBILI 1 01 (H) 01/21/2021    EGFR 100 04/27/2021     Lab Results   Component Value Date    HGBA1C 5 0 10/29/2020     No results found for: [id number], TSH  Lab Results   Component Value Date    CHOLESTEROL 178 06/25/2020     Lab Results   Component Value Date    HDL 52 06/25/2020     Lab Results   Component Value Date    TRIG 123 06/25/2020     No results found for: LDLDIRECT

## 2021-05-10 ENCOUNTER — TELEPHONE (OUTPATIENT)
Dept: BARIATRICS | Facility: CLINIC | Age: 46
End: 2021-05-10

## 2021-05-12 NOTE — PRE-PROCEDURE INSTRUCTIONS
Pre-Surgery Instructions:   Medication Instructions    Multiple Vitamins-Minerals (ONE-A-DAY WOMENS PO) Patient was instructed by Physician and understands  Per pt - surgeon wanted pt to remain on this prior to surgery- instructed pt then to take up till 5/18/21   Probiotic Product (Pro-biotic Blend) CAPS Patient was instructed by Physician and understands  Per pt - surgeon wanted pt to remain on this prior to surgery-instructed pt then to take up till 5/18/21  Reviewed all medications and instructions for DOS  Reviewed all showering instructions and COVID visitation policy  Pt aware that [address] [place] [address] is location for DOS, instructed that pt pre op nurse will call on 5/18/21to give specific instructions for DOS  Pt instructed to bring photo ID and insurance card for DOS, remove all jewelry and  NO valuables for DOS  Pt instructed to use only Tylenol between now 5/12/21 and DOS, NO NSAID products  Pt informed transport is needed for DOS due to receiving anesthesia  Instructed patient to minimize alcohol use prior to surgery  No alcohol 24 hours prior to surgery to reduce risk of dehydration  Pt verbalized understanding of all instructions given and reviewed for DOS  My Surgical Experience    The following information was developed to assist you to prepare for your operation  What do I need to do before coming to the hospital?   Arrange for a responsible person to drive you to and from the hospital    Arrange care for your children at home  Children are not allowed in the recovery areas of the hospital   Plan to wear clothing that is easy to put on and take off  If you are having shoulder surgery, wear a shirt that buttons or zippers in the front  Bathing  o Shower the evening before and the morning of your surgery with an antibacterial soap   Please refer to the Pre Op Showering Instructions for Surgery Patients Sheet   o Remove nail polish and all body piercing jewelry  o Do not shave any body part for at least 24 hours before surgery-this includes face, arms, legs and upper body  Food  o Nothing to eat or drink after midnight the night before your surgery  This includes candy and chewing gum  o Exception: If your surgery is after 12:00pm (noon), you may have clear liquids such as 7-Up®, ginger ale, apple or cranberry juice, Jell-O®, water, or clear broth until 8:00 am  o Do not drink milk or juice with pulp on the morning before surgery  o Do not drink alcohol 24 hours before surgery  Medicine  o Follow instructions you received from your surgeon about which medicines you may take on the day of surgery  o If instructed to take medicine on the morning of surgery, take pills with just a small sip of water  Call your prescribing doctor for specific infroamtion on what to do if you take insulin    What should I bring to the hospital?    Bring:  Pablito Zhang or a walker, if you have them, for foot or knee surgery   A list of the daily medicines, vitamins, minerals, herbals and nutritional supplements you take  Include the dosages of medicines and the time you take them each day   Glasses, dentures or hearing aids   Minimal clothing; you will be wearing hospital sleepwear   Photo ID; required to verify your identity   If you have a Living Will or Power of , bring a copy of the documents   If you have an ostomy, bring an extra pouch and any supplies you use    Do not bring   Medicines or inhalers   Money, valuables or jewelry    What other information should I know about the day of surgery?  Notify your surgeons if you develop a cold, sore throat, cough, fever, rash or any other illness     Report to the Ambulatory Surgical/Same Day Surgery Unit   You will be instructed to stop at Registration only if you have not been pre-registered   Inform your  fi they do not stay that they will be asked by the staff to leave a phone number where they can be reached   Be available to be reached before surgery  In the event the operating room schedule changes, you may be asked to come in earlier or later than expected    *It is important to tell your doctor and others involved in your health care if you are taking or have been taking any non-prescription drugs, vitamins, minerals, herbals or other nutritional supplements   Any of these may interact with some food or medicines and cause a reaction

## 2021-05-17 ENCOUNTER — OFFICE VISIT (OUTPATIENT)
Dept: DERMATOLOGY | Facility: CLINIC | Age: 46
End: 2021-05-17
Payer: COMMERCIAL

## 2021-05-17 ENCOUNTER — APPOINTMENT (OUTPATIENT)
Dept: LAB | Facility: CLINIC | Age: 46
End: 2021-05-17
Payer: COMMERCIAL

## 2021-05-17 VITALS — HEIGHT: 68 IN | WEIGHT: 239 LBS | TEMPERATURE: 98.2 F | BODY MASS INDEX: 36.22 KG/M2

## 2021-05-17 DIAGNOSIS — R53.83 FATIGUE, UNSPECIFIED TYPE: ICD-10-CM

## 2021-05-17 DIAGNOSIS — L81.4 LENTIGO: ICD-10-CM

## 2021-05-17 DIAGNOSIS — L57.0 KERATOSIS, ACTINIC: Primary | ICD-10-CM

## 2021-05-17 DIAGNOSIS — L82.1 SEBORRHEIC KERATOSIS: ICD-10-CM

## 2021-05-17 DIAGNOSIS — E66.01 SEVERE OBESITY (BMI 35.0-39.9) WITH COMORBIDITY (HCC): ICD-10-CM

## 2021-05-17 DIAGNOSIS — E55.9 VITAMIN D DEFICIENCY: Primary | ICD-10-CM

## 2021-05-17 LAB
25(OH)D3 SERPL-MCNC: 9.5 NG/ML (ref 30–100)
TSH SERPL DL<=0.05 MIU/L-ACNC: 2.22 UIU/ML (ref 0.36–3.74)

## 2021-05-17 PROCEDURE — 84443 ASSAY THYROID STIM HORMONE: CPT

## 2021-05-17 PROCEDURE — 82306 VITAMIN D 25 HYDROXY: CPT

## 2021-05-17 PROCEDURE — 99203 OFFICE O/P NEW LOW 30 MIN: CPT | Performed by: DERMATOLOGY

## 2021-05-17 PROCEDURE — 17000 DESTRUCT PREMALG LESION: CPT | Performed by: DERMATOLOGY

## 2021-05-17 PROCEDURE — 17003 DESTRUCT PREMALG LES 2-14: CPT | Performed by: DERMATOLOGY

## 2021-05-17 PROCEDURE — 36415 COLL VENOUS BLD VENIPUNCTURE: CPT

## 2021-05-17 RX ORDER — ERGOCALCIFEROL 1.25 MG/1
50000 CAPSULE ORAL 3 TIMES WEEKLY
Qty: 24 CAPSULE | Refills: 0 | Status: ON HOLD | OUTPATIENT
Start: 2021-05-17 | End: 2021-08-06 | Stop reason: ALTCHOICE

## 2021-05-17 NOTE — PROGRESS NOTES
[facility] Note     Patient Name: Miriam Maria  Encounter Date: 5/17/2021     Have you been cared for by a [facility] Dermatologist in the last 3 years and, if so, which one? No    · Have you traveled outside of the [address] [place] [address] in the past 3 months or outside of the [facility] area in the last 2 weeks? No     May we call your Preferred Phone number to discuss your specific medical information? Yes     May we leave a detailed message that includes your specific medical information? Yes      Today's Chief Concerns:   Concern #1:  Spot on forehead   Concern #2:  Spot on Right upper thigh     Past Medical History:  Have you personally ever had or currently have any of the following? · Skin cancer (such as Melanoma, Basal Cell Carcinoma, Squamous Cell Carcinoma? (If Yes, please provide more detail)- No  · Eczema: No  · Psoriasis: No  · HIV/AIDS: No  · Hepatitis B or C: No  · Tuberculosis: No  · Systemic Immunosuppression such as Diabetes, Biologic or Immunotherapy, Chemotherapy, Organ Transplantation, Bone Marrow Transplantation (If YES, please provide more detail): No  · Radiation Treatment (If YES, please provide more detail): No  · Any other major medical conditions/concerns? (If Yes, which types)- YES, Tumor predisposition syndrome  Social History:     What is/was your primary occupation?  and       What are your hobbies/past-times? Craft and exercises    Family History:  Have any of your "first degree relatives" (parent, brother, sister, or child) had any of the following       · Skin cancer such as Melanoma or Merkel Cell Carcinoma or Pancreatic Cancer? No  · Eczema, Asthma, Hay Fever or Seasonal Allergies: Yes, son has hx of seasonal allergies   · Psoriasis or Psoriatic Arthritis: No  · Do any other medical conditions seem to run in your family? If Yes, what condition and which relatives?   No    Current Medications:         Current Outpatient Medications:     acetaminophen-codeine (TYLENOL #3) 300-30 mg per tablet, Take 1 tablet by mouth every 4 (four) hours as needed, Disp: , Rfl:     estradiol (VIVELLE-DOT) 0 05 MG/24HR, Place 1 patch on the skin 2 (two) times a week (Patient not taking: Reported on 5/7/2021), Disp: 8 patch, Rfl: 3    Multiple Vitamins-Minerals (ONE-A-DAY WOMENS PO), Take by mouth daily , Disp: , Rfl:     Probiotic Product (Pro-biotic Blend) CAPS, Take by mouth daily , Disp: , Rfl:       Review of Systems:  Have you recently had or currently have any of the following? If YES, what are you doing for the problem? · Fever, chills or unintended weight loss: No  · Sudden loss or change in your vision: No  · Nausea, vomiting or blood in your stool: No  · Painful or swollen joints: No  · Wheezing or cough: No  · Changing mole or non-healing wound: YES, Right inner thigh   · Nosebleeds: No  · Excessive sweating: No  · Easy or prolonged bleeding? No  · Over the last 2 weeks, how often have you been bothered by the following problems? · Taking little interest or pleasure in doing things: 1 - Not at All  · Feeling down, depressed, or hopeless: 1 - Not at All  · Rapid heartbeat with epinephrine:  No    · FEMALES ONLY:    · Are you pregnant or planning to become pregnant? No  · Are you currently or planning to be nursing or breast feeding? No    · Any known allergies? Allergies   Allergen Reactions    Penicillins Other (See Comments)     Childhood reaction; unknown reaction- tolerated Ancef   ·       Physical Exam:     Was a chaperone (Derm Clinical Assistant) present throughout the entire Physical Exam? Yes     Did the Dermatology Team specifically  the patient on the importance of a Full Skin Exam to be sure that nothing is missed clinically?  Yes}  o Did the patient ultimately request or accept a Full Skin Exam?  NO      CONSTITUTIONAL:   There were no vitals filed for this visit         Paulo Hopson: Normal mood and affect  EYES: Normal conjunctiva  ENT: Normal lips and oral mucosa  CARDIOVASCULAR: No edema  RESPIRATORY: Normal respirations  HEME/LYMPH/IMMUNO:  No regional lymphadenopathy except as noted below in "ASSESSMENT AND PLAN BY DIAGNOSIS"    SKIN:  FULL ORGAN SYSTEM EXAM  Hair, Scalp, Ears, Face Normal except as noted below in Assessment   Neck, Cervical Chain Nodes Normal except as noted below in Assessment   Right Arm/Hand/Fingers Normal except as noted below in Assessment   Left Arm/Hand/Fingers Normal except as noted below in Assessment   Chest/Breasts/Axillae Viewed areas Normal except as noted below in Assessment   Abdomen, Umbilicus Normal except as noted below in Assessment   Back/Spine Normal except as noted below in Assessment   Groin/Genitalia/Buttocks NOT EXAMINED   Right Leg, Foot, Toes Normal except as noted below in Assessment   Left Leg, Foot, Toes Normal except as noted below in Assessment        Assessment and Plan by Diagnosis:    History of Present Condition:     Duration:  How long has this been an issue for you?    o  Forehead for 2 years  o Right Inner tight about 6 months    Location Affected:  Where on the body is this affecting you?    o  See above    Quality:  Is there any bleeding, pain, itch, burning/irritation, or redness associated with the skin lesion? o  Red, irritation, pain  o Has changed in shape and texture    Severity:  Describe any bleeding, pain, itch, burning/irritation, or redness on a scale of 1 to 10 (with 10 being the worst)    o  3   Timing:  Does this condition seem to be there pretty constantly or do you notice it more at specific times throughout the day?    o  Denies    Context:  Have you ever noticed that this condition seems to be associated with specific activities you do?    o  Denies    Modifying Factors:    o Anything that seems to make the condition worse?    -  Denies   o What have you tried to do to make the condition better? -  Moisturizer    Associated Signs and Symptoms:  Does this skin lesion seem to be associated with any of the following:  o  SL AMB DERM SIGNS AND SYMPTOMS: Redness     1  ACTINIC KERATOSIS    Physical Exam:   Anatomic Location Affected:  Left inner eyebrow, Right nasal side wall    Morphological Description:  Scaly pink papules    Additional History of Present Condition:  Had it for 2 years  It has not healed since she did a cosmetic treatment on the area  She noticed redness, irritation and pain  Assessment and Plan:  Based on a thorough discussion of this condition and the management approach to it (including a comprehensive discussion of the known risks, side effects and potential benefits of treatment), the patient (family) agrees to implement the following specific plan:     Cryotherapy treatment  Verbal and written consent obtained   Follow up in 6 months  Patient placed on the recall list      Actinic keratoses are very common on sites repeatedly exposed to the sun, especially the backs of the hands and the face, most often affecting the ears, nose, cheeks, upper lip, vermilion of the lower lip, temples, forehead and balding scalp  In severely chronically sun-damaged individuals, they may also be found on the upper trunk, upper and lower limbs, and dorsum of feet  We discussed the theoretical premalignant (pre-cancerous) nature and etiology of these growths  We discussed the prevailing notion that actinic keratoses are a reflection of abnormal skin cell development due to DNA damage by short wavelength UVB  They are more likely to appear if the immune function is poor, due to aging, recent sun exposure, predisposing disease or certain drugs  We discussed that the main concern is that actinic keratoses may predispose to squamous cell carcinoma   It is rare for a solitary actinic keratosis to evolve to squamous cell carcinoma (SCC), but the risk of SCC occurring at some stage in a patient with more than 10 actinic keratoses is thought to be about 10 to 15%  A tender, thickened, ulcerated or enlarging actinic keratosis is suspicious of SCC  Actinic keratoses may be prevented by strict sun protection  If already present, keratoses may improve with a very high sun protection factor (50+) broad-spectrum sunscreen applied at least daily to affected areas, year-round  We recommend that UPF-rated clothing and hats and sunglasses be worn whenever possible and that a sunscreen-moisturizer combination product such as Neutrogena Daily Defense be applied at least three times a day  We performed a thorough discussion of treatment options and specific risk/benefits/alternatives including but not limited to medical field treatment with medications such as the following:     Topical field area medications such as 5-fluorouracil or Aldara (specifically, the trouble with long-term compliance, blistering and local skin reaction versus the convenience of at-home therapy and that field therapy gets what is not yet seen)   Cryotherapy (specifically, local pain, scarring, dyspigmentation, blistering, possible superinfection, and treats only what we see versus directed treatment today)   Photodynamic therapy (specifically, local pain, scarring, dyspigmentation, blistering, possible superinfection, need to schedule for a later date, and time spent in the office versus field therapy that gets what is not yet seen)  PROCEDURE:  DESTRUCTION OF PRE-MALIGNANT LESIONS  After a thorough discussion of treatment options and risk/benefits/alternatives (including but not limited to local pain, scarring, dyspigmentation, blistering, and possible superinfection), verbal and written consent were obtained and the aforementioned lesions were treated on with cryotherapy using liquid nitrogen x 1 cycle for 5-10 seconds       TOTAL NUMBER of 2 pre-malignant lesions were treated today on the ANATOMIC LOCATION: Left inner eyebrow and Right nasal side wall  The patient tolerated the procedure well, and after-care instructions were provided  LENTIGO    Physical Exam:   Anatomic Location Affected:  Right shoulder    Morphological Description:  Scattered 2-4 light tan around to ovoid flat top papule   Pertinent Positives:   Pertinent Negatives: Additional History of Present Condition:  She says she had it for several years  Did not notice a change  Assessment and Plan:  Based on a thorough discussion of this condition and the management approach to it (including a comprehensive discussion of the known risks, side effects and potential benefits of treatment), the patient (family) agrees to implement the following specific plan:   Monitor for changes   When outside we recommend using a wide brim hat, sunglasses, long sleeve and pants, sunscreen with SPF 22+ with reapplication every 2 hours, or SPF specific clothing     What is a lentigo? A lentigo is a pigmented flat or slightly raised lesion with a clearly defined edge  Unlike an ephelis (freckle), it does not fade in the winter months  There are several kinds of lentigo  The name lentigo originally referred to its appearance resembling a small lentil  The plural of lentigo is lentigines, although lentigos is also in common use  Who gets lentigines? Lentigines can affect males and females of all ages and races  Solar lentigines are especially prevalent in fair skinned adults  Lentigines associated with syndromes are present at birth or arise during childhood  What causes lentigines? Common forms of lentigo are due to exposure to ultraviolet radiation:   Sun damage including sunburn    Indoor tanning    Phototherapy, especially photochemotherapy (PUVA)    Ionizing radiation, eg radiation therapy, can also cause lentigines    Several familial syndromes associated with widespread lentigines originate from mutations in Bernardo-MAP kinase, mTOR signaling and PTEN pathways  What are the clinical features of lentigines? Lentigines have been classified into several different types depending on what they look like, where they appear on the body, causative factors, and whether they are associated to other diseases or conditions  Lentigines may be solitary or more often, multiple  Most lentigines are smaller than 5 mm in diameter      Lentigo simplex   A precursor to junctional naevus    Arises during childhood and early adult life    Found on trunk and limbs    Small brown round or oval macule or thin plaque    Jagged or smooth edge    May have a dry surface    May disappear in time  Solar lentigo   A precursor to seborrhoeic keratosis    Found on chronically sun exposed sites such as hands, face, lower legs    May also follow sunburn to shoulders    Yellow, light or dark brown regular or irregular macule or thin plaque    May have a dry surface    Often has moth-eaten outline    Can slowly enlarge to several centimeters in diameter    May disappear, often through the process known as lichenoid keratosis    When atypical in appearance, may be difficult to distinguish from melanoma in situ  Ink spot lentigo   Also known as reticulated lentigo    Few in number compared to solar lentigines    Follows sunburn in very fair skinned individuals    Dark brown to black irregular ink spot-like macule  PUVA lentigo   Similar to ink spot lentigo but follows photochemotherapy (PUVA)    Location anywhere exposed to PUVA  Tanning bed lentigo   Similar to ink spot lentigo but follows indoor tanning    Location anywhere exposed to tanning bed  Radiation lentigo   Occurs in site of irradiation (accidental or therapeutic)    Associated with late-stage radiation dermatitis: epidermal atrophy, subcutaneous fibrosis, keratosis, telangiectasias  Melanotic macule   Mucosal surfaces or adjacent glabrous skin eg lip, vulva, penis, anus  Light to dark brown    Also called mucosal melanosis  Generalised lentigines   Found on any exposed or covered site from early childhood    Small macules may merge to form larger patches    Not associated with a syndrome    Also called lentigines profusa, multiple lentigines  Agminated lentigines   Naevoid eruption of lentigos confined to a single segmental area    Sharp demarcation in midline    May have associated neurological and developmental abnormalities  Patterned lentigines   Inherited tendency to lentigines on face, lips, buttocks, palms, soles    Recognised mainly in people of  ethnicity  Centrofacial neurodysraphic lentiginosis  Associated with mental retardation  Lentiginosis syndromes   Syndromes include LEOPARD/Disputanta, Peutz-Jeghers, Laugier-Hunziker, Moynahan, Xeroderma pigmentosum, myxoma syndromes (DESAI, NAME, Sarmiento), Ruvalcaba-Myhre-Barnett, Bannayan-Zonnana syndrome, Cowden disease (multiple hamartoma syndrome )    Inheritance is autosomal dominant; sporadic cases common    Widespread lentigines present at birth or arise in early childhood    Associated with neural, endocrine, and mesenchymal tumors    How is the diagnosis made? Lentigines are usually diagnosed clinically by their typical appearance  Concern regarding possibility of melanoma may lead to:   Dermatoscopy    Diagnostic excision biopsy    Histopathology of a lentigo shows:   Thickened epidermis    An increased number of melanocytes along the basal layer of epidermis    Unlike junctional melanocytic naevus, there are no nests of melanocytes    Increased melanin pigment within the keratinocytes    Additional features depending on type of lentigo    In contrast, an ephelis (freckle) shows sun-induced increased melanin within the keratinocytes, without an increase in number of cells  What is the treatment for lentigines? Most lentigines are left alone  Attempts to lighten them may not be successful   The following approaches are used:   SPF 50+ broad-spectrum sunscreen    Hydroquinone bleaching cream    Alpha hydroxy acids    Vitamin C    Retinoids    Azelaic acid    Chemical peels  Individual lesions can be permanently removed using:   Cryotherapy    Intense pulsed light    Pigment lasers    How can lentigines be prevented? Lentigines associated with exposure ultraviolet radiation can be prevented by very careful sun protection  Clothing is more successful at preventing new lentigines than are sunscreens  What is the outlook for lentigines? Lentigines usually persist  They may increase in number with age and sun exposure  Some in sun-protected sites may fade and disappear  SEBORRHEIC KERATOSIS; NON-INFLAMED    Physical Exam:   Anatomic Location Affected:  Right inner thigh   Morphological Description:  Flat and raised, waxy, smooth to warty textured, yellow to brownish-grey to dark brown to blackish, discrete, "stuck-on" appearing papules   Pertinent Positives:   Pertinent Negatives: Additional History of Present Condition:  Patient reports new bumps on the skin  Denies itch, burn, pain, bleeding or ulceration  Present constantly; nothing seems to make it worse or better  No prior treatment  Assessment and Plan:  Based on a thorough discussion of this condition and the management approach to it (including a comprehensive discussion of the known risks, side effects and potential benefits of treatment), the patient (family) agrees to implement the following specific plan:   Reassured benign     Seborrheic Keratosis  A seborrheic keratosis is a harmless warty spot that appears during adult life as a common sign of skin aging  Seborrheic keratoses can arise on any area of skin, covered or uncovered, with the usual exception of the palms and soles  They do not arise from mucous membranes  Seborrheic keratoses can have highly variable appearance        Seborrheic keratoses are extremely common  It has been estimated that over 90% of adults over the age of 61 years have one or more of them  They occur in males and females of all races, typically beginning to erupt in the 35s or 45s  They are uncommon under the age of 21 years  The precise cause of seborrhoeic keratoses is not known  Seborrhoeic keratoses are considered degenerative in nature  As time goes by, seborrheic keratoses tend to become more numerous  Some people inherit a tendency to develop a very large number of them; some people may have hundreds of them  The name "seborrheic keratosis" is misleading, because these lesions are not limited to a seborrhoeic distribution (scalp, mid-face, chest, upper back), nor are they formed from sebaceous glands, nor are they associated with sebum -- which is greasy  Seborrheic keratosis may also be called "SK," "Seb K," "basal cell papilloma," "senile wart," or "barnacle "      Researchers have noted:   Eruptive seborrhoeic keratoses can follow sunburn or dermatitis   Skin friction may be the reason they appear in body folds   Viral cause (e g , human papillomavirus) seems unlikely   Stable and clonal mutations or activation of FRFR3, PIK3CA, AUSTIN, AKT1 and EGFR genes are found in seborrhoeic keratoses   Seborrhoeic keratosis can arise from solar lentigo   FRFR3 mutations also arise in solar lentigines  These mutations are associated with increased age and location on the head and neck, suggesting a role of ultraviolet radiation in these lesions   Seborrheic keratoses do not harbour tumour suppressor gene mutations   Epidermal growth factor receptor inhibitors, which are used to treat some cancers, often result in an increase in verrucal (warty) keratoses  There is no easy way to remove multiple lesions on a single occasion  Unless a specific lesion is "inflamed" and is causing pain or stinging/burning or is bleeding, most insurance companies do not authorize treatment      Scribe Attestation    I,:  Deepika Hutchinson am acting as a scribe while in the presence of the attending physician :       I,:  Desiree Morales MD personally performed the services described in this documentation    as scribed in my presence :

## 2021-05-17 NOTE — PATIENT INSTRUCTIONS
ACTINIC KERATOSIS    Assessment and Plan:  Based on a thorough discussion of this condition and the management approach to it (including a comprehensive discussion of the known risks, side effects and potential benefits of treatment), the patient (family) agrees to implement the following specific plan:     Cryotherapy treatment  Verbal and written consent obtained     Actinic keratoses are very common on sites repeatedly exposed to the sun, especially the backs of the hands and the face, most often affecting the ears, nose, cheeks, upper lip, vermilion of the lower lip, temples, forehead and balding scalp  In severely chronically sun-damaged individuals, they may also be found on the upper trunk, upper and lower limbs, and dorsum of feet  We discussed the theoretical premalignant (pre-cancerous) nature and etiology of these growths  We discussed the prevailing notion that actinic keratoses are a reflection of abnormal skin cell development due to DNA damage by short wavelength UVB  They are more likely to appear if the immune function is poor, due to aging, recent sun exposure, predisposing disease or certain drugs  We discussed that the main concern is that actinic keratoses may predispose to squamous cell carcinoma  It is rare for a solitary actinic keratosis to evolve to squamous cell carcinoma (SCC), but the risk of SCC occurring at some stage in a patient with more than 10 actinic keratoses is thought to be about 10 to 15%  A tender, thickened, ulcerated or enlarging actinic keratosis is suspicious of SCC  Actinic keratoses may be prevented by strict sun protection  If already present, keratoses may improve with a very high sun protection factor (50+) broad-spectrum sunscreen applied at least daily to affected areas, year-round    We recommend that UPF-rated clothing and hats and sunglasses be worn whenever possible and that a sunscreen-moisturizer combination product such as Neutrogena Daily Defense be applied at least three times a day  We performed a thorough discussion of treatment options and specific risk/benefits/alternatives including but not limited to medical field treatment with medications such as the following:     Topical field area medications such as 5-fluorouracil or Aldara (specifically, the trouble with long-term compliance, blistering and local skin reaction versus the convenience of at-home therapy and that field therapy gets what is not yet seen)   Cryotherapy (specifically, local pain, scarring, dyspigmentation, blistering, possible superinfection, and treats only what we see versus directed treatment today)   Photodynamic therapy (specifically, local pain, scarring, dyspigmentation, blistering, possible superinfection, need to schedule for a later date, and time spent in the office versus field therapy that gets what is not yet seen)  PROCEDURE:  DESTRUCTION OF PRE-MALIGNANT LESIONS  After a thorough discussion of treatment options and risk/benefits/alternatives (including but not limited to local pain, scarring, dyspigmentation, blistering, and possible superinfection), verbal and written consent were obtained and the aforementioned lesions were treated on with cryotherapy using liquid nitrogen x 1 cycle for 5-10 seconds  LENTIGO    Assessment and Plan:  Based on a thorough discussion of this condition and the management approach to it (including a comprehensive discussion of the known risks, side effects and potential benefits of treatment), the patient (family) agrees to implement the following specific plan:   Monitor for changes   When outside we recommend using a wide brim hat, sunglasses, long sleeve and pants, sunscreen with SPF 84+ with reapplication every 2 hours, or SPF specific clothing     What is a lentigo? A lentigo is a pigmented flat or slightly raised lesion with a clearly defined edge   Unlike an ephelis (freckle), it does not fade in the winter months  There are several kinds of lentigo  The name lentigo originally referred to its appearance resembling a small lentil  The plural of lentigo is lentigines, although lentigos is also in common use  Who gets lentigines? Lentigines can affect males and females of all ages and races  Solar lentigines are especially prevalent in fair skinned adults  Lentigines associated with syndromes are present at birth or arise during childhood  What causes lentigines? Common forms of lentigo are due to exposure to ultraviolet radiation:   Sun damage including sunburn    Indoor tanning    Phototherapy, especially photochemotherapy (PUVA)    Ionizing radiation, eg radiation therapy, can also cause lentigines  Several familial syndromes associated with widespread lentigines originate from mutations in Bernardo-MAP kinase, mTOR signaling and PTEN pathways  What are the clinical features of lentigines? Lentigines have been classified into several different types depending on what they look like, where they appear on the body, causative factors, and whether they are associated to other diseases or conditions  Lentigines may be solitary or more often, multiple  Most lentigines are smaller than 5 mm in diameter      Lentigo simplex   A precursor to junctional naevus    Arises during childhood and early adult life    Found on trunk and limbs    Small brown round or oval macule or thin plaque    Jagged or smooth edge    May have a dry surface    May disappear in time  Solar lentigo   A precursor to seborrhoeic keratosis    Found on chronically sun exposed sites such as hands, face, lower legs    May also follow sunburn to shoulders    Yellow, light or dark brown regular or irregular macule or thin plaque    May have a dry surface    Often has moth-eaten outline    Can slowly enlarge to several centimeters in diameter    May disappear, often through the process known as lichenoid keratosis  When atypical in appearance, may be difficult to distinguish from melanoma in situ  Ink spot lentigo   Also known as reticulated lentigo    Few in number compared to solar lentigines    Follows sunburn in very fair skinned individuals    Dark brown to black irregular ink spot-like macule  PUVA lentigo   Similar to ink spot lentigo but follows photochemotherapy (PUVA)    Location anywhere exposed to PUVA  Tanning bed lentigo   Similar to ink spot lentigo but follows indoor tanning    Location anywhere exposed to tanning bed  Radiation lentigo   Occurs in site of irradiation (accidental or therapeutic)    Associated with late-stage radiation dermatitis: epidermal atrophy, subcutaneous fibrosis, keratosis, telangiectasias  Melanotic macule   Mucosal surfaces or adjacent glabrous skin eg lip, vulva, penis, anus    Light to dark brown    Also called mucosal melanosis  Generalised lentigines   Found on any exposed or covered site from early childhood    Small macules may merge to form larger patches    Not associated with a syndrome    Also called lentigines profusa, multiple lentigines  Agminated lentigines   Naevoid eruption of lentigos confined to a single segmental area    Sharp demarcation in midline    May have associated neurological and developmental abnormalities  Patterned lentigines   Inherited tendency to lentigines on face, lips, buttocks, palms, soles    Recognised mainly in people of  ethnicity  Centrofacial neurodysraphic lentiginosis  Associated with mental retardation  Lentiginosis syndromes   Syndromes include LEOPARD/Bedford, Peutz-Jeghers, Laugier-Hunziker, Moynahan, Xeroderma pigmentosum, myxoma syndromes (DESAI, NAME, Sarmiento), Ruvalcaba-Myhre-Barnett, Bannayan-Zonnana syndrome, Cowden disease (multiple hamartoma syndrome )    Inheritance is autosomal dominant; sporadic cases common    Widespread lentigines present at birth or arise in early childhood    Associated with neural, endocrine, and mesenchymal tumors    How is the diagnosis made? Lentigines are usually diagnosed clinically by their typical appearance  Concern regarding possibility of melanoma may lead to:   Dermatoscopy    Diagnostic excision biopsy    Histopathology of a lentigo shows:   Thickened epidermis    An increased number of melanocytes along the basal layer of epidermis    Unlike junctional melanocytic naevus, there are no nests of melanocytes    Increased melanin pigment within the keratinocytes    Additional features depending on type of lentigo    In contrast, an ephelis (freckle) shows sun-induced increased melanin within the keratinocytes, without an increase in number of cells  What is the treatment for lentigines? Most lentigines are left alone  Attempts to lighten them may not be successful  The following approaches are used:   SPF 50+ broad-spectrum sunscreen    Hydroquinone bleaching cream    Alpha hydroxy acids    Vitamin C    Retinoids    Azelaic acid    Chemical peels  Individual lesions can be permanently removed using:   Cryotherapy    Intense pulsed light    Pigment lasers    How can lentigines be prevented? Lentigines associated with exposure ultraviolet radiation can be prevented by very careful sun protection  Clothing is more successful at preventing new lentigines than are sunscreens  What is the outlook for lentigines? Lentigines usually persist  They may increase in number with age and sun exposure  Some in sun-protected sites may fade and disappear      SEBORRHEIC KERATOSIS; NON-INFLAMED    Assessment and Plan:  Based on a thorough discussion of this condition and the management approach to it (including a comprehensive discussion of the known risks, side effects and potential benefits of treatment), the patient (family) agrees to implement the following specific plan:   Reassured benign     Seborrheic Keratosis  A seborrheic keratosis is a harmless warty spot that appears during adult life as a common sign of skin aging  Seborrheic keratoses can arise on any area of skin, covered or uncovered, with the usual exception of the palms and soles  They do not arise from mucous membranes  Seborrheic keratoses can have highly variable appearance  Seborrheic keratoses are extremely common  It has been estimated that over 90% of adults over the age of 61 years have one or more of them  They occur in males and females of all races, typically beginning to erupt in the 35s or 45s  They are uncommon under the age of 21 years  The precise cause of seborrhoeic keratoses is not known  Seborrhoeic keratoses are considered degenerative in nature  As time goes by, seborrheic keratoses tend to become more numerous  Some people inherit a tendency to develop a very large number of them; some people may have hundreds of them  The name "seborrheic keratosis" is misleading, because these lesions are not limited to a seborrhoeic distribution (scalp, mid-face, chest, upper back), nor are they formed from sebaceous glands, nor are they associated with sebum -- which is greasy  Seborrheic keratosis may also be called "SK," "Seb K," "basal cell papilloma," "senile wart," or "barnacle "      Researchers have noted:   Eruptive seborrhoeic keratoses can follow sunburn or dermatitis   Skin friction may be the reason they appear in body folds   Viral cause (e g , human papillomavirus) seems unlikely   Stable and clonal mutations or activation of FRFR3, PIK3CA, AUSTIN, AKT1 and EGFR genes are found in seborrhoeic keratoses   Seborrhoeic keratosis can arise from solar lentigo   FRFR3 mutations also arise in solar lentigines   These mutations are associated with increased age and location on the head and neck, suggesting a role of ultraviolet radiation in these lesions   Seborrheic keratoses do not harbour tumour suppressor gene mutations   Epidermal growth factor receptor inhibitors, which are used to treat some cancers, often result in an increase in verrucal (warty) keratoses  There is no easy way to remove multiple lesions on a single occasion  Unless a specific lesion is "inflamed" and is causing pain or stinging/burning or is bleeding, most insurance companies do not authorize treatment

## 2021-05-18 ENCOUNTER — ANESTHESIA EVENT (OUTPATIENT)
Dept: PERIOP | Facility: HOSPITAL | Age: 46
End: 2021-05-18
Payer: COMMERCIAL

## 2021-05-18 PROBLEM — R11.2 PONV (POSTOPERATIVE NAUSEA AND VOMITING): Status: ACTIVE | Noted: 2021-05-18

## 2021-05-18 PROBLEM — Z98.890 PONV (POSTOPERATIVE NAUSEA AND VOMITING): Status: ACTIVE | Noted: 2021-05-18

## 2021-05-18 PROBLEM — Z90.710 HISTORY OF HYSTERECTOMY: Status: ACTIVE | Noted: 2021-05-18

## 2021-05-18 PROBLEM — C50.912 MALIGNANT NEOPLASM OF LEFT FEMALE BREAST (HCC): Status: ACTIVE | Noted: 2021-05-18

## 2021-05-18 NOTE — ANESTHESIA PREPROCEDURE EVALUATION
Procedure:  CAPSULECTOMY, EXPANDER/IMPLANT EXCHANGE (Bilateral Breast)    Relevant Problems   ANESTHESIA  old charts reviewed   (+) PONV (postoperative nausea and vomiting)      CARDIO (within normal limits)      ENDO (within normal limits)  obesity      GI/HEPATIC (within normal limits)      /RENAL (within normal limits)      GYN   (+) History of hysterectomy   (+) Malignant neoplasm of left female breast (HCC)      HEMATOLOGY (within normal limits)      MUSCULOSKELETAL (within normal limits)      NEURO/PSYCH (within normal limits)      PULMONARY   (+) Asthma        Physical Exam    Airway    Mallampati score: II  TM Distance: >3 FB  Neck ROM: full     Dental       Cardiovascular  Cardiovascular exam normal    Pulmonary  Pulmonary exam normal     Other Findings  Fixed upper and lower teeth and in good repair      Anesthesia Plan  ASA Score- 2     Anesthesia Type- general with ASA Monitors  Additional Monitors:   Airway Plan:           Plan Factors-Exercise tolerance (METS): >4 METS  Chart reviewed  EKG reviewed  Existing labs reviewed  Patient summary reviewed  Patient is not a current smoker  Patient not instructed to abstain from smoking on day of procedure  Patient did not smoke on day of surgery  Obstructive sleep apnea risk education given perioperatively  Induction- intravenous  Postoperative Plan- Plan for postoperative opioid use  Informed Consent- Anesthetic plan and risks discussed with patient and spouse  I personally reviewed this patient with the CRNA  Discussed and agreed on the Anesthesia Plan with the CRNA  Dion Khanna

## 2021-05-19 ENCOUNTER — ANESTHESIA (OUTPATIENT)
Dept: PERIOP | Facility: HOSPITAL | Age: 46
End: 2021-05-19
Payer: COMMERCIAL

## 2021-05-19 ENCOUNTER — HOSPITAL ENCOUNTER (OUTPATIENT)
Facility: HOSPITAL | Age: 46
Setting detail: OUTPATIENT SURGERY
Discharge: HOME | End: 2021-05-19
Attending: PLASTIC SURGERY | Admitting: PLASTIC SURGERY
Payer: COMMERCIAL

## 2021-05-19 VITALS
HEART RATE: 68 BPM | TEMPERATURE: 97.1 F | DIASTOLIC BLOOD PRESSURE: 63 MMHG | HEIGHT: 68 IN | OXYGEN SATURATION: 95 % | SYSTOLIC BLOOD PRESSURE: 115 MMHG | WEIGHT: 239 LBS | BODY MASS INDEX: 36.22 KG/M2 | RESPIRATION RATE: 16 BRPM

## 2021-05-19 DIAGNOSIS — N65.0 DEFORMITY OF RECONSTRUCTED BREAST: ICD-10-CM

## 2021-05-19 DIAGNOSIS — Z15.01 GENETIC SUSCEPTIBILITY TO MALIGNANT NEOPLASM OF BREAST: ICD-10-CM

## 2021-05-19 PROCEDURE — 88305 TISSUE EXAM BY PATHOLOGIST: CPT | Performed by: PATHOLOGY

## 2021-05-19 PROCEDURE — C1789 PROSTHESIS, BREAST, IMP: HCPCS | Performed by: PLASTIC SURGERY

## 2021-05-19 DEVICE — SALINE BREAST IMPLANT WITH DIAPHRAGM VALVE, 800CC  SMOOTH ROUND SALINE
Type: IMPLANTABLE DEVICE | Site: BREAST | Status: FUNCTIONAL
Brand: MENTOR SMOOTH ROUND MODERATE PLUS PROFILE

## 2021-05-19 RX ORDER — TRAMADOL HYDROCHLORIDE 50 MG/1
50 TABLET ORAL ONCE
Status: COMPLETED | OUTPATIENT
Start: 2021-05-19 | End: 2021-05-19

## 2021-05-19 RX ORDER — OXYCODONE HYDROCHLORIDE AND ACETAMINOPHEN 5; 325 MG/1; MG/1
2 TABLET ORAL EVERY 4 HOURS PRN
Status: DISCONTINUED | OUTPATIENT
Start: 2021-05-19 | End: 2021-05-19 | Stop reason: HOSPADM

## 2021-05-19 RX ORDER — MIDAZOLAM HYDROCHLORIDE 2 MG/2ML
INJECTION, SOLUTION INTRAMUSCULAR; INTRAVENOUS AS NEEDED
Status: DISCONTINUED | OUTPATIENT
Start: 2021-05-19 | End: 2021-05-19

## 2021-05-19 RX ORDER — LIDOCAINE HYDROCHLORIDE AND EPINEPHRINE 10; 10 MG/ML; UG/ML
INJECTION, SOLUTION INFILTRATION; PERINEURAL AS NEEDED
Status: DISCONTINUED | OUTPATIENT
Start: 2021-05-19 | End: 2021-05-19 | Stop reason: HOSPADM

## 2021-05-19 RX ORDER — DEXAMETHASONE SODIUM PHOSPHATE 4 MG/ML
4 INJECTION, SOLUTION INTRA-ARTICULAR; INTRALESIONAL; INTRAMUSCULAR; INTRAVENOUS; SOFT TISSUE ONCE AS NEEDED
Status: DISCONTINUED | OUTPATIENT
Start: 2021-05-19 | End: 2021-05-19 | Stop reason: HOSPADM

## 2021-05-19 RX ORDER — SCOLOPAMINE TRANSDERMAL SYSTEM 1 MG/1
1 PATCH, EXTENDED RELEASE TRANSDERMAL ONCE
Status: DISCONTINUED | OUTPATIENT
Start: 2021-05-19 | End: 2021-05-19 | Stop reason: HOSPADM

## 2021-05-19 RX ORDER — FENTANYL CITRATE 50 UG/ML
INJECTION, SOLUTION INTRAMUSCULAR; INTRAVENOUS AS NEEDED
Status: DISCONTINUED | OUTPATIENT
Start: 2021-05-19 | End: 2021-05-19

## 2021-05-19 RX ORDER — FENTANYL CITRATE/PF 50 MCG/ML
25 SYRINGE (ML) INJECTION
Status: COMPLETED | OUTPATIENT
Start: 2021-05-19 | End: 2021-05-19

## 2021-05-19 RX ORDER — SODIUM CHLORIDE 9 MG/ML
INJECTION, SOLUTION INTRAVENOUS AS NEEDED
Status: DISCONTINUED | OUTPATIENT
Start: 2021-05-19 | End: 2021-05-19 | Stop reason: HOSPADM

## 2021-05-19 RX ORDER — ONDANSETRON 2 MG/ML
INJECTION INTRAMUSCULAR; INTRAVENOUS AS NEEDED
Status: DISCONTINUED | OUTPATIENT
Start: 2021-05-19 | End: 2021-05-19

## 2021-05-19 RX ORDER — PROMETHAZINE HYDROCHLORIDE 25 MG/ML
12.5 INJECTION, SOLUTION INTRAMUSCULAR; INTRAVENOUS ONCE AS NEEDED
Status: DISCONTINUED | OUTPATIENT
Start: 2021-05-19 | End: 2021-05-19 | Stop reason: HOSPADM

## 2021-05-19 RX ORDER — SODIUM CHLORIDE, SODIUM LACTATE, POTASSIUM CHLORIDE, CALCIUM CHLORIDE 600; 310; 30; 20 MG/100ML; MG/100ML; MG/100ML; MG/100ML
75 INJECTION, SOLUTION INTRAVENOUS CONTINUOUS
Status: DISCONTINUED | OUTPATIENT
Start: 2021-05-19 | End: 2021-05-19 | Stop reason: HOSPADM

## 2021-05-19 RX ORDER — LIDOCAINE HYDROCHLORIDE 10 MG/ML
INJECTION, SOLUTION EPIDURAL; INFILTRATION; INTRACAUDAL; PERINEURAL AS NEEDED
Status: DISCONTINUED | OUTPATIENT
Start: 2021-05-19 | End: 2021-05-19

## 2021-05-19 RX ORDER — FENTANYL CITRATE/PF 50 MCG/ML
12.5 SYRINGE (ML) INJECTION
Status: DISCONTINUED | OUTPATIENT
Start: 2021-05-19 | End: 2021-05-19 | Stop reason: HOSPADM

## 2021-05-19 RX ORDER — METOCLOPRAMIDE HYDROCHLORIDE 5 MG/ML
INJECTION INTRAMUSCULAR; INTRAVENOUS AS NEEDED
Status: DISCONTINUED | OUTPATIENT
Start: 2021-05-19 | End: 2021-05-19

## 2021-05-19 RX ORDER — MEPERIDINE HYDROCHLORIDE 25 MG/ML
12.5 INJECTION INTRAMUSCULAR; INTRAVENOUS; SUBCUTANEOUS
Status: DISCONTINUED | OUTPATIENT
Start: 2021-05-19 | End: 2021-05-19 | Stop reason: HOSPADM

## 2021-05-19 RX ORDER — PROPOFOL 10 MG/ML
INJECTION, EMULSION INTRAVENOUS AS NEEDED
Status: DISCONTINUED | OUTPATIENT
Start: 2021-05-19 | End: 2021-05-19

## 2021-05-19 RX ORDER — MAGNESIUM HYDROXIDE 1200 MG/15ML
LIQUID ORAL AS NEEDED
Status: DISCONTINUED | OUTPATIENT
Start: 2021-05-19 | End: 2021-05-19 | Stop reason: HOSPADM

## 2021-05-19 RX ORDER — ONDANSETRON 2 MG/ML
4 INJECTION INTRAMUSCULAR; INTRAVENOUS EVERY 8 HOURS PRN
Status: DISCONTINUED | OUTPATIENT
Start: 2021-05-19 | End: 2021-05-19 | Stop reason: HOSPADM

## 2021-05-19 RX ORDER — VANCOMYCIN HYDROCHLORIDE 1 G/200ML
1000 INJECTION, SOLUTION INTRAVENOUS ONCE
Status: COMPLETED | OUTPATIENT
Start: 2021-05-19 | End: 2021-05-19

## 2021-05-19 RX ORDER — DIPHENHYDRAMINE HYDROCHLORIDE 50 MG/ML
12.5 INJECTION INTRAMUSCULAR; INTRAVENOUS ONCE AS NEEDED
Status: DISCONTINUED | OUTPATIENT
Start: 2021-05-19 | End: 2021-05-19 | Stop reason: HOSPADM

## 2021-05-19 RX ORDER — DEXAMETHASONE SODIUM PHOSPHATE 10 MG/ML
INJECTION, SOLUTION INTRAMUSCULAR; INTRAVENOUS AS NEEDED
Status: DISCONTINUED | OUTPATIENT
Start: 2021-05-19 | End: 2021-05-19

## 2021-05-19 RX ORDER — BUPIVACAINE HYDROCHLORIDE 5 MG/ML
INJECTION, SOLUTION PERINEURAL AS NEEDED
Status: DISCONTINUED | OUTPATIENT
Start: 2021-05-19 | End: 2021-05-19 | Stop reason: HOSPADM

## 2021-05-19 RX ADMIN — SODIUM CHLORIDE, SODIUM LACTATE, POTASSIUM CHLORIDE, AND CALCIUM CHLORIDE: .6; .31; .03; .02 INJECTION, SOLUTION INTRAVENOUS at 09:18

## 2021-05-19 RX ADMIN — LIDOCAINE HYDROCHLORIDE 50 MG: 10 INJECTION, SOLUTION EPIDURAL; INFILTRATION; INTRACAUDAL; PERINEURAL at 09:33

## 2021-05-19 RX ADMIN — MIDAZOLAM 2 MG: 1 INJECTION INTRAMUSCULAR; INTRAVENOUS at 09:22

## 2021-05-19 RX ADMIN — DEXAMETHASONE SODIUM PHOSPHATE 8 MG: 10 INJECTION, SOLUTION INTRAMUSCULAR; INTRAVENOUS at 09:30

## 2021-05-19 RX ADMIN — MIDAZOLAM 2 MG: 1 INJECTION INTRAMUSCULAR; INTRAVENOUS at 09:30

## 2021-05-19 RX ADMIN — FENTANYL CITRATE 25 MCG: 50 INJECTION, SOLUTION INTRAMUSCULAR; INTRAVENOUS at 12:49

## 2021-05-19 RX ADMIN — FENTANYL CITRATE 25 MCG: 50 INJECTION, SOLUTION INTRAMUSCULAR; INTRAVENOUS at 12:44

## 2021-05-19 RX ADMIN — ONDANSETRON 4 MG: 2 INJECTION INTRAMUSCULAR; INTRAVENOUS at 11:48

## 2021-05-19 RX ADMIN — FENTANYL CITRATE 25 MCG: 50 INJECTION, SOLUTION INTRAMUSCULAR; INTRAVENOUS at 09:30

## 2021-05-19 RX ADMIN — FENTANYL CITRATE 75 MCG: 50 INJECTION, SOLUTION INTRAMUSCULAR; INTRAVENOUS at 09:37

## 2021-05-19 RX ADMIN — VANCOMYCIN HYDROCHLORIDE 1000 MG: 1 INJECTION, SOLUTION INTRAVENOUS at 09:20

## 2021-05-19 RX ADMIN — SODIUM CHLORIDE, SODIUM LACTATE, POTASSIUM CHLORIDE, AND CALCIUM CHLORIDE: .6; .31; .03; .02 INJECTION, SOLUTION INTRAVENOUS at 11:48

## 2021-05-19 RX ADMIN — SCOPALAMINE 1 PATCH: 1 PATCH, EXTENDED RELEASE TRANSDERMAL at 07:25

## 2021-05-19 RX ADMIN — PROPOFOL 200 MG: 10 INJECTION, EMULSION INTRAVENOUS at 09:33

## 2021-05-19 RX ADMIN — FENTANYL CITRATE 25 MCG: 50 INJECTION, SOLUTION INTRAMUSCULAR; INTRAVENOUS at 12:54

## 2021-05-19 RX ADMIN — FENTANYL CITRATE 25 MCG: 50 INJECTION, SOLUTION INTRAMUSCULAR; INTRAVENOUS at 12:39

## 2021-05-19 RX ADMIN — METOCLOPRAMIDE 10 MG: 5 INJECTION, SOLUTION INTRAMUSCULAR; INTRAVENOUS at 09:35

## 2021-05-19 RX ADMIN — FENTANYL CITRATE 50 MCG: 50 INJECTION, SOLUTION INTRAMUSCULAR; INTRAVENOUS at 10:18

## 2021-05-19 RX ADMIN — TRAMADOL HYDROCHLORIDE 50 MG: 50 TABLET, FILM COATED ORAL at 13:51

## 2021-05-19 NOTE — DISCHARGE SUMMARY
Discharge Summary - Medical Lisa Seaman Brandon 42 y o  female MRN: [id number]    [address] [facility] Room / Bed: OR POOL/OR POOL Encounter: [id number]    BRIEF OVERVIEW  Admitting Provider: Ulises Hyde MD  Discharge Provider: Ulises Hyde MD  Primary Care Physician at Discharge: John Cabot, [facility], [place]    Discharge To: Home      Admission Date: 5/19/2021     Discharge Date: No discharge date for patient encounter  Code Status: Prior  Advance Directive and Living Will: <no information>  Power of :        Primary Discharge Diagnosis  Active Problems:    History of hysterectomy    Malignant neoplasm of left female breast (HCC)    PONV (postoperative nausea and vomiting)  Resolved Problems:    * No resolved hospital problems   *        Discharge Disposition: [address] [place] [address]    Presenting Problem/History of Present Illness  <principal problem not specified>      Discharge Condition: stable    Patient tolerated the procedure well, recovered and was discharged home in stable condition    Ulises Hyde MD  5/19/2021  9:03 AM

## 2021-05-19 NOTE — DISCHARGE INSTRUCTIONS
[address], [address], [facility], [facility] [address] [facility] [place]       No heavy lifting >20 pounds    Do not raise hands above head    No exercise    Consider using button up shirts so you don't have to raise your hands above your head to put the shirt on    Wear the surgical bra at all times except the shower   If the bra is tight and is leaving marks on the skin unclasp the bottom clasps of the bra    No ice or heating pack to chest    Ok to shower    No bathing    Do not lay on stomach or sides    No smoking    No pushing or pulling    No repetitive arm movements such as cleaning, gardening etc    Call the office for an appointment in 5-14 days - [phone]

## 2021-05-19 NOTE — ANESTHESIA POSTPROCEDURE EVALUATION
Post-Op Assessment Note    CV Status:  Stable  Pain Score: 3    Pain management: adequate     Mental Status:  Alert and awake   Hydration Status:  Euvolemic   PONV Controlled:  Controlled   Airway Patency:  Patent      Post Op Vitals Reviewed: Yes      Staff: Anesthesiologist, CRNA   Comments: LMA without any known complications        No complications documented      /60 (05/19/21 1233)    Temp     Pulse 86 (05/19/21 1233)   Resp 17 (05/19/21 1233)    SpO2 96 % (05/19/21 1233)

## 2021-05-19 NOTE — NURSING NOTE
Tramadol order obtained and same given for pain (burning) in bilateral breast incisions  Ambulated to the bathroom with supervision of staff member and   Voided  IV to be removed

## 2021-05-19 NOTE — OP NOTE
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]    Procedure(s) (LRB):  CAPSULECTOMY, EXPANDER/IMPLANT EXCHANGE (Bilateral)    Specimen(s):  * No specimens in log *    Estimated Blood Loss:   Minimal    Drains:  Closed/Suction Drain Left;Medial Chest Bulb 15 Fr  (Active)   Number of days: 99       Closed/Suction Drain Left;Lateral Chest Bulb 15 Fr  (Active)   Number of days: 99       Closed/Suction Drain Right;Medial Chest Bulb 15 Fr  (Active)   Number of days: 99       Closed/Suction Drain Right;Lateral Chest 15 Fr  (Active)   Number of days: 99       Anesthesia Type:   General/LMA    Operative Indications:  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]  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]      Operative Findings:      Complications:   None    Procedure and Technique:  The patient was marked while standing prior to surgery  The patient was brought to the operating room and placed supine on the operating room table  Time out procedure was performed, SCDs were applied and IV antibiotics were given  The chest was prepped and draped using standard surgical technique  Attention was turned to the right breast  The previous scar was excised and dissection was carried down to the capsule using electrocautery  The capsule was found to be very thick and stiff  The capsule was opened and the fluid was removed from the expander with a tiffany suction  The expander was removed  Due to the thick capsule the decision was made to perform a capsulectomy   A total capsulectomy was performed  The capsule was sent as a specimen  The pocket was opened to fit the exact dimensions of the chosen implant  Attention was turned to the left breast  The previous scar was excised and dissection was carried down to the capsule using electrocautery  The capsule was found to be very thick and stiff  The capsule was opened and the fluid was removed from the expander with a tiffany suction  The expander was removed  Due to the thick capsule the decision was made to perform a capsulectomy  A total capsulectomy was performed   The capsule was sent as a specimen  The pocket was opened to fit the exact dimensions of the chosen implant  The pockets were irrigated with antibiotic solution which was allowed to dwell for 5 minutes  Excellent hemostasis was achieved  The skin was re prepped, all surgical team members changed their gloves  Silicone sizers were prepared  The sizers were placed into the pockets and excellent shape and contour was confirmed  [place] saline implants were placed into the pockets  The orientation markers were used to confirm anatomic position of the implant  1050ml injectable saline was placed into right implant and 1000ml into the left implant  The deep tissue was re approximated using 2-0 PDS suture in running technique  The skin was closed using 3-0 vicryl and 3-0 PDS suture in interrupted deep dermal technique  The superficial skin was closed using 3-0 stratafix suture in running subcuticular technique  The wounds were cleaned and dried  Skin glue was applied  Sterile gauze and a surgical bra was placed  The patient tolerated the procedure well and was taken to the recovery room in stable condition         I was present for the entire procedure and A qualified resident physician was not available    Patient Disposition:  hemodynamically stable and extubated and stable    SIGNATURE: Anel Gold MD  DATE: May 19, 2021  TIME: 9:01 AM

## 2021-05-19 NOTE — ANESTHESIA POSTPROCEDURE EVALUATION
Post-Op Assessment Note    CV Status:  Stable  Pain Score: 0    Pain management: adequate     Mental Status:  Alert and awake   Hydration Status:  Euvolemic   PONV Controlled:  Controlled   Airway Patency:  Patent      Post Op Vitals Reviewed: Yes      Staff: CRNA         No complications documented      /70 (05/19/21 1203)    Temp (!) 97 °F (36 1 °C) (05/19/21 1203)    Pulse 105 (05/19/21 1203)   Resp 16 (05/19/21 1203)    SpO2 97 % (05/19/21 1203)

## 2021-05-21 PROBLEM — C50.912 MALIGNANT NEOPLASM OF LEFT FEMALE BREAST (HCC): Status: RESOLVED | Noted: 2021-05-18 | Resolved: 2021-05-21

## 2021-05-24 ENCOUNTER — OFFICE VISIT (OUTPATIENT)
Dept: SURGICAL ONCOLOGY | Facility: CLINIC | Age: 46
End: 2021-05-24
Payer: COMMERCIAL

## 2021-05-24 VITALS
HEART RATE: 76 BPM | DIASTOLIC BLOOD PRESSURE: 80 MMHG | OXYGEN SATURATION: 98 % | BODY MASS INDEX: 35.77 KG/M2 | TEMPERATURE: 98 F | HEIGHT: 68 IN | RESPIRATION RATE: 16 BRPM | SYSTOLIC BLOOD PRESSURE: 128 MMHG | WEIGHT: 236 LBS

## 2021-05-24 DIAGNOSIS — Z15.01 BRCA1 GENE MUTATION POSITIVE: Primary | ICD-10-CM

## 2021-05-24 DIAGNOSIS — Z12.39 BREAST CANCER SCREENING, HIGH RISK PATIENT: ICD-10-CM

## 2021-05-24 DIAGNOSIS — Z15.09 BRCA1 GENE MUTATION POSITIVE: Primary | ICD-10-CM

## 2021-05-24 PROCEDURE — 99213 OFFICE O/P EST LOW 20 MIN: CPT | Performed by: SURGERY

## 2021-05-24 RX ORDER — OXYCODONE HYDROCHLORIDE AND ACETAMINOPHEN 325; 5 MG/5ML; MG/5ML
SOLUTION ORAL EVERY 4 HOURS PRN
COMMUNITY
End: 2021-11-12

## 2021-05-24 NOTE — PROGRESS NOTES
Surgical Oncology Follow Up       [address]  [facility]  [address] [facility] [address]    Elza Otero Difrancesco  1975  [id number]  [address] [facility]  [facility]  [address] [address]    Chief Complaint   Patient presents with    Follow-up          Assessment & Plan:     Patient presents for a follow-up visit  She has had her  Expanders unchanged for implants last week  She is healing quite well  Clinical exam shows no evidence of any suspicious findings    Cancer History:     Oncology History    No history exists  Interval History:    see above  The patient has a BRCA 1 mutation  She has no evidence of breast cancer    Review of Systems:   Review of Systems   All other systems reviewed and are negative  Past Medical History     Patient Active Problem List   Diagnosis    Sinobronchitis    BRCA1 gene mutation positive    Symptomatic postsurgical menopause    Asthma    Obesity    Keratosis, actinic    History of hysterectomy    PONV (postoperative nausea and vomiting)     Past Medical History:   Diagnosis Date    Anxiety 2/9/2021 5/12/21 Pt states never diagnosed with anxiety     BRCA1-associated protein-1 tumor predisposition syndrome     Intraductal papilloma of breast, left 2/23/2021    Obesity (BMI 30-39  9)     Pneumonia     2019    PONV (postoperative nausea and vomiting)      Past Surgical History:   Procedure Laterality Date    APPENDECTOMY      CAPSULOTOMY Bilateral 5/19/2021    Procedure: CAPSULECTOMY, EXPANDER/IMPLANT EXCHANGE;  Surgeon: Angie Hendrix MD;  Location: [facility] MAIN OR;  Service: Plastics    COLONOSCOPY     [address]    HYSTERECTOMY N/A 11/27/2020    Procedure: TOTAL LAPAROSCOPIC HYSTERECTOMY, BILATERAL SALPINGO-OOPHORECTOMY, cystoscopy;  Surgeon: Jimena Dugan MD;  [facility] MAIN [facility];  Service: Gynecology Oncology    ND IMPLNT BIO IMPLNT FOR SOFT TISSUE REINFORCEMENT Bilateral 2/9/2021    Procedure: RECONSTRUCTION BREAST W/ IMPLANT;  Surgeon: Cheli Kirkland MD;  [facility] Main OR;  Service: Plastics    ND MASTECTOMY, SIMPLE, COMPLETE Bilateral 2/9/2021    Procedure: BREAST MASTECTOMY;  Surgeon: Kezia Raines MD;  [facility] Main OR;  Service: Surgical Oncology    ND TISSUE EXPANDER PLACEMENT BREAST RECONSTRUCTION Bilateral 2/9/2021    Procedure: BREAST INSERTION/PLACEMENT TISSUE EXPANDER (EXCHANGE); Surgeon: Cheli Kirkland MD;  [facility] Main OR;  Service: Plastics    TONSILLECTOMY      TYMPANOSTOMY TUBE PLACEMENT Bilateral      Family History   Problem Relation Age of Onset   Jason Rasheed Cancer Father         Age at [address] unknown; type unknown    Cancer Maternal Grandfather     No Known Problems Maternal Aunt     No Known Problems Paternal Aunt     No Known Problems Paternal Aunt     Breast cancer Cousin 36    No Known Problems Mother     BRCA1 Positive Sister     No Known Problems Daughter     No Known Problems Sister     No Known Problems Daughter     BRCA1 Positive Maternal Uncle     No Known Problems Paternal Uncle     No Known Problems Paternal Aunt     No Known Problems Paternal Uncle     Ovarian cancer Other 46     Social History     Socioeconomic History    Marital status: /Civil Union     Spouse name: Not on file    Number of children: Not on file    Years of education: Not on file    Highest education level: Not on file   Occupational History    Not on file   Social Needs    Financial resource strain: Not on file    Food insecurity     Worry: Not on file     Inability: Not on file   [employer] needs     Medical: Not on file     Non-medical: Not on file   Tobacco Use    Smoking status: Never Smoker    Smokeless tobacco: Never Used    Tobacco comment: Denies   Substance and Sexual Activity    Alcohol use:  Yes     Alcohol/week: 1 0 standard drinks     Types: 1 Standard drinks or equivalent per week     Frequency: Monthly or less     Comment: social    Drug use: No     Comment: Denies    Sexual activity: Yes     Partners: Male   Lifestyle    Physical activity     Days per week: Not on file     Minutes per session: Not on file    Stress: Not on file   Relationships    Social connections     Talks on phone: Not on file     Gets together: Not on file     Attends Christian service: Not on file     Active member of club or organization: Not on file     Attends meetings of clubs or organizations: Not on file     Relationship status: Not on file    Intimate partner violence     Fear of current or ex partner: Not on file     Emotionally abused: Not on file     Physically abused: Not on file     Forced sexual activity: Not on file   Other Topics Concern    Not on file   Social History Narrative    Not on file       Current Outpatient Medications:     acetaminophen-codeine (TYLENOL #3) 300-30 mg per tablet, Take 1 tablet by mouth every 4 (four) hours as needed, Disp: , Rfl:     ergocalciferol (VITAMIN D2) 50,000 units, Take 1 capsule (50,000 Units total) by mouth 3 (three) times a week For 8 weeks  , Disp: 24 capsule, Rfl: 0    estradiol (VIVELLE-DOT) 0 05 MG/24HR, Place 1 patch on the skin 2 (two) times a week, Disp: 8 patch, Rfl: 3    Multiple Vitamins-Minerals (ONE-A-DAY WOMENS PO), Take by mouth daily , Disp: , Rfl:     oxyCODONE-acetaminophen (ROXICET) 5-325 mg/5 mL solution, Take by mouth every 4 (four) hours as needed for moderate pain, Disp: , Rfl:     Probiotic Product (Pro-biotic Blend) CAPS, Take by mouth daily , Disp: , Rfl:   Allergies   Allergen Reactions    Penicillins Other (See Comments)     Childhood reaction; unknown reaction- tolerated [facility]       Physical Exam:     Vitals:    05/24/21 1136   BP: 128/80   Pulse: 76   Resp: 16   Temp: 98 °F (36 7 °C)   SpO2: 98%     Physical Exam  Chest:      Comments:  Examination of both reconstructed breast demonstrates mild ecchymosis  She has a good cosmetic outcome  There are no dominant masses or evidence of  Cancer  There is no axillary or paraclavicular adenopathy  Results & Discussion:     The patient had a conversation regarding further screening  I have recommended that she contact us should she ever appreciate any changes in her breast   We talked about screening intervals  We will see her back in 1 year's time with the above caveat  Advance Care Planning/Advance Directives:  I discussed the disease status, treatment plans and follow-up with the patient

## 2021-06-07 ENCOUNTER — OFFICE VISIT (OUTPATIENT)
Dept: BARIATRICS | Facility: CLINIC | Age: 46
End: 2021-06-07
Payer: COMMERCIAL

## 2021-06-07 DIAGNOSIS — E66.9 OBESITY, CLASS II, BMI 35-39.9: Primary | ICD-10-CM

## 2021-06-07 DIAGNOSIS — E55.9 VITAMIN D DEFICIENCY: ICD-10-CM

## 2021-06-07 DIAGNOSIS — Z90.710 HISTORY OF HYSTERECTOMY: ICD-10-CM

## 2021-06-07 DIAGNOSIS — R63.5 ABNORMAL WEIGHT GAIN: ICD-10-CM

## 2021-06-07 PROCEDURE — 1036F TOBACCO NON-USER: CPT | Performed by: INTERNAL MEDICINE

## 2021-06-07 PROCEDURE — 3008F BODY MASS INDEX DOCD: CPT | Performed by: INTERNAL MEDICINE

## 2021-06-07 PROCEDURE — 99214 OFFICE O/P EST MOD 30 MIN: CPT | Performed by: INTERNAL MEDICINE

## 2021-06-07 RX ORDER — TOPIRAMATE 50 MG/1
TABLET, FILM COATED ORAL
Qty: 30 TABLET | Refills: 0 | Status: SHIPPED | OUTPATIENT
Start: 2021-06-07 | End: 2021-07-08 | Stop reason: SDUPTHER

## 2021-06-07 RX ORDER — PHENTERMINE HYDROCHLORIDE 15 MG/1
15 CAPSULE ORAL EVERY MORNING
Qty: 30 CAPSULE | Refills: 0 | Status: SHIPPED | OUTPATIENT
Start: 2021-06-07 | End: 2021-07-08 | Stop reason: SDUPTHER

## 2021-06-07 NOTE — PROGRESS NOTES
Assessment/Plan:  Jada Viera was seen today for follow-up  Diagnoses and all orders for this visit:    History of hysterectomy  Obesity, Class II, BMI 35-39 9  Abnormal weight gain  Will start   -     topiramate (TOPAMAX) 50 MG tablet; Take half tablet for 1 week, then take 1 tablet in the morning  -     phentermine 15 MG capsule; Take 1 capsule (15 mg total) by mouth every morning  Goals:  Goal protein 90-100g per day  Goal carbohydrates 100-120g per day   Make sure to meet enough calories; 7792-5889 calories   Exercise 3x week - peloton 15-30 min    Vitamin D deficiency  Continue to take vit D 3x wk      Follow up in approximately 1 month with Non-Surgical Physician/Advanced Practitioner  Subjective:   Chief Complaint   Patient presents with    Follow-up     pt is here for mwm f/u  Patient ID: Jt Medeiros  is a 42 y o  female with excess weight/obesity here to pursue weight management  Patient is pursuing Conservative Program      HPI  -Initial weight loss goal of 5-10% weight loss for improved health  Wt Readings from Last 10 Encounters:   06/07/21 108 kg (238 lb 12 8 oz)   05/24/21 107 kg (236 lb)   05/19/21 108 kg (239 lb)   05/17/21 108 kg (239 lb)   05/07/21 107 kg (235 lb 12 8 oz)   02/24/21 105 kg (231 lb)   02/09/21 108 kg (238 lb)   01/14/21 108 kg (238 lb)   01/13/21 108 kg (238 lb)   11/27/20 105 kg (232 lb)     Initial weight: 235 lbs    Current weight: 238 8  Change in weight: +4 8 lbs   Goal: 180    Had breast reconstruction surgery last month, still recovering from it   Having a lot of pain on her sides   B- protein shake for B/L  S-  L-   S-  D- meat/veggies  S-  Drinks- 72 oz water with crystal light   Alcohol- once a week 1 drink  Exercise- not able to due to surgery; can resume in 1 week - has a peloton       The following portions of the patient's history were reviewed and updated as appropriate: allergies, current medications, past family history, past medical history, past social history, past surgical history, and problem list     Review of Systems   Respiratory: Negative  Cardiovascular: Negative  Gastrointestinal: Negative  Musculoskeletal:        Pain in her axillary regions due to surgery   Psychiatric/Behavioral: Negative  Objective:  /78 (BP Location: Left arm, Patient Position: Sitting, Cuff Size: Large)   Pulse 78   Temp 98 °F (36 7 °C)   Ht 5' 8 2" (1 732 m)   Wt 108 kg (238 lb 12 8 oz)   LMP 10/15/2020 (Exact Date)   BMI 36 10 kg/m²   Constitutional: Well-developed, well-nourished and obese Body mass index is 36 1 kg/m²  Jinny Martinez HEENT: No conjunctival pallor or jaundice  Pulmonary: No increased work of breathing or signs of respiratory distress  CV: Normal rate, well-perfused  GI: Obese  Non-distended  MSK: No edema   Neuro: Oriented to person, place and time  Normal Speech  Normal gait  Psych: Normal affect and mood       Labs and Imaging  Reviewed

## 2021-06-21 DIAGNOSIS — E89.41 SYMPTOMATIC POSTSURGICAL MENOPAUSE: ICD-10-CM

## 2021-06-21 RX ORDER — ESTRADIOL 0.05 MG/D
FILM, EXTENDED RELEASE TRANSDERMAL
Qty: 8 PATCH | Refills: 3 | Status: SHIPPED | OUTPATIENT
Start: 2021-06-21 | End: 2021-08-25

## 2021-07-08 ENCOUNTER — TELEPHONE (OUTPATIENT)
Dept: BARIATRICS | Facility: CLINIC | Age: 46
End: 2021-07-08

## 2021-07-08 VITALS
SYSTOLIC BLOOD PRESSURE: 118 MMHG | HEIGHT: 68 IN | TEMPERATURE: 98 F | HEART RATE: 78 BPM | BODY MASS INDEX: 36.19 KG/M2 | WEIGHT: 238.8 LBS | DIASTOLIC BLOOD PRESSURE: 78 MMHG

## 2021-07-08 DIAGNOSIS — E66.9 OBESITY, CLASS II, BMI 35-39.9: ICD-10-CM

## 2021-07-08 RX ORDER — PHENTERMINE HYDROCHLORIDE 15 MG/1
15 CAPSULE ORAL EVERY MORNING
Qty: 30 CAPSULE | Refills: 1 | Status: SHIPPED | OUTPATIENT
Start: 2021-07-08 | End: 2021-09-08 | Stop reason: SDUPTHER

## 2021-07-08 RX ORDER — TOPIRAMATE 50 MG/1
TABLET, FILM COATED ORAL
Qty: 30 TABLET | Refills: 1 | Status: SHIPPED | OUTPATIENT
Start: 2021-07-08 | End: 2021-07-26 | Stop reason: SDUPTHER

## 2021-07-08 NOTE — TELEPHONE ENCOUNTER
Retrieved a message from the Clinical line  Patient is requesting refills for her Topamax and Phentermine as she will not have enough to last until her appointment with Dr Joey Bob at the end of this month

## 2021-07-26 ENCOUNTER — OFFICE VISIT (OUTPATIENT)
Dept: BARIATRICS | Facility: CLINIC | Age: 46
End: 2021-07-26
Payer: COMMERCIAL

## 2021-07-26 ENCOUNTER — APPOINTMENT (OUTPATIENT)
Dept: LAB | Facility: HOSPITAL | Age: 46
End: 2021-07-26
Payer: COMMERCIAL

## 2021-07-26 VITALS
SYSTOLIC BLOOD PRESSURE: 110 MMHG | RESPIRATION RATE: 12 BRPM | TEMPERATURE: 96.6 F | HEART RATE: 60 BPM | WEIGHT: 231.3 LBS | DIASTOLIC BLOOD PRESSURE: 70 MMHG | HEIGHT: 68 IN | BODY MASS INDEX: 35.06 KG/M2

## 2021-07-26 DIAGNOSIS — R63.5 ABNORMAL WEIGHT GAIN: ICD-10-CM

## 2021-07-26 DIAGNOSIS — E55.9 VITAMIN D DEFICIENCY: ICD-10-CM

## 2021-07-26 DIAGNOSIS — E66.9 OBESITY, CLASS II, BMI 35-39.9: Primary | ICD-10-CM

## 2021-07-26 DIAGNOSIS — Z01.818 OTHER SPECIFIED PRE-OPERATIVE EXAMINATION: ICD-10-CM

## 2021-07-26 DIAGNOSIS — Z90.710 HISTORY OF HYSTERECTOMY: ICD-10-CM

## 2021-07-26 DIAGNOSIS — Z01.812 PRE-OPERATIVE LABORATORY EXAMINATION: ICD-10-CM

## 2021-07-26 DIAGNOSIS — N65.0: ICD-10-CM

## 2021-07-26 LAB
25(OH)D3 SERPL-MCNC: 50 NG/ML (ref 30–100)
ANION GAP SERPL CALCULATED.3IONS-SCNC: 10 MMOL/L (ref 4–13)
BASOPHILS # BLD AUTO: 0.07 THOUSANDS/ΜL (ref 0–0.1)
BASOPHILS NFR BLD AUTO: 1 % (ref 0–1)
BUN SERPL-MCNC: 10 MG/DL (ref 5–25)
CALCIUM SERPL-MCNC: 9 MG/DL (ref 8.3–10.1)
CHLORIDE SERPL-SCNC: 105 MMOL/L (ref 100–108)
CO2 SERPL-SCNC: 25 MMOL/L (ref 21–32)
CREAT SERPL-MCNC: 0.74 MG/DL (ref 0.6–1.3)
EOSINOPHIL # BLD AUTO: 0.16 THOUSAND/ΜL (ref 0–0.61)
EOSINOPHIL NFR BLD AUTO: 2 % (ref 0–6)
ERYTHROCYTE [DISTWIDTH] IN BLOOD BY AUTOMATED COUNT: 13.7 % (ref 11.6–15.1)
GFR SERPL CREATININE-BSD FRML MDRD: 99 ML/MIN/1.73SQ M
GLUCOSE SERPL-MCNC: 88 MG/DL (ref 65–140)
HCT VFR BLD AUTO: 42.9 % (ref 34.8–46.1)
HGB BLD-MCNC: 14.2 G/DL (ref 11.5–15.4)
IMM GRANULOCYTES # BLD AUTO: 0.03 THOUSAND/UL (ref 0–0.2)
IMM GRANULOCYTES NFR BLD AUTO: 0 % (ref 0–2)
LYMPHOCYTES # BLD AUTO: 2.43 THOUSANDS/ΜL (ref 0.6–4.47)
LYMPHOCYTES NFR BLD AUTO: 33 % (ref 14–44)
MCH RBC QN AUTO: 31.1 PG (ref 26.8–34.3)
MCHC RBC AUTO-ENTMCNC: 33.1 G/DL (ref 31.4–37.4)
MCV RBC AUTO: 94 FL (ref 82–98)
MONOCYTES # BLD AUTO: 0.48 THOUSAND/ΜL (ref 0.17–1.22)
MONOCYTES NFR BLD AUTO: 7 % (ref 4–12)
NEUTROPHILS # BLD AUTO: 4.18 THOUSANDS/ΜL (ref 1.85–7.62)
NEUTS SEG NFR BLD AUTO: 57 % (ref 43–75)
NRBC BLD AUTO-RTO: 0 /100 WBCS
PLATELET # BLD AUTO: 274 THOUSANDS/UL (ref 149–390)
PMV BLD AUTO: 10.9 FL (ref 8.9–12.7)
POTASSIUM SERPL-SCNC: 3.9 MMOL/L (ref 3.5–5.3)
RBC # BLD AUTO: 4.56 MILLION/UL (ref 3.81–5.12)
SODIUM SERPL-SCNC: 140 MMOL/L (ref 136–145)
WBC # BLD AUTO: 7.35 THOUSAND/UL (ref 4.31–10.16)

## 2021-07-26 PROCEDURE — 1036F TOBACCO NON-USER: CPT | Performed by: INTERNAL MEDICINE

## 2021-07-26 PROCEDURE — 36415 COLL VENOUS BLD VENIPUNCTURE: CPT

## 2021-07-26 PROCEDURE — 82306 VITAMIN D 25 HYDROXY: CPT

## 2021-07-26 PROCEDURE — 3008F BODY MASS INDEX DOCD: CPT | Performed by: INTERNAL MEDICINE

## 2021-07-26 PROCEDURE — 80048 BASIC METABOLIC PNL TOTAL CA: CPT

## 2021-07-26 PROCEDURE — 85025 COMPLETE CBC W/AUTO DIFF WBC: CPT

## 2021-07-26 PROCEDURE — 99214 OFFICE O/P EST MOD 30 MIN: CPT | Performed by: INTERNAL MEDICINE

## 2021-07-26 RX ORDER — TOPIRAMATE 50 MG/1
TABLET, FILM COATED ORAL
Qty: 60 TABLET | Refills: 1 | Status: SHIPPED | OUTPATIENT
Start: 2021-07-26 | End: 2021-09-27

## 2021-07-26 NOTE — PROGRESS NOTES
Assessment/Plan:  Lakshmi Muir was seen today for follow-up  Diagnoses and all orders for this visit:    History of hysterectomy  Obesity, Class II, BMI 35-39 9  Abnormal weight gain  Continue phentermine 15mg qAM and topamax 50mg BID  Goals:  Goal protein 90-100g per day  Goal carbohydrates 100-120g per day   Make sure to meet enough calories; 2040-1056 calories   Continue to exercise     Vitamin D deficiency  Finished vit D supp x 8 wks  Will recheck vit D      Follow up in approximately 2 months with Non-Surgical Physician/Advanced Practitioner  Subjective:   Chief Complaint   Patient presents with    Follow-up     6 week        Patient ID: Sia Delgado  is a 42 y o  female with excess weight/obesity here to pursue weight management  Patient is pursuing Conservative Program      HPI  -Initial weight loss goal of 5-10% weight loss for improved health  Wt Readings from Last 10 Encounters:   07/26/21 105 kg (231 lb 4 8 oz)   06/07/21 108 kg (238 lb 12 8 oz)   05/24/21 107 kg (236 lb)   05/19/21 108 kg (239 lb)   05/17/21 108 kg (239 lb)   05/07/21 107 kg (235 lb 12 8 oz)   02/24/21 105 kg (231 lb)   02/09/21 108 kg (238 lb)   01/14/21 108 kg (238 lb)   01/13/21 108 kg (238 lb)     Initial weight: 235 lbs    Current weight: 231  Change in weight: -4 lbs   Goal: 180  Started on Phentermine/topamax in 6/2021 (-7 lb weight loss since)  Pt initially saw no weight loss so increased topamax to 50mg BID   No side effects  Started exercising 3x wk or more   B- eggs  S-  L- protein shake or [employer] frozen meal  S-  D- meat/veggies  S-  Drinks- 72 oz water with crystal light   Alcohol- once a week 1 drink      The following portions of the patient's history were reviewed and updated as appropriate: allergies, current medications, past family history, past medical history, past social history, past surgical history, and problem list     Review of Systems   Respiratory: Negative  Cardiovascular: Negative  Gastrointestinal: Negative  Psychiatric/Behavioral: Negative  Objective:  /70   Pulse 60   Temp (!) 96 6 °F (35 9 °C)   Resp 12   Ht 5' 8 2" (1 732 m)   Wt 105 kg (231 lb 4 8 oz)   LMP 10/15/2020 (Exact Date)   BMI 34 96 kg/m²   Constitutional: Well-developed, well-nourished and obese Body mass index is 34 96 kg/m²  Meet Howell HEENT: No conjunctival pallor or jaundice  Pulmonary: No increased work of breathing or signs of respiratory distress  CV: Normal rate, well-perfused  GI: Obese  Non-distended  MSK: No edema   Neuro: Oriented to person, place and time  Normal Speech  Normal gait  Psych: Normal affect and mood       Labs and Imaging  Reviewed

## 2021-08-03 NOTE — PRE-PROCEDURE INSTRUCTIONS
Pre-Surgery Instructions:   Medication Instructions    Multiple Vitamins-Minerals (ONE-A-DAY WOMENS PO) Patient was instructed by Physician and understands   Probiotic Product (Pro-biotic Blend) CAPS-  Patient was instructed by Physician and understands       Pt verbalizes understanding of the following:  - NPO after MN prior  - Per physician, pt can continue MVI & Probiotic up to day prior to procedure  - Bathing instructions (has CHG)

## 2021-08-04 ENCOUNTER — TELEPHONE (OUTPATIENT)
Dept: BARIATRICS | Facility: CLINIC | Age: 46
End: 2021-08-04

## 2021-08-04 DIAGNOSIS — E66.9 OBESITY, CLASS II, BMI 35-39.9: ICD-10-CM

## 2021-08-04 NOTE — TELEPHONE ENCOUNTER
Patient left a message on Clinical line requesting refill of her Phentermine as she only has two pills left

## 2021-08-05 ENCOUNTER — ANESTHESIA EVENT (OUTPATIENT)
Dept: PERIOP | Facility: HOSPITAL | Age: 46
End: 2021-08-05
Payer: COMMERCIAL

## 2021-08-06 ENCOUNTER — ANESTHESIA (OUTPATIENT)
Dept: PERIOP | Facility: HOSPITAL | Age: 46
End: 2021-08-06
Payer: COMMERCIAL

## 2021-08-06 ENCOUNTER — HOSPITAL ENCOUNTER (OUTPATIENT)
Facility: HOSPITAL | Age: 46
Setting detail: OUTPATIENT SURGERY
Discharge: HOME | End: 2021-08-06
Attending: PLASTIC SURGERY | Admitting: PLASTIC SURGERY
Payer: COMMERCIAL

## 2021-08-06 VITALS
WEIGHT: 228 LBS | DIASTOLIC BLOOD PRESSURE: 61 MMHG | HEART RATE: 62 BPM | HEIGHT: 68 IN | TEMPERATURE: 97.2 F | RESPIRATION RATE: 16 BRPM | SYSTOLIC BLOOD PRESSURE: 103 MMHG | BODY MASS INDEX: 34.56 KG/M2 | OXYGEN SATURATION: 95 %

## 2021-08-06 RX ORDER — LIDOCAINE HYDROCHLORIDE 20 MG/ML
INJECTION, SOLUTION EPIDURAL; INFILTRATION; INTRACAUDAL; PERINEURAL AS NEEDED
Status: DISCONTINUED | OUTPATIENT
Start: 2021-08-06 | End: 2021-08-06

## 2021-08-06 RX ORDER — OXYCODONE HYDROCHLORIDE AND ACETAMINOPHEN 5; 325 MG/1; MG/1
2 TABLET ORAL EVERY 4 HOURS PRN
Status: DISCONTINUED | OUTPATIENT
Start: 2021-08-06 | End: 2021-08-06 | Stop reason: HOSPADM

## 2021-08-06 RX ORDER — PROPOFOL 10 MG/ML
INJECTION, EMULSION INTRAVENOUS AS NEEDED
Status: DISCONTINUED | OUTPATIENT
Start: 2021-08-06 | End: 2021-08-06

## 2021-08-06 RX ORDER — ONDANSETRON 2 MG/ML
4 INJECTION INTRAMUSCULAR; INTRAVENOUS ONCE AS NEEDED
Status: DISCONTINUED | OUTPATIENT
Start: 2021-08-06 | End: 2021-08-06 | Stop reason: HOSPADM

## 2021-08-06 RX ORDER — ONDANSETRON 2 MG/ML
INJECTION INTRAMUSCULAR; INTRAVENOUS AS NEEDED
Status: DISCONTINUED | OUTPATIENT
Start: 2021-08-06 | End: 2021-08-06

## 2021-08-06 RX ORDER — DEXAMETHASONE SODIUM PHOSPHATE 4 MG/ML
INJECTION, SOLUTION INTRA-ARTICULAR; INTRALESIONAL; INTRAMUSCULAR; INTRAVENOUS; SOFT TISSUE AS NEEDED
Status: DISCONTINUED | OUTPATIENT
Start: 2021-08-06 | End: 2021-08-06

## 2021-08-06 RX ORDER — ONDANSETRON 2 MG/ML
4 INJECTION INTRAMUSCULAR; INTRAVENOUS EVERY 8 HOURS PRN
Status: DISCONTINUED | OUTPATIENT
Start: 2021-08-06 | End: 2021-08-06 | Stop reason: HOSPADM

## 2021-08-06 RX ORDER — HYDROMORPHONE HCL/PF 1 MG/ML
SYRINGE (ML) INJECTION AS NEEDED
Status: DISCONTINUED | OUTPATIENT
Start: 2021-08-06 | End: 2021-08-06

## 2021-08-06 RX ORDER — ROCURONIUM BROMIDE 10 MG/ML
INJECTION, SOLUTION INTRAVENOUS AS NEEDED
Status: DISCONTINUED | OUTPATIENT
Start: 2021-08-06 | End: 2021-08-06

## 2021-08-06 RX ORDER — FENTANYL CITRATE 50 UG/ML
INJECTION, SOLUTION INTRAMUSCULAR; INTRAVENOUS AS NEEDED
Status: DISCONTINUED | OUTPATIENT
Start: 2021-08-06 | End: 2021-08-06

## 2021-08-06 RX ORDER — VANCOMYCIN HYDROCHLORIDE 1 G/200ML
1000 INJECTION, SOLUTION INTRAVENOUS ONCE
Status: COMPLETED | OUTPATIENT
Start: 2021-08-06 | End: 2021-08-06

## 2021-08-06 RX ORDER — MIDAZOLAM HYDROCHLORIDE 2 MG/2ML
INJECTION, SOLUTION INTRAMUSCULAR; INTRAVENOUS AS NEEDED
Status: DISCONTINUED | OUTPATIENT
Start: 2021-08-06 | End: 2021-08-06

## 2021-08-06 RX ORDER — NEOSTIGMINE METHYLSULFATE 1 MG/ML
INJECTION INTRAVENOUS AS NEEDED
Status: DISCONTINUED | OUTPATIENT
Start: 2021-08-06 | End: 2021-08-06

## 2021-08-06 RX ORDER — SODIUM CHLORIDE 9 MG/ML
125 INJECTION, SOLUTION INTRAVENOUS CONTINUOUS
Status: DISCONTINUED | OUTPATIENT
Start: 2021-08-06 | End: 2021-08-06 | Stop reason: HOSPADM

## 2021-08-06 RX ORDER — SCOLOPAMINE TRANSDERMAL SYSTEM 1 MG/1
1 PATCH, EXTENDED RELEASE TRANSDERMAL ONCE AS NEEDED
Status: DISCONTINUED | OUTPATIENT
Start: 2021-08-06 | End: 2021-08-06 | Stop reason: HOSPADM

## 2021-08-06 RX ORDER — FENTANYL CITRATE 50 UG/ML
50 INJECTION, SOLUTION INTRAMUSCULAR; INTRAVENOUS
Status: DISCONTINUED | OUTPATIENT
Start: 2021-08-06 | End: 2021-08-06 | Stop reason: HOSPADM

## 2021-08-06 RX ORDER — GLYCOPYRROLATE 0.2 MG/ML
INJECTION INTRAMUSCULAR; INTRAVENOUS AS NEEDED
Status: DISCONTINUED | OUTPATIENT
Start: 2021-08-06 | End: 2021-08-06

## 2021-08-06 RX ADMIN — SODIUM CHLORIDE 125 ML/HR: 0.9 INJECTION, SOLUTION INTRAVENOUS at 11:26

## 2021-08-06 RX ADMIN — NEOSTIGMINE METHYLSULFATE 3 MG: 1 INJECTION INTRAVENOUS at 15:15

## 2021-08-06 RX ADMIN — MIDAZOLAM 2 MG: 1 INJECTION INTRAMUSCULAR; INTRAVENOUS at 12:55

## 2021-08-06 RX ADMIN — ROCURONIUM BROMIDE 20 MG: 50 INJECTION, SOLUTION INTRAVENOUS at 13:45

## 2021-08-06 RX ADMIN — ROCURONIUM BROMIDE 10 MG: 50 INJECTION, SOLUTION INTRAVENOUS at 14:30

## 2021-08-06 RX ADMIN — LIDOCAINE HYDROCHLORIDE 100 MG: 20 INJECTION, SOLUTION EPIDURAL; INFILTRATION; INTRACAUDAL; PERINEURAL at 13:03

## 2021-08-06 RX ADMIN — FENTANYL CITRATE 50 MCG: 50 INJECTION INTRAMUSCULAR; INTRAVENOUS at 14:20

## 2021-08-06 RX ADMIN — ONDANSETRON 4 MG: 2 INJECTION INTRAMUSCULAR; INTRAVENOUS at 15:15

## 2021-08-06 RX ADMIN — VANCOMYCIN HYDROCHLORIDE 1000 MG: 1 INJECTION, SOLUTION INTRAVENOUS at 12:52

## 2021-08-06 RX ADMIN — HYDROMORPHONE HYDROCHLORIDE 0.5 MG: 1 INJECTION, SOLUTION INTRAMUSCULAR; INTRAVENOUS; SUBCUTANEOUS at 14:47

## 2021-08-06 RX ADMIN — SODIUM CHLORIDE: 0.9 INJECTION, SOLUTION INTRAVENOUS at 13:35

## 2021-08-06 RX ADMIN — FENTANYL CITRATE 50 MCG: 50 INJECTION INTRAMUSCULAR; INTRAVENOUS at 14:00

## 2021-08-06 RX ADMIN — SODIUM CHLORIDE 125 ML/HR: 0.9 INJECTION, SOLUTION INTRAVENOUS at 16:15

## 2021-08-06 RX ADMIN — PROPOFOL 200 MG: 10 INJECTION, EMULSION INTRAVENOUS at 13:03

## 2021-08-06 RX ADMIN — FENTANYL CITRATE 50 MCG: 50 INJECTION INTRAMUSCULAR; INTRAVENOUS at 15:55

## 2021-08-06 RX ADMIN — GLYCOPYRROLATE 0.4 MG: 0.2 INJECTION, SOLUTION INTRAMUSCULAR; INTRAVENOUS at 15:15

## 2021-08-06 RX ADMIN — ONDANSETRON 4 MG: 2 INJECTION INTRAMUSCULAR; INTRAVENOUS at 13:03

## 2021-08-06 RX ADMIN — DEXAMETHASONE SODIUM PHOSPHATE 4 MG: 4 INJECTION INTRA-ARTICULAR; INTRALESIONAL; INTRAMUSCULAR; INTRAVENOUS; SOFT TISSUE at 13:03

## 2021-08-06 RX ADMIN — ROCURONIUM BROMIDE 50 MG: 50 INJECTION, SOLUTION INTRAVENOUS at 13:03

## 2021-08-06 RX ADMIN — FENTANYL CITRATE 100 MCG: 50 INJECTION INTRAMUSCULAR; INTRAVENOUS at 13:03

## 2021-08-06 RX ADMIN — FENTANYL CITRATE 50 MCG: 50 INJECTION INTRAMUSCULAR; INTRAVENOUS at 16:09

## 2021-08-06 RX ADMIN — SCOPALAMINE 1 PATCH: 1 PATCH, EXTENDED RELEASE TRANSDERMAL at 11:28

## 2021-08-06 RX ADMIN — FENTANYL CITRATE 25 MCG: 50 INJECTION INTRAMUSCULAR; INTRAVENOUS at 16:35

## 2021-08-06 NOTE — DISCHARGE INSTRUCTIONS
[address], [address], [address] [facility], [address] [facility] /U / [email or web address]  com       No heavy lifting >20 pounds    Do not raise hands above head    No exercise    Consider using button up shirts so you don't have to raise your hands above your head to put the shirt on    Wear the surgical bra at all times except the shower   If the bra is tight and is leaving marks on the skin unclasp the bottom clasps of the bra    No ice or heating pack to chest    Ok to shower    No bathing    Do not lay on stomach or sides    No smoking    No pushing or pulling    No repetitive arm movements such as cleaning, gardening etc    Call the office for an appointment in 5-14 days - [phone]

## 2021-08-06 NOTE — ANESTHESIA PREPROCEDURE EVALUATION
Procedure:  BREAST RECONSTRUCITON REVISION; EXCISION OF STANDING DEFORMITIES (Bilateral Breast)    Relevant Problems   ANESTHESIA   (+) PONV (postoperative nausea and vomiting)      GYN   (+) History of hysterectomy      PULMONARY   (+) Asthma        Physical Exam    Airway    Mallampati score: II  TM Distance: >3 FB  Neck ROM: full     Dental       Cardiovascular  Rhythm: regular, Rate: normal,     Pulmonary  Breath sounds clear to auscultation,     Other Findings        Anesthesia Plan  ASA Score- 2     Anesthesia Type- general with ASA Monitors  Additional Monitors:   Airway Plan:           Plan Factors-Exercise tolerance (METS): >4 METS  Chart reviewed  Existing labs reviewed  Patient summary reviewed  Patient is not a current smoker  Patient not instructed to abstain from smoking on day of procedure  Patient did not smoke on day of surgery  Obstructive sleep apnea risk education given perioperatively  Induction- intravenous  Postoperative Plan-     Informed Consent- Anesthetic plan and risks discussed with patient

## 2021-08-06 NOTE — DISCHARGE SUMMARY
Discharge Summary - Medical Giancarlo Reveal Difrancesco 42 y o  female MRN: [id number]    [facility] PACU Room / Bed: OR POOL/OR POOL Encounter: [id number]    BRIEF OVERVIEW  Admitting Provider: Ida Barron MD  Discharge Provider: Ida Barron MD  Primary Care Physician at Discharge: Chino Blake [address] [facility]    Discharge To: Home      Admission Date: 8/6/2021     Discharge Date: No discharge date for patient encounter  Code Status: Prior  Advance Directive and Living Will: <no information>  Power of :        Primary Discharge Diagnosis  Active Problems:    * No active hospital problems  *  Resolved Problems:    * No resolved hospital problems   *        Discharge Disposition: [address] [place] [address]    Presenting Problem/History of Present Illness  <principal problem not specified>      Discharge Condition: stable    Patient tolerated the procedure well, recovered and was discharged home in stable condition    Ida Barron MD  8/6/2021  3:38 PM

## 2021-08-06 NOTE — OP NOTE
OPERATIVE REPORT  PATIENT NAME: Altaf Silveira    :  1975  MRN: [id number]  Pt Location: [facility] OR ROOM 02    SURGERY DATE: 2021    Surgeon(s) and Role:     * Mary Tollivre MD - Primary    Preop Diagnosis:  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]  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]    Post-Op Diagnosis Codes: * 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]     * 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]    Procedure(s) (LRB):  BREAST RECONSTRUCITON REVISION; EXCISION OF STANDING DEFORMITIES (Bilateral)    Specimen(s):  * No specimens in log *    Estimated Blood Loss:   Minimal    Drains:  * No LDAs found *    Anesthesia Type:   General    Operative Indications:  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]  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]      Operative Findings:      Complications:   None    Procedure and Technique:  The patient was brought to the operating room and placed supine on the    operating room table  A time-out procedure was performed  Sequential    compression devices were applied  Intravenous antibiotics were given  After adequate anesthesia was obtained, all appropriate pressure    precautions were taken, and a beanbag was used  The patient was placed    in a right side down decubitus position  Also, the patient had been    marked while standing in the holding area prior to surgery  An elliptical design was included to excise the standing skin    deformity at the left lateral breast  This did extend onto the lateral    chest  This elliptical design was incised using a #15 blade scalpel    and extended down to the lateral chest wall, as well as superficial to    the breast capsule  This was then amputated   Tumescent was infiltrated    into the lateral posterior aspect of the wound, and liposuction was    performed with a 4-[place]ese, Mercedes-style cannula in order to avoid    the posterior standing skin deformity  Following this, undermining was    performed deep to Mellissa fascia using electrocautery  The Mellissa fascia was closed using 0    PDS suture in an interrupted technique  The deep dermis was closed    using 2-0 Vicryl and 3-0 PDS suture in an interrupted deep dermal    technique  The superficial skin was closed using 3-0 Monocryl suture    in a running subcuticular technique  Skin glue was applied  The patient was placed    in a left side down decubitus position  Also, the patient had been    marked while standing in the holding area prior to surgery  An elliptical design was included to excise the standing skin    deformity at the right lateral breast  This did extend onto the lateral    chest  This elliptical design was incised using a #15 blade scalpel    and extended down to the lateral chest wall, as well as superficial to    the breast capsule  This was then amputated  Tumescent was infiltrated    into the lateral posterior aspect of the wound, and liposuction was    performed with a 4-Honduran, Mercedes-style cannula in order to avoid    the posterior standing skin deformity  Following this, undermining was    performed deep to Mellissa fascia using electrocautery  The Mellissa fascia was closed using 0    PDS suture in an interrupted technique  The deep dermis was closed    using 2-0 Vicryl and 3-0 PDS suture in an interrupted deep dermal    technique  The superficial skin was closed using 3-0 Monocryl suture    in a running subcuticular technique  Skin glue was applied  All the    wounds were cleaned and dried  Benzoin, Steri-Strips and skin glue    were applied as, well as Bio-patches to the drain sites  Sterile gauze    and a surgical bra were applied  The patient tolerated the procedure    well, was extubated in the operating room and taken to the recovery    room in stable condition        I was present for the entire procedure and A qualified resident physician was not available    Patient Disposition:  hemodynamically stable and extubated and stable    SIGNATURE: Johnny Solorzano MD  DATE: August 6, 2021  TIME: 3:37 PM

## 2021-08-23 ENCOUNTER — TELEPHONE (OUTPATIENT)
Dept: HEMATOLOGY ONCOLOGY | Facility: CLINIC | Age: 46
End: 2021-08-23

## 2021-08-23 NOTE — TELEPHONE ENCOUNTER
Pt called and is thinking that the estradiol is not working  She keeps having procedures so she has be on and off with it    She is also having mood swings so she thinks she need a higher dose

## 2021-08-25 ENCOUNTER — TELEPHONE (OUTPATIENT)
Dept: HEMATOLOGY ONCOLOGY | Facility: CLINIC | Age: 46
End: 2021-08-25

## 2021-08-25 DIAGNOSIS — E89.41 SYMPTOMATIC POSTSURGICAL MENOPAUSE: Primary | ICD-10-CM

## 2021-08-25 RX ORDER — ESTRADIOL 0.07 MG/D
1 FILM, EXTENDED RELEASE TRANSDERMAL 2 TIMES WEEKLY
Qty: 8 PATCH | Refills: 2 | Status: SHIPPED | OUTPATIENT
Start: 2021-08-26 | End: 2021-09-07

## 2021-08-25 NOTE — TELEPHONE ENCOUNTER
Return call placed to patient  She has been on 0 05 dose since 8/7, with no improvement in symptoms  Will trial vivelle-dot 0 [phone]  Rx sent to pharmacy

## 2021-08-25 NOTE — TELEPHONE ENCOUNTER
Patient called requesting a higher dosage of estradial (?), the hormone patch  Patient is having a lot of mood swings  Patient is has had procedures, which took her off the medication  Patient sees Dr Kaylah Roth

## 2021-09-07 ENCOUNTER — TELEPHONE (OUTPATIENT)
Dept: HEMATOLOGY ONCOLOGY | Facility: CLINIC | Age: 46
End: 2021-09-07

## 2021-09-07 DIAGNOSIS — E89.41 SYMPTOMATIC POSTSURGICAL MENOPAUSE: Primary | ICD-10-CM

## 2021-09-07 RX ORDER — ESTRADIOL 0.05 MG/D
1 FILM, EXTENDED RELEASE TRANSDERMAL 2 TIMES WEEKLY
Qty: 8 PATCH | Refills: 0
Start: 2021-09-09 | End: 2021-09-27 | Stop reason: SDUPTHER

## 2021-09-07 NOTE — TELEPHONE ENCOUNTER
Patient called in and explained that even though she did request that the hormone levels in her patch be lifted, she feels like they now may be too high  She went back to using the lower dosage but would like someone to call her back to discuss it

## 2021-09-08 ENCOUNTER — TELEPHONE (OUTPATIENT)
Dept: BARIATRICS | Facility: CLINIC | Age: 46
End: 2021-09-08

## 2021-09-08 DIAGNOSIS — E66.9 OBESITY, CLASS II, BMI 35-39.9: ICD-10-CM

## 2021-09-08 RX ORDER — PHENTERMINE HYDROCHLORIDE 15 MG/1
15 CAPSULE ORAL EVERY MORNING
Qty: 30 CAPSULE | Refills: 1 | Status: SHIPPED | OUTPATIENT
Start: 2021-09-08 | End: 2021-10-04

## 2021-09-26 DIAGNOSIS — R63.5 ABNORMAL WEIGHT GAIN: ICD-10-CM

## 2021-09-26 DIAGNOSIS — E66.9 OBESITY, CLASS II, BMI 35-39.9: ICD-10-CM

## 2021-09-27 ENCOUNTER — TELEPHONE (OUTPATIENT)
Dept: HEMATOLOGY ONCOLOGY | Facility: CLINIC | Age: 46
End: 2021-09-27

## 2021-09-27 RX ORDER — TOPIRAMATE 50 MG/1
TABLET, FILM COATED ORAL
Qty: 60 TABLET | Refills: 1 | Status: SHIPPED | OUTPATIENT
Start: 2021-09-27 | End: 2021-11-30 | Stop reason: SDUPTHER

## 2021-09-27 NOTE — TELEPHONE ENCOUNTER
Patient is requesting her estradiol patch 0 05 mg to be refilled  She would also like a call back confirming this is completed  Patient was upset that she did not reach the office directly

## 2021-09-29 RX ORDER — LEVOFLOXACIN 500 MG/1
500 TABLET, FILM COATED ORAL DAILY
COMMUNITY
Start: 2021-09-20 | End: 2021-11-12

## 2021-09-29 RX ORDER — DOXYCYCLINE HYCLATE 100 MG/1
100 CAPSULE ORAL 2 TIMES DAILY
COMMUNITY
Start: 2021-09-20 | End: 2021-11-12

## 2021-10-04 ENCOUNTER — OFFICE VISIT (OUTPATIENT)
Dept: BARIATRICS | Facility: CLINIC | Age: 46
End: 2021-10-04
Payer: COMMERCIAL

## 2021-10-04 VITALS
WEIGHT: 228.6 LBS | DIASTOLIC BLOOD PRESSURE: 70 MMHG | TEMPERATURE: 97.5 F | RESPIRATION RATE: 12 BRPM | BODY MASS INDEX: 34.65 KG/M2 | HEIGHT: 68 IN | HEART RATE: 67 BPM | SYSTOLIC BLOOD PRESSURE: 110 MMHG

## 2021-10-04 DIAGNOSIS — Z90.710 S/P HYSTERECTOMY: ICD-10-CM

## 2021-10-04 DIAGNOSIS — R63.5 ABNORMAL WEIGHT GAIN: ICD-10-CM

## 2021-10-04 DIAGNOSIS — E66.9 CLASS 1 OBESITY: Primary | ICD-10-CM

## 2021-10-04 DIAGNOSIS — Z98.890 STATUS POST BILATERAL BREAST RECONSTRUCTION: ICD-10-CM

## 2021-10-04 PROCEDURE — 99215 OFFICE O/P EST HI 40 MIN: CPT | Performed by: INTERNAL MEDICINE

## 2021-10-04 RX ORDER — PHENTERMINE HYDROCHLORIDE 30 MG/1
30 CAPSULE ORAL EVERY MORNING
Qty: 30 CAPSULE | Refills: 2 | Status: SHIPPED | OUTPATIENT
Start: 2021-10-04 | End: 2022-01-10 | Stop reason: SDUPTHER

## 2021-10-11 ENCOUNTER — TELEPHONE (OUTPATIENT)
Dept: BARIATRICS | Facility: CLINIC | Age: 46
End: 2021-10-11

## 2021-11-12 ENCOUNTER — ANESTHESIA EVENT (OUTPATIENT)
Dept: PERIOP | Facility: HOSPITAL | Age: 46
End: 2021-11-12
Payer: COMMERCIAL

## 2021-11-18 ENCOUNTER — HOSPITAL ENCOUNTER (OUTPATIENT)
Facility: HOSPITAL | Age: 46
Setting detail: OUTPATIENT SURGERY
Discharge: HOME | End: 2021-11-18
Attending: PLASTIC SURGERY | Admitting: PLASTIC SURGERY
Payer: COMMERCIAL

## 2021-11-18 ENCOUNTER — ANESTHESIA (OUTPATIENT)
Dept: PERIOP | Facility: HOSPITAL | Age: 46
End: 2021-11-18
Payer: COMMERCIAL

## 2021-11-18 VITALS
BODY MASS INDEX: 34.56 KG/M2 | DIASTOLIC BLOOD PRESSURE: 53 MMHG | OXYGEN SATURATION: 93 % | WEIGHT: 228 LBS | HEIGHT: 68 IN | RESPIRATION RATE: 16 BRPM | TEMPERATURE: 96.4 F | HEART RATE: 65 BPM | SYSTOLIC BLOOD PRESSURE: 104 MMHG

## 2021-11-18 RX ORDER — LIDOCAINE HYDROCHLORIDE AND EPINEPHRINE 10; 10 MG/ML; UG/ML
INJECTION, SOLUTION INFILTRATION; PERINEURAL AS NEEDED
Status: DISCONTINUED | OUTPATIENT
Start: 2021-11-18 | End: 2021-11-18 | Stop reason: HOSPADM

## 2021-11-18 RX ORDER — VANCOMYCIN HYDROCHLORIDE 1 G/200ML
1000 INJECTION, SOLUTION INTRAVENOUS ONCE
Status: DISCONTINUED | OUTPATIENT
Start: 2021-11-18 | End: 2021-11-18 | Stop reason: HOSPADM

## 2021-11-18 RX ORDER — MAGNESIUM HYDROXIDE 1200 MG/15ML
LIQUID ORAL AS NEEDED
Status: DISCONTINUED | OUTPATIENT
Start: 2021-11-18 | End: 2021-11-18 | Stop reason: HOSPADM

## 2021-11-18 RX ORDER — VANCOMYCIN HYDROCHLORIDE 1 G/200ML
INJECTION, SOLUTION INTRAVENOUS AS NEEDED
Status: DISCONTINUED | OUTPATIENT
Start: 2021-11-18 | End: 2021-11-18

## 2021-11-18 RX ORDER — TRAMADOL HYDROCHLORIDE 50 MG/1
50 TABLET ORAL EVERY 6 HOURS PRN
Status: DISCONTINUED | OUTPATIENT
Start: 2021-11-18 | End: 2021-11-18 | Stop reason: HOSPADM

## 2021-11-18 RX ORDER — LIDOCAINE HYDROCHLORIDE 10 MG/ML
INJECTION, SOLUTION EPIDURAL; INFILTRATION; INTRACAUDAL; PERINEURAL AS NEEDED
Status: DISCONTINUED | OUTPATIENT
Start: 2021-11-18 | End: 2021-11-18

## 2021-11-18 RX ORDER — SODIUM CHLORIDE 9 MG/ML
INJECTION, SOLUTION INTRAVENOUS AS NEEDED
Status: DISCONTINUED | OUTPATIENT
Start: 2021-11-18 | End: 2021-11-18 | Stop reason: HOSPADM

## 2021-11-18 RX ORDER — SODIUM CHLORIDE, SODIUM LACTATE, POTASSIUM CHLORIDE, CALCIUM CHLORIDE 600; 310; 30; 20 MG/100ML; MG/100ML; MG/100ML; MG/100ML
INJECTION, SOLUTION INTRAVENOUS CONTINUOUS PRN
Status: DISCONTINUED | OUTPATIENT
Start: 2021-11-18 | End: 2021-11-18

## 2021-11-18 RX ORDER — PROPOFOL 10 MG/ML
INJECTION, EMULSION INTRAVENOUS AS NEEDED
Status: DISCONTINUED | OUTPATIENT
Start: 2021-11-18 | End: 2021-11-18

## 2021-11-18 RX ORDER — ONDANSETRON 2 MG/ML
4 INJECTION INTRAMUSCULAR; INTRAVENOUS ONCE AS NEEDED
Status: COMPLETED | OUTPATIENT
Start: 2021-11-18 | End: 2021-11-18

## 2021-11-18 RX ORDER — MIDAZOLAM HYDROCHLORIDE 2 MG/2ML
INJECTION, SOLUTION INTRAMUSCULAR; INTRAVENOUS AS NEEDED
Status: DISCONTINUED | OUTPATIENT
Start: 2021-11-18 | End: 2021-11-18

## 2021-11-18 RX ORDER — FENTANYL CITRATE 50 UG/ML
INJECTION, SOLUTION INTRAMUSCULAR; INTRAVENOUS AS NEEDED
Status: DISCONTINUED | OUTPATIENT
Start: 2021-11-18 | End: 2021-11-18

## 2021-11-18 RX ORDER — HYDROMORPHONE HCL/PF 1 MG/ML
0.5 SYRINGE (ML) INJECTION
Status: DISCONTINUED | OUTPATIENT
Start: 2021-11-18 | End: 2021-11-18 | Stop reason: HOSPADM

## 2021-11-18 RX ORDER — ONDANSETRON 2 MG/ML
4 INJECTION INTRAMUSCULAR; INTRAVENOUS EVERY 8 HOURS PRN
Status: DISCONTINUED | OUTPATIENT
Start: 2021-11-18 | End: 2021-11-18 | Stop reason: HOSPADM

## 2021-11-18 RX ORDER — ONDANSETRON 2 MG/ML
INJECTION INTRAMUSCULAR; INTRAVENOUS AS NEEDED
Status: DISCONTINUED | OUTPATIENT
Start: 2021-11-18 | End: 2021-11-18

## 2021-11-18 RX ORDER — DEXAMETHASONE SODIUM PHOSPHATE 4 MG/ML
INJECTION, SOLUTION INTRA-ARTICULAR; INTRALESIONAL; INTRAMUSCULAR; INTRAVENOUS; SOFT TISSUE AS NEEDED
Status: DISCONTINUED | OUTPATIENT
Start: 2021-11-18 | End: 2021-11-18

## 2021-11-18 RX ORDER — PROPOFOL 10 MG/ML
INJECTION, EMULSION INTRAVENOUS CONTINUOUS PRN
Status: DISCONTINUED | OUTPATIENT
Start: 2021-11-18 | End: 2021-11-18

## 2021-11-18 RX ORDER — OXYCODONE HYDROCHLORIDE AND ACETAMINOPHEN 5; 325 MG/1; MG/1
2 TABLET ORAL EVERY 4 HOURS PRN
Status: DISCONTINUED | OUTPATIENT
Start: 2021-11-18 | End: 2021-11-18 | Stop reason: HOSPADM

## 2021-11-18 RX ORDER — SODIUM CHLORIDE, SODIUM LACTATE, POTASSIUM CHLORIDE, CALCIUM CHLORIDE 600; 310; 30; 20 MG/100ML; MG/100ML; MG/100ML; MG/100ML
50 INJECTION, SOLUTION INTRAVENOUS CONTINUOUS
Status: DISCONTINUED | OUTPATIENT
Start: 2021-11-18 | End: 2021-11-18 | Stop reason: HOSPADM

## 2021-11-18 RX ORDER — HYDROMORPHONE HYDROCHLORIDE 2 MG/ML
INJECTION, SOLUTION INTRAMUSCULAR; INTRAVENOUS; SUBCUTANEOUS AS NEEDED
Status: DISCONTINUED | OUTPATIENT
Start: 2021-11-18 | End: 2021-11-18

## 2021-11-18 RX ORDER — SCOLOPAMINE TRANSDERMAL SYSTEM 1 MG/1
1 PATCH, EXTENDED RELEASE TRANSDERMAL
Status: DISCONTINUED | OUTPATIENT
Start: 2021-11-18 | End: 2021-11-18 | Stop reason: HOSPADM

## 2021-11-18 RX ADMIN — HYDROMORPHONE HYDROCHLORIDE 0.5 MG: 1 INJECTION, SOLUTION INTRAMUSCULAR; INTRAVENOUS; SUBCUTANEOUS at 13:59

## 2021-11-18 RX ADMIN — ONDANSETRON 4 MG: 2 INJECTION INTRAMUSCULAR; INTRAVENOUS at 14:04

## 2021-11-18 RX ADMIN — OXYCODONE HYDROCHLORIDE AND ACETAMINOPHEN 1 TABLET: 5; 325 TABLET ORAL at 15:39

## 2021-11-18 RX ADMIN — FENTANYL CITRATE 100 MCG: 50 INJECTION, SOLUTION INTRAMUSCULAR; INTRAVENOUS at 11:29

## 2021-11-18 RX ADMIN — DEXAMETHASONE SODIUM PHOSPHATE 4 MG: 4 INJECTION, SOLUTION INTRAMUSCULAR; INTRAVENOUS at 11:35

## 2021-11-18 RX ADMIN — SODIUM CHLORIDE, SODIUM LACTATE, POTASSIUM CHLORIDE, AND CALCIUM CHLORIDE: .6; .31; .03; .02 INJECTION, SOLUTION INTRAVENOUS at 11:26

## 2021-11-18 RX ADMIN — ONDANSETRON 4 MG: 2 INJECTION INTRAMUSCULAR; INTRAVENOUS at 13:15

## 2021-11-18 RX ADMIN — SCOPALAMINE 1 PATCH: 1 PATCH, EXTENDED RELEASE TRANSDERMAL at 10:45

## 2021-11-18 RX ADMIN — MIDAZOLAM 2 MG: 1 INJECTION INTRAMUSCULAR; INTRAVENOUS at 11:28

## 2021-11-18 RX ADMIN — MIDAZOLAM 2 MG: 1 INJECTION INTRAMUSCULAR; INTRAVENOUS at 11:26

## 2021-11-18 RX ADMIN — PROPOFOL 160 MCG/KG/MIN: 10 INJECTION, EMULSION INTRAVENOUS at 11:29

## 2021-11-18 RX ADMIN — VANCOMYCIN HYDROCHLORIDE 1000 MG: 1 INJECTION, SOLUTION INTRAVENOUS at 11:32

## 2021-11-18 RX ADMIN — SODIUM CHLORIDE, SODIUM LACTATE, POTASSIUM CHLORIDE, AND CALCIUM CHLORIDE 50 ML/HR: .6; .31; .03; .02 INJECTION, SOLUTION INTRAVENOUS at 13:33

## 2021-11-18 RX ADMIN — PROPOFOL 200 MG: 10 INJECTION, EMULSION INTRAVENOUS at 11:29

## 2021-11-18 RX ADMIN — LIDOCAINE HYDROCHLORIDE 50 MG: 10 INJECTION, SOLUTION EPIDURAL; INFILTRATION; INTRACAUDAL; PERINEURAL at 11:29

## 2021-11-18 RX ADMIN — TRAMADOL HYDROCHLORIDE 50 MG: 50 TABLET, FILM COATED ORAL at 14:41

## 2021-11-18 RX ADMIN — HYDROMORPHONE HYDROCHLORIDE 1.5 MG: 2 INJECTION, SOLUTION INTRAMUSCULAR; INTRAVENOUS; SUBCUTANEOUS at 11:40

## 2021-11-27 ENCOUNTER — IMMUNIZATIONS (OUTPATIENT)
Dept: FAMILY MEDICINE CLINIC | Facility: HOSPITAL | Age: 46
End: 2021-11-27

## 2021-11-27 DIAGNOSIS — Z23 ENCOUNTER FOR IMMUNIZATION: Primary | ICD-10-CM

## 2021-11-27 PROCEDURE — 0001A COVID-19 PFIZER VACC 0.3 ML: CPT

## 2021-11-27 PROCEDURE — 91300 COVID-19 PFIZER VACC 0.3 ML: CPT

## 2021-11-30 DIAGNOSIS — E66.9 OBESITY, CLASS II, BMI 35-39.9: ICD-10-CM

## 2021-11-30 DIAGNOSIS — R63.5 ABNORMAL WEIGHT GAIN: ICD-10-CM

## 2021-11-30 RX ORDER — TOPIRAMATE 50 MG/1
TABLET, FILM COATED ORAL
Qty: 60 TABLET | Refills: 1 | Status: SHIPPED | OUTPATIENT
Start: 2021-11-30 | End: 2022-01-31

## 2022-01-10 ENCOUNTER — TELEPHONE (OUTPATIENT)
Dept: BARIATRICS | Facility: CLINIC | Age: 47
End: 2022-01-10

## 2022-01-10 DIAGNOSIS — E66.9 CLASS 1 OBESITY: ICD-10-CM

## 2022-01-10 DIAGNOSIS — R63.5 ABNORMAL WEIGHT GAIN: ICD-10-CM

## 2022-01-10 RX ORDER — PHENTERMINE HYDROCHLORIDE 30 MG/1
30 CAPSULE ORAL EVERY MORNING
Qty: 30 CAPSULE | Refills: 2 | Status: SHIPPED | OUTPATIENT
Start: 2022-01-10

## 2022-01-27 ENCOUNTER — OFFICE VISIT (OUTPATIENT)
Dept: GYNECOLOGIC ONCOLOGY | Facility: CLINIC | Age: 47
End: 2022-01-27
Payer: COMMERCIAL

## 2022-01-27 VITALS
OXYGEN SATURATION: 100 % | TEMPERATURE: 97.4 F | BODY MASS INDEX: 34.4 KG/M2 | HEART RATE: 80 BPM | SYSTOLIC BLOOD PRESSURE: 124 MMHG | RESPIRATION RATE: 16 BRPM | DIASTOLIC BLOOD PRESSURE: 90 MMHG | WEIGHT: 227 LBS | HEIGHT: 68 IN

## 2022-01-27 DIAGNOSIS — Z15.01 BRCA1 GENE MUTATION POSITIVE: Primary | ICD-10-CM

## 2022-01-27 DIAGNOSIS — Z15.09 BRCA1 GENE MUTATION POSITIVE: Primary | ICD-10-CM

## 2022-01-27 DIAGNOSIS — E89.41 SYMPTOMATIC POSTSURGICAL MENOPAUSE: ICD-10-CM

## 2022-01-27 PROCEDURE — 99213 OFFICE O/P EST LOW 20 MIN: CPT | Performed by: PHYSICIAN ASSISTANT

## 2022-01-27 RX ORDER — MELATONIN
1000 DAILY
COMMUNITY

## 2022-01-27 NOTE — ASSESSMENT & PLAN NOTE
44-year-old with a known BRCA1 mutation who is s/p prophylactic TLH, BSO, and is continuing with her breast reconstruction process  Her clinical exam is benign and without evidence of peritoneal disease  Her menopausal symptoms are well controlled with transdermal estrogen  Return to the office in 1 year for continued BRCA surveillance

## 2022-01-27 NOTE — PROGRESS NOTES
Assessment/Plan:    Problem List Items Addressed This Visit        Other    BRCA1 gene mutation positive - Primary     59-year-old with a known BRCA1 mutation who is s/p prophylactic TLH, BSO, and is continuing with her breast reconstruction process  Her clinical exam is benign and without evidence of peritoneal disease  Her menopausal symptoms are well controlled with transdermal estrogen  Return to the office in 1 year for continued BRCA surveillance  Symptomatic postsurgical menopause     Well controlled with transdermal estrogen  CHIEF COMPLAINT:   BRCA surveillance    Problem:  BRCA 1 mutation      Previous therapy:  1  TLH, BSO and cystoscopy on 11/27/20  A  Benign  2  Bilateral mastectomy with ongoing breast reconstruction  Patient ID: Reza Alvarez is a 42 y o  female  who has no new complaints today  No vaginal bleeding, abdominal/pelvic pain  Normal bowel and bladder function  The patient is continuing her breast reconstruction process  She is scheduled for upcoming surgery in February  Quality of life is good  The following portions of the patient's history were reviewed and updated as appropriate: allergies, current medications, past medical history, past surgical history and problem list     Review of Systems   Constitutional: Negative  HENT: Negative  Eyes: Negative  Respiratory: Negative  Cardiovascular: Negative  Gastrointestinal: Negative  Genitourinary: Negative  Musculoskeletal: Negative  Skin: Negative  Neurological: Negative  Psychiatric/Behavioral: Negative          Current Outpatient Medications   Medication Sig Dispense Refill    cholecalciferol (VITAMIN D3) 1,000 units tablet Take 1,000 Units by mouth daily      estradiol (VIVELLE-DOT) 0 05 MG/24HR Place 1 patch on the skin 2 (two) times a week 8 patch 3    Multiple Vitamins-Minerals (ONE-A-DAY WOMENS PO) Take by mouth daily       phentermine 30 MG capsule Take 1 capsule (30 mg total) by mouth every morning 30 capsule 2    Probiotic Product (Pro-biotic Blend) CAPS Take by mouth daily       topiramate (TOPAMAX) 50 MG tablet take 1 tablet by mouth every morning and 1 tablet by mouth every evening 60 tablet 1     No current facility-administered medications for this visit  Objective:    Blood pressure 124/90, pulse 80, temperature (!) 97 4 °F (36 3 °C), temperature source Temporal, resp  rate 16, height 5' 8" (1 727 m), weight 103 kg (227 lb), last menstrual period 10/15/2020, SpO2 100 %, not currently breastfeeding  Body mass index is 34 52 kg/m²  Body surface area is 2 16 meters squared  Physical Exam  Vitals reviewed  Exam conducted with a chaperone present  Constitutional:       General: She is not in acute distress  Appearance: Normal appearance  She is not ill-appearing  HENT:      Head: Normocephalic and atraumatic  Mouth/Throat:      Mouth: Mucous membranes are moist    Eyes:      General:         Right eye: No discharge  Left eye: No discharge  Conjunctiva/sclera: Conjunctivae normal    Pulmonary:      Effort: Pulmonary effort is normal    Abdominal:      Palpations: Abdomen is soft  There is no mass  Tenderness: There is no abdominal tenderness  Hernia: No hernia is present  Genitourinary:     Comments: The external female genitalia is normal  The bartholin's, uretheral and skenes glands are normal  The urethral meatus is normal (midline with no lesions)  Anus without fissure or lesion  Speculum exam reveals a grossly normal vagina  No masses, lesions,discharge or bleeding  No significant cystocele or rectocele noted  Bimanual exam notes a surgical absent cervix, uterus and adnexal structures  No masses or fullness  Bladder is without fullness, mass or tenderness  Musculoskeletal:      Right lower leg: No edema  Left lower leg: No edema  Skin:     General: Skin is warm and dry        Coloration: Skin is not jaundiced  Findings: No rash  Neurological:      General: No focal deficit present  Mental Status: She is alert and oriented to person, place, and time  Cranial Nerves: No cranial nerve deficit  Sensory: No sensory deficit  Motor: No weakness  Gait: Gait normal    Psychiatric:         Mood and Affect: Mood normal          Behavior: Behavior normal          Thought Content:  Thought content normal          Judgment: Judgment normal

## 2022-02-22 NOTE — PRE-PROCEDURE INSTRUCTIONS
Pre-Surgery Instructions:   Medication Instructions    cholecalciferol (VITAMIN D3) 1,000 units tablet Pt was instructed by surgeon to hold day before and day of surgery only    estradiol (VIVELLE-DOT) 0 05 MG/24HR Take as directed    Multiple Vitamins-Minerals (ONE-A-DAY WOMENS PO) Pt was instructed by surgeon to hold day before and day of surgery only    phentermine 30 MG capsule Take as directed    Probiotic Product (Pro-biotic Blend) CAPS Take as directed    topiramate (TOPAMAX) 50 MG tablet Take as directed      My Surgical Experience    The following information was developed to assist you to prepare for your operation  What do I need to do before coming to the hospital?   Arrange for a responsible person to drive you to and from the hospital    Arrange care for your children at home  Children are not allowed in the recovery areas of the hospital   Plan to wear clothing that is easy to put on and take off  If you are having shoulder surgery, wear a shirt that buttons or zippers in the front  Bathing  o Shower the evening before and the morning of your surgery with an antibacterial soap  Please refer to the Pre Op Showering Instructions for Surgery Patients Sheet   o Remove nail polish and all body piercing jewelry  o Do not shave any body part for at least 24 hours before surgery-this includes face, arms, legs and upper body  Food  o Nothing to eat or drink after midnight the night before your surgery   This includes candy and chewing gum  o Exception: If your surgery is after 12:00pm (noon), you may have clear liquids such as 7-Up®, ginger ale, apple or cranberry juice, Jell-O®, water, or clear broth until 8:00 am  o Do not drink milk or juice with pulp on the morning before surgery  o Do not drink alcohol 24 hours before surgery  Medicine  o Follow instructions you received from your surgeon about which medicines you may take on the day of surgery  o If instructed to take medicine on the morning of surgery, take pills with just a small sip of water  Call your prescribing doctor for specific infroamtion on what to do if you take insulin    What should I bring to the hospital?    Bring:  Patience Cezar or a walker, if you have them, for foot or knee surgery   A list of the daily medicines, vitamins, minerals, herbals and nutritional supplements you take  Include the dosages of medicines and the time you take them each day   Glasses, dentures or hearing aids   Minimal clothing; you will be wearing hospital sleepwear   Photo ID; required to verify your identity   If you have a Living Will or Power of , bring a copy of the documents   If you have an ostomy, bring an extra pouch and any supplies you use    Do not bring   Medicines or inhalers   Money, valuables or jewelry    What other information should I know about the day of surgery?  Notify your surgeons if you develop a cold, sore throat, cough, fever, rash or any other illness   Report to the Ambulatory Surgical/Same Day Surgery Unit   You will be instructed to stop at Registration only if you have not been pre-registered   Inform your  fi they do not stay that they will be asked by the staff to leave a phone number where they can be reached   Be available to be reached before surgery  In the event the operating room schedule changes, you may be asked to come in earlier or later than expected    *It is important to tell your doctor and others involved in your health care if you are taking or have been taking any non-prescription drugs, vitamins, minerals, herbals or other nutritional supplements   Any of these may interact with some food or medicines and cause a reaction

## 2022-02-23 ENCOUNTER — ANESTHESIA EVENT (OUTPATIENT)
Dept: PERIOP | Facility: HOSPITAL | Age: 47
End: 2022-02-23
Payer: COMMERCIAL

## 2022-02-24 ENCOUNTER — ANESTHESIA (OUTPATIENT)
Dept: PERIOP | Facility: HOSPITAL | Age: 47
End: 2022-02-24
Payer: COMMERCIAL

## 2022-02-24 ENCOUNTER — HOSPITAL ENCOUNTER (OUTPATIENT)
Facility: HOSPITAL | Age: 47
Setting detail: OUTPATIENT SURGERY
Discharge: HOME | End: 2022-02-24
Attending: PLASTIC SURGERY | Admitting: PLASTIC SURGERY
Payer: COMMERCIAL

## 2022-02-24 VITALS
TEMPERATURE: 96.8 F | RESPIRATION RATE: 18 BRPM | BODY MASS INDEX: 33.49 KG/M2 | SYSTOLIC BLOOD PRESSURE: 112 MMHG | OXYGEN SATURATION: 96 % | HEART RATE: 63 BPM | WEIGHT: 221 LBS | HEIGHT: 68 IN | DIASTOLIC BLOOD PRESSURE: 59 MMHG

## 2022-02-24 RX ORDER — HYDROMORPHONE HCL/PF 1 MG/ML
0.25 SYRINGE (ML) INJECTION
Status: DISCONTINUED | OUTPATIENT
Start: 2022-02-24 | End: 2022-02-24 | Stop reason: HOSPADM

## 2022-02-24 RX ORDER — MIDAZOLAM HYDROCHLORIDE 2 MG/2ML
INJECTION, SOLUTION INTRAMUSCULAR; INTRAVENOUS AS NEEDED
Status: DISCONTINUED | OUTPATIENT
Start: 2022-02-24 | End: 2022-02-24

## 2022-02-24 RX ORDER — SODIUM CHLORIDE, SODIUM LACTATE, POTASSIUM CHLORIDE, CALCIUM CHLORIDE 600; 310; 30; 20 MG/100ML; MG/100ML; MG/100ML; MG/100ML
100 INJECTION, SOLUTION INTRAVENOUS CONTINUOUS
Status: DISCONTINUED | OUTPATIENT
Start: 2022-02-24 | End: 2022-02-24 | Stop reason: HOSPADM

## 2022-02-24 RX ORDER — PROMETHAZINE HYDROCHLORIDE 25 MG/ML
12.5 INJECTION, SOLUTION INTRAMUSCULAR; INTRAVENOUS
Status: DISCONTINUED | OUTPATIENT
Start: 2022-02-24 | End: 2022-02-24 | Stop reason: HOSPADM

## 2022-02-24 RX ORDER — LIDOCAINE HYDROCHLORIDE 10 MG/ML
INJECTION, SOLUTION EPIDURAL; INFILTRATION; INTRACAUDAL; PERINEURAL AS NEEDED
Status: DISCONTINUED | OUTPATIENT
Start: 2022-02-24 | End: 2022-02-24

## 2022-02-24 RX ORDER — PROPOFOL 10 MG/ML
INJECTION, EMULSION INTRAVENOUS AS NEEDED
Status: DISCONTINUED | OUTPATIENT
Start: 2022-02-24 | End: 2022-02-24

## 2022-02-24 RX ORDER — PROPOFOL 10 MG/ML
INJECTION, EMULSION INTRAVENOUS CONTINUOUS PRN
Status: DISCONTINUED | OUTPATIENT
Start: 2022-02-24 | End: 2022-02-24

## 2022-02-24 RX ORDER — GINSENG 100 MG
CAPSULE ORAL AS NEEDED
Status: DISCONTINUED | OUTPATIENT
Start: 2022-02-24 | End: 2022-02-24 | Stop reason: HOSPADM

## 2022-02-24 RX ORDER — ONDANSETRON 2 MG/ML
4 INJECTION INTRAMUSCULAR; INTRAVENOUS EVERY 8 HOURS PRN
Status: DISCONTINUED | OUTPATIENT
Start: 2022-02-24 | End: 2022-02-24 | Stop reason: HOSPADM

## 2022-02-24 RX ORDER — HYDROCODONE BITARTRATE AND ACETAMINOPHEN 5; 325 MG/1; MG/1
1 TABLET ORAL EVERY 4 HOURS PRN
Status: DISCONTINUED | OUTPATIENT
Start: 2022-02-24 | End: 2022-02-24 | Stop reason: HOSPADM

## 2022-02-24 RX ORDER — FENTANYL CITRATE 50 UG/ML
INJECTION, SOLUTION INTRAMUSCULAR; INTRAVENOUS AS NEEDED
Status: DISCONTINUED | OUTPATIENT
Start: 2022-02-24 | End: 2022-02-24

## 2022-02-24 RX ORDER — LIDOCAINE HYDROCHLORIDE AND EPINEPHRINE 5; 5 MG/ML; UG/ML
INJECTION, SOLUTION INFILTRATION; PERINEURAL AS NEEDED
Status: DISCONTINUED | OUTPATIENT
Start: 2022-02-24 | End: 2022-02-24 | Stop reason: HOSPADM

## 2022-02-24 RX ORDER — MAGNESIUM HYDROXIDE 1200 MG/15ML
LIQUID ORAL AS NEEDED
Status: DISCONTINUED | OUTPATIENT
Start: 2022-02-24 | End: 2022-02-24 | Stop reason: HOSPADM

## 2022-02-24 RX ORDER — ONDANSETRON 2 MG/ML
4 INJECTION INTRAMUSCULAR; INTRAVENOUS ONCE AS NEEDED
Status: DISCONTINUED | OUTPATIENT
Start: 2022-02-24 | End: 2022-02-24 | Stop reason: HOSPADM

## 2022-02-24 RX ORDER — VANCOMYCIN HYDROCHLORIDE 1 G/200ML
1000 INJECTION, SOLUTION INTRAVENOUS ONCE
Status: DISCONTINUED | OUTPATIENT
Start: 2022-02-24 | End: 2022-02-24 | Stop reason: HOSPADM

## 2022-02-24 RX ORDER — FENTANYL CITRATE/PF 50 MCG/ML
50 SYRINGE (ML) INJECTION
Status: DISCONTINUED | OUTPATIENT
Start: 2022-02-24 | End: 2022-02-24 | Stop reason: HOSPADM

## 2022-02-24 RX ORDER — SODIUM CHLORIDE, SODIUM LACTATE, POTASSIUM CHLORIDE, CALCIUM CHLORIDE 600; 310; 30; 20 MG/100ML; MG/100ML; MG/100ML; MG/100ML
INJECTION, SOLUTION INTRAVENOUS CONTINUOUS PRN
Status: DISCONTINUED | OUTPATIENT
Start: 2022-02-24 | End: 2022-02-24

## 2022-02-24 RX ORDER — VANCOMYCIN HYDROCHLORIDE 1 G/200ML
INJECTION, SOLUTION INTRAVENOUS AS NEEDED
Status: DISCONTINUED | OUTPATIENT
Start: 2022-02-24 | End: 2022-02-24

## 2022-02-24 RX ADMIN — FENTANYL CITRATE 100 MCG: 50 INJECTION, SOLUTION INTRAMUSCULAR; INTRAVENOUS at 08:05

## 2022-02-24 RX ADMIN — MIDAZOLAM 2 MG: 1 INJECTION INTRAMUSCULAR; INTRAVENOUS at 08:03

## 2022-02-24 RX ADMIN — PROPOFOL 120 MCG/KG/MIN: 10 INJECTION, EMULSION INTRAVENOUS at 08:04

## 2022-02-24 RX ADMIN — SODIUM CHLORIDE, SODIUM LACTATE, POTASSIUM CHLORIDE, AND CALCIUM CHLORIDE: .6; .31; .03; .02 INJECTION, SOLUTION INTRAVENOUS at 06:07

## 2022-02-24 RX ADMIN — LIDOCAINE HYDROCHLORIDE 50 MG: 10 INJECTION, SOLUTION EPIDURAL; INFILTRATION; INTRACAUDAL; PERINEURAL at 08:04

## 2022-02-24 RX ADMIN — PROPOFOL 100 MG: 10 INJECTION, EMULSION INTRAVENOUS at 08:04

## 2022-02-24 RX ADMIN — PROPOFOL 50 MG: 10 INJECTION, EMULSION INTRAVENOUS at 08:17

## 2022-02-24 RX ADMIN — VANCOMYCIN HYDROCHLORIDE 1000 MG: 1 INJECTION, SOLUTION INTRAVENOUS at 08:00

## 2022-02-24 RX ADMIN — SODIUM CHLORIDE, SODIUM LACTATE, POTASSIUM CHLORIDE, AND CALCIUM CHLORIDE 100 ML/HR: .6; .31; .03; .02 INJECTION, SOLUTION INTRAVENOUS at 06:08

## 2022-02-24 NOTE — DISCHARGE SUMMARY
Discharge Summary - Medical Robert Taylor 42 y o  female MRN: [id number]    [address] PACU Room / Bed: OR POOL/OR POOL Encounter: [id number]    BRIEF OVERVIEW  Admitting Provider: Milly Rosales MD  Discharge Provider: Milly Roslaes MD  Primary Care Physician at Discharge: Talya [facility] ([place], [place])    Discharge To: Home      Admission Date: 2/24/2022     Discharge Date: No discharge date for patient encounter  Code Status: Prior  Advance Directive and Living Will: <no information>  Power of :        Primary Discharge Diagnosis  Active Problems:    * No active hospital problems  *  Resolved Problems:    * No resolved hospital problems   *        Discharge Disposition: [address] [place] [address]    Presenting Problem/History of Present Illness  <principal problem not specified>      Discharge Condition: stable    Patient tolerated the procedure well, recovered and was discharged home in stable condition    Milly Rosales MD  2/24/2022  9:34 AM

## 2022-02-24 NOTE — DISCHARGE INSTRUCTIONS
[address], [address] [facility][note 2], [address] [facility], [facility], [address] [facility] /[facility] / [employer]           Avoid any pressure on nipple    Use gauze dressing and shield to prevent pressure on the reconstructed nipple    The gauze should raise the shield above the level of the nipple to prevent pressure    Use antiobiotic ointment three times per day on nipple (neosporin,etc)    It is ok to shower, avoid direct water pressure on the nipple    Call the office [phone] for a followup appointment in 7-14 days

## 2022-03-01 NOTE — OP NOTE
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]     * 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]    Procedure(s) (LRB):  NIPPLE RECONSTRUCTION (Bilateral)    Specimen(s):  * No specimens in log *    Estimated Blood Loss:   Minimal    Drains:  * No LDAs found *    Anesthesia Type:   IV Sedation with Anesthesia    Operative Indications:  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]  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]      Operative Findings:      Complications:   None    Procedure and Technique:  The patient was brought to the operating room and placed supine on the    operating table  The patient was marked while standing prior to    entering the operating room  Sequential compression devices were    applied  IV antibiotics were given  After adequate anesthesia was    obtained, the chest was prepped and draped using standard surgical    technique       Attention was turned to the left breast  The nipple marking was    examined  A local field block with 0 25% Marcaine with epinephrine was    given  Following this, a C-V flap was designed with 1-cm vertical    limbs and 1 2-cm horizontal limbs  This pattern was incised in a    subcutaneous plane maintaining a well perfused 1-cm vascular pedicle     Except the area of the pedicle, circumferential undermining was    performed to avoid elevating the previous mastectomy scar  Following    this, excellent hemostasis was achieved using electrocautery   The    donor site was closed using 4-0 Vicryl suture in interrupted deep    dermal technique followed by 5-0 chromic suture in interrupted    technique  The horizontal limbs were rotated, advanced towards the    midline and sewn together, and the flaps were inset using 4-0 Vicryl    suture in interrupted deep dermal technique followed by 5-0 chromic in    interrupted technique  This resulted in excellent creation of a nipple    that was well perfused  Attention was turned to the right breast  The nipple marking was    examined  A local field block with 0 25% Marcaine with epinephrine was    given  Following this, a C-V flap was designed with 1-cm vertical    limbs and 1 2-cm horizontal limbs  This pattern was incised in a    subcutaneous plane maintaining a well perfused 1-cm vascular pedicle     Except the area of the pedicle, circumferential undermining was    performed to avoid elevating the previous mastectomy scar  Following    this, excellent hemostasis was achieved using electrocautery  The    donor site was closed using 4-0 Vicryl suture in interrupted deep    dermal technique followed by 5-0 chromic suture in interrupted    technique  The horizontal limbs were rotated, advanced towards the    midline and sewn together, and the flaps were inset using 4-0 Vicryl    suture in interrupted deep dermal technique followed by 5-0 chromic in    interrupted technique  This resulted in excellent creation of a nipple    that was well perfused  Bacitracin ointment and sterile gauze were    placed around the nipple followed by an eye shield to avoid any    pressure on the nipple   The patient tolerated the procedure well and    was taken to the recovery room in stable condition        I was present for the entire procedure and A qualified resident physician was not available    Patient Disposition:  hemodynamically stable and extubated and stable      SIGNATURE: Silvia Collier MD  DATE: March 1, 2022  TIME: 2:53 PM

## 2022-03-31 ENCOUNTER — TELEPHONE (OUTPATIENT)
Dept: SURGICAL ONCOLOGY | Facility: CLINIC | Age: 47
End: 2022-03-31

## 2022-03-31 DIAGNOSIS — E89.41 SYMPTOMATIC POSTSURGICAL MENOPAUSE: ICD-10-CM

## 2022-03-31 RX ORDER — ESTRADIOL 0.05 MG/D
1 FILM, EXTENDED RELEASE TRANSDERMAL 2 TIMES WEEKLY
Qty: 8 PATCH | Refills: 3 | Status: SHIPPED | OUTPATIENT
Start: 2022-03-31 | End: 2022-07-11

## 2022-03-31 NOTE — TELEPHONE ENCOUNTER
Medication Refill     Who is Calling Patient   Medication  estradiol   How many pills left  2 patches   Preferred Pharmacy / Address  [address] [place] [address], Ge[facility]ani Sy   Who is your Physician?  Dr Calzada Double   Call back number  [phone]   Relevant Information

## 2022-05-06 ENCOUNTER — EVALUATION (OUTPATIENT)
Dept: PHYSICAL THERAPY | Facility: CLINIC | Age: 47
End: 2022-05-06
Payer: COMMERCIAL

## 2022-05-06 DIAGNOSIS — M25.511 RIGHT SHOULDER PAIN, UNSPECIFIED CHRONICITY: ICD-10-CM

## 2022-05-06 DIAGNOSIS — M25.512 LEFT SHOULDER PAIN, UNSPECIFIED CHRONICITY: Primary | ICD-10-CM

## 2022-05-06 PROCEDURE — 97161 PT EVAL LOW COMPLEX 20 MIN: CPT | Performed by: PHYSICAL THERAPIST

## 2022-05-06 PROCEDURE — 97140 MANUAL THERAPY 1/> REGIONS: CPT | Performed by: PHYSICAL THERAPIST

## 2022-05-06 PROCEDURE — 97110 THERAPEUTIC EXERCISES: CPT | Performed by: PHYSICAL THERAPIST

## 2022-05-06 NOTE — PROGRESS NOTES
PT EVALUATION    Today's date: 22  Patient name: Garth Lerma  : 1975  MRN: [id number]  Referring provider: Melody Sánchez MD  Dx:   1  Left shoulder pain, unspecified chronicity    2  Right shoulder pain, unspecified chronicity        Garth Lerma is a 41 y o  female who presents with chronic bilateral shoulder consistent aching that increases with movement  She has decreased active range of motion and tolerance to reaching activities  Trigger points present in bilateral UT/supraspinatus region and a history of multiple breast surgeries  This patient would benefit from skilled physical therapy to address their listed impairments and functional limitations to maximize functional outcome  Impairments:    restricted ROM    decreased strength   pain with function   activity intolerance     Prognosis:  Excellent  Positive and negative prognostic indicator(s):  pain >3 months    Goals:    STG Patient is independent with HEP   STG Range of motion is improved by 25% in 2 weeks  LTG Range of motion is improved by 50% in 4 weeks  LTG ADL performance improved to prior level of function in 6 weeks    Planned interventions:  home exercise program, patient education, manual therapy, massage, graded activity, flexibility, functional range of motion exercises and strengthening    Duration in visits:  8  Frequency: 2 visits per week  Duration in weeks:  4    History of Current Injury: patient reports about two years of bilateral shoulder pain, right > left  Symptoms have been worst since a breast surgery in 2021  She has difficulty reaching behind the back and overhead  The shoulders ache all day and get worse with movement  Symptoms are worse upon waking  She has a history of double mastectomy and reconstruction  Denies UE swelling      Pain location: around the shoulder  Pain descriptors:  aching  Pain intensity:  8/10 with movement    Aggravating factors: reaching behind the back, reaching overhead, using a hair dryer (for work)  Easing factors: nothing    Patient goals:  decreased pain, independence with ADLs, increased mobility and increased strength    Objective     Active Range of Motion   Left Shoulder   Flexion: 154 degrees   Extension: 45 degrees   Abduction: 155 degrees   Internal rotation BTB: T6     Right Shoulder   Flexion: 145 degrees with pain  Extension: 35 degrees with pain  Abduction: 121 degrees with pain  Internal rotation BTB: T11     Passive Range of Motion   Left Shoulder   Flexion: 152 degrees with pain  External rotation 90°: 90 degrees   Internal rotation 90°: WFL    Right Shoulder   Flexion: 155 degrees with pain  External rotation 90°: 85 degrees with pain  Internal rotation 90°: WFL    Additional Passive Range of Motion Details  Flexion PROM limited by lats/teres pulling    Strength/Myotome Testing     Left Shoulder     Planes of Motion   Flexion: 3+   Abduction: 3+   External rotation at 0°: 5   Internal rotation at 0°: 5     Right Shoulder     Planes of Motion   Flexion: 3+   Abduction: 3+   External rotation at 0°: 4   Internal rotation at 0°: 5     General Comments:      Shoulder Comments   Pain with right resisted ER at end range  Tender to left UT/supraspinauts > right          Precautions: none      Manuals 5/6            PROM left flexion, right ER SY            bilat UT/supra STM in sidelying SY                                      Neuro Re-Ed                                                                                                        Ther Ex             Thoracic ext over chair :10x5            Corner stretch             scap push downs?              UT stretch with OP :10x5            Table slides flexion :10x5            Rows Red 20            Shld Ext             Shld ER in row position             Uni ER             Prone ITL                                                    Ther Activity                                       Gait Training Modalities

## 2022-05-09 ENCOUNTER — OFFICE VISIT (OUTPATIENT)
Dept: PHYSICAL THERAPY | Facility: CLINIC | Age: 47
End: 2022-05-09
Payer: COMMERCIAL

## 2022-05-09 DIAGNOSIS — M25.511 RIGHT SHOULDER PAIN, UNSPECIFIED CHRONICITY: Primary | ICD-10-CM

## 2022-05-09 DIAGNOSIS — M25.512 LEFT SHOULDER PAIN, UNSPECIFIED CHRONICITY: ICD-10-CM

## 2022-05-09 PROCEDURE — 97110 THERAPEUTIC EXERCISES: CPT

## 2022-05-09 PROCEDURE — 97112 NEUROMUSCULAR REEDUCATION: CPT

## 2022-05-09 PROCEDURE — 97140 MANUAL THERAPY 1/> REGIONS: CPT

## 2022-05-09 NOTE — PROGRESS NOTES
Daily Note     Today's date: 2022  Patient name: Kristy Verma  : 1975  MRN: [id number]  Referring provider: Florencia Pascual MD  Dx:   Encounter Diagnosis     ICD-10-CM    1  Right shoulder pain, unspecified chronicity  M25 511    2  Left shoulder pain, unspecified chronicity  M25 512                   Subjective: Patient reports compliance with HEP and increased general shoulder soreness after IE  Objective: See treatment diary below      Assessment: Initiated PT POC as indicated and patient tolerated treatment well  Patient exhibited good technique with therapeutic exercises and would benefit from continued PT  Cueing needed for neutral positioning on L during resisted ER activities  Tender to L>R UT with decreased tightness noted post MT  Patient denies increased shoulder pain throughout treatment  Plan: Progress treatment as tolerated         Precautions: none      Manuals            PROM left flexion, right ER [facility]           bilat UT/supra STM in sidelying [facility]                                     Neuro Re-Ed                                                                                                        Ther Ex             Thoracic ext over chair :10x5 :10x5           Corner stretch  :05x5           scap push downs?  :05x10           UT stretch with OP :10x5 :10x5           Table slides flexion :10x5 :10x5           Rows Red 20 Red 20           Shld Ext  Red 20           Shld ER in row position  Red 20           Uni ER  Red 20           Prone ITL                                                    Ther Activity                                       Gait Training                                       Modalities

## 2022-05-13 ENCOUNTER — OFFICE VISIT (OUTPATIENT)
Dept: PHYSICAL THERAPY | Facility: CLINIC | Age: 47
End: 2022-05-13
Payer: COMMERCIAL

## 2022-05-13 DIAGNOSIS — M25.511 RIGHT SHOULDER PAIN, UNSPECIFIED CHRONICITY: Primary | ICD-10-CM

## 2022-05-13 DIAGNOSIS — M25.512 LEFT SHOULDER PAIN, UNSPECIFIED CHRONICITY: ICD-10-CM

## 2022-05-13 PROCEDURE — 97110 THERAPEUTIC EXERCISES: CPT

## 2022-05-13 PROCEDURE — 97112 NEUROMUSCULAR REEDUCATION: CPT

## 2022-05-13 PROCEDURE — 97140 MANUAL THERAPY 1/> REGIONS: CPT

## 2022-05-13 NOTE — PROGRESS NOTES
Daily Note     Today's date: 2022  Patient name: Lorenzo Hawkins  : 1975  MRN: [id number]  Referring provider: Brenda Ibanez MD  Dx:   Encounter Diagnosis     ICD-10-CM    1  Right shoulder pain, unspecified chronicity  M25 511    2  Left shoulder pain, unspecified chronicity  M25 512                   Subjective: Patient reports no soreness after last visit and she has noticed increased shoulder mobility and slight decrease in pain overall  Objective: See treatment diary below      Assessment: Progressed exercise program as charted and patient tolerated treatment well  Patient exhibited good technique with therapeutic exercises and would benefit from continued PT  Continues to feel significant stretch with passive flexion on L  Most challenged by resisted ER exercises though patient feels these are most beneficial; updated HEP to include bilateral TB ER  Plan: Progress treatment as tolerated         Precautions: none      Manuals           PROM left flexion, right ER [facility]          bilat UT/supra STM in sidelying [facility]                                    Neuro Re-Ed                                                                                                        Ther Ex             Thoracic ext over chair :10x5 :10x5 :10x5          Corner stretch  :05x5 :10x5          scap push downs?  :05x10 :05x10          UT stretch with OP :10x5 :10x5 :10x5          Table slides flexion :10x5 :10x5 :10x10          Rows Red 20 Red 20 Red 30          Shld Ext  Red 20 Red 30          Shld ER in row position  Red 20 Red 20          Uni ER  Red 20 Red 30          Prone ITL             pball rolls flexion   :10x10 table                                    Ther Activity                                       Gait Training                                       Modalities

## 2022-05-16 ENCOUNTER — OFFICE VISIT (OUTPATIENT)
Dept: PHYSICAL THERAPY | Facility: CLINIC | Age: 47
End: 2022-05-16
Payer: COMMERCIAL

## 2022-05-16 DIAGNOSIS — M25.511 RIGHT SHOULDER PAIN, UNSPECIFIED CHRONICITY: Primary | ICD-10-CM

## 2022-05-16 DIAGNOSIS — M25.512 LEFT SHOULDER PAIN, UNSPECIFIED CHRONICITY: ICD-10-CM

## 2022-05-16 PROCEDURE — 97112 NEUROMUSCULAR REEDUCATION: CPT | Performed by: PHYSICAL THERAPIST

## 2022-05-16 PROCEDURE — 97530 THERAPEUTIC ACTIVITIES: CPT | Performed by: PHYSICAL THERAPIST

## 2022-05-16 PROCEDURE — 97110 THERAPEUTIC EXERCISES: CPT | Performed by: PHYSICAL THERAPIST

## 2022-05-16 PROCEDURE — 97140 MANUAL THERAPY 1/> REGIONS: CPT | Performed by: PHYSICAL THERAPIST

## 2022-05-16 NOTE — PROGRESS NOTES
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 and was a little sore from that      Objective: See treatment diary below      Assessment: Tolerated treatment well  Patient exhibited good technique with therapeutic exercises and would benefit from continued PT  Progressed ROM activities, added MFR to pec minor bilaterally  PROM full       Plan: Progress treatment as tolerated         Precautions: none      Manuals          PROM left flexion, right ER [facility]         bilat UT/supra STM in sidelying [facility]                                   Neuro Re-Ed                                                                                                        Ther Ex             Thoracic ext over chair :10x5 :10x5 :10x5 :10x5         Corner stretch  :05x5 :10x5 :10x5         scap push downs?  :05x10 :05x10          UT stretch with OP :10x5 :10x5 :10x5 :10x5         Table slides flexion :10x5 :10x5 :10x10          Rows Red 20 Red 20 Red 30 Green 30         Shld Ext  Red 20 Red 30 Green 30         Shld ER in row position  Red 20 Red 20 Green 30         Uni ER  Red 20 Red 30 Red 30         Prone ITL             pball rolls flexion   :10x10 table :10x10 table                                   Ther Activity                                       Gait Training                                       Modalities

## 2022-05-23 ENCOUNTER — OFFICE VISIT (OUTPATIENT)
Dept: SURGICAL ONCOLOGY | Facility: CLINIC | Age: 47
End: 2022-05-23
Payer: COMMERCIAL

## 2022-05-23 ENCOUNTER — APPOINTMENT (OUTPATIENT)
Dept: PHYSICAL THERAPY | Facility: CLINIC | Age: 47
End: 2022-05-23
Payer: COMMERCIAL

## 2022-05-23 VITALS
SYSTOLIC BLOOD PRESSURE: 110 MMHG | RESPIRATION RATE: 16 BRPM | BODY MASS INDEX: 35.01 KG/M2 | HEIGHT: 68 IN | DIASTOLIC BLOOD PRESSURE: 76 MMHG | HEART RATE: 80 BPM | OXYGEN SATURATION: 98 % | TEMPERATURE: 97 F | WEIGHT: 231 LBS

## 2022-05-23 DIAGNOSIS — Z15.09 BRCA1 GENE MUTATION POSITIVE: ICD-10-CM

## 2022-05-23 DIAGNOSIS — Z12.39 BREAST CANCER SCREENING, HIGH RISK PATIENT: Primary | ICD-10-CM

## 2022-05-23 DIAGNOSIS — Z15.01 BRCA1 GENE MUTATION POSITIVE: ICD-10-CM

## 2022-05-23 PROCEDURE — 99213 OFFICE O/P EST LOW 20 MIN: CPT | Performed by: SURGERY

## 2022-05-23 NOTE — PROGRESS NOTES
Surgical Oncology Follow Up       [address]  [facility] [place]  [address]  [facility] [address]    Calista Smith Difrancesco  1975  [id number]  [address] [facility]  [facility] [place]  [address] [facility] [id number]    Chief Complaint   Patient presents with    Follow-up          Assessment & Plan:   Patient presents for 1 year follow-up visit for BRCA positivity  She has had bilateral mastectomy  Her implants are prepectoral   We reviewed being aware of any abnormalities  We also recommended MRIs only as based on her implants  She has no worrisome findings  She has had nipple reconstruction with exceptional cosmetic results  We will see her back in 1 year's time or sooner should she appreciate any changes or have any concerns  She is agreeable see our nurse practitioner at her follow-up visit  Cancer History:     Oncology History    No history exists  Interval History:   See above    Review of Systems:   Review of Systems   All other systems reviewed and are negative  Past Medical History     Patient Active Problem List   Diagnosis    Sinobronchitis    BRCA1 gene mutation positive    Symptomatic postsurgical menopause    Asthma    Obesity    Keratosis, actinic    History of hysterectomy    PONV (postoperative nausea and vomiting)     Past Medical History:   Diagnosis Date    Anesthesia complication     daughter has malignant hyperthermia    Asthma     resolved w/ wt loss    BRCA1-associated protein-1 tumor predisposition syndrome     H/O bilateral breast implants     Intraductal papilloma of breast, left 2/23/2021    Obesity (BMI 30-39  9)     Pneumonia     2019    PONV (postoperative nausea and vomiting)     Scab     back and right thigh from few precancerous lesions taken off     Past Surgical History:   Procedure Laterality Date    APPENDECTOMY      BREAST IMPLANT Left 11/18/2021    Procedure: FILL SALINE BREAST IMPLANT;  Surgeon: Kj Nunez MD;  Location: [facility] MAIN OR;  Service: Plastics    BREAST SURGERY  2/9/2021    Bilateral mastectomy    CAPSULOTOMY Bilateral 5/19/2021    Procedure: CAPSULECTOMY, EXPANDER/IMPLANT EXCHANGE;  Surgeon: Dwight Aviles MD;  Location: [facility] MAIN OR;  Service: Plastics    COLONOSCOPY      COSMETIC SURGERY  2021    Reconstruction of breast    DILATION AND CURETTAGE  [address] N/A 11/27/2020    Procedure: TOTAL LAPAROSCOPIC HYSTERECTOMY, BILATERAL SALPINGO-OOPHORECTOMY, cystoscopy;  Surgeon: Reji Daniels MD;  [facility]: [facility] MAIN [facility];  Service: Gynecology Oncology    MASTECTOMY      DC IMPLNT BIO IMPLNT FOR SOFT TISSUE REINFORCEMENT Bilateral 2/9/2021    Procedure: RECONSTRUCTION BREAST W/ IMPLANT;  Surgeon: Dwight Aviles MD;  [facility];  Service: Plastics    DC MASTECTOMY, SIMPLE, COMPLETE Bilateral 2/9/2021    Procedure: BREAST MASTECTOMY;  Surgeon: Clark Tijerina MD;  [facility];  Service: Surgical Oncology    DC NIPPLE/AREOLA RECONSTRUCTION Bilateral 2/24/2022    Procedure: NIPPLE RECONSTRUCTION;  Surgeon: Kj Nunez MD;  Location: [facility] MAIN OR;  Service: Plastics    DC REVISION OF RECONSTRUCTED BREAST Bilateral 8/6/2021    Procedure: BREAST RECONSTRUCITON REVISION; EXCISION OF STANDING DEFORMITIES;  Surgeon: Kj Nunez MD;  Location: [facility];  Service: Plastics    DC REVISION OF RECONSTRUCTED BREAST Bilateral 11/18/2021    Procedure: REVISE BREAST RECONSTRUCTION;  Surgeon: Kj Nunez MD;  Location: [facility] MAIN OR;  Service: Plastics    DC TISSUE EXPANDER PLACEMENT BREAST RECONSTRUCTION Bilateral 2/9/2021    Procedure: BREAST INSERTION/PLACEMENT TISSUE EXPANDER (EXCHANGE);   Surgeon: Dwight Aviles MD;  [facility];  Service: Plastics    TONSILLECTOMY      TYMPANOSTOMY TUBE PLACEMENT Bilateral      Family History   Problem Relation Age of Onset    Cancer Father         Age at [facility] unknown; type unknown    Cancer Maternal Grandfather     No Known Problems Maternal Aunt     No Known Problems Paternal Aunt     No Known Problems Paternal Aunt     Breast cancer Cousin 36    No Known Problems Mother     BRCA1 Positive Sister     No Known Problems Daughter     No Known Problems Sister     No Known Problems Daughter     BRCA1 Positive Maternal Uncle     No Known Problems Paternal Uncle     No Known Problems Paternal Aunt     No Known Problems Paternal Uncle     Ovarian cancer Other 46     Social History     Socioeconomic History    Marital status: /Civil Union     Spouse name: Not on file    Number of children: Not on file    Years of education: Not on file    Highest education level: Not on file   Occupational History    Not on file   Tobacco Use    Smoking status: Never Smoker    Smokeless tobacco: Never Used    Tobacco comment: Denies   Vaping Use    Vaping Use: Never used   Substance and Sexual Activity    Alcohol use:  Yes     Alcohol/week: 1 0 standard drink     Types: 1 Standard drinks or equivalent per week     Comment: social    Drug use: No    Sexual activity: Yes     Partners: Male     Birth control/protection: Female Sterilization   Other Topics Concern    Not on file   Social History Narrative    Not on file     Social Determinants of Health     Financial Resource Strain: Not on file   Food Insecurity: Not on file   Transportation Needs: Not on file   Physical Activity: Not on file   Stress: Not on file   Social Connections: Not on file   Intimate Partner Violence: Not on file   Housing Stability: Not on file       Current Outpatient Medications:     cholecalciferol (VITAMIN D3) 1,000 units tablet, Take 1,000 Units by mouth daily, Disp: , Rfl:     estradiol (VIVELLE-DOT) 0 05 MG/24HR, Place 1 patch on the skin 2 (two) times a week, Disp: 8 patch, Rfl: 3    Multiple Vitamins-Minerals (ONE-A-DAY WOMENS PO), Take by mouth daily , Disp: , Rfl:    Probiotic Product (Pro-biotic Blend) CAPS, Take by mouth daily , Disp: , Rfl:     topiramate (TOPAMAX) 50 MG tablet, take 1 tablet by mouth every evening, Disp: 60 tablet, Rfl: 0    phentermine 30 MG capsule, Take 1 capsule (30 mg total) by mouth every morning (Patient not taking: Reported on 5/23/2022), Disp: 30 capsule, Rfl: 2  Allergies   Allergen Reactions    Penicillins Other (See Comments)     Childhood reaction; unknown reaction- tolerated Ancef       Physical Exam:     Vitals:    05/23/22 0847   BP: 110/76   Pulse: 80   Resp: 16   Temp: (!) 97 °F (36 1 °C)   SpO2: 98%     Physical Exam  Chest:      Comments: Examination of both reconstructed breast demonstrate very good cosmetic outcome  Her nipple reconstructions are highly realistic  She will have tattoos in the near future  Clinical exam demonstrates no masses worrisome skin findings or axillary adenopathy on either side  Results & Discussion:   Patient is free of any evidence of breast cancer  We will see her back in 1 year's time  She will be breast self aware  She will contact us with any concerns  She is agreeable see our nurse practitioner at the next visit  Advance Care Planning/Advance Directives:  I discussed the disease status, treatment plans and follow-up with the patient

## 2022-05-27 ENCOUNTER — OFFICE VISIT (OUTPATIENT)
Dept: PHYSICAL THERAPY | Facility: CLINIC | Age: 47
End: 2022-05-27
Payer: COMMERCIAL

## 2022-05-27 DIAGNOSIS — M25.511 RIGHT SHOULDER PAIN, UNSPECIFIED CHRONICITY: Primary | ICD-10-CM

## 2022-05-27 DIAGNOSIS — M25.512 LEFT SHOULDER PAIN, UNSPECIFIED CHRONICITY: ICD-10-CM

## 2022-05-27 PROCEDURE — 97112 NEUROMUSCULAR REEDUCATION: CPT

## 2022-05-27 PROCEDURE — 97110 THERAPEUTIC EXERCISES: CPT

## 2022-05-27 PROCEDURE — 97140 MANUAL THERAPY 1/> REGIONS: CPT

## 2022-05-27 NOTE — PROGRESS NOTES
Daily Note     Today's date: 2022  Patient name: Bryant Posey  : 1975  MRN: [id number]  Referring provider: Rahul Lee MD  Dx:   Encounter Diagnosis     ICD-10-CM    1  Right shoulder pain, unspecified chronicity  M25 511    2  Left shoulder pain, unspecified chronicity  M25 512                   Subjective: Patient reports she has almost no pain in L shoulder though she continues to have pain in R which is her dominant hand  Pain is mostly in posterior aspect  Objective: See treatment diary below       Assessment: Tolerated treatment well  Patient exhibited good technique with therapeutic exercises and would benefit from continued PT  Progressed to green band for uni ER with fatigue  Continued tightness to bilateral pec minor with good response to manual therapy and corner stretch  Plan: Progress treatment as tolerated         Precautions: none      Manuals         PROM left flexion, right ER SY [facility] SY D/c full        bilat UT/supra STM in sidelying SY [facility] SY MC        Pec minor MFR bilat     MC                     Neuro Re-Ed                                                                                                        Ther Ex             Thoracic ext over chair :10x5 :10x5 :10x5 :10x5 :10x10        Corner stretch  :05x5 :10x5 :10x5 :10x5        scap push downs?  :05x10 :05x10  :05x10        UT stretch with OP :10x5 :10x5 :10x5 :10x5 :10x5        Table slides flexion :10x5 :10x5 :10x10          Rows Red 20 Red 20 Red 30 Green 30 Green 30        Shld Ext  Red 20 Red 30 Green 30 Green 30        Shld ER in row position  Red 20 Red 20 Green 30 Green 30        Uni ER  Red 20 Red 30 Red 30 Green 30        Prone ITL             pball rolls flexion   :10x10 table :10x10 table :10x10 table                                  Ther Activity                                       Gait Training                                       Modalities

## 2022-06-01 ENCOUNTER — APPOINTMENT (OUTPATIENT)
Dept: PHYSICAL THERAPY | Facility: CLINIC | Age: 47
End: 2022-06-01
Payer: COMMERCIAL

## 2022-06-02 ENCOUNTER — APPOINTMENT (OUTPATIENT)
Dept: PHYSICAL THERAPY | Facility: CLINIC | Age: 47
End: 2022-06-02
Payer: COMMERCIAL

## 2022-06-03 ENCOUNTER — APPOINTMENT (OUTPATIENT)
Dept: PHYSICAL THERAPY | Facility: CLINIC | Age: 47
End: 2022-06-03
Payer: COMMERCIAL

## 2022-06-07 ENCOUNTER — OFFICE VISIT (OUTPATIENT)
Dept: PHYSICAL THERAPY | Facility: CLINIC | Age: 47
End: 2022-06-07
Payer: COMMERCIAL

## 2022-06-07 DIAGNOSIS — M25.512 LEFT SHOULDER PAIN, UNSPECIFIED CHRONICITY: ICD-10-CM

## 2022-06-07 DIAGNOSIS — M25.511 RIGHT SHOULDER PAIN, UNSPECIFIED CHRONICITY: Primary | ICD-10-CM

## 2022-06-07 PROCEDURE — 97110 THERAPEUTIC EXERCISES: CPT | Performed by: PHYSICAL THERAPIST

## 2022-06-07 PROCEDURE — 97140 MANUAL THERAPY 1/> REGIONS: CPT | Performed by: PHYSICAL THERAPIST

## 2022-06-07 PROCEDURE — 97112 NEUROMUSCULAR REEDUCATION: CPT | Performed by: PHYSICAL THERAPIST

## 2022-06-07 NOTE — PROGRESS NOTES
Daily Note     Today's date: 2022  Patient name: Yo Romero  : 1975  MRN: [id number]  Referring provider: Torey Walton MD  Dx:   Encounter Diagnosis     ICD-10-CM    1  Right shoulder pain, unspecified chronicity  M25 511    2  Left shoulder pain, unspecified chronicity  M25 512                   Subjective: patient reports she has only tightness in the left shoulder but she continues to have pain with flexion/abduction in the right shoulder      Objective: See treatment diary below      Assessment: Tolerated treatment fair  Patient exhibited good technique with therapeutic exercises and would benefit from continued PT  Pain noted with forward raises, reduced height to tolerance  She also had pain with scaption using #2, held on the exercise      Plan: Progress treatment as tolerated  Precautions: none      Manuals        PROM left flexion, right ER SY [facility] SY D/c full SY       bilat UT/supra STM in sidelying SY [facility] SY MC SY       Pec minor MFR bilat      SY       UBE retro             Neuro Re-Ed                                                                                                        Ther Ex             Thoracic ext over chair :10x5 :10x5 :10x5 :10x5 :10x10 :10x10       Corner stretch  :05x5 :10x5 :10x5 :10x5 Uni doorway :15x4       scap push downs?  :05x10 :05x10  :05x10        UT stretch with OP :10x5 :10x5 :10x5 :10x5 :10x5 :10x5, arm behind back + chin to anterior shoulder :10x5       Table slides flexion :10x5 :10x5 :10x10          Rows Red 20 Red 20 Red 30 Green 30 Green 30 Green 20       Shld Ext  Red 20 Red 30 Green 30 Green 30        Shld ER in row position  Red 20 Red 20 Green 30 Green 30 Green 20       Uni ER  Red 20 Red 30 Red 30 Green 30        Prone ITL             pball rolls flexion   :10x10 table :10x10 table :10x10 table Up wall 20       Overhead press      Uni DB both hands #5 20       Front raises      #2 20, p! Cross body stretch upright      :10x5                                 Ther Activity                                       Gait Training                                       Modalities

## 2022-06-10 ENCOUNTER — OFFICE VISIT (OUTPATIENT)
Dept: PHYSICAL THERAPY | Facility: CLINIC | Age: 47
End: 2022-06-10
Payer: COMMERCIAL

## 2022-06-10 DIAGNOSIS — M25.511 RIGHT SHOULDER PAIN, UNSPECIFIED CHRONICITY: Primary | ICD-10-CM

## 2022-06-10 DIAGNOSIS — M25.512 LEFT SHOULDER PAIN, UNSPECIFIED CHRONICITY: ICD-10-CM

## 2022-06-10 PROCEDURE — 97110 THERAPEUTIC EXERCISES: CPT | Performed by: PHYSICAL THERAPIST

## 2022-06-10 PROCEDURE — 97140 MANUAL THERAPY 1/> REGIONS: CPT | Performed by: PHYSICAL THERAPIST

## 2022-06-10 PROCEDURE — 97112 NEUROMUSCULAR REEDUCATION: CPT | Performed by: PHYSICAL THERAPIST

## 2022-06-10 NOTE — PROGRESS NOTES
Daily Note      Today's date: 6/10/2022  Patient name: Judi Aguilera  : 1975  MRN: [id number]  Referring provider: Denise Zazueta MD  Dx:   Encounter Diagnosis     ICD-10-CM    1  Right shoulder pain, unspecified chronicity  M25 511    2  Left shoulder pain, unspecified chronicity  M25 512                   Subjective: patient reports she did not have soreness after last treamtent      Objective: See treatment diary below      Assessment: Tolerated treatment well  Patient exhibited good technique with therapeutic exercises and would benefit from continued PT  Continued progression of PREs, no pain with standing strengthening exercises  Plan: Progress treatment as tolerated         Precautions: none      Manuals 5/6 5/9 5/13 5/16 5/27 6/7 6/10      PROM left flexion, right ER SY [facility] SY D/c full SY SY      bilat UT/supra STM in sidelying SY [facility] SY  SY SY      Pec minor MFR bilat      SY SY      UBE retro       4' lvl 3retro      Neuro Re-Ed                                                                                                        Ther Ex             Thoracic ext over chair :10x5 :10x5 :10x5 :10x5 :10x10 :10x10 :05x15 using UBE chair      Corner stretch  :05x5 :10x5 :10x5 :10x5 Uni doorway :15x4 Uni doorway :15x4      scap push downs?  :05x10 :05x10  :05x10        UT stretch with OP :10x5 :10x5 :10x5 :10x5 :10x5 :10x5, arm behind back + chin to anterior shoulder :10x5 :10x5, arm behind back + chin to anterior shoulder :10x5      Table slides flexion :10x5 :10x5 :10x10          Rows Red 20 Red 20 Red 30 Green 30 Green 30 Green 20 Green 30      Shld Ext  Red 20 Red 30 Green 30 Green 30  Green 20      Shld ER in row position  [employer] 20 Red 20 Green 30 Green 30 Green 20 Green 30      Uni ER  Red 20 Red 30 Red 30 Green 30        Prone ITL             pball rolls flexion   :10x10 table :10x10 table :10x10 table Up wall 20 Up wall 20      Overhead press      Uni DB both hands #5 20 Front raises      #2 20, p!        Cross body stretch upright      :10x5 :10x5      Lateral raises with ER       #2 20x                   Ther Activity                                       Gait Training                                       Modalities

## 2022-06-13 ENCOUNTER — OFFICE VISIT (OUTPATIENT)
Dept: PHYSICAL THERAPY | Facility: CLINIC | Age: 47
End: 2022-06-13
Payer: COMMERCIAL

## 2022-06-13 DIAGNOSIS — M25.511 RIGHT SHOULDER PAIN, UNSPECIFIED CHRONICITY: Primary | ICD-10-CM

## 2022-06-13 DIAGNOSIS — M25.512 LEFT SHOULDER PAIN, UNSPECIFIED CHRONICITY: ICD-10-CM

## 2022-06-13 PROCEDURE — 97140 MANUAL THERAPY 1/> REGIONS: CPT | Performed by: PHYSICAL THERAPIST

## 2022-06-13 PROCEDURE — 97112 NEUROMUSCULAR REEDUCATION: CPT | Performed by: PHYSICAL THERAPIST

## 2022-06-13 PROCEDURE — 97110 THERAPEUTIC EXERCISES: CPT | Performed by: PHYSICAL THERAPIST

## 2022-06-13 NOTE — PROGRESS NOTES
Daily Note     Today's date: 2022  Patient name: Lorenzo Hawkins  : 1975  MRN: [id number]  Referring provider: Brenda Ibanez MD  Dx:   Encounter Diagnosis     ICD-10-CM    1  Right shoulder pain, unspecified chronicity  M25 511    2  Left shoulder pain, unspecified chronicity  M25 512                   Subjective: patient reports no soreness after last treatment  She used her arms a lot at work and did OK with it      Objective: See treatment diary below      Assessment: Tolerated treatment well  Patient exhibited good technique with therapeutic exercises and would benefit from continued PT  Blue band Row with ER felt anterior, consider decreasing to green NV    Plan: Progress treatment as tolerated         Precautions: none      Manuals 5/6 5/9 5/13 5/16 5/27 6/7 6/10 6/13     PROM left flexion, right ER SY  [facility] SY D/c full SY SY      bilat UT/supra STM in sidelying SY [facility] SY MC SY SY SY     Pec minor MFR bilat      SY SY SY     UBE retro       4' lvl 3retro 4' retro lvl 3     Neuro Re-Ed                                                                                                        Ther Ex             Thoracic ext over chair :10x5 :10x5 :10x5 :10x5 :10x10 :10x10 :05x15 using UBE chair :05x15 using UBE chair     Corner stretch  :05x5 :10x5 :10x5 :10x5 Uni doorway :15x4 Uni doorway :15x4 Uni doorway :15x4     scap push downs?  :05x10 :05x10  :05x10        UT stretch with OP :10x5 :10x5 :10x5 :10x5 :10x5 :10x5, arm behind back + chin to anterior shoulder :10x5 :10x5, arm behind back + chin to anterior shoulder :10x5 nv     Table slides flexion :10x5 :10x5 :10x10          Rows Red 20 Red 20 Red 30 Green 30 Green 30 Green 20 Green 30 Blue 30     Shld Ext  Red 20 Red 30 Green 30 Green 30  Green 20 Blue 30     Shld ER in row position  [employer] 20 Red 20 Green 30 Green 30 Green 20 Green 30 Blue 30 Green this vist    Uni ER  Red 20 Red 30 Red 30 Green 30        TLY        #3 20 TY     pball rolls flexion   :10x10 table :10x10 table :10x10 table Up wall 20 Up wall 20 Up wall 20     Overhead press      Uni DB both hands #5 20       Front raises      #2 20, p!        Cross body stretch upright      :10x5 :10x5 :10x5     Lateral raises with ER       #2 20x #3 20x                  Ther Activity                                       Gait Training                                       Modalities

## 2022-06-16 ENCOUNTER — OFFICE VISIT (OUTPATIENT)
Dept: PHYSICAL THERAPY | Facility: CLINIC | Age: 47
End: 2022-06-16
Payer: COMMERCIAL

## 2022-06-16 DIAGNOSIS — M25.511 RIGHT SHOULDER PAIN, UNSPECIFIED CHRONICITY: Primary | ICD-10-CM

## 2022-06-16 DIAGNOSIS — M25.512 LEFT SHOULDER PAIN, UNSPECIFIED CHRONICITY: ICD-10-CM

## 2022-06-16 PROCEDURE — 97110 THERAPEUTIC EXERCISES: CPT

## 2022-06-16 PROCEDURE — 97140 MANUAL THERAPY 1/> REGIONS: CPT

## 2022-06-16 PROCEDURE — 97112 NEUROMUSCULAR REEDUCATION: CPT

## 2022-06-16 NOTE — PROGRESS NOTES
Daily Note     Today's date: 2022  Patient name: Tereso Burris  : 1975  MRN: [id number]  Referring provider: Jose Workman MD  Dx:   Encounter Diagnosis     ICD-10-CM    1  Right shoulder pain, unspecified chronicity  M25 511    2  Left shoulder pain, unspecified chronicity  M25 512        Start Time: 804  Stop Time: 852  Total time in clinic (min): 48 minutes    Subjective:  Pt reports no pain at the start of her session and verbalizes no medical changes since previous session      Objective: See treatment diary below      Assessment: Tolerated treatment well  She was given cues on proper form and reps/sets for all exercises  Patient demonstrated fatigue post treatment, exhibited good technique with therapeutic exercises and would benefit from continued PT      Plan: Continue per plan of care        Precautions: none      Manuals 5/6 5/9 5/13 5/16 5/27 6/7 6/10 6/13 6/16    PROM left flexion, right ER SY [facility] SY D/c full SY SY  FH    bilat UT/supra STM in sidelying SY [facility] SY SY SY FH    Pec minor MFR bilat     [facility] SY [facility]    UBE retro       4' lvl 3retro 4' retro lvl 3 4' retro lvl 3    Neuro Re-Ed                                                                                                        Ther Ex             Thoracic ext over chair :10x5 :10x5 :10x5 :10x5 :10x10 :10x10 :05x15 using UBE chair :05x15 using UBE chair ''5x15 using UBE chair    Corner stretch  :05x5 :10x5 :10x5 :10x5 Uni doorway :15x4 Uni doorway :15x4 Uni doorway :15x4 Uni doorway ''15x4    scap push downs?  :05x10 :05x10  :05x10        UT stretch with OP :10x5 :10x5 :10x5 :10x5 :10x5 :10x5, arm behind back + chin to anterior shoulder :10x5 :10x5, arm behind back + chin to anterior shoulder :10x5 nv ''10x5, arm behind back + chin to anterio    Table slides flexion :10x5 :10x5 :10x10          Rows Red 20 Red 20 Red 30 Green 30 Green 30 Green 20 Green 30 Blue 30 Blue 30x     Shld Ext  Red 20 Red 30 Green 30 Green 30  Green 20 Blue 30 Blue 30x    [employer] ER in row position  [employer] 20 Red 20 Green 30 Green 30 Green 20 Green 30 Blue 30 Green 30x    Uni ER  Red 20 Red 30 Red 30 Green 30        TLY        #3 20 TY 3# 20x    pball rolls flexion   :10x10 table :10x10 table :10x10 table Up wall 20 Up wall 20 Up wall 20 Wall flexion 20x     Overhead press      Uni DB both hands #5 20       Front raises      #2 20, p!        Cross body stretch upright      :10x5 :10x5 :10x5 10''x5    Lateral raises with ER       #2 20x #3 20x 3# 20x                 Ther Activity                                       Gait Training                                       Modalities

## 2022-06-20 ENCOUNTER — APPOINTMENT (OUTPATIENT)
Dept: PHYSICAL THERAPY | Facility: CLINIC | Age: 47
End: 2022-06-20
Payer: COMMERCIAL

## 2022-06-23 ENCOUNTER — APPOINTMENT (OUTPATIENT)
Dept: PHYSICAL THERAPY | Facility: CLINIC | Age: 47
End: 2022-06-23
Payer: COMMERCIAL

## 2022-06-24 ENCOUNTER — APPOINTMENT (OUTPATIENT)
Dept: PHYSICAL THERAPY | Facility: CLINIC | Age: 47
End: 2022-06-24
Payer: COMMERCIAL

## 2022-06-28 ENCOUNTER — APPOINTMENT (OUTPATIENT)
Dept: PHYSICAL THERAPY | Facility: CLINIC | Age: 47
End: 2022-06-28
Payer: COMMERCIAL

## 2022-06-30 ENCOUNTER — APPOINTMENT (OUTPATIENT)
Dept: PHYSICAL THERAPY | Facility: CLINIC | Age: 47
End: 2022-06-30
Payer: COMMERCIAL

## 2022-07-10 ENCOUNTER — PATIENT MESSAGE (OUTPATIENT)
Dept: GYNECOLOGIC ONCOLOGY | Facility: CLINIC | Age: 47
End: 2022-07-10

## 2022-07-11 ENCOUNTER — PATIENT MESSAGE (OUTPATIENT)
Dept: GYNECOLOGIC ONCOLOGY | Facility: CLINIC | Age: 47
End: 2022-07-11

## 2022-07-11 DIAGNOSIS — E89.41 SYMPTOMATIC POSTSURGICAL MENOPAUSE: Primary | ICD-10-CM

## 2022-07-11 RX ORDER — ESTRADIOL 0.07 MG/D
1 PATCH TRANSDERMAL WEEKLY
Qty: 4 PATCH | Refills: 3 | Status: SHIPPED | OUTPATIENT
Start: 2022-07-11 | End: 2022-07-25

## 2022-07-25 DIAGNOSIS — E89.41 SYMPTOMATIC POSTSURGICAL MENOPAUSE: Primary | ICD-10-CM

## 2022-07-25 RX ORDER — ESTRADIOL 0.07 MG/D
1 FILM, EXTENDED RELEASE TRANSDERMAL 2 TIMES WEEKLY
Qty: 8 PATCH | Refills: 2 | Status: SHIPPED | OUTPATIENT
Start: 2022-07-25 | End: 2022-10-07 | Stop reason: SDUPTHER

## 2022-09-01 ENCOUNTER — HOSPITAL ENCOUNTER (EMERGENCY)
Facility: HOSPITAL | Age: 47
Discharge: HOME | End: 2022-09-01
Attending: EMERGENCY MEDICINE
Payer: COMMERCIAL

## 2022-09-01 ENCOUNTER — APPOINTMENT (OUTPATIENT)
Dept: RADIOLOGY | Facility: HOSPITAL | Age: 47
End: 2022-09-01
Payer: COMMERCIAL

## 2022-09-01 VITALS
SYSTOLIC BLOOD PRESSURE: 141 MMHG | OXYGEN SATURATION: 100 % | BODY MASS INDEX: 37.51 KG/M2 | WEIGHT: 246.69 LBS | RESPIRATION RATE: 18 BRPM | HEART RATE: 69 BPM | DIASTOLIC BLOOD PRESSURE: 84 MMHG | TEMPERATURE: 98.2 F

## 2022-09-01 DIAGNOSIS — M94.0 COSTOCHONDRITIS: Primary | ICD-10-CM

## 2022-09-01 LAB
ALBUMIN SERPL BCP-MCNC: 3.8 G/DL (ref 3.5–5)
ALP SERPL-CCNC: 66 U/L (ref 34–104)
ALT SERPL W P-5'-P-CCNC: 16 U/L (ref 7–52)
ANION GAP SERPL CALCULATED.3IONS-SCNC: 6 MMOL/L (ref 4–13)
AST SERPL W P-5'-P-CCNC: 15 U/L (ref 13–39)
ATRIAL RATE: 67 BPM
BASOPHILS # BLD AUTO: 0.09 THOUSANDS/ΜL (ref 0–0.1)
BASOPHILS NFR BLD AUTO: 1 % (ref 0–1)
BILIRUB SERPL-MCNC: 0.89 MG/DL (ref 0.2–1)
BUN SERPL-MCNC: 10 MG/DL (ref 5–25)
CALCIUM SERPL-MCNC: 8.7 MG/DL (ref 8.4–10.2)
CARDIAC TROPONIN I PNL SERPL HS: <2 NG/L
CHLORIDE SERPL-SCNC: 107 MMOL/L (ref 96–108)
CO2 SERPL-SCNC: 25 MMOL/L (ref 21–32)
CREAT SERPL-MCNC: 0.57 MG/DL (ref 0.6–1.3)
D DIMER PPP FEU-MCNC: 0.41 UG/ML FEU
EOSINOPHIL # BLD AUTO: 0.4 THOUSAND/ΜL (ref 0–0.61)
EOSINOPHIL NFR BLD AUTO: 4 % (ref 0–6)
ERYTHROCYTE [DISTWIDTH] IN BLOOD BY AUTOMATED COUNT: 11.9 % (ref 11.6–15.1)
GFR SERPL CREATININE-BSD FRML MDRD: 112 ML/MIN/1.73SQ M
GLUCOSE SERPL-MCNC: 94 MG/DL (ref 65–140)
HCT VFR BLD AUTO: 40.8 % (ref 34.8–46.1)
HGB BLD-MCNC: 13.9 G/DL (ref 11.5–15.4)
IMM GRANULOCYTES # BLD AUTO: 0.05 THOUSAND/UL (ref 0–0.2)
IMM GRANULOCYTES NFR BLD AUTO: 1 % (ref 0–2)
LYMPHOCYTES # BLD AUTO: 2.43 THOUSANDS/ΜL (ref 0.6–4.47)
LYMPHOCYTES NFR BLD AUTO: 22 % (ref 14–44)
MCH RBC QN AUTO: 32 PG (ref 26.8–34.3)
MCHC RBC AUTO-ENTMCNC: 34.1 G/DL (ref 31.4–37.4)
MCV RBC AUTO: 94 FL (ref 82–98)
MONOCYTES # BLD AUTO: 0.95 THOUSAND/ΜL (ref 0.17–1.22)
MONOCYTES NFR BLD AUTO: 9 % (ref 4–12)
NEUTROPHILS # BLD AUTO: 7.15 THOUSANDS/ΜL (ref 1.85–7.62)
NEUTS SEG NFR BLD AUTO: 63 % (ref 43–75)
NRBC BLD AUTO-RTO: 0 /100 WBCS
P AXIS: 55 DEGREES
PLATELET # BLD AUTO: 279 THOUSANDS/UL (ref 149–390)
PMV BLD AUTO: 10.9 FL (ref 8.9–12.7)
POTASSIUM SERPL-SCNC: 3.8 MMOL/L (ref 3.5–5.3)
PR INTERVAL: 164 MS
PROT SERPL-MCNC: 6.8 G/DL (ref 6.4–8.4)
QRS AXIS: 90 DEGREES
QRSD INTERVAL: 94 MS
QT INTERVAL: 424 MS
QTC INTERVAL: 448 MS
RBC # BLD AUTO: 4.35 MILLION/UL (ref 3.81–5.12)
SODIUM SERPL-SCNC: 138 MMOL/L (ref 135–147)
T WAVE AXIS: 59 DEGREES
VENTRICULAR RATE: 67 BPM
WBC # BLD AUTO: 11.07 THOUSAND/UL (ref 4.31–10.16)

## 2022-09-01 PROCEDURE — 85379 FIBRIN DEGRADATION QUANT: CPT

## 2022-09-01 PROCEDURE — 84484 ASSAY OF TROPONIN QUANT: CPT

## 2022-09-01 PROCEDURE — 99285 EMERGENCY DEPT VISIT HI MDM: CPT

## 2022-09-01 PROCEDURE — 99285 EMERGENCY DEPT VISIT HI MDM: CPT | Performed by: EMERGENCY MEDICINE

## 2022-09-01 PROCEDURE — 80053 COMPREHEN METABOLIC PANEL: CPT

## 2022-09-01 PROCEDURE — 85025 COMPLETE CBC W/AUTO DIFF WBC: CPT

## 2022-09-01 PROCEDURE — 71045 X-RAY EXAM CHEST 1 VIEW: CPT

## 2022-09-01 PROCEDURE — 36415 COLL VENOUS BLD VENIPUNCTURE: CPT

## 2022-09-01 PROCEDURE — 93005 ELECTROCARDIOGRAM TRACING: CPT

## 2022-09-01 PROCEDURE — 93010 ELECTROCARDIOGRAM REPORT: CPT | Performed by: INTERNAL MEDICINE

## 2022-09-01 RX ORDER — IBUPROFEN 600 MG/1
600 TABLET ORAL EVERY 6 HOURS PRN
Qty: 14 TABLET | Refills: 0 | Status: SHIPPED | OUTPATIENT
Start: 2022-09-01

## 2022-09-01 RX ORDER — IBUPROFEN 400 MG/1
400 TABLET ORAL ONCE
Status: COMPLETED | OUTPATIENT
Start: 2022-09-01 | End: 2022-09-01

## 2022-09-01 RX ADMIN — IBUPROFEN 400 MG: 400 TABLET, FILM COATED ORAL at 10:58

## 2022-09-01 NOTE — ED PROVIDER NOTES
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 Taking? Multiple Vitamins-Minerals (ONE-A-DAY WOMENS PO)  Self Yes No   Sig: Take by mouth daily    Probiotic Product (Pro-biotic Blend) CAPS  Self Yes No   Sig: Take by mouth daily    cholecalciferol (VITAMIN D3) 1,000 units tablet  Self Yes No   Sig: Take 1,000 Units by mouth daily   estradiol (Vivelle-Dot) 0 075 MG/24HR   No No   Sig: Place 1 patch on the skin 2 (two) times a week   phentermine 30 MG capsule  Self No No   Sig: Take 1 capsule (30 mg total) by mouth every morning   Patient not taking: Reported on 5/23/2022   topiramate (TOPAMAX) 50 MG tablet   No No   Sig: take 1 tablet by mouth every evening      Facility-Administered Medications: None       Past Medical History:   Diagnosis Date    Anesthesia complication     daughter has malignant hyperthermia    Asthma     resolved w/ wt loss    BRCA1-associated protein-1 tumor predisposition syndrome     H/O bilateral breast implants     Intraductal papilloma of breast, left 2/23/2021    Obesity (BMI 30-39  9)     Pneumonia     2019    PONV (postoperative nausea and vomiting)     Scab     back and right thigh from few precancerous lesions taken off Past Surgical History:   Procedure Laterality Date    APPENDECTOMY      BREAST IMPLANT Left 11/18/2021    Procedure: FILL SALINE BREAST IMPLANT;  Surgeon: Jj Laguna MD;  Location: [facility] MAIN OR;  Service: Plastics    BREAST SURGERY  2/9/2021    Bilateral mastectomy    CAPSULOTOMY Bilateral 5/19/2021    Procedure: CAPSULECTOMY, EXPANDER/IMPLANT EXCHANGE;  Surgeon: Asael Lanier MD;  Location: [facility] MAIN OR;  Service: Plastics    COLONOSCOPY     [address]  2021    Reconstruction of breast    DILATION AND CURETTAGE  [address] N/A 11/27/2020    Procedure: TOTAL LAPAROSCOPIC HYSTERECTOMY, BILATERAL SALPINGO-OOPHORECTOMY, cystoscopy;  Surgeon: Robyn Reyes MD;  Location:  MAIN OR;  Service: Gynecology Oncology    MASTECTOMY      MT IMPLNT BIO IMPLNT FOR SOFT TISSUE REINFORCEMENT Bilateral 2/9/2021    Procedure: RECONSTRUCTION BREAST W/ IMPLANT;  Surgeon: Asael Lanier MD;  [facility];  Service: Plastics    MT MASTECTOMY, SIMPLE, COMPLETE Bilateral 2/9/2021    Procedure: BREAST MASTECTOMY;  Surgeon: Davi Dos Santos MD;  [facility];  Service: Surgical Oncology    MT NIPPLE/AREOLA RECONSTRUCTION Bilateral 2/24/2022    Procedure: NIPPLE RECONSTRUCTION;  Surgeon: Jj Laguna MD;  Location: [facility] MAIN OR;  Service: Plastics    MT REVISION OF RECONSTRUCTED BREAST Bilateral 8/6/2021    Procedure: BREAST RECONSTRUCITON REVISION; EXCISION OF STANDING DEFORMITIES;  Surgeon: Jj Laguna MD;  [facility];  Service: Plastics    MT REVISION OF RECONSTRUCTED BREAST Bilateral 11/18/2021    Procedure: REVISE BREAST RECONSTRUCTION;  Surgeon: Jj Laguna MD;  Location: [facility] MAIN OR;  Service: Plastics    MT TISSUE EXPANDER PLACEMENT BREAST RECONSTRUCTION Bilateral 2/9/2021    Procedure: BREAST INSERTION/PLACEMENT TISSUE EXPANDER (EXCHANGE);   Surgeon: Asael Lanier MD;  [facility];  Service: Plastics    TONSILLECTOMY      [address]  Bilateral        Family History   Problem Relation Age of Onset   Lawanda Courser Cancer Father         Age at DX unknown; type unknown    Cancer Maternal Grandfather     No Known Problems Maternal Aunt     No Known Problems Paternal Aunt     No Known Problems Paternal Aunt     Breast cancer Cousin 36    No Known Problems Mother     BRCA1 Positive Sister     No Known Problems Daughter     No Known Problems Sister     No Known Problems Daughter     BRCA1 Positive Maternal Uncle     No Known Problems Paternal Uncle     No Known Problems Paternal Aunt     No Known Problems Paternal Uncle     Ovarian cancer Other 46     I have reviewed and agree with the history as documented  E-Cigarette/Vaping    E-Cigarette Use Never User     Comments Denies      E-Cigarette/Vaping Substances    Nicotine No     THC No     CBD No     Flavoring No     Other No      Social History     Tobacco Use    Smoking status: Never Smoker    Smokeless tobacco: Never Used    Tobacco comment: Denies   Vaping Use    Vaping Use: Never used   Substance Use Topics    Alcohol use: Yes     Alcohol/week: 1 0 standard drink     Types: 1 Standard drinks or equivalent per week     Comment: social    Drug use: No        Review of Systems   Constitutional: Negative for chills and fever  Respiratory: Positive for cough and shortness of breath  Cough for past 2 weeks   Cardiovascular: Positive for chest pain  Negative for leg swelling  Gastrointestinal: Positive for diarrhea  Negative for abdominal pain, nausea and vomiting  Diarrhea persistent for past 2 weeks   Genitourinary: Negative for dysuria and flank pain  Musculoskeletal: Positive for back pain  Neurological: Negative for dizziness, weakness, light-headedness and headaches  All other systems reviewed and are negative        Physical Exam  ED Triage Vitals [09/01/22 1012]   Temperature Pulse Respirations Blood Pressure SpO2   98 2 °F (36 8 °C) 69 18 141/84 100 % Temp Source Heart Rate Source Patient Position - Orthostatic VS BP Location FiO2 (%)   Oral Monitor Sitting Right arm --      Pain Score       7             Orthostatic Vital Signs  Vitals:    09/01/22 1012   BP: 141/84   Pulse: 69   Patient Position - Orthostatic VS: Sitting       Physical Exam  Vitals and nursing note reviewed  Constitutional:       General: She is in acute distress  Appearance: She is well-developed  She is not diaphoretic  HENT:      Head: Normocephalic and atraumatic  Cardiovascular:      Rate and Rhythm: Normal rate and regular rhythm  Pulmonary:      Effort: Pulmonary effort is normal  No respiratory distress  Breath sounds: Normal breath sounds  No decreased breath sounds, wheezing, rhonchi or rales  Chest:      Chest wall: Tenderness present  Abdominal:      General: Bowel sounds are normal       Palpations: Abdomen is soft  There is no mass  Tenderness: There is no abdominal tenderness  There is no guarding  Musculoskeletal:         General: Normal range of motion  Cervical back: Normal range of motion  Right lower leg: No tenderness  Left lower leg: Tenderness present  Comments: 2/10 tenderness in LLE   Skin:     General: Skin is warm and dry  Neurological:      General: No focal deficit present  Mental Status: She is alert and oriented to person, place, and time     Psychiatric:         Mood and Affect: Mood normal          Behavior: Behavior normal          ED Medications  Medications   ibuprofen (MOTRIN) tablet 400 mg (400 mg Oral Given 9/1/22 1058)       Diagnostic Studies  Results Reviewed     Procedure Component Value Units Date/Time    HS Troponin 0hr (reflex protocol) [[id number]]  (Normal) Collected: 09/01/22 1052    Lab Status: Final result Specimen: Blood from Arm, Right Updated: 09/01/22 1127     hs TnI 0hr <2 ng/L     Comprehensive metabolic panel [[id number]]  (Abnormal) Collected: 09/01/22 1052    Lab Status: Final result Specimen: Blood from Arm, Right Updated: 09/01/22 1119     Sodium 138 mmol/L      Potassium 3 8 mmol/L      Chloride 107 mmol/L      CO2 25 mmol/L      ANION GAP 6 mmol/L      BUN 10 mg/dL      Creatinine 0 57 mg/dL      Glucose 94 mg/dL      Calcium 8 7 mg/dL      AST 15 U/L      ALT 16 U/L      Alkaline Phosphatase 66 U/L      Total Protein 6 8 g/dL      Albumin 3 8 g/dL      Total Bilirubin 0 89 mg/dL      eGFR 112 ml/min/1 73sq m     Narrative:      National Kidney Disease Foundation guidelines for Chronic Kidney Disease (CKD):     Stage 1 with normal or high GFR (GFR > 90 mL/min/1 73 square meters)    Stage 2 Mild CKD (GFR = 60-89 mL/min/1 73 square meters)    Stage 3A Moderate CKD (GFR = 45-59 mL/min/1 73 square meters)    Stage 3B Moderate CKD (GFR = 30-44 mL/min/1 73 square meters)    Stage 4 Severe CKD (GFR = 15-29 mL/min/1 73 square meters)    Stage 5 End Stage CKD (GFR <15 mL/min/1 73 square meters)  Note: GFR calculation is accurate only with a steady state creatinine    D-dimer, quantitative [[id number]]  (Normal) Collected: 09/01/22 1052    Lab Status: Final result Specimen: Blood from Arm, Right Updated: 09/01/22 1115     D-Dimer, Quant 0 41 ug/ml FEU     CBC and differential [[id number]]  (Abnormal) Collected: 09/01/22 1052    Lab Status: Final result Specimen: Blood from Arm, Right Updated: 09/01/22 1104     WBC 11 07 Thousand/uL      RBC 4 35 Million/uL      Hemoglobin 13 9 g/dL      Hematocrit 40 8 %      MCV 94 fL      MCH 32 0 pg      MCHC 34 1 g/dL      RDW 11 9 %      MPV 10 9 fL      Platelets 652 Thousands/uL      nRBC 0 /100 WBCs      Neutrophils Relative 63 %      Immat GRANS % 1 %      Lymphocytes Relative 22 %      Monocytes Relative 9 %      Eosinophils Relative 4 %      Basophils Relative 1 %      Neutrophils Absolute 7 15 Thousands/µL      Immature Grans Absolute 0 05 Thousand/uL      Lymphocytes Absolute 2 43 Thousands/µL      Monocytes Absolute 0 95 Thousand/µL Eosinophils Absolute 0 40 Thousand/µL      Basophils Absolute 0 09 Thousands/µL                  XR chest 1 view portable   Final Result by Fermin Garcia MD (09/01 1306)      No acute cardiopulmonary disease                    Workstation performed: [id number]               Procedures  ECG 12 Lead Documentation Only    Date/Time: 9/1/2022 10:45 AM  Performed by: Monie Shannon DO  Authorized by: Monie Shannon DO     Indications / Diagnosis:  Rule out ACS  ECG reviewed by me, the ED Provider: yes    Patient location:  ED  Previous ECG:     Previous ECG:  Compared to current  Interpretation:     Interpretation: normal    Quality:     Tracing quality:  Limited by artifact  Rate:     ECG rate assessment: normal    Rhythm:     Rhythm: sinus rhythm    Ectopy:     Ectopy: none    QRS:     QRS axis:  Normal  Conduction:     Conduction: normal    ST segments:     ST segments:  Normal  T waves:     T waves: normal            ED Course             HEART Risk Score    Flowsheet Row Most Recent Value   Heart Score Risk Calculator    History 1 Filed at: 09/01/2022 1908   ECG 0 Filed at: 09/01/2022 1908   Age 0 Filed at: 09/01/2022 1908   Risk Factors 1 Filed at: 09/01/2022 1908   Troponin 0 Filed at: 09/01/2022 1908   HEART Score 2 Filed at: 09/01/2022 1908              [address]  Rule for PE    Flowsheet Row Most Recent Value   PERC Rule for PE    Age >=50 0 Filed at: 09/01/2022 1031   HR >=100 0 Filed at: 09/01/2022 1031   O2 Sat on room air < 95% 0 Filed at: 09/01/2022 1031   History of PE or DVT 0 Filed at: 09/01/2022 1031   Recent trauma or surgery 0 Filed at: 09/01/2022 1031   Hemoptysis 0 Filed at: 09/01/2022 1031   Exogenous estrogen 0 Filed at: 09/01/2022 1031   Unilateral leg swelling 0 Filed at: 09/01/2022 1031   [address] for PE Results 0 Filed at: 09/01/2022 1031                  Wells' Criteria for PE    Flowsheet Row Most Recent Value   Wells' Criteria for PE    Clinical signs and symptoms of DVT 0 Filed at: 09/01/2022 56   PE is primary diagnosis or equally likely 0 Filed at: 09/01/2022 1030   HR >100 0 Filed at: 09/01/2022 1030   Immobilization at least 3 days or Surgery in the previous 4 weeks 0 Filed at: 09/01/2022 1030   Previous, objectively diagnosed PE or DVT 0 Filed at: 09/01/2022 1030   Hemoptysis 0 Filed at: 09/01/2022 1030   Malignancy with treatment within 6 months or palliative 0 Filed at: 09/01/2022 1030   Wells' Criteria Total 0 Filed at: 09/01/2022 1030            [facility]  Number of Diagnoses or Management Options  Costochondritis  Diagnosis management comments: Clinical findings appear consistent with costochondritis  Chest pain reproducible and worsens with deep breaths and coughing  Denies radiation, including to arms, shoulders, jaw  DDx also included pleurisy 2/2 covid pneumonia, DVT/PE, ACS  D-dimer negative  Trops negative  CXR showed poor inspiratory effort; ground-glass opacities in perihilar regions  Disposition  Final diagnoses:   Costochondritis     Time reflects when diagnosis was documented in both MDM as applicable and the Disposition within this note     Time User Action Codes Description Comment    9/1/2022 11:59 AM [employer] Add [M94 0] Costochondritis       ED Disposition     ED Disposition   Discharge    Condition   Stable    Date/Time   Thu Sep 1, 2022 11:58 AM    Comment   Jason Taylor discharge to home/self care  Follow-up Information     Follow up With Specialties Details Why Fina Jimenez MD Internal Medicine Call in 1 week  [phone]  [address] [facility] [address] [facility]  [phone]            Discharge Medication List as of 9/1/2022 12:00 PM      START taking these medications    Details   ibuprofen (MOTRIN) 600 mg tablet Take 1 tablet (600 mg total) by mouth every 6 (six) hours as needed for mild pain, Starting Thu 9/1/2022, Normal         CONTINUE these medications which have NOT CHANGED    Details   cholecalciferol (VITAMIN D3) 1,000 units tablet Take 1,000 Units by mouth daily, Historical Med      estradiol (Vivelle-Dot) 0 075 MG/24HR Place 1 patch on the skin 2 (two) times a week, Starting Mon 7/25/2022, Normal      Multiple Vitamins-Minerals (ONE-A-DAY WOMENS PO) Take by mouth daily , Historical Med      phentermine 30 MG capsule Take 1 capsule (30 mg total) by mouth every morning, Starting Mon 1/10/2022, Normal      Probiotic Product (Pro-biotic Blend) CAPS Take by mouth daily , Historical Med      topiramate (TOPAMAX) 50 MG tablet take 1 tablet by mouth every evening, Normal           No discharge procedures on file  PDMP Review       Value Time User    PDMP Reviewed  Yes 1/10/2022 10:26 AM Lisa Thomas DO           ED Provider  Attending physically available and evaluated Guadalupe  SEVERIANO managed the patient along with the ED Attending      Electronically Signed by         Constantino Westbrook,   09/01/22 [address] [place] [address], DO  09/01/22 [address] [place] [address], DO  09/01/22 1513

## 2022-09-01 NOTE — ED ATTENDING ATTESTATION
9/1/2022  I, Laurita Cerda MD, saw and evaluated the patient  I have discussed the patient with the resident/non-physician practitioner and agree with the resident's/non-physician practitioner's findings, Plan of Care, and MDM as documented in the resident's/non-physician practitioner's note, except where noted  All available labs and Radiology studies were reviewed  I was present for key portions of any procedure(s) performed by the resident/non-physician practitioner and I was immediately available to provide assistance  At this point I agree with the current assessment done in the Emergency Department  I have conducted an independent evaluation of this patient a history and physical is as follows:    61-year-old female presents to the emergency department for evaluation shortness of breath and chest discomfort  She was identified to have COVID infection 2 weeks ago reports having felt okay until yesterday when she became short of breath acutely  She describes constant soreness in the left upper back since yesterday and today appreciate some left upper chest pain which is sharp and worsened with any movement, deep breath and cough  This radiates through to the back  It reminds her of discomfort experienced remotely with a pneumonia  She endorses a cough which is productive  She did not look in the dark to see the coloring of this  She has not appreciated any taste of blood  She has some mild soreness in her left calf the release of that is chronic and unchanged  She has not appreciated any swelling  She has had loose stools over the past couple weeks though remains able to eat and drink well  No fever  Past medical history significant for asthma  She has tried her inhaler on a few occasions since onset of symptoms yesterday without relief  No known personal or family history of coagulopathy      On exam she appears uncomfortable-often holding the left chest wall with deep breaths and cough  Palpation of the left upper chest is quite uncomfortable  Tenderness reproduces her symptoms  She additionally has tenderness in the left upper thoracic region  No overlying rashes, deformity or swelling  Heart sounds regular  Lungs clear to auscultation bilaterally  Mucous membranes are moist   Abdomen is soft and nontender  Lower extremities nontender nonedematous with strong PT pulses  Differential diagnosis includes but is not limited to musculoskeletal chest discomfort, pleurisy, pneumonia, PE versus less likely DVT or pneumothorax      ED Course         Critical Care Time  Procedures

## 2022-09-03 ENCOUNTER — APPOINTMENT (OUTPATIENT)
Dept: RADIOLOGY | Age: 47
End: 2022-09-03
Payer: COMMERCIAL

## 2022-09-03 ENCOUNTER — HOSPITAL ENCOUNTER (OUTPATIENT)
Dept: RADIOLOGY | Facility: HOSPITAL | Age: 47
Discharge: HOME | End: 2022-09-03

## 2022-09-03 ENCOUNTER — OFFICE VISIT (OUTPATIENT)
Dept: URGENT CARE | Age: 47
End: 2022-09-03
Payer: COMMERCIAL

## 2022-09-03 VITALS
SYSTOLIC BLOOD PRESSURE: 118 MMHG | DIASTOLIC BLOOD PRESSURE: 66 MMHG | OXYGEN SATURATION: 97 % | RESPIRATION RATE: 20 BRPM | TEMPERATURE: 97.8 F | HEART RATE: 68 BPM

## 2022-09-03 DIAGNOSIS — R06.02 SOB (SHORTNESS OF BREATH): ICD-10-CM

## 2022-09-03 DIAGNOSIS — R05.1 ACUTE COUGH: Primary | ICD-10-CM

## 2022-09-03 PROCEDURE — G0382 LEV 3 HOSP TYPE B ED VISIT: HCPCS

## 2022-09-03 PROCEDURE — S9083 URGENT CARE CENTER GLOBAL: HCPCS

## 2022-09-03 PROCEDURE — 71046 X-RAY EXAM CHEST 2 VIEWS: CPT

## 2022-09-03 RX ORDER — ALBUTEROL SULFATE 90 UG/1
AEROSOL, METERED RESPIRATORY (INHALATION)
COMMUNITY
Start: 2022-08-19

## 2022-09-03 RX ORDER — METHYLPREDNISOLONE 4 MG/1
TABLET ORAL
Qty: 21 TABLET | Refills: 0 | Status: SHIPPED | OUTPATIENT
Start: 2022-09-03 | End: 2022-09-09

## 2022-09-03 RX ORDER — NIRMATRELVIR AND RITONAVIR 300-100 MG
KIT ORAL
COMMUNITY
Start: 2022-08-19 | End: 2022-10-17

## 2022-09-03 RX ORDER — CODEINE PHOSPHATE AND GUAIFENESIN 10; 100 MG/5ML; MG/5ML
SOLUTION ORAL
COMMUNITY
Start: 2022-08-19 | End: 2022-10-17

## 2022-09-03 NOTE — PROGRESS NOTES
[facility]        NAME: Jt Medeiros is a 41 y o  female  : 1975    MRN: [id number]  DATE: September 3, 2022  TIME: 1:52 PM    Assessment and Plan   SOB (shortness of breath) [R06 02]  1  SOB (shortness of breath)  XR chest pa & lateral     Chest XR reviewed, no acute abnormality appreciated  Awaiting final read per radiology department  Patient Instructions     Medrol Dosepak as discussed  Follow up with PCP in 3-5 days  Proceed to  ER if symptoms worsen  Chief Complaint     Chief Complaint   Patient presents with    Cold Like Symptoms    Cough    Shortness of Breath     Patient tested positive for COVID - been having issues with chest congestion, cough and nasal congestion since she been sick- c/o of shortness of breath with walking         History of Present Illness       Patient presenting for evaluation of increasing cough and shortness of breath  Patient states that she was COVID positive 2 weeks ago  States after testing positive, she was treated with Paxil  Patient states that she had initially felt better after completing packs of it therapy, but has noticed an increase of her symptoms  Patient states that she went to the ER 2 days ago  She states that a chest x-ray was completed at this time, and was negative  She states that she was treated for costochondritis  Patient states that she has been using albuterol inhaler for her chest tightness shortness of breath, and is providing mild improvement  She states that at times her cough is productive of brown sputum  Patient denies any recent fevers or chills  Review of Systems   Review of Systems   Constitutional: Negative for chills and fever  HENT: Negative for ear pain and sore throat  Eyes: Negative for pain and visual disturbance  Respiratory: Positive for cough, chest tightness and shortness of breath  Cardiovascular: Negative for chest pain and palpitations     Gastrointestinal: Negative for abdominal pain and vomiting  Genitourinary: Negative for dysuria and hematuria  Musculoskeletal: Negative for arthralgias and back pain  Skin: Negative for color change and rash  Neurological: Negative for seizures and syncope  All other systems reviewed and are negative          Current Medications       Current Outpatient Medications:     albuterol (PROVENTIL HFA,VENTOLIN HFA) 90 mcg/act inhaler, inhale 2 puffs by mouth and INTO THE LUNGS every 4 to 6 hours if needed, Disp: , Rfl:     cholecalciferol (VITAMIN D3) 1,000 units tablet, Take 1,000 Units by mouth daily, Disp: , Rfl:     estradiol (Vivelle-Dot) 0 075 MG/24HR, Place 1 patch on the skin 2 (two) times a week, Disp: 8 patch, Rfl: 2    guaiFENesin-codeine (ROBITUSSIN AC) 100-10 mg/5 mL oral solution, take 10 milliliters (2 TEASPOONFULS) by mouth every 4 hours if needed, Disp: , Rfl:     ibuprofen (MOTRIN) 600 mg tablet, Take 1 tablet (600 mg total) by mouth every 6 (six) hours as needed for mild pain, Disp: 14 tablet, Rfl: 0    Multiple Vitamins-Minerals (ONE-A-DAY WOMENS PO), Take by mouth daily , Disp: , Rfl:     Paxlovid, 300/100, tablet therapy pack, take 2 NIRMATRELVIR tablets with 1 RITONAVIR tablet twice a day for 5 days, Disp: , Rfl:     Probiotic Product (Pro-biotic Blend) CAPS, Take by mouth daily , Disp: , Rfl:     topiramate (TOPAMAX) 50 MG tablet, take 1 tablet by mouth every evening, Disp: 60 tablet, Rfl: 0    Current Allergies     Allergies as of 09/03/2022 - Reviewed 09/03/2022   Allergen Reaction Noted    Penicillins Other (See Comments) 05/19/2016            The following portions of the patient's history were reviewed and updated as appropriate: allergies, current medications, past family history, past medical history, past social history, past surgical history and problem list      Past Medical History:   Diagnosis Date    Anesthesia complication     daughter has malignant hyperthermia    Asthma     resolved w/ wt loss    BRCA1-associated protein-1 tumor predisposition syndrome     H/O bilateral breast implants     Intraductal papilloma of breast, left 2/23/2021    Obesity (BMI 30-39  9)     Pneumonia     2019    PONV (postoperative nausea and vomiting)     Scab     back and right thigh from few precancerous lesions taken off       Past Surgical History:   Procedure Laterality Date    APPENDECTOMY      BREAST IMPLANT Left 11/18/2021    Procedure: FILL SALINE BREAST IMPLANT;  Surgeon: Dorita Ramirez MD;  Location: [address] [place] [address] MAIN OR;  Service: Plastics    BREAST SURGERY  2/9/2021    Bilateral mastectomy    CAPSULOTOMY Bilateral 5/19/2021    Procedure: CAPSULECTOMY, EXPANDER/IMPLANT EXCHANGE;  Surgeon: Mike Post MD;  Location: [address] [place] [address] MAIN OR;  Service: [employer]  2021    Reconstruction of breast    DILATION AND CURETTAGE  [address] N/A 11/27/2020    Procedure: TOTAL LAPAROSCOPIC HYSTERECTOMY, BILATERAL SALPINGO-OOPHORECTOMY, cystoscopy;  Surgeon: Lotus Thomas MD;  [facility]: [facility] MAIN [facility];  Service: Gynecology Oncology    MASTECTOMY      KS IMPLNT BIO IMPLNT FOR SOFT TISSUE REINFORCEMENT Bilateral 2/9/2021    Procedure: RECONSTRUCTION BREAST W/ IMPLANT;  Surgeon: Mike Post MD;  [facility] Main [facility];  Service: Plastics    KS MASTECTOMY, SIMPLE, COMPLETE Bilateral 2/9/2021    Procedure: BREAST MASTECTOMY;  Surgeon: Channing Robbins MD;  [facility];  Service: Surgical Oncology    KS NIPPLE/AREOLA RECONSTRUCTION Bilateral 2/24/2022    Procedure: NIPPLE RECONSTRUCTION;  Surgeon: Dorita Ramirez MD;  Location: [address] [place] [address] MAIN OR;  Service: Plastics    KS REVISION OF RECONSTRUCTED BREAST Bilateral 8/6/2021    Procedure: BREAST [address]; EXCISION OF STANDING DEFORMITIES;  Surgeon: Dorita Ramirez MD;  [facility];  Service: Plastics    KS REVISION OF RECONSTRUCTED BREAST Bilateral 11/18/2021    Procedure: REVISE BREAST RECONSTRUCTION;  Surgeon: Liberty Oneal MD;  Location: [address] [place] [address] MAIN OR;  Service: Plastics    VA TISSUE EXPANDER PLACEMENT BREAST RECONSTRUCTION Bilateral 2/9/2021    Procedure: BREAST INSERTION/PLACEMENT TISSUE EXPANDER (EXCHANGE); Surgeon: Vilma Carreon MD;  [facility];  Service: Plastics    TONSILLECTOMY      TYMPANOSTOMY TUBE PLACEMENT Bilateral        Family History   Problem Relation Age of Onset    Cancer Father         Age at [address] unknown; type unknown    Cancer Maternal Grandfather     No Known Problems Maternal Aunt     No Known Problems Paternal Aunt     No Known Problems Paternal Aunt     Breast cancer Cousin 36    No Known Problems Mother     BRCA1 Positive Sister     No Known Problems Daughter     No Known Problems Sister     No Known Problems Daughter     BRCA1 Positive Maternal Uncle     No Known Problems Paternal Uncle     No Known Problems Paternal Aunt     No Known Problems Paternal Uncle     Ovarian cancer Other 46         Medications have been verified  Objective   /66 (BP Location: Right arm, Patient Position: Sitting, Cuff Size: Standard)   Pulse 68   Temp 97 8 °F (36 6 °C)   Resp 20   LMP 10/15/2020 (Exact Date)   SpO2 97%        Physical Exam     Physical Exam  Vitals and nursing note reviewed  Constitutional:       General: She is not in acute distress  Appearance: Normal appearance  She is well-developed and normal weight  She is not ill-appearing, toxic-appearing or diaphoretic  HENT:      Head: Normocephalic and atraumatic  Right Ear: Tympanic membrane normal       Left Ear: Tympanic membrane normal       Nose: Nose normal  No congestion or rhinorrhea  Mouth/Throat:      Mouth: Mucous membranes are moist       Pharynx: Oropharynx is clear  No oropharyngeal exudate or posterior oropharyngeal erythema  Eyes:      Extraocular Movements: Extraocular movements intact        Conjunctiva/sclera: Conjunctivae normal       Pupils: Pupils are equal, round, and reactive to light  Cardiovascular:      Rate and Rhythm: Normal rate and regular rhythm  Pulses: Normal pulses  Heart sounds: Normal heart sounds  No murmur heard  No friction rub  No gallop  Pulmonary:      Effort: Pulmonary effort is normal  No respiratory distress  Breath sounds: Normal breath sounds  No stridor  No wheezing, rhonchi or rales  Chest:      Chest wall: No tenderness  Abdominal:      General: Abdomen is flat  Bowel sounds are normal       Palpations: Abdomen is soft  Tenderness: There is no abdominal tenderness  There is no guarding or rebound  Musculoskeletal:         General: No tenderness  Normal range of motion  Cervical back: Normal range of motion and neck supple  No tenderness  Skin:     General: Skin is warm  Capillary Refill: Capillary refill takes less than 2 seconds  Neurological:      General: No focal deficit present  Mental Status: She is alert and oriented to person, place, and time     Psychiatric:         Mood and Affect: Mood normal          Behavior: Behavior normal

## 2022-09-03 NOTE — PATIENT INSTRUCTIONS
Medrol Dosepak as ordered  Continue with inhaler if needed    Follow-up with primary care provider if symptoms persist

## 2022-09-23 ENCOUNTER — APPOINTMENT (OUTPATIENT)
Dept: RADIOLOGY | Age: 47
End: 2022-09-23
Payer: COMMERCIAL

## 2022-09-23 ENCOUNTER — OFFICE VISIT (OUTPATIENT)
Dept: URGENT CARE | Age: 47
End: 2022-09-23
Payer: COMMERCIAL

## 2022-09-23 VITALS — OXYGEN SATURATION: 99 % | HEART RATE: 87 BPM | RESPIRATION RATE: 18 BRPM | TEMPERATURE: 97.6 F

## 2022-09-23 DIAGNOSIS — R06.2 WHEEZING: ICD-10-CM

## 2022-09-23 DIAGNOSIS — J06.9 ACUTE URI: Primary | ICD-10-CM

## 2022-09-23 PROCEDURE — G0382 LEV 3 HOSP TYPE B ED VISIT: HCPCS | Performed by: STUDENT IN AN ORGANIZED HEALTH CARE EDUCATION/TRAINING PROGRAM

## 2022-09-23 PROCEDURE — 71046 X-RAY EXAM CHEST 2 VIEWS: CPT

## 2022-09-23 PROCEDURE — S9083 URGENT CARE CENTER GLOBAL: HCPCS | Performed by: STUDENT IN AN ORGANIZED HEALTH CARE EDUCATION/TRAINING PROGRAM

## 2022-09-23 RX ORDER — AZITHROMYCIN 250 MG/1
TABLET, FILM COATED ORAL
Qty: 6 TABLET | Refills: 0 | Status: SHIPPED | OUTPATIENT
Start: 2022-09-23 | End: 2022-09-23 | Stop reason: SDUPTHER

## 2022-09-23 RX ORDER — AZITHROMYCIN 250 MG/1
TABLET, FILM COATED ORAL
Qty: 6 TABLET | Refills: 0 | Status: SHIPPED | OUTPATIENT
Start: 2022-09-23 | End: 2022-09-27

## 2022-09-23 NOTE — PROGRESS NOTES
[facility]        NAME: Willis Anguiano is a 41 y o  female  : 1975    MRN: [id number]  DATE: 2022  TIME: 6:40 PM    Assessment and Plan   Acute URI [J06 9]  1  Acute URI  azithromycin (ZITHROMAX) 250 mg tablet   2  Wheezing  XR chest pa & lateral    azithromycin (ZITHROMAX) 250 mg tablet         Patient Instructions       Follow up with PCP in 3-5 days  Proceed to  ER if symptoms worsen  Chief Complaint     Chief Complaint   Patient presents with    Shortness of Breath     Started 2 days ago; 3 negative home covid tests    Cough    Diarrhea         History of Present Illness       HPI  Patient presents today complaining of shortness of breath cough congestion and chest congestion ongoing for the past 2 or 3 days  Patient has had 3- home COVID test   She is fully vaccinated for COVID-19  Patient has also developed diarrhea  Patient does not have any fevers or chills    She denies any chest pain at this time  Review of Systems   Review of Systems  See HPI    Current Medications       Current Outpatient Medications:     azithromycin (ZITHROMAX) 250 mg tablet, Take 2 tablets today then 1 tablet daily x 4 days, Disp: 6 tablet, Rfl: 0    albuterol (PROVENTIL HFA,VENTOLIN HFA) 90 mcg/act inhaler, inhale 2 puffs by mouth and INTO THE LUNGS every 4 to 6 hours if needed, Disp: , Rfl:     cholecalciferol (VITAMIN D3) 1,000 units tablet, Take 1,000 Units by mouth daily, Disp: , Rfl:     estradiol (Vivelle-Dot) 0 075 MG/24HR, Place 1 patch on the skin 2 (two) times a week, Disp: 8 patch, Rfl: 2    guaiFENesin-codeine (ROBITUSSIN AC) 100-10 mg/5 mL oral solution, take 10 milliliters (2 TEASPOONFULS) by mouth every 4 hours if needed, Disp: , Rfl:     ibuprofen (MOTRIN) 600 mg tablet, Take 1 tablet (600 mg total) by mouth every 6 (six) hours as needed for mild pain, Disp: 14 tablet, Rfl: 0    Multiple Vitamins-Minerals (ONE-A-DAY WOMENS PO), Take by mouth daily , Disp: , Rfl:    Paxlovid, 300/100, tablet therapy pack, take 2 NIRMATRELVIR tablets with 1 RITONAVIR tablet twice a day for 5 days, Disp: , Rfl:     Probiotic Product (Pro-biotic Blend) CAPS, Take by mouth daily , Disp: , Rfl:     topiramate (TOPAMAX) 50 MG tablet, take 1 tablet by mouth every evening, Disp: 60 tablet, Rfl: 0    Current Allergies     Allergies as of 09/23/2022 - Reviewed 09/23/2022   Allergen Reaction Noted    Penicillins Other (See Comments) 05/19/2016            The following portions of the patient's history were reviewed and updated as appropriate: allergies, current medications, past family history, past medical history, past social history, past surgical history and problem list      Past Medical History:   Diagnosis Date    Anesthesia complication     daughter has malignant hyperthermia    Asthma     resolved w/ wt loss    BRCA1-associated protein-1 tumor predisposition syndrome     H/O bilateral breast implants     Intraductal papilloma of breast, left 2/23/2021    Obesity (BMI 30-39  9)     Pneumonia     2019    PONV (postoperative nausea and vomiting)     Scab     back and right thigh from few precancerous lesions taken off       Past Surgical History:   Procedure Laterality Date    APPENDECTOMY      BREAST IMPLANT Left 11/18/2021    Procedure: FILL SALINE BREAST IMPLANT;  Surgeon: Felipe Gillette MD;  Location: [facility] MAIN OR;  Service: Plastics    BREAST SURGERY  2/9/2021    Bilateral mastectomy    CAPSULOTOMY Bilateral 5/19/2021    Procedure: CAPSULECTOMY, EXPANDER/IMPLANT EXCHANGE;  Surgeon: Teodoro Kelley MD;  Location: [facility] MAIN OR;  Service: [employer]  2021    Reconstruction of breast    DILATION AND CURETTAGE  [address] N/A 11/27/2020    Procedure: TOTAL LAPAROSCOPIC HYSTERECTOMY, BILATERAL SALPINGO-OOPHORECTOMY, cystoscopy;  Surgeon: Shay Kc MD;  Location:  MAIN OR;  Service: Gynecology Oncology    MASTECTOMY      MA IMPLNT BIO IMPLNT FOR SOFT TISSUE REINFORCEMENT Bilateral 2/9/2021    Procedure: RECONSTRUCTION BREAST W/ IMPLANT;  Surgeon: Anel Gold MD;  [facility];  Service: Plastics    WI MASTECTOMY, SIMPLE, COMPLETE Bilateral 2/9/2021    Procedure: BREAST MASTECTOMY;  Surgeon: Shoshana Jackman MD;  [facility];  Service: Surgical Oncology    WI NIPPLE/AREOLA RECONSTRUCTION Bilateral 2/24/2022    Procedure: NIPPLE RECONSTRUCTION;  Surgeon: Fortino Dale MD;  Location: [address] [place] [address] MAIN OR;  Service: Plastics    WI REVISION OF RECONSTRUCTED BREAST Bilateral 8/6/2021    Procedure: BREAST RECONSTRUCITON REVISION; EXCISION OF STANDING DEFORMITIES;  Surgeon: Fortino Dale MD;  [facility];  Service: Plastics    WI REVISION OF RECONSTRUCTED BREAST Bilateral 11/18/2021    Procedure: REVISE BREAST RECONSTRUCTION;  Surgeon: Fortino Dale MD;  Location: [address] [place] [address] MAIN OR;  Service: Plastics    WI TISSUE EXPANDER PLACEMENT BREAST RECONSTRUCTION Bilateral 2/9/2021    Procedure: BREAST INSERTION/PLACEMENT TISSUE EXPANDER (EXCHANGE); Surgeon: Anel Gold MD;  [facility];  Service: Plastics    TONSILLECTOMY      TYMPANOSTOMY TUBE PLACEMENT Bilateral        Family History   Problem Relation Age of Onset    Cancer Father         Age at [address] unknown; type unknown    Cancer Maternal Grandfather     No Known Problems Maternal Aunt     No Known Problems Paternal Aunt     No Known Problems Paternal Aunt     Breast cancer Cousin 36    No Known Problems Mother     BRCA1 Positive Sister     No Known Problems Daughter     No Known Problems Sister     No Known Problems Daughter     BRCA1 Positive Maternal Uncle     No Known Problems Paternal Uncle     No Known Problems Paternal Aunt     No Known Problems Paternal Uncle     Ovarian cancer Other 46         Medications have been verified          Objective   Pulse 87   Temp 97 6 °F (36 4 °C) (Temporal)   Resp 18   LMP 10/15/2020 (Exact Date)   SpO2 99% Patient's last menstrual period was 10/15/2020 (exact date)  Physical Exam     Physical Exam  Constitutional:       General: She is not in acute distress  Appearance: Normal appearance  HENT:      Head: Normocephalic  Nose: No congestion or rhinorrhea  Mouth/Throat:      Mouth: Mucous membranes are moist       Pharynx: Pharyngeal swelling present  No oropharyngeal exudate or posterior oropharyngeal erythema  Eyes:      General:         Right eye: No discharge  Left eye: No discharge  Conjunctiva/sclera: Conjunctivae normal    Cardiovascular:      Rate and Rhythm: Normal rate and regular rhythm  Pulses: Normal pulses  Pulmonary:      Effort: Pulmonary effort is normal  No respiratory distress  Breath sounds: Examination of the left-lower field reveals wheezing and rhonchi  Wheezing and rhonchi present  No rales  Abdominal:      General: Abdomen is flat  There is no distension  Palpations: Abdomen is soft  Tenderness: There is no abdominal tenderness  Musculoskeletal:      Cervical back: Neck supple  Skin:     General: Skin is warm  Capillary Refill: Capillary refill takes less than 2 seconds  Neurological:      Mental Status: She is alert and oriented to person, place, and time

## 2022-10-17 ENCOUNTER — HOSPITAL ENCOUNTER (OUTPATIENT)
Dept: RADIOLOGY | Facility: HOSPITAL | Age: 47
Discharge: HOME | End: 2022-10-17
Payer: COMMERCIAL

## 2022-10-17 ENCOUNTER — OFFICE VISIT (OUTPATIENT)
Dept: BARIATRICS | Facility: CLINIC | Age: 47
End: 2022-10-17
Payer: COMMERCIAL

## 2022-10-17 VITALS
HEIGHT: 68 IN | WEIGHT: 245.1 LBS | BODY MASS INDEX: 37.15 KG/M2 | OXYGEN SATURATION: 95 % | DIASTOLIC BLOOD PRESSURE: 74 MMHG | SYSTOLIC BLOOD PRESSURE: 116 MMHG | HEART RATE: 91 BPM | RESPIRATION RATE: 16 BRPM

## 2022-10-17 DIAGNOSIS — R53.83 FATIGUE: ICD-10-CM

## 2022-10-17 DIAGNOSIS — R06.83 SNORING: ICD-10-CM

## 2022-10-17 DIAGNOSIS — J45.909 ASTHMA: Chronic | ICD-10-CM

## 2022-10-17 DIAGNOSIS — E66.01 CLASS 2 SEVERE OBESITY DUE TO EXCESS CALORIES WITH SERIOUS COMORBIDITY AND BODY MASS INDEX (BMI) OF 37.0 TO 37.9 IN ADULT (HCC): Chronic | ICD-10-CM

## 2022-10-17 DIAGNOSIS — E66.9 OBESITY, CLASS II, BMI 35-39.9: Primary | ICD-10-CM

## 2022-10-17 DIAGNOSIS — Z12.11 ENCOUNTER FOR SCREENING COLONOSCOPY: ICD-10-CM

## 2022-10-17 DIAGNOSIS — M79.672 LEFT FOOT PAIN: ICD-10-CM

## 2022-10-17 PROCEDURE — 73630 X-RAY EXAM OF FOOT: CPT

## 2022-10-17 PROCEDURE — 99214 OFFICE O/P EST MOD 30 MIN: CPT | Performed by: PHYSICIAN ASSISTANT

## 2022-10-17 RX ORDER — FLUTICASONE PROPIONATE AND SALMETEROL 100; 50 UG/1; UG/1
POWDER RESPIRATORY (INHALATION)
COMMUNITY
Start: 2022-09-29

## 2022-10-17 NOTE — PROGRESS NOTES
Assessment/Plan:    Obesity  -Patient is pursuing Conservative Program  -Initial weight loss goal of 5-10% weight loss for improved health  -Screening labs CMP 9/1/22 but TSH, cbc wnl 2021    Initial:235 8 (5/7/21-has not been seen since 10/4/21)  Current:245 1  Change:+10 1  Goal:    Goals:  -recommend food logging  1400 calorie goal a day on rest day and 1550 exercise  -Try to increase water to 64 oz a day  -start using the peloton again for slower rides 30 minutes 3 times a week    Discussed medication options  Will start saxenda  Patient denies personal and family history of MCT and MEN2 tumors  Patient denies personal history of pancreatitis  Side effects discussed but not limited to diarrhea, bloating, constipation, GI upset, heartburn, increased heart rate, headache, low blood sugar, fatigue and dizziness  Titration and medication administration discussed  Asthma  Currently on advair and albuterol  Recommend talking to PCP about asthma due to increased albuterol use    Fatigue  Recommend sleep consult due to unrestful sleep, weight, snoring        Follow up in approximately 2 months with Non-Surgical Physician/Advanced Practitioner  Diagnoses and all orders for this visit:    Obesity, Class II, BMI 35-39 9  -     Ambulatory Referral to Sleep Medicine; Future  -     liraglutide (SAXENDA) injection; Inject subcutaneously WEEK 1 use 0 6mg day,  WEEK 2 use 1 2mg day, WEEK 3 use 1 8mg day, WEEK 4 use 2 4mg day, WEEK 5 use 3mg day  -     Insulin Pen Needle 32G X 4 MM MISC; Use in the morning    Fatigue  -     Ambulatory Referral to Sleep Medicine; Future    Snoring  -     Ambulatory Referral to Sleep Medicine; Future    Encounter for screening colonoscopy  -     Ambulatory referral for colonoscopy;  Future    Class 2 severe obesity due to excess calories with serious comorbidity and body mass index (BMI) of 37 0 to 37 9 in [facility])    Asthma    Other orders  -     Fluticasone-Salmeterol (Advair) 100-50 mcg/dose inhaler; inhale 1 puff by mouth and INTO THE LUNGS twice a day Rinse mouth     (REFER TO PRESCRIPTION NOTES)  Subjective:   Chief Complaint   Patient presents with   • Consult     MWM waist 46, goal wt 200, s/b 2-8 neg,        Patient ID: Socorro Ibarra  is a 41 y o  female with excess weight/obesity here to pursue weight managment  Patient is pursuing Conservative Program      HPI  Here for MWM follow up   Last seen 1 year ago  She was on phentermine and topamax in the past   She had to start and stop medications for breast surgery in the past and she thinks it slowed her down  She initially started in 2018 with a different doctor and on higher doses and had more success  She does feel she needs help with her appetite  She has not been able to exercise  She had COVID at the end of July and has been having SOB  She is on advair but still felt winded going up stars today and had to use albuterol  Prior was doing peloton rides regularly She just got cortisone injection today in her heels  It has been going on for months and has noticed     She would also be interested in surgery as an option  She does believe more than 6 years ago she had a test that had dx her with fatty liver but CT in 2014 and LFTs have been normal   Wt Readings from Last 10 Encounters:   10/17/22 111 kg (245 lb 1 6 oz)   09/01/22 112 kg (246 lb 11 1 oz)   05/23/22 105 kg (231 lb)   02/24/22 100 kg (221 lb)   01/27/22 103 kg (227 lb)   11/12/21 103 kg (228 lb)   10/04/21 104 kg (228 lb 9 6 oz)   08/06/21 103 kg (228 lb)   07/26/21 105 kg (231 lb 4 8 oz)   06/07/21 108 kg (238 lb 12 8 oz)       Food logging:not currenlty  Increased appetite/cravings:yes  Fruit/Vegetable servings:  Exercise:was doing peloton until she had covid in september  Hydration:sparkling water 34 oz, 1 coffee SF creamer    Rare alcohol intake  Occupation:     Diet recall:  B:protein shake [employer]  L:sometimes skips due to work   D:protein, vegetable  S: none    Colonoscopy-Not Completed, had one in 1999    The following portions of the patient's history were reviewed and updated as appropriate:   She  has a past medical history of Anesthesia complication, Asthma, [id number] protein-1 tumor predisposition syndrome, H/O bilateral breast implants, Intraductal papilloma of breast, left (2/23/2021), Obesity (BMI 30-39 9), Pneumonia, PONV (postoperative nausea and vomiting), and Scab  She   Patient Active Problem List    Diagnosis Date Noted   • Fatigue 10/17/2022   • History of hysterectomy 05/18/2021   • PONV (postoperative nausea and vomiting) 05/18/2021   • Keratosis, actinic 05/17/2021   • Asthma 02/09/2021   • Obesity 02/09/2021   • Symptomatic postsurgical menopause 12/02/2020   • BRCA1 gene mutation positive 09/25/2020   • Sinobronchitis 02/23/2019     She  has a past surgical history that includes Appendectomy; Tonsillectomy; Tympanostomy tube placement (Bilateral); Dilation and curettage of uterus (1997); pr mastectomy, simple, complete (Bilateral, 2/9/2021); pr tissue expander placement breast reconstruction (Bilateral, 2/9/2021); pr implnt bio implnt for soft tissue reinforcement (Bilateral, 2/9/2021); Colonoscopy; Hysterectomy (N/A, 11/27/2020); Capsulectomy (Bilateral, 5/19/2021); Breast surgery (2/9/2021); Cosmetic surgery (2021); pr revision of reconstructed breast (Bilateral, 8/6/2021); pr revision of reconstructed breast (Bilateral, 11/18/2021); BREAST IMPLANT (Left, 11/18/2021); Mastectomy; and pr nipple/areola reconstruction (Bilateral, 2/24/2022)  Her family history includes BRCA1 Positive in her maternal uncle and sister; Breast cancer (age of onset: 36) in her cousin; Cancer in her father and maternal grandfather; No Known Problems in her daughter, daughter, maternal aunt, mother, paternal aunt, paternal aunt, paternal aunt, paternal uncle, paternal uncle, and sister; Ovarian cancer (age of onset: 46) in her other    She reports that she has never smoked  She has never used smokeless tobacco  She reports current alcohol use of about 1 0 standard drink of alcohol per week  She reports that she does not use drugs  Current Outpatient Medications   Medication Sig Dispense Refill   • albuterol (PROVENTIL HFA,VENTOLIN HFA) 90 mcg/act inhaler inhale 2 puffs by mouth and INTO THE LUNGS every 4 to 6 hours if needed     • cholecalciferol (VITAMIN D3) 1,000 units tablet Take 1,000 Units by mouth daily     • estradiol (Vivelle-Dot) 0 075 MG/24HR Place 1 patch on the skin 2 (two) times a week 8 patch 6   • ibuprofen (MOTRIN) 600 mg tablet Take 1 tablet (600 mg total) by mouth every 6 (six) hours as needed for mild pain 14 tablet 0   • Insulin Pen Needle 32G X 4 MM MISC Use in the morning 100 each 0   • liraglutide (SAXENDA) injection Inject subcutaneously WEEK 1 use 0 6mg day,  WEEK 2 use 1 2mg day, WEEK 3 use 1 8mg day, WEEK 4 use 2 4mg day, WEEK 5 use 3mg day 15 mL 1   • Multiple Vitamins-Minerals (ONE-A-DAY WOMENS PO) Take by mouth daily      • Probiotic Product (Pro-biotic Blend) CAPS Take by mouth daily      • Fluticasone-Salmeterol (Advair) 100-50 mcg/dose inhaler inhale 1 puff by mouth and INTO THE LUNGS twice a day Rinse mouth     (REFER TO PRESCRIPTION NOTES)  No current facility-administered medications for this visit       Current Outpatient Medications on File Prior to Visit   Medication Sig   • albuterol (PROVENTIL HFA,VENTOLIN HFA) 90 mcg/act inhaler inhale 2 puffs by mouth and INTO THE LUNGS every 4 to 6 hours if needed   • cholecalciferol (VITAMIN D3) 1,000 units tablet Take 1,000 Units by mouth daily   • estradiol (Vivelle-Dot) 0 075 MG/24HR Place 1 patch on the skin 2 (two) times a week   • ibuprofen (MOTRIN) 600 mg tablet Take 1 tablet (600 mg total) by mouth every 6 (six) hours as needed for mild pain   • Multiple Vitamins-Minerals (ONE-A-DAY WOMENS PO) Take by mouth daily    • Probiotic Product (Pro-biotic Blend) CAPS Take by mouth daily    • [DISCONTINUED] Paxlovid, 300/100, tablet therapy pack take 2 NIRMATRELVIR tablets with 1 RITONAVIR tablet twice a day for 5 days   • Fluticasone-Salmeterol (Advair) 100-50 mcg/dose inhaler inhale 1 puff by mouth and INTO THE LUNGS twice a day Rinse mouth     (REFER TO PRESCRIPTION NOTES)  • [DISCONTINUED] guaiFENesin-codeine (ROBITUSSIN AC) 100-10 mg/5 mL oral solution take 10 milliliters (2 TEASPOONFULS) by mouth every 4 hours if needed   • [DISCONTINUED] topiramate (TOPAMAX) 50 MG tablet take 1 tablet by mouth every evening (Patient not taking: Reported on 10/17/2022)     No current facility-administered medications on file prior to visit  She is allergic to penicillins       Review of Systems   Constitutional: Positive for fatigue (wakes up tired, snores at night, fragmented sleep)  Negative for chills and fever  Respiratory: Negative for shortness of breath  Cardiovascular: Negative for chest pain and palpitations  Gastrointestinal: Negative for abdominal pain, constipation, diarrhea and vomiting  Genitourinary: Negative for difficulty urinating  Musculoskeletal: Negative for arthralgias and back pain  Skin: Negative for rash  Neurological: Negative for headaches  Psychiatric/Behavioral: Positive for sleep disturbance  Negative for dysphoric mood  The patient is not nervous/anxious  Objective:    /74   Pulse 91   Resp 16   Ht 5' 7 75" (1 721 m)   Wt 111 kg (245 lb 1 6 oz)   LMP 10/15/2020 (Exact Date)   SpO2 95%   BMI 37 54 kg/m²      Physical Exam  Vitals and nursing note reviewed  Constitutional:       General: She is not in acute distress  Appearance: She is well-developed  She is obese  HENT:      Head: Normocephalic and atraumatic  Eyes:      Conjunctiva/sclera: Conjunctivae normal    Neck:      Thyroid: No thyromegaly  Pulmonary:      Effort: Pulmonary effort is normal  No respiratory distress     Skin:     Findings: No rash (visible)  Neurological:      Mental Status: She is alert and oriented to person, place, and time     Psychiatric:         Mood and Affect: Mood normal          Behavior: Behavior normal

## 2022-10-17 NOTE — ASSESSMENT & PLAN NOTE
-Patient is pursuing Conservative Program  -Initial weight loss goal of 5-10% weight loss for improved health  -Screening labs CMP 9/1/22 but TSH, cbc wnl 2021    Initial:235 8 (5/7/21-has not been seen since 10/4/21)  Current:245 1  Change:+10 1  Goal:    Goals:  -recommend food logging  1400 calorie goal a day on rest day and 1550 exercise  -Try to increase water to 64 oz a day  -start using the peloton again for slower rides 30 minutes 3 times a week    Discussed medication options  Will start saxenda  Patient denies personal and family history of MCT and MEN2 tumors  Patient denies personal history of pancreatitis  Side effects discussed but not limited to diarrhea, bloating, constipation, GI upset, heartburn, increased heart rate, headache, low blood sugar, fatigue and dizziness  Titration and medication administration discussed

## 2022-10-17 NOTE — ASSESSMENT & PLAN NOTE
Currently on advair and albuterol   Recommend talking to PCP about asthma due to increased albuterol use

## 2022-11-01 ENCOUNTER — OFFICE VISIT (OUTPATIENT)
Dept: SLEEP CENTER | Facility: CLINIC | Age: 47
End: 2022-11-01

## 2022-11-01 ENCOUNTER — PATIENT MESSAGE (OUTPATIENT)
Dept: SLEEP CENTER | Facility: CLINIC | Age: 47
End: 2022-11-01

## 2022-11-01 ENCOUNTER — OFFICE VISIT (OUTPATIENT)
Dept: LAB | Age: 47
End: 2022-11-01

## 2022-11-01 ENCOUNTER — TELEPHONE (OUTPATIENT)
Dept: SLEEP CENTER | Facility: CLINIC | Age: 47
End: 2022-11-01

## 2022-11-01 ENCOUNTER — TELEPHONE (OUTPATIENT)
Dept: GASTROENTEROLOGY | Facility: CLINIC | Age: 47
End: 2022-11-01

## 2022-11-01 ENCOUNTER — PREP FOR PROCEDURE (OUTPATIENT)
Dept: GASTROENTEROLOGY | Facility: CLINIC | Age: 47
End: 2022-11-01

## 2022-11-01 VITALS
HEART RATE: 65 BPM | SYSTOLIC BLOOD PRESSURE: 130 MMHG | BODY MASS INDEX: 37.04 KG/M2 | HEIGHT: 68 IN | DIASTOLIC BLOOD PRESSURE: 70 MMHG | WEIGHT: 244.4 LBS | OXYGEN SATURATION: 90 %

## 2022-11-01 DIAGNOSIS — Z12.11 SCREENING FOR COLON CANCER: Primary | ICD-10-CM

## 2022-11-01 DIAGNOSIS — R00.2 PALPITATIONS: ICD-10-CM

## 2022-11-01 DIAGNOSIS — G47.33 OSA (OBSTRUCTIVE SLEEP APNEA): ICD-10-CM

## 2022-11-01 DIAGNOSIS — R00.2 PALPITATIONS: Primary | ICD-10-CM

## 2022-11-01 LAB
ATRIAL RATE: 61 BPM
P AXIS: 2 DEGREES
PR INTERVAL: 150 MS
QRS AXIS: 27 DEGREES
QRSD INTERVAL: 94 MS
QT INTERVAL: 410 MS
QTC INTERVAL: 412 MS
T WAVE AXIS: 57 DEGREES
VENTRICULAR RATE: 61 BPM

## 2022-11-01 RX ORDER — NAPROXEN 500 MG/1
500 TABLET ORAL 2 TIMES DAILY
COMMUNITY
Start: 2022-10-21

## 2022-11-01 NOTE — TELEPHONE ENCOUNTER
[facility] Assessment    Name: Olayinka Mancilla  YOB: 1975  Last Height: 5' 7 75" (1 721 m)  Last weight: 111 kg (244 lb 6 4 oz)  BMI: 37 44 kg/m²  Procedure: Colon  Diagnosis: Screening  Date of procedure: 11/14/2022  Prep: Miralax/Dulcolax  Responsible :  Lyle Marroquin  Phone#: [phone]  Name completing form: Justinoyassine Youssef  Date form completed: 11/01/22      If the patient answers yes to any of these questions, schedule in a hospital  Are you pregnant: No  Do you rely on a wheelchair for mobility: No  Have you been diagnosed with End Stage Renal Disease (ESRD): No  Do you need oxygen during the day: No  Have you had a heart attack or stroke within the past three months: No  Have you had a seizure within the past three months: No  Have you ever been informed by anesthesia that you have a difficult airway: No  Additional Questions  Have you had any cardiac testing or are under the care of a Cardiologist (see cardiac list): No  Cardiac list:   Do you have an implanted cardiac defibrillator: No (Comment:  This patient should be scheduled in the hospital)    Have any bleeding problems, such as anemia or hemophilia (If patient has H&H result below 8, schedule in hospital   H&H must be within 30 days of procedure): No    Had an organ transplant within the past 3 months: No    Do you have any present infections: No  Do you get short of breath when walking a few blocks: Yes  Have you been diagnosed with diabetes: No  Comments (provide cardiac provider information if applicable):

## 2022-11-01 NOTE — TELEPHONE ENCOUNTER
----- Message from Moe Taylor sent at 11/1/2022 11:51 AM EDT -----  Regarding: Question regarding ECG 12-LEAD  I see that it came back abnormal?  Is everything okay?

## 2022-11-01 NOTE — PROGRESS NOTES
Consultation - Yanique  Jose 55 : 1975  MRN: [id number]      Assessment:  The patient's symptoms are consistent with obstructive sleep apnea  Her symptoms got worse since weight gain  Home sleep apnea testing should be adequate to make a diagnosis  The patient's pulse was slightly irregular  She also reported chest discomfort yesterday  Plan:  Home sleep apnea testing    ECG this morning    Follow up:  Compliance check    History of Present Illness:   41 y  o female with a history of worsening snoring since weight gain of over 20 lb  She reports excessive daytime sleepiness, but does not fall asleep inappropriately  She denies any history of cardiovascular disease  She reports morning headache and dry mouth  Review of Systems      Genitourinary need to urinate more than twice a night  Cardiology none  Gastrointestinal frequent heartburn/acid reflux  Neurology forgetfulness and poor concentration or confusion,   Constitutional fatigue and weight change  Integumentary none  Psychiatry anxiety  Musculoskeletal none  Pulmonary shortness of breath with activity and snoring  ENT none  Endocrine none  Hematological none    I have reviewed and updated the review of systems as necessary    Historical Information      Family History: non-contributory    Social History     Socioeconomic History   • Marital status: /Civil Union     Spouse name: Not on file   • Number of children: Not on file   • Years of education: Not on file   • Highest education level: Not on file   Occupational History   • Not on file   Tobacco Use   • Smoking status: Never Smoker   • Smokeless tobacco: Never Used   • Tobacco comment: Denies   Vaping Use   • Vaping Use: Never used   Substance and Sexual Activity   • Alcohol use:  Yes     Alcohol/week: 1 0 standard drink     Types: 1 Standard drinks or equivalent per week     Comment: social   • Drug use: No   • Sexual activity: Yes     Partners: Male Birth control/protection: Female Sterilization   Other Topics Concern   • Not on file   Social History Narrative   • Not on file     Social Determinants of Health     Financial Resource Strain: Not on file   Food Insecurity: Not on file   Transportation Needs: Not on file   Physical Activity: Not on file   Stress: Not on file   Social Connections: Not on file   Intimate Partner Violence: Not on file   Housing Stability: Not on file         Sleep Schedule: unremarkable    Snoring:  Yes    Witnessed Apnea:  Yes    Medications/Allergies:    Current Outpatient Medications:   •  albuterol (PROVENTIL HFA,VENTOLIN HFA) 90 mcg/act inhaler, inhale 2 puffs by mouth and INTO THE LUNGS every 4 to 6 hours if needed, Disp: , Rfl:   •  cholecalciferol (VITAMIN D3) 1,000 units tablet, Take 1,000 Units by mouth daily, Disp: , Rfl:   •  estradiol (Vivelle-Dot) 0 075 MG/24HR, Place 1 patch on the skin 2 (two) times a week, Disp: 8 patch, Rfl: 6  •  Fluticasone-Salmeterol (Advair) 100-50 mcg/dose inhaler, inhale 1 puff by mouth and INTO THE LUNGS twice a day Rinse mouth     (REFER TO PRESCRIPTION NOTES)  , Disp: , Rfl:   •  ibuprofen (MOTRIN) 600 mg tablet, Take 1 tablet (600 mg total) by mouth every 6 (six) hours as needed for mild pain, Disp: 14 tablet, Rfl: 0  •  Insulin Pen Needle 32G X 4 MM MISC, Use in the morning, Disp: 100 each, Rfl: 0  •  liraglutide (SAXENDA) injection, Inject subcutaneously WEEK 1 use 0 6mg day,  WEEK 2 use 1 2mg day, WEEK 3 use 1 8mg day, WEEK 4 use 2 4mg day, WEEK 5 use 3mg day, Disp: 15 mL, Rfl: 1  •  Multiple Vitamins-Minerals (ONE-A-DAY WOMENS PO), Take by mouth daily , Disp: , Rfl:   •  naproxen (NAPROSYN) 500 mg tablet, Take 500 mg by mouth 2 (two) times a day, Disp: , Rfl:   •  Probiotic Product (Pro-biotic Blend) CAPS, Take by mouth daily , Disp: , Rfl:         No notes on file                  Objective:    Vital Signs:   Vitals:    11/01/22 0759   BP: 130/70   Pulse: 65   SpO2: 90%   Weight: 111 kg (244 lb 6 4 oz)   Height: 5' 7 75" (1 721 m)     Neck Circumference: 14 5      [place] Sleepiness Scale: Total score: 15    Physical Exam:    General: Alert, appropriate, cooperative, overweight    Head: NC/AT, moderate retrognathia    Nose: No septal deviation, nares not obstructed, mucosa normal    Throat: Airway slightly diminished, tongue base thickened, no tonsils visualized    Neck: Normal, no thyromegaly or lymphadenopathy, no JVD    Heart: RR, normal S1 and S2, no murmurs    Chest: Clear bilaterally    Extremity: No clubbing, cyanosis, no edema    Skin: Warm, dry    Neuro: No motor abnormalities, cranial nerves appear intact        Counseling / Coordination of Care  A description of the counseling / coordination of care: We discussed the pathophysiology of obstructive sleep apnea as well as the possible treatment options  We also discussed the rationale for positive airway pressure therapy  Board Certified Sleep Specialist    Portions of the record may have been created with voice recognition software  Occasional wrong word or "sound a like" substitutions may have occurred due to the inherent limitations of voice recognition software  Read the chart carefully and recognize, using context, where substitutions have occurred

## 2022-11-01 NOTE — TELEPHONE ENCOUNTER
I called the patient to let her know that the rhythm on her EKG is unremarkable  There is also poor R-wave progression

## 2022-11-01 NOTE — TELEPHONE ENCOUNTER
Scheduled date of colonoscopy (as of today): 11/14/2022    Physician performing colonoscopy: Dr Rosellen Lesches    Location of colonoscopy: [facility]    Bowel prep reviewed with patient: Miralax/Dulcolax (emailed instructions to pt)    Instructions reviewed with patient by: AS    Clearances: N/A

## 2022-11-03 ENCOUNTER — HOSPITAL ENCOUNTER (OUTPATIENT)
Dept: SLEEP CENTER | Facility: CLINIC | Age: 47
Discharge: HOME | End: 2022-11-03

## 2022-11-03 DIAGNOSIS — G47.33 OSA (OBSTRUCTIVE SLEEP APNEA): ICD-10-CM

## 2022-11-03 NOTE — PROGRESS NOTES
Home Sleep Study Documentation    HOME STUDY DEVICE: Noxturnal no                                            G3 yes      Pre-Sleep Home Study:    Set-up and instructions performed by: TOÑO Bagley    Technician performed demonstration for Patient: yes    Return demonstration performed by Patient: yes    Written instructions provided to Patient: yes    Patient signed consent form: yes        Post-Sleep Home Study:    Additional comments by Patient: pending    Home Sleep Study Failed:pending    Failure reason: pending    Reported or Detected: pending    Scored by: pending

## 2022-11-05 ENCOUNTER — OFFICE VISIT (OUTPATIENT)
Dept: URGENT CARE | Age: 47
End: 2022-11-05

## 2022-11-05 VITALS
DIASTOLIC BLOOD PRESSURE: 57 MMHG | SYSTOLIC BLOOD PRESSURE: 119 MMHG | RESPIRATION RATE: 12 BRPM | OXYGEN SATURATION: 96 % | HEART RATE: 87 BPM | TEMPERATURE: 97.9 F

## 2022-11-05 DIAGNOSIS — J02.9 SORE THROAT: ICD-10-CM

## 2022-11-05 DIAGNOSIS — J06.9 UPPER RESPIRATORY TRACT INFECTION, UNSPECIFIED TYPE: Primary | ICD-10-CM

## 2022-11-05 DIAGNOSIS — H69.83 DYSFUNCTION OF BOTH EUSTACHIAN TUBES: ICD-10-CM

## 2022-11-05 LAB — S PYO AG THROAT QL: NEGATIVE

## 2022-11-05 RX ORDER — PREDNISONE 20 MG/1
20 TABLET ORAL 2 TIMES DAILY WITH MEALS
Qty: 10 TABLET | Refills: 0 | Status: SHIPPED | OUTPATIENT
Start: 2022-11-05 | End: 2022-11-10

## 2022-11-05 NOTE — PROGRESS NOTES
[facility]        NAME: Jared Danielson is a 41 y o  female  : 1975    MRN: [id number]  DATE: 2022  TIME: 3:56 PM    Assessment and Plan   Upper respiratory tract infection, unspecified type [J06 9]  1  Upper respiratory tract infection, unspecified type     2  Dysfunction of both eustachian tubes  predniSONE 20 mg tablet   3  Sore throat  POCT rapid strepA    Throat culture         Patient Instructions     Take med as prescribed  Nasal saline and Mucinex OTC  Rapid strep is negative; will send for culture  Follow up with PCP in 3-5 days  Proceed to  ER if symptoms worsen  Chief Complaint     Chief Complaint   Patient presents with   • Nasal Congestion     Started 3 days ago   • Sore Throat   • Earache     bilateral         History of Present Illness       HPI   Reports cold symptoms x 3 days  Includes nasal congestion, sore throat, and bilateral ear pain  Ear pain worse today  Home covid test is negative    Review of Systems   Review of Systems   Constitutional: Negative for fever  HENT: Positive for congestion, ear pain and sore throat  Negative for trouble swallowing  Respiratory: Positive for cough  Negative for chest tightness, shortness of breath and wheezing  Cardiovascular: Negative for chest pain  Gastrointestinal: Negative for diarrhea and vomiting  Neurological: Negative for light-headedness           Current Medications       Current Outpatient Medications:   •  predniSONE 20 mg tablet, Take 1 tablet (20 mg total) by mouth 2 (two) times a day with meals for 5 days, Disp: 10 tablet, Rfl: 0  •  albuterol (PROVENTIL HFA,VENTOLIN HFA) 90 mcg/act inhaler, inhale 2 puffs by mouth and INTO THE LUNGS every 4 to 6 hours if needed, Disp: , Rfl:   •  cholecalciferol (VITAMIN D3) 1,000 units tablet, Take 1,000 Units by mouth daily, Disp: , Rfl:   •  estradiol (Vivelle-Dot) 0 075 MG/24HR, Place 1 patch on the skin 2 (two) times a week, Disp: 8 patch, Rfl: 6  • Fluticasone-Salmeterol (Advair) 100-50 mcg/dose inhaler, inhale 1 puff by mouth and INTO THE LUNGS twice a day Rinse mouth     (REFER TO PRESCRIPTION NOTES)  , Disp: , Rfl:   •  ibuprofen (MOTRIN) 600 mg tablet, Take 1 tablet (600 mg total) by mouth every 6 (six) hours as needed for mild pain, Disp: 14 tablet, Rfl: 0  •  Insulin Pen Needle 32G X 4 MM MISC, Use in the morning, Disp: 100 each, Rfl: 0  •  liraglutide (SAXENDA) injection, Inject subcutaneously WEEK 1 use 0 6mg day,  WEEK 2 use 1 2mg day, WEEK 3 use 1 8mg day, WEEK 4 use 2 4mg day, WEEK 5 use 3mg day, Disp: 15 mL, Rfl: 1  •  Multiple Vitamins-Minerals (ONE-A-DAY WOMENS PO), Take by mouth daily , Disp: , Rfl:   •  naproxen (NAPROSYN) 500 mg tablet, Take 500 mg by mouth 2 (two) times a day, Disp: , Rfl:   •  Probiotic Product (Pro-biotic Blend) CAPS, Take by mouth daily , Disp: , Rfl:     Current Allergies     Allergies as of 11/05/2022 - Reviewed 11/05/2022   Allergen Reaction Noted   • Penicillins Other (See Comments) 05/19/2016            The following portions of the patient's history were reviewed and updated as appropriate: allergies, current medications, past family history, past medical history, past social history, past surgical history and problem list      Past Medical History:   Diagnosis Date   • Anesthesia complication     daughter has malignant hyperthermia   • Asthma     resolved w/ wt loss   • BRCA1-associated protein-1 tumor predisposition syndrome    • H/O bilateral breast implants    • Intraductal papilloma of breast, left 2/23/2021   • Obesity (BMI 30-39  9)    • Pneumonia     2019   • PONV (postoperative nausea and vomiting)    • Scab     back and right thigh from few precancerous lesions taken off       Past Surgical History:   Procedure Laterality Date   • APPENDECTOMY     • BREAST IMPLANT Left 11/18/2021    Procedure: FILL SALINE BREAST IMPLANT;  Surgeon: Valery Garcia MD;  Location: [facility] MAIN OR;  Service: Plastics   • BREAST SURGERY  2/9/2021    Bilateral mastectomy   • CAPSULOTOMY Bilateral 5/19/2021    Procedure: CAPSULECTOMY, EXPANDER/IMPLANT EXCHANGE;  Surgeon: Walter Richardson MD;  Location: [facility] MAIN OR;  Service: Plastics   • COLONOSCOPY     • COSMETIC SURGERY  2021    Reconstruction of breast   • DILATION AND CURETTAGE OF UTERUS  1997   • HYSTERECTOMY N/A 11/27/2020    Procedure: TOTAL LAPAROSCOPIC HYSTERECTOMY, BILATERAL SALPINGO-OOPHORECTOMY, cystoscopy;  Surgeon: Mort Nageotte, MD;  Location:  MAIN OR;  Service: Gynecology Oncology   • MASTECTOMY     • OK IMPLNT BIO IMPLNT FOR SOFT TISSUE REINFORCEMENT Bilateral 2/9/2021    Procedure: RECONSTRUCTION BREAST W/ IMPLANT;  Surgeon: Walter Richardson MD;  [facility];  Service: Plastics   • OK MASTECTOMY, SIMPLE, COMPLETE Bilateral 2/9/2021    Procedure: BREAST MASTECTOMY;  Surgeon: Hilary Enriquez MD;  [facility];  Service: Surgical Oncology   • OK NIPPLE/AREOLA RECONSTRUCTION Bilateral 2/24/2022    Procedure: NIPPLE RECONSTRUCTION;  Surgeon: Justin Owen MD;  Location: [facility] MAIN OR;  Service: Plastics   • OK REVISION OF RECONSTRUCTED BREAST Bilateral 8/6/2021    Procedure: BREAST RECONSTRUCITON REVISION; EXCISION OF STANDING DEFORMITIES;  Surgeon: Justin Owen MD;  [facility] Main [facility];  Service: Plastics   • OK REVISION OF RECONSTRUCTED BREAST Bilateral 11/18/2021    Procedure: REVISE BREAST RECONSTRUCTION;  Surgeon: Justin Owen MD;  Location: [facility] MAIN OR;  Service: Plastics   • OK TISSUE EXPANDER PLACEMENT BREAST RECONSTRUCTION Bilateral 2/9/2021    Procedure: BREAST INSERTION/PLACEMENT TISSUE EXPANDER (EXCHANGE);   Surgeon: Walter Richardson MD;  [facility];  Service: Plastics   • TONSILLECTOMY     • TYMPANOSTOMY TUBE PLACEMENT Bilateral        Family History   Problem Relation Age of Onset   • Cancer Father         Age at [address] unknown; type unknown   • Cancer Maternal Grandfather    • No Known Problems Maternal Aunt    • No Known Problems Paternal Aunt    • No Known Problems Paternal Aunt    • Breast cancer Cousin 36   • No Known Problems Mother    • BRCA1 Positive Sister    • No Known Problems Daughter    • No Known Problems Sister    • No Known Problems Daughter    • BRCA1 Positive Maternal Uncle    • No Known Problems Paternal Uncle    • No Known Problems Paternal Aunt    • No Known Problems Paternal Uncle    • Ovarian cancer Other 46         Medications have been verified  Objective   /57 (BP Location: Right arm, Patient Position: Sitting, Cuff Size: Large)   Pulse 87   Temp 97 9 °F (36 6 °C) (Temporal)   Resp 12   LMP 10/15/2020 (Exact Date)   SpO2 96%   Patient's last menstrual period was 10/15/2020 (exact date)  Physical Exam     Physical Exam  Constitutional:       Appearance: She is not ill-appearing or diaphoretic  HENT:      Right Ear: Tympanic membrane normal       Left Ear: Tympanic membrane normal       Nose: Congestion and rhinorrhea present  Comments: Dried blood in the right nostril     Mouth/Throat:      Mouth: Mucous membranes are moist       Pharynx: No pharyngeal swelling or posterior oropharyngeal erythema  Tonsils: No tonsillar exudate  0 on the right  0 on the left  Cardiovascular:      Rate and Rhythm: Regular rhythm  Pulmonary:      Effort: Pulmonary effort is normal       Breath sounds: Normal breath sounds     Abdominal:      General: Bowel sounds are normal

## 2022-11-06 LAB — BACTERIA THROAT CULT: NORMAL

## 2022-11-07 ENCOUNTER — TELEPHONE (OUTPATIENT)
Dept: GASTROENTEROLOGY | Facility: CLINIC | Age: 47
End: 2022-11-07

## 2022-11-07 LAB — BACTERIA THROAT CULT: NORMAL

## 2022-11-07 NOTE — TELEPHONE ENCOUNTER
Spoke to pt confirming pt's colonoscopy scheduled on 11/14/22 at [facility] with Dr Myra Nieto  Informed [facility] would be calling this Thr or Friday with the arrival time  Informed of clear liquid diet day prior as well as the bowel cleansing preparation  Informed would need a  the day of the procedure due to being under sedation  Pt did not have instructions so I reviewed and emailed to her  Advised pt to contact insurance if has any questions regarding coverage for procedure

## 2022-11-08 ENCOUNTER — TELEPHONE (OUTPATIENT)
Dept: SLEEP CENTER | Facility: CLINIC | Age: 47
End: 2022-11-08

## 2022-11-08 DIAGNOSIS — G47.33 OSA (OBSTRUCTIVE SLEEP APNEA): Primary | ICD-10-CM

## 2022-11-08 NOTE — TELEPHONE ENCOUNTER
I spoke with the patient  I explained that her poor R-wave progression on her ECG was an unremarkable finding  Everything else on the tracing was normal   She had home sleep apnea testing at [facility], which was read by Dr Aleta Jackson  It showed mild obstructive sleep apnea, for which I have recommended APAP with a range of 4 to 20 cm  It is possible that her nocturnal shortness of breath could be result of her obstructive sleep apnea  The patient is agreeable to start APAP and the  will reach out to get her scheduled for a set up

## 2022-11-09 ENCOUNTER — APPOINTMENT (OUTPATIENT)
Dept: LAB | Facility: CLINIC | Age: 47
End: 2022-11-09

## 2022-11-09 ENCOUNTER — TELEPHONE (OUTPATIENT)
Dept: SLEEP CENTER | Facility: CLINIC | Age: 47
End: 2022-11-09

## 2022-11-09 DIAGNOSIS — Z01.812 PRE-OPERATIVE LABORATORY EXAMINATION: ICD-10-CM

## 2022-11-09 DIAGNOSIS — Z01.818 OTHER SPECIFIED PRE-OPERATIVE EXAMINATION: ICD-10-CM

## 2022-11-09 LAB
ANION GAP SERPL CALCULATED.3IONS-SCNC: 1 MMOL/L (ref 4–13)
BUN SERPL-MCNC: 8 MG/DL (ref 5–25)
CALCIUM SERPL-MCNC: 9.6 MG/DL (ref 8.3–10.1)
CHLORIDE SERPL-SCNC: 104 MMOL/L (ref 96–108)
CO2 SERPL-SCNC: 30 MMOL/L (ref 21–32)
CREAT SERPL-MCNC: 0.62 MG/DL (ref 0.6–1.3)
ERYTHROCYTE [DISTWIDTH] IN BLOOD BY AUTOMATED COUNT: 12.4 % (ref 11.6–15.1)
GFR SERPL CREATININE-BSD FRML MDRD: 109 ML/MIN/1.73SQ M
GLUCOSE SERPL-MCNC: 91 MG/DL (ref 65–140)
HCT VFR BLD AUTO: 44.1 % (ref 34.8–46.1)
HGB BLD-MCNC: 14.6 G/DL (ref 11.5–15.4)
MCH RBC QN AUTO: 31.7 PG (ref 26.8–34.3)
MCHC RBC AUTO-ENTMCNC: 33.1 G/DL (ref 31.4–37.4)
MCV RBC AUTO: 96 FL (ref 82–98)
PLATELET # BLD AUTO: 341 THOUSANDS/UL (ref 149–390)
PMV BLD AUTO: 11.2 FL (ref 8.9–12.7)
POTASSIUM SERPL-SCNC: 4.2 MMOL/L (ref 3.5–5.3)
RBC # BLD AUTO: 4.6 MILLION/UL (ref 3.81–5.12)
SODIUM SERPL-SCNC: 135 MMOL/L (ref 135–147)
WBC # BLD AUTO: 11.55 THOUSAND/UL (ref 4.31–10.16)

## 2022-11-11 LAB
DME PARACHUTE DELIVERY DATE REQUESTED: NORMAL
DME PARACHUTE DELIVERY NOTE: NORMAL
DME PARACHUTE ITEM DESCRIPTION: NORMAL
DME PARACHUTE ORDER STATUS: NORMAL
DME PARACHUTE SUPPLIER NAME: NORMAL
DME PARACHUTE SUPPLIER PHONE: NORMAL

## 2022-11-14 ENCOUNTER — HOSPITAL ENCOUNTER (OUTPATIENT)
Dept: GASTROENTEROLOGY | Facility: AMBULATORY SURGERY CENTER | Age: 47
Discharge: HOME | End: 2022-11-14

## 2022-11-14 ENCOUNTER — ANESTHESIA EVENT (OUTPATIENT)
Dept: GASTROENTEROLOGY | Facility: AMBULATORY SURGERY CENTER | Age: 47
End: 2022-11-14

## 2022-11-14 ENCOUNTER — ANESTHESIA (OUTPATIENT)
Dept: GASTROENTEROLOGY | Facility: AMBULATORY SURGERY CENTER | Age: 47
End: 2022-11-14

## 2022-11-14 VITALS
HEIGHT: 67 IN | RESPIRATION RATE: 18 BRPM | OXYGEN SATURATION: 96 % | HEART RATE: 101 BPM | TEMPERATURE: 97.1 F | WEIGHT: 240 LBS | DIASTOLIC BLOOD PRESSURE: 76 MMHG | SYSTOLIC BLOOD PRESSURE: 129 MMHG | BODY MASS INDEX: 37.67 KG/M2

## 2022-11-14 DIAGNOSIS — Z12.11 SCREENING FOR COLON CANCER: ICD-10-CM

## 2022-11-14 RX ORDER — SODIUM CHLORIDE 9 MG/ML
125 INJECTION, SOLUTION INTRAVENOUS CONTINUOUS
Status: DISCONTINUED | OUTPATIENT
Start: 2022-11-14 | End: 2022-11-18 | Stop reason: HOSPADM

## 2022-11-14 RX ORDER — SODIUM CHLORIDE, SODIUM LACTATE, POTASSIUM CHLORIDE, CALCIUM CHLORIDE 600; 310; 30; 20 MG/100ML; MG/100ML; MG/100ML; MG/100ML
INJECTION, SOLUTION INTRAVENOUS CONTINUOUS PRN
Status: DISCONTINUED | OUTPATIENT
Start: 2022-11-14 | End: 2022-11-14

## 2022-11-14 RX ORDER — LIDOCAINE HYDROCHLORIDE 10 MG/ML
0.5 INJECTION, SOLUTION EPIDURAL; INFILTRATION; INTRACAUDAL; PERINEURAL ONCE AS NEEDED
Status: DISCONTINUED | OUTPATIENT
Start: 2022-11-14 | End: 2022-11-18 | Stop reason: HOSPADM

## 2022-11-14 RX ORDER — PROPOFOL 10 MG/ML
INJECTION, EMULSION INTRAVENOUS AS NEEDED
Status: DISCONTINUED | OUTPATIENT
Start: 2022-11-14 | End: 2022-11-14

## 2022-11-14 RX ORDER — GLYCOPYRROLATE 0.2 MG/ML
INJECTION INTRAMUSCULAR; INTRAVENOUS AS NEEDED
Status: DISCONTINUED | OUTPATIENT
Start: 2022-11-14 | End: 2022-11-14

## 2022-11-14 RX ADMIN — GLYCOPYRROLATE 0.2 MCG: 0.2 INJECTION INTRAMUSCULAR; INTRAVENOUS at 07:41

## 2022-11-14 RX ADMIN — PROPOFOL 50 MG: 10 INJECTION, EMULSION INTRAVENOUS at 07:37

## 2022-11-14 RX ADMIN — PROPOFOL 50 MG: 10 INJECTION, EMULSION INTRAVENOUS at 07:54

## 2022-11-14 RX ADMIN — PROPOFOL 50 MG: 10 INJECTION, EMULSION INTRAVENOUS at 07:44

## 2022-11-14 RX ADMIN — PROPOFOL 50 MG: 10 INJECTION, EMULSION INTRAVENOUS at 08:00

## 2022-11-14 RX ADMIN — PROPOFOL 100 MG: 10 INJECTION, EMULSION INTRAVENOUS at 07:31

## 2022-11-14 RX ADMIN — PROPOFOL 50 MG: 10 INJECTION, EMULSION INTRAVENOUS at 07:40

## 2022-11-14 RX ADMIN — PROPOFOL 50 MG: 10 INJECTION, EMULSION INTRAVENOUS at 07:47

## 2022-11-14 RX ADMIN — PROPOFOL 50 MG: 10 INJECTION, EMULSION INTRAVENOUS at 07:50

## 2022-11-14 RX ADMIN — PROPOFOL 50 MG: 10 INJECTION, EMULSION INTRAVENOUS at 07:33

## 2022-11-14 RX ADMIN — SODIUM CHLORIDE, SODIUM LACTATE, POTASSIUM CHLORIDE, CALCIUM CHLORIDE: 600; 310; 30; 20 INJECTION, SOLUTION INTRAVENOUS at 07:31

## 2022-11-14 NOTE — H&P
History and Physical - [facility]  Yanira Taylor 41 y o  female MRN: [id number]                  HPI: German Barrett is a 41y o  year old female who presents for colonoscopy for screening purposes  Last colonoscopy about 20 years ago  REVIEW OF SYSTEMS: Per the HPI, and otherwise unremarkable  Historical Information   Past Medical History:   Diagnosis Date   • Anesthesia complication     daughter has malignant hyperthermia   • Asthma     resolved w/ wt loss; is now having asthma sx due to recent hx of having covid 8/2022   • BRCA1-associated protein-1 tumor predisposition syndrome    • H/O bilateral breast implants    • Intraductal papilloma of breast, left 02/23/2021   • Malignant hyperthermia due to anesthesia     has family hx of MH, her daughter   • Obesity (BMI 30-39  9)    • Overweight 11/14/2022    is currently using victoza for weight loss   • Pneumonia     2019   • PONV (postoperative nausea and vomiting)    • Scab     back and right thigh from few precancerous lesions taken off     Past Surgical History:   Procedure Laterality Date   • APPENDECTOMY     • BREAST IMPLANT Left 11/18/2021    Procedure: FILL SALINE BREAST IMPLANT;  Surgeon: Celio Juarez MD;  Location: [address] [address];  Service: Plastics   • BREAST SURGERY  2/9/2021    Bilateral mastectomy   • CAPSULOTOMY Bilateral 5/19/2021    Procedure: CAPSULECTOMY, EXPANDER/IMPLANT EXCHANGE;  Surgeon: Olga Wood MD;  Location: [address] [place] [address];  Service: Plastics   • COLONOSCOPY     • COSMETIC SURGERY  2021    Reconstruction of breast   • DILATION AND CURETTAGE OF UTERUS  1997   • HYSTERECTOMY N/A 11/27/2020    Procedure: TOTAL LAPAROSCOPIC HYSTERECTOMY, BILATERAL SALPINGO-OOPHORECTOMY, cystoscopy;  Surgeon: Hardy Rojas MD;  Location:  MAIN OR;  Service: Gynecology Oncology   • MASTECTOMY     • RI IMPLNT BIO IMPLNT FOR SOFT TISSUE REINFORCEMENT Bilateral 2/9/2021    Procedure: RECONSTRUCTION BREAST W/ IMPLANT;  Surgeon: Teodoro Kelley MD;  [facility];  Service: Plastics   • CT MASTECTOMY, SIMPLE, COMPLETE Bilateral 2/9/2021    Procedure: BREAST MASTECTOMY;  Surgeon: Adonis Fernandez MD;  [facility];  Service: Surgical Oncology   • CT NIPPLE/AREOLA RECONSTRUCTION Bilateral 2/24/2022    Procedure: NIPPLE RECONSTRUCTION;  Surgeon: Felipe Gileltte MD;  Location: [address] [place] [address] MAIN OR;  Service: Plastics   • CT REVISION OF RECONSTRUCTED BREAST Bilateral 8/6/2021    Procedure: BREAST RECONSTRUCITON REVISION; EXCISION OF STANDING DEFORMITIES;  Surgeon: Felipe Gillette MD;  [facility];  Service: Plastics   • CT REVISION OF RECONSTRUCTED BREAST Bilateral 11/18/2021    Procedure: REVISE BREAST RECONSTRUCTION;  Surgeon: Felipe Gillette MD;  Location: [address] [place] [address] MAIN OR;  Service: Plastics   • CT TISSUE EXPANDER PLACEMENT BREAST RECONSTRUCTION Bilateral 2/9/2021    Procedure: BREAST INSERTION/PLACEMENT TISSUE EXPANDER (EXCHANGE);   Surgeon: Teodoro Kelley MD;  [facility];  Service: Plastics   • TONSILLECTOMY     • TYMPANOSTOMY TUBE PLACEMENT Bilateral      Social History   Social History     Substance and Sexual Activity   Alcohol Use Yes   • Alcohol/week: 1 0 standard drink   • Types: 1 Standard drinks or equivalent per week    Comment: social, monthly     Social History     Substance and Sexual Activity   Drug Use No     Social History     Tobacco Use   Smoking Status Never Smoker   Smokeless Tobacco Never Used   Tobacco Comment    Denies     Family History   Problem Relation Age of Onset   • Cancer Father         Age at [address] unknown; type unknown   • Cancer Maternal Grandfather    • No Known Problems Maternal Aunt    • No Known Problems Paternal Aunt    • No Known Problems Paternal Aunt    • Breast cancer Cousin 36   • No Known Problems Mother    • BRCA1 Positive Sister    • No Known Problems Daughter    • No Known Problems Sister    • No Known Problems Daughter    • BRCA1 Positive Maternal Uncle    • No Known Problems Paternal Uncle • No Known Problems Paternal Aunt    • No Known Problems Paternal Uncle    • Ovarian cancer Other 46       Meds/Allergies       Current Outpatient Medications:   •  albuterol (PROVENTIL HFA,VENTOLIN HFA) 90 mcg/act inhaler  •  cholecalciferol (VITAMIN D3) 1,000 units tablet  •  estradiol (Vivelle-Dot) 0 075 MG/24HR  •  Fluticasone-Salmeterol (Advair) 100-50 mcg/dose inhaler  •  liraglutide (SAXENDA) injection  •  Multiple Vitamins-Minerals (ONE-A-DAY WOMENS PO)  •  Probiotic Product (Pro-biotic Blend) CAPS  •  Insulin Pen Needle 32G X 4 MM MISC    Current Facility-Administered Medications:   •  lidocaine (PF) (XYLOCAINE-MPF) 1 % injection 0 5 mL, 0 5 mL, Infiltration, Once PRN  •  sodium chloride 0 9 % infusion, 125 mL/hr, Intravenous, Continuous    Allergies   Allergen Reactions   • Penicillins Other (See Comments)     Childhood reaction; unknown reaction- tolerated Ancef       Objective     /58   Pulse 79   Temp (!) 97 1 °F (36 2 °C) (Skin)   Resp 18   Ht 5' 7" (1 702 m)   Wt 109 kg (240 lb)   LMP 10/15/2020 (Exact Date)   SpO2 96%   BMI 37 59 kg/m²       PHYSICAL EXAM    Gen: NAD  Head: NCAT  CV: RRR  CHEST: Clear  ABD: soft, NT/ND  EXT: no edema      ASSESSMENT/PLAN:  This is a 41y o  year old female here for colonoscopy, and she is stable and optimized for her procedure

## 2022-11-14 NOTE — ANESTHESIA POSTPROCEDURE EVALUATION
Post-Op Assessment Note    CV Status:  Stable  Pain Score: 0    Pain management: adequate     Mental Status:  Arousable and sleepy   Hydration Status:  Stable   PONV Controlled:  Controlled   Airway Patency:  Patent      Post Op Vitals Reviewed: Yes      Staff: CRNA         No complications documented      BP   96/52   Temp 97 6   Pulse 100   Resp 18   SpO2 99

## 2022-11-14 NOTE — ANESTHESIA PREPROCEDURE EVALUATION
Procedure:  COLONOSCOPY    Relevant Problems   ANESTHESIA   (+) PONV (postoperative nausea and vomiting)      GYN   (+) History of hysterectomy      PULMONARY   (+) Asthma   (+) CLIFF (obstructive sleep apnea)        Physical Exam    Airway    Mallampati score: II         Dental   No notable dental hx     Cardiovascular      Pulmonary      Other Findings        Anesthesia Plan  ASA Score- 2     Anesthesia Type- IV sedation with anesthesia with ASA Monitors  Additional Monitors:   Airway Plan:     Comment: I, Dr Diana Lyon, the attending physician, have personally seen and evaluated the patient prior to anesthetic care  I have reviewed the pre-anesthetic record, and other medical records if appropriate to the anesthetic care  If a CRNA is involved in the case, I have reviewed the CRNA assessment, if present, and agree  The patient is in a suitable condition to proceed with my formulated anesthetic plan          Plan Factors-    Chart reviewed  Induction-     Postoperative Plan-     Informed Consent- Anesthetic plan and risks discussed with patient  I personally reviewed this patient with the CRNA  Discussed and agreed on the Anesthesia Plan with the CRNA  Sondra [place]

## 2022-11-22 ENCOUNTER — TELEPHONE (OUTPATIENT)
Dept: SLEEP CENTER | Facility: CLINIC | Age: 47
End: 2022-11-22

## 2022-11-22 LAB
DME PARACHUTE DELIVERY DATE ACTUAL: NORMAL
DME PARACHUTE DELIVERY DATE REQUESTED: NORMAL
DME PARACHUTE DELIVERY NOTE: NORMAL
DME PARACHUTE ITEM DESCRIPTION: NORMAL
DME PARACHUTE ORDER STATUS: NORMAL
DME PARACHUTE SUPPLIER NAME: NORMAL
DME PARACHUTE SUPPLIER PHONE: NORMAL

## 2022-11-28 ENCOUNTER — ANESTHESIA EVENT (OUTPATIENT)
Dept: PERIOP | Facility: HOSPITAL | Age: 47
End: 2022-11-28

## 2022-11-28 NOTE — PRE-PROCEDURE INSTRUCTIONS
Pre-Surgery Instructions:   Medication Instructions   • albuterol (PROVENTIL HFA,VENTOLIN HFA) 90 mcg/act inhaler Conitnue as prescribed   • cholecalciferol (VITAMIN D3) 1,000 units tablet Avoid 1 week prior to surgery    • estradiol (Vivelle-Dot) 0 075 MG/24HR continue as prescribed excluding DOS   • liraglutide (SAXENDA) injection continue as prescribed including DOS   • Multiple Vitamins-Minerals (ONE-A-DAY WOMENS PO) Avoid 1 week prior to surgery    • Probiotic Product (Pro-biotic Blend) CAPS Avoid 1 week prior to surgery     Avoid non-prescribed ASPIRIN, OTC vitamins and NSAIDS prior to surgery  Tylenol okay PRN  Continue scheduled medications excluding DOS  Avoid smoking prior to Surgery   Avoid alcohol, illicit drugs and marijuana for 24 hours prior to DOS  NPO instructions given: no food, water or anything else by mouth after midnight prior to surgery  Shower the night before and the morning of surgery using CHG wash or antibacterial soap if CHG is not indicated  Avoid shaving for 24 hours prior to DOS  Avoid using lotions, powders, oils, makeup, hair products, etc  DOS  Patient given up to date visitor guidelines  A ride home after surgery is needed- failure to have a ride can result in cancellation  Remove jewelry and not to bring valuables DOS  Dentures and contact lenses will have to be removed for surgery  Patient understands he or she will receive a call the afternoon before surgery regarding an arrival time  If you have any changes in your health before DOS please call the surgeon's office  Patient verbalized understanding of all instructions

## 2022-12-01 ENCOUNTER — TELEPHONE (OUTPATIENT)
Dept: GYNECOLOGIC ONCOLOGY | Facility: CLINIC | Age: 47
End: 2022-12-01

## 2022-12-01 DIAGNOSIS — R32 URINARY INCONTINENCE, UNSPECIFIED TYPE: Primary | ICD-10-CM

## 2022-12-01 PROBLEM — T88.3XXA MALIGNANT HYPERTHERMIA: Status: ACTIVE | Noted: 2022-12-01

## 2022-12-01 NOTE — TELEPHONE ENCOUNTER
Left message at [address] [place] [address] in [place] to schedule patient for an appointment

## 2022-12-01 NOTE — ANESTHESIA PREPROCEDURE EVALUATION
Procedure:  FAT GRAFTING TO BREAST (Bilateral: Breast)    Relevant Problems   ANESTHESIA   (+) Malignant hyperthermia   (+) PONV (postoperative nausea and vomiting)      GYN   (+) History of hysterectomy      PULMONARY   (+) Asthma   (+) CLIFF (obstructive sleep apnea)      Other   (+) BRCA1 gene mutation positive   (+) Obesity   (+) Symptomatic postsurgical menopause      Patient's daughter with episode of Malignant Hyperthermia about a year ago  Patient has successfully had general anesthesia with no complications in the past  All malignant hyperthermia precautions still taken with flushing of anesthesia machine and filters placed  Will not use inhaled anesthetics and will avoid succinylcholine  Malignant hyperthermia cart placed outside OR 10  Physical Exam    Airway    Mallampati score: II  TM Distance: >3 FB  Neck ROM: full     Dental   Comment: Missing Molars,     Cardiovascular      Pulmonary      Other Findings        Anesthesia Plan  ASA Score- 2     Anesthesia Type- general with ASA Monitors  Additional Monitors:   Airway Plan: LMA  Plan Factors-Exercise tolerance (METS): >4 METS  Chart reviewed  Patient summary reviewed  Patient is not a current smoker  Patient did not smoke on day of surgery  Induction- intravenous  Postoperative Plan- Plan for postoperative opioid use  Informed Consent- Anesthetic plan and risks discussed with patient  I personally reviewed this patient with the CRNA  Discussed and agreed on the Anesthesia Plan with the CRNA  Edgar Beard

## 2022-12-02 ENCOUNTER — HOSPITAL ENCOUNTER (OUTPATIENT)
Facility: HOSPITAL | Age: 47
Setting detail: OUTPATIENT SURGERY
Discharge: HOME | End: 2022-12-02
Attending: PLASTIC SURGERY | Admitting: PLASTIC SURGERY

## 2022-12-02 ENCOUNTER — ANESTHESIA (OUTPATIENT)
Dept: PERIOP | Facility: HOSPITAL | Age: 47
End: 2022-12-02

## 2022-12-02 VITALS
SYSTOLIC BLOOD PRESSURE: 121 MMHG | RESPIRATION RATE: 18 BRPM | HEIGHT: 67 IN | OXYGEN SATURATION: 96 % | DIASTOLIC BLOOD PRESSURE: 67 MMHG | BODY MASS INDEX: 37.67 KG/M2 | TEMPERATURE: 96.6 F | WEIGHT: 240 LBS | HEART RATE: 76 BPM

## 2022-12-02 RX ORDER — LIDOCAINE HYDROCHLORIDE 10 MG/ML
INJECTION, SOLUTION EPIDURAL; INFILTRATION; INTRACAUDAL; PERINEURAL AS NEEDED
Status: DISCONTINUED | OUTPATIENT
Start: 2022-12-02 | End: 2022-12-02

## 2022-12-02 RX ORDER — PROPOFOL 10 MG/ML
INJECTION, EMULSION INTRAVENOUS CONTINUOUS PRN
Status: DISCONTINUED | OUTPATIENT
Start: 2022-12-02 | End: 2022-12-02

## 2022-12-02 RX ORDER — ONDANSETRON 2 MG/ML
4 INJECTION INTRAMUSCULAR; INTRAVENOUS EVERY 8 HOURS PRN
Status: DISCONTINUED | OUTPATIENT
Start: 2022-12-02 | End: 2022-12-02 | Stop reason: HOSPADM

## 2022-12-02 RX ORDER — OXYCODONE HYDROCHLORIDE 5 MG/1
10 TABLET ORAL EVERY 4 HOURS PRN
Status: DISCONTINUED | OUTPATIENT
Start: 2022-12-02 | End: 2022-12-02 | Stop reason: HOSPADM

## 2022-12-02 RX ORDER — MINERAL OIL
OIL (ML) MISCELLANEOUS AS NEEDED
Status: DISCONTINUED | OUTPATIENT
Start: 2022-12-02 | End: 2022-12-02 | Stop reason: HOSPADM

## 2022-12-02 RX ORDER — VANCOMYCIN HYDROCHLORIDE 1 G/200ML
1000 INJECTION, SOLUTION INTRAVENOUS ONCE
Status: COMPLETED | OUTPATIENT
Start: 2022-12-02 | End: 2022-12-02

## 2022-12-02 RX ORDER — FENTANYL CITRATE 50 UG/ML
INJECTION, SOLUTION INTRAMUSCULAR; INTRAVENOUS AS NEEDED
Status: DISCONTINUED | OUTPATIENT
Start: 2022-12-02 | End: 2022-12-02

## 2022-12-02 RX ORDER — DEXAMETHASONE SODIUM PHOSPHATE 10 MG/ML
INJECTION, SOLUTION INTRAMUSCULAR; INTRAVENOUS AS NEEDED
Status: DISCONTINUED | OUTPATIENT
Start: 2022-12-02 | End: 2022-12-02

## 2022-12-02 RX ORDER — ONDANSETRON 4 MG/1
4 TABLET, ORALLY DISINTEGRATING ORAL ONCE
Status: COMPLETED | OUTPATIENT
Start: 2022-12-02 | End: 2022-12-02

## 2022-12-02 RX ORDER — ONDANSETRON 2 MG/ML
4 INJECTION INTRAMUSCULAR; INTRAVENOUS ONCE AS NEEDED
Status: DISCONTINUED | OUTPATIENT
Start: 2022-12-02 | End: 2022-12-02 | Stop reason: HOSPADM

## 2022-12-02 RX ORDER — HYDROMORPHONE HCL/PF 1 MG/ML
SYRINGE (ML) INJECTION AS NEEDED
Status: DISCONTINUED | OUTPATIENT
Start: 2022-12-02 | End: 2022-12-02

## 2022-12-02 RX ORDER — FENTANYL CITRATE/PF 50 MCG/ML
50 SYRINGE (ML) INJECTION
Status: DISCONTINUED | OUTPATIENT
Start: 2022-12-02 | End: 2022-12-02 | Stop reason: HOSPADM

## 2022-12-02 RX ORDER — PROPOFOL 10 MG/ML
INJECTION, EMULSION INTRAVENOUS AS NEEDED
Status: DISCONTINUED | OUTPATIENT
Start: 2022-12-02 | End: 2022-12-02

## 2022-12-02 RX ORDER — ACETAMINOPHEN 325 MG/1
650 TABLET ORAL EVERY 6 HOURS PRN
Status: DISCONTINUED | OUTPATIENT
Start: 2022-12-02 | End: 2022-12-02 | Stop reason: HOSPADM

## 2022-12-02 RX ORDER — KETAMINE HCL IN NACL, ISO-OSM 100MG/10ML
SYRINGE (ML) INJECTION AS NEEDED
Status: DISCONTINUED | OUTPATIENT
Start: 2022-12-02 | End: 2022-12-02

## 2022-12-02 RX ORDER — HYDROMORPHONE HCL/PF 1 MG/ML
0.5 SYRINGE (ML) INJECTION
Status: DISCONTINUED | OUTPATIENT
Start: 2022-12-02 | End: 2022-12-02 | Stop reason: HOSPADM

## 2022-12-02 RX ORDER — ACETAMINOPHEN 325 MG/1
650 TABLET ORAL ONCE
Status: COMPLETED | OUTPATIENT
Start: 2022-12-02 | End: 2022-12-02

## 2022-12-02 RX ORDER — MIDAZOLAM HYDROCHLORIDE 2 MG/2ML
INJECTION, SOLUTION INTRAMUSCULAR; INTRAVENOUS AS NEEDED
Status: DISCONTINUED | OUTPATIENT
Start: 2022-12-02 | End: 2022-12-02

## 2022-12-02 RX ORDER — ONDANSETRON 2 MG/ML
INJECTION INTRAMUSCULAR; INTRAVENOUS AS NEEDED
Status: DISCONTINUED | OUTPATIENT
Start: 2022-12-02 | End: 2022-12-02

## 2022-12-02 RX ORDER — GLYCOPYRROLATE 0.2 MG/ML
INJECTION INTRAMUSCULAR; INTRAVENOUS AS NEEDED
Status: DISCONTINUED | OUTPATIENT
Start: 2022-12-02 | End: 2022-12-02

## 2022-12-02 RX ORDER — GABAPENTIN 300 MG/1
300 CAPSULE ORAL ONCE
Status: COMPLETED | OUTPATIENT
Start: 2022-12-02 | End: 2022-12-02

## 2022-12-02 RX ORDER — SODIUM CHLORIDE, SODIUM LACTATE, POTASSIUM CHLORIDE, CALCIUM CHLORIDE 600; 310; 30; 20 MG/100ML; MG/100ML; MG/100ML; MG/100ML
125 INJECTION, SOLUTION INTRAVENOUS CONTINUOUS
Status: DISCONTINUED | OUTPATIENT
Start: 2022-12-02 | End: 2022-12-02 | Stop reason: HOSPADM

## 2022-12-02 RX ORDER — OXYCODONE HYDROCHLORIDE 5 MG/1
5 TABLET ORAL EVERY 4 HOURS PRN
Status: DISCONTINUED | OUTPATIENT
Start: 2022-12-02 | End: 2022-12-02 | Stop reason: HOSPADM

## 2022-12-02 RX ADMIN — Medication 25 MG: at 07:38

## 2022-12-02 RX ADMIN — PROPOFOL 160 MCG/KG/MIN: 10 INJECTION, EMULSION INTRAVENOUS at 07:56

## 2022-12-02 RX ADMIN — PROPOFOL 50 MG: 10 INJECTION, EMULSION INTRAVENOUS at 07:58

## 2022-12-02 RX ADMIN — Medication 25 MG: at 08:08

## 2022-12-02 RX ADMIN — HYDROMORPHONE HYDROCHLORIDE 0.5 MG: 1 INJECTION, SOLUTION INTRAMUSCULAR; INTRAVENOUS; SUBCUTANEOUS at 08:07

## 2022-12-02 RX ADMIN — DEXAMETHASONE SODIUM PHOSPHATE 10 MG: 10 INJECTION, SOLUTION INTRAMUSCULAR; INTRAVENOUS at 07:38

## 2022-12-02 RX ADMIN — ONDANSETRON 4 MG: 4 TABLET, ORALLY DISINTEGRATING ORAL at 06:01

## 2022-12-02 RX ADMIN — PROPOFOL 140 MCG/KG/MIN: 10 INJECTION, EMULSION INTRAVENOUS at 07:38

## 2022-12-02 RX ADMIN — VANCOMYCIN HYDROCHLORIDE 1000 MG: 1 INJECTION, SOLUTION INTRAVENOUS at 07:30

## 2022-12-02 RX ADMIN — ACETAMINOPHEN 650 MG: 325 TABLET ORAL at 06:00

## 2022-12-02 RX ADMIN — FENTANYL CITRATE 50 MCG: 50 INJECTION INTRAMUSCULAR; INTRAVENOUS at 08:35

## 2022-12-02 RX ADMIN — MIDAZOLAM 2 MG: 1 INJECTION INTRAMUSCULAR; INTRAVENOUS at 07:30

## 2022-12-02 RX ADMIN — FENTANYL CITRATE 50 MCG: 50 INJECTION INTRAMUSCULAR; INTRAVENOUS at 07:38

## 2022-12-02 RX ADMIN — FENTANYL CITRATE 50 MCG: 50 INJECTION INTRAMUSCULAR; INTRAVENOUS at 07:55

## 2022-12-02 RX ADMIN — LIDOCAINE HYDROCHLORIDE 50 MG: 10 INJECTION, SOLUTION EPIDURAL; INFILTRATION; INTRACAUDAL; PERINEURAL at 07:38

## 2022-12-02 RX ADMIN — FENTANYL CITRATE 50 MCG: 50 INJECTION INTRAMUSCULAR; INTRAVENOUS at 08:46

## 2022-12-02 RX ADMIN — ONDANSETRON 4 MG: 2 INJECTION INTRAMUSCULAR; INTRAVENOUS at 08:39

## 2022-12-02 RX ADMIN — GLYCOPYRROLATE 0.2 MG: 0.4 INJECTION INTRAMUSCULAR; INTRAVENOUS at 07:30

## 2022-12-02 RX ADMIN — GABAPENTIN 300 MG: 300 CAPSULE ORAL at 06:00

## 2022-12-02 RX ADMIN — PROPOFOL 200 MG: 10 INJECTION, EMULSION INTRAVENOUS at 07:38

## 2022-12-02 RX ADMIN — SODIUM CHLORIDE, SODIUM LACTATE, POTASSIUM CHLORIDE, AND CALCIUM CHLORIDE 125 ML/HR: .6; .31; .03; .02 INJECTION, SOLUTION INTRAVENOUS at 06:06

## 2022-12-02 NOTE — DISCHARGE SUMMARY
Discharge Summary - Medical Celina Nathscelsy 41 y o  female MRN: [id number]    [facility] APU Room / Bed: OR POOL/OR POOL Encounter: [id number]    BRIEF OVERVIEW  Admitting Provider: Rainer Peguero MD  Discharge Provider: Jessica Hubbard MD  Primary Care Physician at Discharge: Socorro Nath, [address] [place]    Discharge To: Home      Admission Date: 12/2/2022     Discharge Date: No discharge date for patient encounter  Code Status: Prior  Advance Directive and Living Will: <no information>  Power of :        Primary Discharge Diagnosis  Active Problems:    Malignant hyperthermia  Resolved Problems:    * No resolved hospital problems   *        Discharge Disposition: [address] [place] [address]    Presenting Problem/History of Present Illness  <principal problem not specified>      Discharge Condition: stable    Patient tolerated the procedure well, recovered and was discharged home in stable condition    Jessica Hubbard MD  12/2/2022  7:24 AM

## 2022-12-02 NOTE — ANESTHESIA POSTPROCEDURE EVALUATION
Post-Op Assessment Note    CV Status:  Stable  Pain Score: 0    Pain management: adequate     Mental Status:  Alert and awake   Hydration Status:  Euvolemic and stable   PONV Controlled:  Controlled   Airway Patency:  Patent      Post Op Vitals Reviewed: Yes      Staff: CRNA, Anesthesiologist   Comments: Report given to recovering RN, VSS  Ptstates she is comfortable        No notable events documented      /61 (12/02/22 0850)    Temp (!) 97 2 °F (36 2 °C) (12/02/22 0850)    Pulse 92 (12/02/22 0850)   Resp 20 (12/02/22 0850)    SpO2 95 % (12/02/22 0850)

## 2022-12-02 NOTE — OP NOTE
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]      Operative Findings:      Complications:   None    Procedure and Technique:  The patient was marked while standing in the preoperative holding    area  The patient was brought to the operating room and placed supine    on the operating table  A time-out procedure was performed  Sequential    compression devices were applied  IV antibiotics were given  After    adequate anesthesia was obtained, the abdomen and chest were prepped    and draped using standard surgical technique  Stab incisions were made in the abdomen  Next, 1 liter of tumescent    anesthesia was infiltrated in a subcutaneous plane in the abdomen and    flank areas  After an adequate time for hemostatic effect, liposuction    was performed in a sub-Mellissa plane using a 3-Djiboutian Mercedes-style    cannula for the upper and lower abdomen and flanks  This was    connected to the [facility] system, and the fat was prepared and washed according to    protocol  The marked areas to be injected of both breasts were    injected with 1% lidocaine with epinephrine  Attention was first turned to the right breast  Stab incisions were    made, and a 1-Djiboutian fat grafting cannula was used to inject 200 mL in    a multithreading technique with feathering at the borders of the    injected areas   This was performed in the upper, medial and lateral poles of the breast       Attention was first turned to the left breast  Stab incisions were    made, and a 1-Serbian fat grafting cannula was used to inject 175 mL in    a multithreading technique with feathering at the borders of the    injected areas  This was performed in the upper, medial and lateral    poles of the breast       All the wounds were cleaned and dried  The wounds were closed using 4-0 PDS suture in an    interrupted buried deep dermal technique  All the    wounds were cleaned and dried  Skin glue, ABD dressings, a bra and a    binder were applied  The patient tolerated the procedure well, was    extubated in the operating room and taken to the recovery room in    stable condition        I was present for the entire procedure and A qualified resident physician was not available    Patient Disposition:  hemodynamically stable and extubated and stable        SIGNATURE: Susana Truong MD  DATE: December 2, 2022  TIME: 8:44 AM

## 2022-12-02 NOTE — DISCHARGE INSTRUCTIONS
[address], [address] [facility], [address] [facility], [facility], [address] [facility]  /Z / [employer]       No strenuous activity 1 week    Apply gauze to incision sites daily    No ice or heating pack    Wear your binder/bra for 3 weeks    Drainage from the incision sites is normal    Avoid aspirin/motrin/advil/alleve for 1 week    No smoking    It is ok to shower    Swelling and bruising is normal    Sleep on your back only    Call [phone] for an appointment in 10-14 days

## 2022-12-19 ENCOUNTER — OFFICE VISIT (OUTPATIENT)
Dept: BARIATRICS | Facility: CLINIC | Age: 47
End: 2022-12-19

## 2022-12-19 VITALS
SYSTOLIC BLOOD PRESSURE: 124 MMHG | DIASTOLIC BLOOD PRESSURE: 62 MMHG | HEART RATE: 74 BPM | WEIGHT: 248.4 LBS | BODY MASS INDEX: 38.99 KG/M2 | RESPIRATION RATE: 16 BRPM | HEIGHT: 67 IN

## 2022-12-19 DIAGNOSIS — E66.01 CLASS 2 SEVERE OBESITY DUE TO EXCESS CALORIES WITH SERIOUS COMORBIDITY AND BODY MASS INDEX (BMI) OF 38.0 TO 38.9 IN ADULT (HCC): Primary | Chronic | ICD-10-CM

## 2022-12-19 DIAGNOSIS — G47.33 OSA (OBSTRUCTIVE SLEEP APNEA): ICD-10-CM

## 2022-12-19 RX ORDER — PHENTERMINE HYDROCHLORIDE 30 MG/1
30 CAPSULE ORAL EVERY MORNING
Qty: 30 CAPSULE | Refills: 1 | Status: SHIPPED | OUTPATIENT
Start: 2022-12-19

## 2022-12-19 RX ORDER — TOPIRAMATE 25 MG/1
25 TABLET ORAL 2 TIMES DAILY
Qty: 60 TABLET | Refills: 1 | Status: SHIPPED | OUTPATIENT
Start: 2022-12-19

## 2022-12-19 NOTE — PROGRESS NOTES
Assessment/Plan:    Obesity  -Patient is pursuing Conservative Program  -Initial weight loss goal of 5-10% weight loss for improved health  -Screening labs CMP 9/1/22 but TSH, cbc wnl 2021    Initial:235 8   Current:248 3  Change:+12 5 lb  Goal:    Goals:  -recommend food logging  1400 calorie goal a day on rest day and 1550 exercise  -continue with water intake  -once cleared by plastics to start using the peloton again for slower rides 30 minutes 3 times a week    To stop saxenda since has had weight gain since starting  Patient denies personal and family history of MCT and MEN2 tumors  Patient denies personal history of pancreatitis  Side effects discussed but not limited to diarrhea, bloating, constipation, GI upset, heartburn, increased heart rate, headache, low blood sugar, fatigue and dizziness  Titration and medication administration discussed  To resume phentermine and topamax  Discussed starting at lower doses of both  EKGs reviewed from last month  Did not have any side effects prior with either medication  Denies glaucoma, renal stones, and has hx of hysterectomy    CLIFF (obstructive sleep apnea)  -encouraged continued use of CPAP machine  -may improve with 20-30% weight loss          Follow up in approximately 6 weeks with Non-Surgical Physician/Advanced Practitioner  Diagnoses and all orders for this visit:    Class 2 severe obesity due to excess calories with serious comorbidity and body mass index (BMI) of 38 0 to 38 9 in adult (HCC)  -     topiramate (Topamax) 25 mg tablet; Take 1 tablet (25 mg total) by mouth 2 (two) times a day  -     phentermine 30 MG capsule; Take 1 capsule (30 mg total) by mouth every morning    CLIFF (obstructive sleep apnea)    Other orders  -     Multiple Vitamins-Minerals (ZINC PO);  Take by mouth          Subjective:   Chief Complaint   Patient presents with   • Follow-up     MWM 2mth f/u; waist 44in        Patient ID: Anali Garcia  is a 41 y o  female with excess weight/obesity here to pursue weight managment  Patient is pursuing Conservative Program      HPI   Last seen in October and started on saxenda  She is taking 3mg right now  She does not feel any side effects but also feels no effect on her appetite  She was on prednisone x 2 sicne she started the medication but has not been on anything for last 3 weeks  Di Marilin She was originally on [id number] 5 mg  and topamax 50 mg BID and did well with it  She ended up stopping medications due to lack of refills/followup  She had undergone breast reconstruction x 3 since last November  Last purvi  was seen 12/2 and had fat grafting to the breast   She is still wearing abdomen binder  She is not able to exercise currently but has gone back to work dressing hair  Wt Readings from Last 10 Encounters:   12/19/22 113 kg (248 lb 6 4 oz)   12/02/22 109 kg (240 lb)   11/14/22 109 kg (240 lb)   11/01/22 111 kg (244 lb 6 4 oz)   10/17/22 111 kg (245 lb 1 6 oz)   09/01/22 112 kg (246 lb 11 1 oz)   05/23/22 105 kg (231 lb)   02/24/22 100 kg (221 lb)   01/27/22 103 kg (227 lb)   11/12/21 103 kg (228 lb)       Food logging:not currently  Increased appetite/cravings:yes more in the PM  Fruit/Vegetable servings:  Exercise:not currently  On restriction due to surgery  Hydration:1/2 gallon water    Diet Recall:  B:was doing shake    Sometimes skipping now  L:typically bringing with her leftover or salad and chicken  D:protein, vegetable and sometimes starch  S: more now than before premade peaches      Colonoscopy-Completed    The following portions of the patient's history were reviewed and updated as appropriate: She  has a past medical history of Anesthesia complication, Asthma, [id number] protein-1 tumor predisposition syndrome, CPAP (continuous positive airway pressure) dependence, H/O bilateral breast implants, Intraductal papilloma of breast, left (02/23/2021), Malignant hyperthermia due to anesthesia, Obesity (BMI 30-39 9), Overweight (11/14/2022), Pneumonia, PONV (postoperative nausea and vomiting), Scab, and Sleep apnea  She   Patient Active Problem List    Diagnosis Date Noted   • Malignant hyperthermia 12/01/2022   • CLIFF (obstructive sleep apnea)    • Fatigue 10/17/2022   • History of hysterectomy 05/18/2021   • PONV (postoperative nausea and vomiting) 05/18/2021   • Keratosis, actinic 05/17/2021   • Asthma 02/09/2021   • Obesity 02/09/2021   • Symptomatic postsurgical menopause 12/02/2020   • BRCA1 gene mutation positive 09/25/2020   • Sinobronchitis 02/23/2019     She  has a past surgical history that includes Appendectomy; Tonsillectomy; Tympanostomy tube placement (Bilateral); Dilation and curettage of uterus (1997); pr mastectomy simple complete (Bilateral, 2/9/2021); pr tissue expander placement breast reconstruction (Bilateral, 2/9/2021); pr implnt bio implnt for soft tissue reinforcement (Bilateral, 2/9/2021); Colonoscopy; Hysterectomy (N/A, 11/27/2020); Capsulectomy (Bilateral, 5/19/2021); Breast surgery (2/9/2021); Cosmetic surgery (2021); pr revision of reconstructed breast (Bilateral, 8/6/2021); pr revision of reconstructed breast (Bilateral, 11/18/2021); BREAST IMPLANT (Left, 11/18/2021); Mastectomy; pr nipple/areola reconstruction (Bilateral, 2/24/2022); and Liposuction w/ fat injection (Bilateral, 12/2/2022)  Her family history includes BRCA1 Positive in her maternal uncle and sister; Breast cancer (age of onset: 36) in her cousin; Cancer in her father and maternal grandfather; No Known Problems in her daughter, daughter, maternal aunt, mother, paternal aunt, paternal aunt, paternal aunt, paternal uncle, paternal uncle, and sister; Ovarian cancer (age of onset: 46) in her other  She  reports that she has never smoked  She has never used smokeless tobacco  She reports current alcohol use of about 1 0 standard drink per week  She reports that she does not use drugs    Current Outpatient Medications Medication Sig Dispense Refill   • albuterol (PROVENTIL HFA,VENTOLIN HFA) 90 mcg/act inhaler inhale 2 puffs by mouth and INTO THE LUNGS every 4 to 6 hours if needed     • cholecalciferol (VITAMIN D3) 1,000 units tablet Take 1,000 Units by mouth daily     • estradiol (Vivelle-Dot) 0 075 MG/24HR Place 1 patch on the skin 2 (two) times a week 8 patch 6   • Insulin Pen Needle 32G X 4 MM MISC Use in the morning 100 each 0   • Multiple Vitamins-Minerals (ONE-A-DAY WOMENS PO) Take by mouth daily      • Multiple Vitamins-Minerals (ZINC PO) Take by mouth     • phentermine 30 MG capsule Take 1 capsule (30 mg total) by mouth every morning 30 capsule 1   • Probiotic Product (Pro-biotic Blend) CAPS Take by mouth daily      • topiramate (Topamax) 25 mg tablet Take 1 tablet (25 mg total) by mouth 2 (two) times a day 60 tablet 1     No current facility-administered medications for this visit       Current Outpatient Medications on File Prior to Visit   Medication Sig   • albuterol (PROVENTIL HFA,VENTOLIN HFA) 90 mcg/act inhaler inhale 2 puffs by mouth and INTO THE LUNGS every 4 to 6 hours if needed   • cholecalciferol (VITAMIN D3) 1,000 units tablet Take 1,000 Units by mouth daily   • estradiol (Vivelle-Dot) 0 075 MG/24HR Place 1 patch on the skin 2 (two) times a week   • Insulin Pen Needle 32G X 4 MM MISC Use in the morning   • Multiple Vitamins-Minerals (ONE-A-DAY WOMENS PO) Take by mouth daily    • Multiple Vitamins-Minerals (ZINC PO) Take by mouth   • Probiotic Product (Pro-biotic Blend) CAPS Take by mouth daily    • [DISCONTINUED] liraglutide (SAXENDA) injection Inject subcutaneously WEEK 1 use 0 6mg day,  WEEK 2 use 1 2mg day, WEEK 3 use 1 8mg day, WEEK 4 use 2 4mg day, WEEK 5 use 3mg day (Patient taking differently: 3 mg daily Inject subcutaneously WEEK 1 use 0 6mg day,  WEEK 2 use 1 2mg day, WEEK 3 use 1 8mg day, WEEK 4 use 2 4mg day, WEEK 5 use 3mg day)     No current facility-administered medications on file prior to visit      She is allergic to penicillins       Review of Systems   Constitutional: Negative for fever  HENT: Positive for ear pain  Eyes: Negative for redness and visual disturbance  Respiratory: Negative for cough and shortness of breath  Cardiovascular: Negative for chest pain and palpitations  Gastrointestinal: Negative for abdominal pain and vomiting  Genitourinary: Negative for difficulty urinating  Musculoskeletal: Negative for arthralgias and back pain  Skin: Negative for color change and rash  Neurological: Negative for seizures and syncope  All other systems reviewed and are negative  Objective:    /62   Pulse 74   Resp 16   Ht 5' 7" (1 702 m)   Wt 113 kg (248 lb 6 4 oz)   LMP 10/15/2020 (Exact Date)   BMI 38 90 kg/m²      Physical Exam  Vitals and nursing note reviewed  Constitutional:       General: She is not in acute distress  Appearance: She is well-developed  She is obese  HENT:      Head: Normocephalic and atraumatic  Eyes:      Conjunctiva/sclera: Conjunctivae normal    Neck:      Thyroid: No thyromegaly  Pulmonary:      Effort: Pulmonary effort is normal  No respiratory distress  Skin:     Findings: No rash (visible)  Neurological:      Mental Status: She is alert and oriented to person, place, and time     Psychiatric:         Mood and Affect: Mood normal          Behavior: Behavior normal

## 2022-12-19 NOTE — ASSESSMENT & PLAN NOTE
-Patient is pursuing Conservative Program  -Initial weight loss goal of 5-10% weight loss for improved health  -Screening labs CMP 9/1/22 but TSH, cbc wnl 2021    Initial:235 8   Current:248 3  Change:+12 5 lb  Goal:    Goals:  -recommend food logging  1400 calorie goal a day on rest day and 1550 exercise  -continue with water intake  -once cleared by plastics to start using the peloton again for slower rides 30 minutes 3 times a week    To stop saxenda since has had weight gain since starting  Patient denies personal and family history of MCT and MEN2 tumors  Patient denies personal history of pancreatitis  Side effects discussed but not limited to diarrhea, bloating, constipation, GI upset, heartburn, increased heart rate, headache, low blood sugar, fatigue and dizziness  Titration and medication administration discussed  To resume phentermine and topamax  Discussed starting at lower doses of both  EKGs reviewed from last month  Did not have any side effects prior with either medication   Denies glaucoma, renal stones, and has hx of hysterectomy

## 2023-01-26 ENCOUNTER — OFFICE VISIT (OUTPATIENT)
Dept: SLEEP CENTER | Facility: CLINIC | Age: 48
End: 2023-01-26

## 2023-01-26 VITALS
SYSTOLIC BLOOD PRESSURE: 124 MMHG | WEIGHT: 244 LBS | BODY MASS INDEX: 38.3 KG/M2 | DIASTOLIC BLOOD PRESSURE: 76 MMHG | HEIGHT: 67 IN

## 2023-01-26 DIAGNOSIS — G47.33 OSA (OBSTRUCTIVE SLEEP APNEA): Primary | ICD-10-CM

## 2023-01-26 NOTE — PROGRESS NOTES
Progress Note - [facility] Center   Tami Taylor :1975 MRN: [id number]      Reason for Visit:  41 y  o female here for annual follow-up    Assessment:  Doing well on current therapy of APAP 4 to 20 cm for mild obstructive sleep apnea (ANNY = 12 5)  The patient is dissatisfied with her current mask  Plan:  Try a different mask  The patient will meet with the representative from [address] [place] [address] [address]  Follow up: One year    History of Present Illness:  History of CLIFF on PAP therapy  Fully compliant and deriving benefit  Historical Information    Past Medical History:   Past Medical History:   Diagnosis Date   • Anesthesia complication     daughter has malignant hyperthermia   • Asthma     resolved w/ wt loss; is now having asthma sx due to recent hx of having covid 2022   • BRCA1-associated protein-1 tumor predisposition syndrome    • CPAP (continuous positive airway pressure) dependence    • H/O bilateral breast implants    • Intraductal papilloma of breast, left 2021   • Malignant hyperthermia due to anesthesia     has family hx of MH, her daughter   • Obesity (BMI 30-39  9)    • Overweight 2022    is currently using victoza for weight loss   • Pneumonia        • PONV (postoperative nausea and vomiting)    • Scab     back and right thigh from few precancerous lesions taken off   • Sleep apnea          Past Surgical History:   Past Surgical History:   Procedure Laterality Date   • APPENDECTOMY     • BREAST IMPLANT Left 2021    Procedure: FILL SALINE BREAST IMPLANT;  Surgeon: Kevon Lewis MD;  Location: [facility] MAIN OR;  Service: Plastics   • BREAST SURGERY  2021    Bilateral mastectomy   • CAPSULOTOMY Bilateral 2021    Procedure: CAPSULECTOMY, EXPANDER/IMPLANT EXCHANGE;  Surgeon: Kamari Gates MD;  Location: [facility] MAIN OR;  Service: Plastics   • COLONOSCOPY     • COSMETIC SURGERY      Reconstruction of breast   • Roheline 45   • HYSTERECTOMY N/A 2020 Procedure: TOTAL LAPAROSCOPIC HYSTERECTOMY, BILATERAL SALPINGO-OOPHORECTOMY, cystoscopy;  Surgeon: Chivo Allen MD;  Location: [facility] MAIN [facility];  Service: Gynecology Oncology   • LIPOSUCTION W/ FAT INJECTION Bilateral 12/2/2022    Procedure: FAT GRAFTING TO BREAST;  Surgeon: Reynold Harmon MD;  Location: [address] [place] [address] MAIN OR;  Service: Plastics   • MASTECTOMY     • ND IMPLNT BIO IMPLNT FOR SOFT TISSUE REINFORCEMENT Bilateral 2/9/2021    Procedure: RECONSTRUCTION BREAST W/ IMPLANT;  Surgeon: Nish Stephens MD;  Location: [facility] Main [facility];  Service: Plastics   • ND MASTECTOMY SIMPLE COMPLETE Bilateral 2/9/2021    Procedure: BREAST MASTECTOMY;  Surgeon: Carmen Boogie MD;  Location: [facility];  Service: Surgical Oncology   • ND NIPPLE/AREOLA RECONSTRUCTION Bilateral 2/24/2022    Procedure: NIPPLE RECONSTRUCTION;  Surgeon: Reynold Harmon MD;  Location: [address] [place] [address] MAIN OR;  Service: Plastics   • ND REVISION OF RECONSTRUCTED BREAST Bilateral 8/6/2021    Procedure: BREAST RECONSTRUCITON REVISION; EXCISION OF STANDING DEFORMITIES;  Surgeon: Reynold Harmon MD;  Location: [facility] OR;  Service: Plastics   • ND REVISION OF RECONSTRUCTED BREAST Bilateral 11/18/2021    Procedure: REVISE BREAST RECONSTRUCTION;  Surgeon: Reynold Harmon MD;  Location: [address] [place] [address] MAIN OR;  Service: Plastics   • ND TISSUE EXPANDER PLACEMENT BREAST RECONSTRUCTION Bilateral 2/9/2021    Procedure: BREAST INSERTION/PLACEMENT TISSUE EXPANDER (EXCHANGE);   Surgeon: Nish Stephens MD;  Location: [facility];  Service: Plastics   • TONSILLECTOMY     • TYMPANOSTOMY TUBE PLACEMENT Bilateral        Social History:   Social History     Socioeconomic History   • Marital status: /Civil Union     Spouse name: Not on file   • Number of children: Not on file   • Years of education: Not on file   • Highest education level: Not on file   Occupational History   • Not on file   Tobacco Use   • Smoking status: Never   • Smokeless tobacco: Never   • Tobacco comments:     Denies   Vaping Use   • Vaping Use: Never used   Substance and Sexual Activity   • Alcohol use:  Yes     Alcohol/week: 1 0 standard drink     Types: 1 Standard drinks or equivalent per week     Comment: social, monthly   • Drug use: No   • Sexual activity: Yes     Partners: Male     Birth control/protection: Female Sterilization   Other Topics Concern   • Not on file   Social History Narrative   • Not on file     Social Determinants of Health     Financial Resource Strain: Not on file   Food Insecurity: Not on file   Transportation Needs: Not on file   Physical Activity: Not on file   Stress: Not on file   Social Connections: Not on file   Intimate Partner Violence: Not on file   Housing Stability: Not on file       Family History:   Family History   Problem Relation Age of Onset   • Cancer Father         Age at [address] unknown; type unknown   • Cancer Maternal Grandfather    • No Known Problems Maternal Aunt    • No Known Problems Paternal Aunt    • No Known Problems Paternal Aunt    • Breast cancer Cousin 36   • No Known Problems Mother    • BRCA1 Positive Sister    • No Known Problems Daughter    • No Known Problems Sister    • No Known Problems Daughter    • BRCA1 Positive Maternal Uncle    • No Known Problems Paternal Uncle    • No Known Problems Paternal Aunt    • No Known Problems Paternal Uncle    • Ovarian cancer Other 46       Medications/Allergies:      Current Outpatient Medications:   •  albuterol (PROVENTIL HFA,VENTOLIN HFA) 90 mcg/act inhaler, inhale 2 puffs by mouth and INTO THE LUNGS every 4 to 6 hours if needed, Disp: , Rfl:   •  estradiol (Vivelle-Dot) 0 075 MG/24HR, Place 1 patch on the skin 2 (two) times a week, Disp: 8 patch, Rfl: 6  •  Multiple Vitamins-Minerals (ONE-A-DAY WOMENS PO), Take by mouth daily , Disp: , Rfl:   •  phentermine 30 MG capsule, Take 1 capsule (30 mg total) by mouth every morning, Disp: 30 capsule, Rfl: 1  •  Probiotic Product (Pro-biotic Blend) CAPS, Take by mouth daily , Disp: , Rfl:   •  topiramate (Topamax) 25 mg tablet, Take 1 tablet (25 mg total) by mouth 2 (two) times a day, Disp: 60 tablet, Rfl: 1  •  cholecalciferol (VITAMIN D3) 1,000 units tablet, Take 1,000 Units by mouth daily (Patient not taking: Reported on 1/26/2023), Disp: , Rfl:   •  Insulin Pen Needle 32G X 4 MM MISC, Use in the morning (Patient not taking: Reported on 1/26/2023), Disp: 100 each, Rfl: 0  •  Multiple Vitamins-Minerals (ZINC PO), Take by mouth (Patient not taking: Reported on 1/26/2023), Disp: , Rfl:           Objective      Vital Signs:   Vitals:    01/26/23 1546   BP: 124/76     [place] Sleepiness Scale: Total score: 8        Physical Exam:    General: Alert, appropriate, cooperative, overweight    Head: NC/AT    Skin: Warm, dry    Neuro: No motor abnormalities, cranial nerves appear intact    Extremity: No clubbing, cyanosis      DME Provider: [employer] [employer] [facility]        Counseling / Coordination of Care   I have spent 10 minutes with the patient today in which greater than 50% of this time was spent in counseling/coordination of care regarding: equipment and compliance  Board Certified Sleep Specialist    Portions of the record may have been created with voice recognition software  Occasional wrong word or "sound a like" substitutions may have occurred due to the inherent limitations of voice recognition software  Read the chart carefully and recognize, using context, where substitutions have occurred

## 2023-01-30 PROBLEM — T88.3XXA MALIGNANT HYPERTHERMIA: Status: RESOLVED | Noted: 2022-12-01 | Resolved: 2023-01-30

## 2023-02-02 ENCOUNTER — OFFICE VISIT (OUTPATIENT)
Dept: BARIATRICS | Facility: CLINIC | Age: 48
End: 2023-02-02

## 2023-02-02 VITALS
HEART RATE: 87 BPM | OXYGEN SATURATION: 99 % | DIASTOLIC BLOOD PRESSURE: 74 MMHG | BODY MASS INDEX: 37.59 KG/M2 | SYSTOLIC BLOOD PRESSURE: 126 MMHG | WEIGHT: 240 LBS

## 2023-02-02 DIAGNOSIS — E66.01 CLASS 2 SEVERE OBESITY DUE TO EXCESS CALORIES WITH SERIOUS COMORBIDITY AND BODY MASS INDEX (BMI) OF 37.0 TO 37.9 IN ADULT (HCC): Primary | Chronic | ICD-10-CM

## 2023-02-02 DIAGNOSIS — G47.33 OSA (OBSTRUCTIVE SLEEP APNEA): ICD-10-CM

## 2023-02-02 DIAGNOSIS — E66.01 CLASS 2 SEVERE OBESITY DUE TO EXCESS CALORIES WITH SERIOUS COMORBIDITY AND BODY MASS INDEX (BMI) OF 38.0 TO 38.9 IN ADULT (HCC): Chronic | ICD-10-CM

## 2023-02-02 DIAGNOSIS — R29.890 LOSS OF HEIGHT: ICD-10-CM

## 2023-02-02 DIAGNOSIS — N95.1 MENOPAUSAL STATE: ICD-10-CM

## 2023-02-02 RX ORDER — TOPIRAMATE 50 MG/1
50 TABLET, FILM COATED ORAL EVERY 12 HOURS SCHEDULED
Qty: 60 TABLET | Refills: 1 | Status: SHIPPED | OUTPATIENT
Start: 2023-02-02

## 2023-02-02 RX ORDER — PHENTERMINE HYDROCHLORIDE 30 MG/1
30 CAPSULE ORAL EVERY MORNING
Qty: 30 CAPSULE | Refills: 1 | Status: SHIPPED | OUTPATIENT
Start: 2023-02-02

## 2023-02-02 NOTE — ASSESSMENT & PLAN NOTE
-Patient is pursuing Conservative Program  -Initial weight loss goal of 5-10% weight loss for improved health  -Screening labs CMP 9/1/22 but TSH, cbc wnl 2021    Initial:235 8   Current:240  Change: +4 2 lbs from initial (-8 3 lb from last OV, had regained after initial visit)  Goal:    Goals:  -recommend food logging  1400 calorie goal a day on rest day and 1550 exercise  -discussed meal prepping for work and can try to swap out current oatmeal for lower sugar options  -continue with water intake  -to start using peloton again 30 minutes 3 times a week      To continue phentermine and topamax  Start weight 248 3 on 12/19/22  EKGs reviewed from last month  Did not have any side effects prior with either medication  Denies glaucoma, renal stones, and has hx of hysterectomy  -She was on saxenda prior with weight gain

## 2023-02-02 NOTE — PROGRESS NOTES
Assessment/Plan:    Obesity  -Patient is pursuing Conservative Program  -Initial weight loss goal of 5-10% weight loss for improved health  -Screening labs CMP 9/1/22 but TSH, cbc wnl 2021    Initial:235 8   Current:240  Change: +4 2 lbs from initial (-8 3 lb from last OV, had regained after initial visit)  Goal:    Goals:  -recommend food logging  1400 calorie goal a day on rest day and 1550 exercise  -discussed meal prepping for work and can try to swap out current oatmeal for lower sugar options  -continue with water intake  -to start using peloton again 30 minutes 3 times a week      To continue phentermine and topamax  Start weight 248 3 on 12/19/22  EKGs reviewed from last month  Did not have any side effects prior with either medication  Denies glaucoma, renal stones, and has hx of hysterectomy  -She was on saxenda prior with weight gain  CLIFF (obstructive sleep apnea)  Seeing sleep medicine and working to get new CPAP mask        Follow up in approximately 2 months with Non-Surgical Physician/Advanced Practitioner  Diagnoses and all orders for this visit:    Class 2 severe obesity due to excess calories with serious comorbidity and body mass index (BMI) of 37 0 to 37 9 in adult ([facility])  -     topiramate (Topamax) 50 MG tablet; Take 1 tablet (50 mg total) by mouth every 12 (twelve) hours    Loss of height  -     DXA bone density spine hip and pelvis; Future    Menopausal state  -     DXA bone density spine hip and pelvis; Future    Class 2 severe obesity due to excess calories with serious comorbidity and body mass index (BMI) of 38 0 to 38 9 in adult ([facility])  -     phentermine 30 MG capsule; Take 1 capsule (30 mg total) by mouth every morning    CLIFF (obstructive sleep apnea)          Subjective:   Chief Complaint   Patient presents with   • Follow-up     MWM 6 wk fu,         Patient ID: Noemí Sibley  is a 41 y o  female with excess weight/obesity here to pursue weight managment    Patient is pursuing Conservative Program      HPI here for MWM followup  She is taking topamax and phentermine  It does seem like it is helping with her appetite control  She has had recent acid reflux over the last month but it is now better  She did notice it after coke zero    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 and so has not been as structured before with exercise or meal planning    Also she has had a loss of height  Used to be 69 inches and today 67 inches  Her  has noticed this also  She has not had prolonged steroid use, radiation/chemotherapy, she has had hysterectomy in 2020  Wt Readings from Last 10 Encounters:   02/02/23 109 kg (240 lb)   01/26/23 111 kg (244 lb)   12/19/22 113 kg (248 lb 6 4 oz)   12/02/22 109 kg (240 lb)   11/14/22 109 kg (240 lb)   11/01/22 111 kg (244 lb 6 4 oz)   10/17/22 111 kg (245 lb 1 6 oz)   09/01/22 112 kg (246 lb 11 1 oz)   05/23/22 105 kg (231 lb)   02/24/22 100 kg (221 lb)       Food logging:not currently  Increased appetite/cravings:yes more in the PM  Fruit/Vegetable servings:  Exercise:not currently  --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 but plans to   Hydration:1/2 gallon water     Diet Recall:  B:oatmeal and vital protein in coffee  L:chicken salad  D:protein, vegetable and sometimes starch  S:     Colonoscopy-Completed    The following portions of the patient's history were reviewed and updated as appropriate: She  has a past medical history of Anesthesia complication, Asthma, [id number] protein-1 tumor predisposition syndrome, CPAP (continuous positive airway pressure) dependence, H/O bilateral breast implants, Intraductal papilloma of breast, left (02/23/2021), Malignant hyperthermia due to anesthesia, Obesity (BMI 30-39 9), Pneumonia, PONV (postoperative nausea and vomiting), Scab, and Sleep apnea    She   Patient Active Problem List    Diagnosis Date Noted   • CLIFF (obstructive sleep apnea)    • Fatigue 10/17/2022   • History of hysterectomy 05/18/2021   • PONV (postoperative nausea and vomiting) 05/18/2021   • Keratosis, actinic 05/17/2021   • Asthma 02/09/2021   • Obesity 02/09/2021   • Symptomatic postsurgical menopause 12/02/2020   • BRCA1 gene mutation positive 09/25/2020   • Sinobronchitis 02/23/2019     She  has a past surgical history that includes Appendectomy; Tonsillectomy; Tympanostomy tube placement (Bilateral); Dilation and curettage of uterus (1997); pr mastectomy simple complete (Bilateral, 2/9/2021); pr tissue expander placement breast reconstruction (Bilateral, 2/9/2021); pr implnt bio implnt for soft tissue reinforcement (Bilateral, 2/9/2021); Colonoscopy; Hysterectomy (N/A, 11/27/2020); Capsulectomy (Bilateral, 5/19/2021); Breast surgery (2/9/2021); Cosmetic surgery (2021); pr revision of reconstructed breast (Bilateral, 8/6/2021); pr revision of reconstructed breast (Bilateral, 11/18/2021); BREAST IMPLANT (Left, 11/18/2021); Mastectomy; pr nipple/areola reconstruction (Bilateral, 2/24/2022); and Liposuction w/ fat injection (Bilateral, 12/2/2022)  Her family history includes BRCA1 Positive in her maternal uncle and sister; Breast cancer (age of onset: 36) in her cousin; Cancer in her father and maternal grandfather; No Known Problems in her daughter, daughter, maternal aunt, mother, paternal aunt, paternal aunt, paternal aunt, paternal uncle, paternal uncle, and sister; Ovarian cancer (age of onset: 46) in her other  She  reports that she has never smoked  She has never used smokeless tobacco  She reports current alcohol use of about 1 0 standard drink per week  She reports that she does not use drugs    Current Outpatient Medications   Medication Sig Dispense Refill   • albuterol (PROVENTIL HFA,VENTOLIN HFA) 90 mcg/act inhaler inhale 2 puffs by mouth and INTO THE LUNGS every 4 to 6 hours if needed     • estradiol (Vivelle-Dot) 0 075 MG/24HR Place 1 patch on the skin 2 (two) times a week 8 patch 6   • Multiple Vitamins-Minerals (ONE-A-DAY WOMENS PO) Take by mouth daily      • phentermine 30 MG capsule Take 1 capsule (30 mg total) by mouth every morning 30 capsule 1   • Probiotic Product (Pro-biotic Blend) CAPS Take by mouth daily      • topiramate (Topamax) 50 MG tablet Take 1 tablet (50 mg total) by mouth every 12 (twelve) hours 60 tablet 1   • cholecalciferol (VITAMIN D3) 1,000 units tablet Take 1,000 Units by mouth daily (Patient not taking: Reported on 1/26/2023)     • Multiple Vitamins-Minerals (ZINC PO) Take by mouth (Patient not taking: Reported on 1/26/2023)       No current facility-administered medications for this visit  Current Outpatient Medications on File Prior to Visit   Medication Sig   • albuterol (PROVENTIL HFA,VENTOLIN HFA) 90 mcg/act inhaler inhale 2 puffs by mouth and INTO THE LUNGS every 4 to 6 hours if needed   • estradiol (Vivelle-Dot) 0 075 MG/24HR Place 1 patch on the skin 2 (two) times a week   • Multiple Vitamins-Minerals (ONE-A-DAY WOMENS PO) Take by mouth daily    • Probiotic Product (Pro-biotic Blend) CAPS Take by mouth daily    • [DISCONTINUED] phentermine 30 MG capsule Take 1 capsule (30 mg total) by mouth every morning   • [DISCONTINUED] topiramate (Topamax) 25 mg tablet Take 1 tablet (25 mg total) by mouth 2 (two) times a day   • cholecalciferol (VITAMIN D3) 1,000 units tablet Take 1,000 Units by mouth daily (Patient not taking: Reported on 1/26/2023)   • Multiple Vitamins-Minerals (ZINC PO) Take by mouth (Patient not taking: Reported on 1/26/2023)   • [DISCONTINUED] Insulin Pen Needle 32G X 4 MM MISC Use in the morning (Patient not taking: Reported on 1/26/2023)     No current facility-administered medications on file prior to visit  She is allergic to penicillins       Review of Systems   Constitutional: Negative for fatigue and fever  Respiratory: Negative for shortness of breath  Cardiovascular: Negative for chest pain and palpitations     Gastrointestinal: Negative for abdominal pain, constipation, diarrhea and vomiting  +GERD-diet depended and resolving   Genitourinary: Negative for difficulty urinating  Musculoskeletal: Negative for arthralgias and back pain  Skin: Negative for rash  Neurological: Negative for headaches  Psychiatric/Behavioral: Negative for dysphoric mood  The patient is not nervous/anxious  Objective:    /74   Pulse 87   Wt 109 kg (240 lb)   LMP 10/15/2020 (Exact Date)   SpO2 99%   BMI 37 59 kg/m²      Physical Exam  Vitals and nursing note reviewed  Constitutional:       General: She is not in acute distress  Appearance: She is well-developed  She is obese  HENT:      Head: Normocephalic and atraumatic  Eyes:      Conjunctiva/sclera: Conjunctivae normal    Neck:      Thyroid: No thyromegaly  Pulmonary:      Effort: Pulmonary effort is normal  No respiratory distress  Skin:     Findings: No rash (visible)  Neurological:      Mental Status: She is alert and oriented to person, place, and time     Psychiatric:         Mood and Affect: Mood normal          Behavior: Behavior normal

## 2023-02-03 ENCOUNTER — TELEPHONE (OUTPATIENT)
Dept: SURGICAL ONCOLOGY | Facility: CLINIC | Age: 48
End: 2023-02-03

## 2023-03-01 ENCOUNTER — HOSPITAL ENCOUNTER (OUTPATIENT)
Dept: RADIOLOGY | Facility: MEDICAL CENTER | Age: 48
Discharge: HOME | End: 2023-03-01

## 2023-03-01 DIAGNOSIS — N95.1 MENOPAUSAL STATE: ICD-10-CM

## 2023-03-01 DIAGNOSIS — Z13.820 SCREENING FOR OSTEOPOROSIS: ICD-10-CM

## 2023-03-01 DIAGNOSIS — R29.890 LOSS OF HEIGHT: ICD-10-CM

## 2023-03-03 ENCOUNTER — OFFICE VISIT (OUTPATIENT)
Dept: GYNECOLOGIC ONCOLOGY | Facility: CLINIC | Age: 48
End: 2023-03-03

## 2023-03-03 VITALS
SYSTOLIC BLOOD PRESSURE: 130 MMHG | OXYGEN SATURATION: 99 % | HEART RATE: 73 BPM | DIASTOLIC BLOOD PRESSURE: 68 MMHG | WEIGHT: 238 LBS | HEIGHT: 67 IN | BODY MASS INDEX: 37.35 KG/M2

## 2023-03-03 DIAGNOSIS — Z15.09 BRCA1 GENE MUTATION POSITIVE: Primary | ICD-10-CM

## 2023-03-03 DIAGNOSIS — Z80.0 FAMILY HISTORY OF COLON CANCER: ICD-10-CM

## 2023-03-03 DIAGNOSIS — M85.80 OSTEOPENIA, UNSPECIFIED LOCATION: ICD-10-CM

## 2023-03-03 DIAGNOSIS — Z15.01 BRCA1 GENE MUTATION POSITIVE: Primary | ICD-10-CM

## 2023-03-03 DIAGNOSIS — E89.41 SYMPTOMATIC POSTSURGICAL MENOPAUSE: ICD-10-CM

## 2023-03-03 RX ORDER — ESTRADIOL 0.07 MG/D
1 FILM, EXTENDED RELEASE TRANSDERMAL 2 TIMES WEEKLY
Qty: 8 PATCH | Refills: 11 | Status: SHIPPED | OUTPATIENT
Start: 2023-03-06

## 2023-03-03 NOTE — PATIENT INSTRUCTIONS
Please take 1500 mg calcium daily (in divided doses) and vitamin D 1000 IU to prevent further bone loss

## 2023-03-03 NOTE — PROGRESS NOTES
Assessment/Plan:    Problem List Items Addressed This Visit        Musculoskeletal and Integument    Osteopenia     DEXA from 3/1/23  Encouraged calcium 1500 mg daily (divided doses) and vitamin D 1000 IU daily  Other    BRCA1 gene mutation positive - Primary     79-year-old with a known BRCA1 mutation who is s/p prophylactic TLH, BSO, and b/l mastectomy with breast reconstruction  Her clinical exam is benign and without evidence of peritoneal disease  Her menopausal symptoms are well controlled with transdermal estrogen       Return to the office in 1 year for continued BRCA surveillance  Symptomatic postsurgical menopause     Well controlled on transdermal estrogen  Refill provided  Relevant Medications    estradiol (Vivelle-Dot) 0 075 MG/24HR (Start on 3/6/2023)   Other Visit Diagnoses     Family history of colon cancer        Relevant Orders    Ambulatory Referral to Oncology Genetics            CHIEF COMPLAINT:   BRCA surveillance    Problem:  BRCA 1 mutation    Previous therapy:  1  TLH, BSO and cystoscopy on 11/27/20  A  Benign       2  Bilateral mastectomy with recent fat grafting  Patient ID: Abe Goldmann is a 41 y o  female  who has no new complaints today  No vaginal bleeding, abdominal/pelvic pain  Normal bowel and bladder function  The patient underwent b/l breast fat grafting in the interim  Quality of life is good  The following portions of the patient's history were reviewed and updated as appropriate: allergies, current medications, past medical history, past surgical history and problem list     Review of Systems   Constitutional: Negative  HENT: Negative  Eyes: Negative  Respiratory: Negative  Cardiovascular: Negative  Gastrointestinal: Negative  Genitourinary: Negative  Musculoskeletal: Negative  Skin: Negative  Neurological: Negative  Psychiatric/Behavioral: Negative          Current Outpatient Medications   Medication Sig Dispense Refill   • albuterol (PROVENTIL HFA,VENTOLIN HFA) 90 mcg/act inhaler inhale 2 puffs by mouth and INTO THE LUNGS every 4 to 6 hours if needed     • cholecalciferol (VITAMIN D3) 1,000 units tablet Take 1,000 Units by mouth daily     • [START ON 3/6/2023] estradiol (Vivelle-Dot) 0 075 MG/24HR Place 1 patch on the skin 2 (two) times a week 8 patch 11   • Multiple Vitamins-Minerals (ONE-A-DAY WOMENS PO) Take by mouth daily      • Multiple Vitamins-Minerals (ZINC PO) Take by mouth     • phentermine 30 MG capsule Take 1 capsule (30 mg total) by mouth every morning 30 capsule 1   • Probiotic Product (Pro-biotic Blend) CAPS Take by mouth daily      • topiramate (Topamax) 50 MG tablet Take 1 tablet (50 mg total) by mouth every 12 (twelve) hours 60 tablet 1     No current facility-administered medications for this visit  Objective:    Blood pressure 130/68, pulse 73, height 5' 7" (1 702 m), weight 108 kg (238 lb), last menstrual period 10/15/2020, SpO2 99 %, not currently breastfeeding  Body mass index is 37 28 kg/m²  Body surface area is 2 18 meters squared  Physical Exam  Vitals reviewed  Exam conducted with a chaperone present  Constitutional:       General: She is not in acute distress  Appearance: Normal appearance  She is not ill-appearing  HENT:      Head: Normocephalic and atraumatic  Mouth/Throat:      Mouth: Mucous membranes are moist    Eyes:      General:         Right eye: No discharge  Left eye: No discharge  Conjunctiva/sclera: Conjunctivae normal    Pulmonary:      Effort: Pulmonary effort is normal    Chest:      Comments: Nodularity at left lateral breast line  Recent reconstruction  To defer to plastics/surg-onc for further evaluation  Abdominal:      Palpations: Abdomen is soft  There is no mass  Tenderness: There is no abdominal tenderness  Hernia: No hernia is present  Genitourinary:     Comments:  The external female genitalia is normal  The bartholin's, uretheral and skenes glands are normal  The urethral meatus is normal (midline with no lesions)  Anus without fissure or lesion  Speculum exam reveals a grossly normal vagina  No masses, lesions,discharge or bleeding  No significant cystocele or rectocele noted  Bimanual exam notes a surgical absent cervix, uterus and adnexal structures  No masses or fullness  Bladder is without fullness, mass or tenderness  Musculoskeletal:      Right lower leg: No edema  Left lower leg: No edema  Skin:     General: Skin is warm and dry  Coloration: Skin is not jaundiced  Findings: No rash  Neurological:      General: No focal deficit present  Mental Status: She is alert and oriented to person, place, and time  Cranial Nerves: No cranial nerve deficit  Sensory: No sensory deficit  Motor: No weakness  Gait: Gait normal    Psychiatric:         Mood and Affect: Mood normal          Behavior: Behavior normal          Thought Content:  Thought content normal          Judgment: Judgment normal

## 2023-03-03 NOTE — ASSESSMENT & PLAN NOTE
22-year-old with a known BRCA1 mutation who is s/p prophylactic TLH, BSO, and b/l mastectomy with breast reconstruction  Her clinical exam is benign and without evidence of peritoneal disease  Her menopausal symptoms are well controlled with transdermal estrogen       Return to the office in 1 year for continued BRCA surveillance

## 2023-03-06 ENCOUNTER — OFFICE VISIT (OUTPATIENT)
Dept: SURGICAL ONCOLOGY | Facility: CLINIC | Age: 48
End: 2023-03-06

## 2023-03-06 VITALS
HEART RATE: 67 BPM | DIASTOLIC BLOOD PRESSURE: 75 MMHG | SYSTOLIC BLOOD PRESSURE: 125 MMHG | HEIGHT: 67 IN | OXYGEN SATURATION: 99 % | BODY MASS INDEX: 37.2 KG/M2 | WEIGHT: 237 LBS | RESPIRATION RATE: 18 BRPM

## 2023-03-06 DIAGNOSIS — Z15.01 BRCA1 GENE MUTATION POSITIVE: Primary | ICD-10-CM

## 2023-03-06 DIAGNOSIS — Z15.09 BRCA1 GENE MUTATION POSITIVE: Primary | ICD-10-CM

## 2023-03-06 DIAGNOSIS — N63.21 MASS OF UPPER OUTER QUADRANT OF LEFT BREAST: ICD-10-CM

## 2023-03-06 NOTE — PROGRESS NOTES
Surgical Oncology Follow Up       [address]  [facility]  [address] [address]    Erika Nathjosh  1975  [id number]  [address] [facility]  [facility]  [address] [address]    Chief Complaint   Patient presents with   • Follow-up       Assessment/Plan:    1  BRCA1 gene mutation positive  - 1 year follow up  - s/p b/l mastectomies  - follows annually with gyn onc    2  Mass of upper outer quadrant of left breast  - US breast left limited (diagnostic); Future      Discussion/Summary: Patient is a 51-year-old female presenting for a 1 year follow-up status post prophylactic bilateral mastectomies with Dr Verenice Merlos in   February 2021 and complaints of lumps of left breast   She is BRCA1 positive  She is currently following with GYN oncology and had breast reconstruction with Dr Chanel Tejada  She states she underwent fat grafting in December of last year and recently noticed mulptiple lumps of the left breast 1 month ago  She also notes that along the mastectomy scar/implant bridge towards her left axilla she feels an itchy sensation that she cannot scratch and also feels like there is a " wire" in the skin  On clinical exam I noted 3 areas of nodularity which I suspect are fat necrosis on the left breast located superior to the left nipple between the 12 to 2 o'clock position  There were no concerns near her axilla  I stated she may have scar tissue in this area that she is feeling on clinical exam   I will order an ultrasound of the left breast for further evaluation at this time  I will tentatively plan to see her back in 1 year for a follow-up  Patient states she is following with GYN onc annually however she has had a hysterectomy  I will call her with the results of the ultrasound  She was instructed to call with any questions or concerns prior to her next visit    All questions were answered today  History of Present Illness:     Oncology History    No history exists         -Interval History:  Patient is a 22-year-old female presenting for a 1 year follow-up status post prophylactic bilateral mastectomies with Dr Matias Childress in   February 2021 and complaints of lumps of left breast   She states she underwent fat grafting in December of last year and recently noticed mulptiple lumps of the left breast 1 month ago  She also notes that along the mastectomy scar/implant bridge towards her left axilla she feels an itchy sensation that she cannot scratch and also feels like there is a " wire" in the skin  She denies further changes on her breast exam     Review of Systems:  Review of Systems   Constitutional: Negative for activity change, appetite change, fatigue and unexpected weight change  Respiratory: Negative for cough and shortness of breath  Cardiovascular: Negative for chest pain  Gastrointestinal: Negative for abdominal pain, diarrhea, nausea and vomiting  Endocrine: Negative for heat intolerance  Musculoskeletal: Negative for arthralgias, back pain and myalgias  Skin: Negative for rash  Neurological: Negative for weakness and headaches  Hematological: Negative for adenopathy         Patient Active Problem List   Diagnosis   • Sinobronchitis   • BRCA1 gene mutation positive   • Symptomatic postsurgical menopause   • Asthma   • Obesity   • Keratosis, actinic   • History of hysterectomy   • Malignant neoplasm of left female breast (HCC)   • PONV (postoperative nausea and vomiting)   • Fatigue   • CLIFF (obstructive sleep apnea)   • Osteopenia     Past Medical History:   Diagnosis Date   • Anesthesia complication     daughter has malignant hyperthermia   • Asthma     resolved w/ wt loss; is now having asthma sx due to recent hx of having covid 8/2022   • BRCA1-associated protein-1 tumor predisposition syndrome    • CPAP (continuous positive airway pressure) dependence    • H/O bilateral breast implants    • Intraductal papilloma of breast, left 02/23/2021   • Malignant hyperthermia due to anesthesia     has family hx of MH, her daughter   • Obesity (BMI 30-39  9)    • Pneumonia     2019   • PONV (postoperative nausea and vomiting)    • Scab     back and right thigh from few precancerous lesions taken off   • Sleep apnea      Past Surgical History:   Procedure Laterality Date   • APPENDECTOMY     • BREAST IMPLANT Left 11/18/2021    Procedure: FILL SALINE BREAST IMPLANT;  Surgeon: Bianka Chanel MD;  Location: [address] [place] [address] MAIN OR;  Service: Plastics   • BREAST SURGERY  2/9/2021    Bilateral mastectomy   • CAPSULOTOMY Bilateral 5/19/2021    Procedure: CAPSULECTOMY, EXPANDER/IMPLANT EXCHANGE;  Surgeon: Marycruz Martines MD;  Location: [address] [place] [address] MAIN OR;  Service: Plastics   • COLONOSCOPY     • COSMETIC SURGERY  2021    Reconstruction of breast   • DILATION AND CURETTAGE OF UTERUS  1997   • HYSTERECTOMY N/A 11/27/2020    Procedure: TOTAL LAPAROSCOPIC HYSTERECTOMY, BILATERAL SALPINGO-OOPHORECTOMY, cystoscopy;  Surgeon: Daly Adair MD;  Location: [facility] MAIN OR;  Service: Gynecology Oncology   • LIPOSUCTION W/ FAT INJECTION Bilateral 12/2/2022    Procedure: FAT GRAFTING TO BREAST;  Surgeon: Bianka Chanel MD;  Location: [address] [place] [address] MAIN OR;  Service: Plastics   • MASTECTOMY     • UT IMPLNT BIO IMPLNT FOR SOFT TISSUE REINFORCEMENT Bilateral 2/9/2021    Procedure: RECONSTRUCTION BREAST W/ IMPLANT;  Surgeon: Marycruz Martines MD;  Location: [facility] Main OR;  Service: Plastics   • UT MASTECTOMY SIMPLE COMPLETE Bilateral 2/9/2021    Procedure: BREAST MASTECTOMY;  Surgeon: Daniel Jarvis MD;  [facility] Main [facility];  Service: Surgical Oncology   • UT NIPPLE/AREOLA RECONSTRUCTION Bilateral 2/24/2022    Procedure: NIPPLE RECONSTRUCTION;  Surgeon: Bianka Chanel MD;  Location: [address] [place] [address] MAIN OR;  Service: Plastics   • UT REVISION OF RECONSTRUCTED BREAST Bilateral 8/6/2021    Procedure: BREAST RECONSTRUCITON REVISION; EXCISION OF STANDING DEFORMITIES;  Surgeon: Jesus Delgadillo MD;  Location: [facility] Main OR;  Service: Plastics   • CO REVISION OF RECONSTRUCTED BREAST Bilateral 11/18/2021    Procedure: REVISE BREAST RECONSTRUCTION;  Surgeon: Jesus Delgadillo MD;  Location: [facility] MAIN OR;  Service: Plastics   • CO TISSUE EXPANDER PLACEMENT BREAST RECONSTRUCTION Bilateral 2/9/2021    Procedure: BREAST INSERTION/PLACEMENT TISSUE EXPANDER (EXCHANGE); Surgeon: Alexandre Marks MD;  Location:  Main OR;  Service: Plastics   • TONSILLECTOMY     • TYMPANOSTOMY TUBE PLACEMENT Bilateral      Family History   Problem Relation Age of Onset   • No Known Problems Mother    • Cancer Father         Age at [address] unknown; type unknown   • BRCA1 Positive Sister    • No Known Problems Sister    • No Known Problems Maternal Aunt    • BRCA1 Positive Maternal Uncle    • No Known Problems Paternal Aunt    • No Known Problems Paternal Aunt    • No Known Problems Paternal Aunt    • No Known Problems Paternal Uncle    • No Known Problems Paternal Uncle    • Cancer Maternal Grandfather    • No Known Problems Daughter    • No Known Problems Daughter    • Breast cancer Cousin 36   • Ovarian cancer Other 46     Social History     Socioeconomic History   • Marital status: /Civil Union     Spouse name: Not on file   • Number of children: Not on file   • Years of education: Not on file   • Highest education level: Not on file   Occupational History   • Not on file   Tobacco Use   • Smoking status: Never   • Smokeless tobacco: Never   • Tobacco comments:     Denies   Vaping Use   • Vaping Use: Never used   Substance and Sexual Activity   • Alcohol use:  Yes     Alcohol/week: 1 0 standard drink     Types: 1 Standard drinks or equivalent per week     Comment: social, monthly   • Drug use: No   • Sexual activity: Yes     Partners: Male     Birth control/protection: Female Sterilization   Other Topics Concern   • Not on file   Social History Narrative   • Not on file     Social Determinants of Health     Financial Resource Strain: Not on file   Food Insecurity: Not on file   Transportation Needs: Not on file   Physical Activity: Not on file   Stress: Not on file   Social Connections: Not on file   Intimate Partner Violence: Not on file   Housing Stability: Not on file       Current Outpatient Medications:   •  albuterol (PROVENTIL HFA,VENTOLIN HFA) 90 mcg/act inhaler, inhale 2 puffs by mouth and INTO THE LUNGS every 4 to 6 hours if needed, Disp: , Rfl:   •  cholecalciferol (VITAMIN D3) 1,000 units tablet, Take 1,000 Units by mouth daily, Disp: , Rfl:   •  estradiol (Vivelle-Dot) 0 075 MG/24HR, Place 1 patch on the skin 2 (two) times a week, Disp: 8 patch, Rfl: 11  •  Multiple Vitamins-Minerals (ONE-A-DAY WOMENS PO), Take by mouth daily , Disp: , Rfl:   •  Multiple Vitamins-Minerals (ZINC PO), Take by mouth, Disp: , Rfl:   •  phentermine 30 MG capsule, Take 1 capsule (30 mg total) by mouth every morning, Disp: 30 capsule, Rfl: 1  •  Probiotic Product (Pro-biotic Blend) CAPS, Take by mouth daily , Disp: , Rfl:   •  topiramate (Topamax) 50 MG tablet, Take 1 tablet (50 mg total) by mouth every 12 (twelve) hours, Disp: 60 tablet, Rfl: 1  Allergies   Allergen Reactions   • Penicillins Other (See Comments)     Childhood reaction; unknown reaction- tolerated Ancef     Vitals:    03/06/23 1435   BP: 125/75   Pulse: 67   Resp: 18   SpO2: 99%       Physical Exam  Constitutional:       General: She is not in acute distress  Appearance: Normal appearance  Cardiovascular:      Rate and Rhythm: Normal rate and regular rhythm  Pulses: Normal pulses  Heart sounds: Normal heart sounds  Pulmonary:      Effort: Pulmonary effort is normal       Breath sounds: Normal breath sounds  Chest:      Chest wall: No mass  Breasts:     Right: No swelling, bleeding, mass, skin change or tenderness  Left: Mass and tenderness present  No swelling, bleeding or skin change            Comments: Bilateral mastectomy scars with implants present  3 areas of nodularity above the left nipple  Tenderness along the left implant ridge towards the left axilla  Abdominal:      General: Abdomen is flat  Palpations: Abdomen is soft  Lymphadenopathy:      Upper Body:      Right upper body: No supraclavicular, axillary or pectoral adenopathy  Left upper body: No supraclavicular, axillary or pectoral adenopathy  Skin:     General: Skin is warm  Neurological:      General: No focal deficit present  Mental Status: She is alert and oriented to person, place, and time  Psychiatric:         Mood and Affect: Mood normal          Behavior: Behavior normal            Results:    Imaging  DXA bone density spine hip and pelvis    Result Date: 3/2/2023  Narrative: DXA SCAN CLINICAL HISTORY: 41 years postmenopausal female   OTHER RISK FACTORS:  None  PHARMACOLOGIC THERAPY FOR OSTEOPOROSIS:  None  TECHNIQUE: Bone densitometry was performed using a [employer] Horizon A  bone densitometer  Regions of interest appear properly placed  COMPARISON: There are no prior DXA studies performed on this unit for comparison  RESULTS: LUMBAR SPINE Level: L1-L4 : BMD:  0 923  gm/cm2 T-score: -1 1 LEFT  TOTAL HIP: BMD:  0 999  gm/cm2 T-score:  0 5 LEFT  FEMORAL NECK: BMD:  0 833  gm/cm2 T score: -0 1     Impression: 1  Low bone mass (osteopenia)  [Based on the lumbar spine] 2  The 10 year risk of hip fracture is less than 0 1% with the 10 year risk of major osteoporotic fracture being 2 4% as calculated by the [facility] fracture risk assessment tool (FRAX, which is based on data generated by the [facility])   3   The current NOF guidelines recommend treating patients with a T-score of -2 5 or less in the lumbar spine or hips, or in post-menopausal women and men over the age of 48 with low bone mass (osteopenia) and a FRAX 10 year risk score of >3% for hip fracture and/or >20% for major osteoporotic fracture  4   The NOF recommends follow-up DXA in 1-2 years after initiating therapy for osteoporosis and every 2 years thereafter  More frequent evaluation is appropriate for patients with conditions associated with rapid bone loss, such as glucocorticoid therapy  The interval between DXA screenings may be longer for individuals without major risk factors and initial T-score in the normal or upper low bone mass range  The FRAX algorithm has certain limitations: -FRAX has not been validated in patients currently or previously treated with pharmacotherapy for osteoporosis  In such patients, clinical judgment must be exercised in interpreting FRAX scores  -Prior hip, vertebral and humeral fragility fractures appear to confer greater risk of subsequent fracture than fractures at other sites (this is especially true for individuals with severe vertebral fractures), but quantification of this incremental risk is not possible with FRAX  -FRAX underestimates fracture risk in patients with history of multiple fragility fractures  -FRAX may underestimate fracture risk in patients with history of frequent falls  -It is not appropriate to use FRAX to monitor treatment response  WHO CLASSIFICATION: Normal (a T-score of -1 0 or higher) Low bone mineral density (a T-score of less than -1 0 but higher than -2 5) Osteoporosis (a T-score of -2 5 or less) Severe osteoporosis (a T-score of -2 5 or less with a fragility fracture) Workstation performed: [id number]       I reviewed the above imaging data  Advance Care Planning/Advance Directives:  Discussed disease status, cancer treatment plans and/or cancer treatment goals with the patient

## 2023-03-08 ENCOUNTER — TELEPHONE (OUTPATIENT)
Dept: GENETICS | Facility: CLINIC | Age: 48
End: 2023-03-08

## 2023-03-08 NOTE — TELEPHONE ENCOUNTER
I called Stacy Barrera to schedule a new patient appointment with the Cancer Risk and Genetics Program       Outcome:  Genetics appointment scheduled for 7/26 at 10:30    Personal/Family History Related to Appointment:  Fhx of colon cancer (mucinous adenocarcinoma colon ) - he had genetic test but results are unknown, she was encouraged to have updated testing, mom brca1 carrier  Non-Taoism  History of Genetic Testing:  Patient reports personal history of genetic testing on Brca 1 + 2020  Eduardo Reddy     Genetics Family History Questionnaire:  I confirmed the patient's e-mail on file as the best e-mail to send an invite link for our genetics family history intake

## 2023-03-31 DIAGNOSIS — E66.01 CLASS 2 SEVERE OBESITY DUE TO EXCESS CALORIES WITH SERIOUS COMORBIDITY AND BODY MASS INDEX (BMI) OF 37.0 TO 37.9 IN ADULT (HCC): Chronic | ICD-10-CM

## 2023-04-03 DIAGNOSIS — E66.01 CLASS 2 SEVERE OBESITY DUE TO EXCESS CALORIES WITH SERIOUS COMORBIDITY AND BODY MASS INDEX (BMI) OF 37.0 TO 37.9 IN ADULT (HCC): Chronic | ICD-10-CM

## 2023-04-03 RX ORDER — TOPIRAMATE 50 MG/1
TABLET, FILM COATED ORAL
Qty: 60 TABLET | Refills: 1 | Status: SHIPPED | OUTPATIENT
Start: 2023-04-03

## 2023-04-03 RX ORDER — TOPIRAMATE 50 MG/1
50 TABLET, FILM COATED ORAL EVERY 12 HOURS SCHEDULED
Qty: 60 TABLET | Refills: 1 | Status: SHIPPED | OUTPATIENT
Start: 2023-04-03

## 2023-04-05 ENCOUNTER — HOSPITAL ENCOUNTER (OUTPATIENT)
Dept: RADIOLOGY | Facility: HOSPITAL | Age: 48
Discharge: HOME | End: 2023-04-05

## 2023-04-05 ENCOUNTER — TELEPHONE (OUTPATIENT)
Dept: SURGICAL ONCOLOGY | Facility: CLINIC | Age: 48
End: 2023-04-05

## 2023-04-05 DIAGNOSIS — R92.8 ABNORMAL FINDING ON BREAST IMAGING: Primary | ICD-10-CM

## 2023-04-05 DIAGNOSIS — N63.21 MASS OF UPPER OUTER QUADRANT OF LEFT BREAST: ICD-10-CM

## 2023-04-05 DIAGNOSIS — N64.4 BREAST PAIN, RIGHT: ICD-10-CM

## 2023-04-05 NOTE — TELEPHONE ENCOUNTER
Called and scheduled US for 10/11/23 at 8:30am at [facility]  Called and left message and informed patient of appointment  Provided numbers for hope line and central scheduling just in case

## 2023-04-05 NOTE — TELEPHONE ENCOUNTER
----- Message from Cesilia Ding, [address] sent at 4/5/2023 10:06 AM EDT -----  Regarding: schedule u/s  Hello,     I just placed an order for 6 mo follow up u/s for Michelle  She is aware  Can someone get this scheduled please? Thank you!

## 2023-05-09 PROBLEM — J34.0 NASAL SEPTAL ULCER: Status: ACTIVE | Noted: 2023-05-09

## 2023-05-15 ENCOUNTER — OFFICE VISIT (OUTPATIENT)
Dept: BARIATRICS | Facility: CLINIC | Age: 48
End: 2023-05-15

## 2023-05-15 VITALS
DIASTOLIC BLOOD PRESSURE: 75 MMHG | BODY MASS INDEX: 37.01 KG/M2 | RESPIRATION RATE: 16 BRPM | WEIGHT: 235.8 LBS | HEART RATE: 86 BPM | SYSTOLIC BLOOD PRESSURE: 125 MMHG | HEIGHT: 67 IN

## 2023-05-15 DIAGNOSIS — E66.01 CLASS 2 SEVERE OBESITY DUE TO EXCESS CALORIES WITH SERIOUS COMORBIDITY AND BODY MASS INDEX (BMI) OF 36.0 TO 36.9 IN ADULT (HCC): Primary | Chronic | ICD-10-CM

## 2023-05-15 DIAGNOSIS — E66.01 CLASS 2 SEVERE OBESITY DUE TO EXCESS CALORIES WITH SERIOUS COMORBIDITY AND BODY MASS INDEX (BMI) OF 37.0 TO 37.9 IN ADULT (HCC): Chronic | ICD-10-CM

## 2023-05-15 DIAGNOSIS — G47.33 OSA (OBSTRUCTIVE SLEEP APNEA): ICD-10-CM

## 2023-05-15 RX ORDER — TOPIRAMATE 50 MG/1
50 TABLET, FILM COATED ORAL EVERY 12 HOURS SCHEDULED
Qty: 60 TABLET | Refills: 1 | Status: SHIPPED | OUTPATIENT
Start: 2023-05-15

## 2023-05-15 RX ORDER — PHENTERMINE HYDROCHLORIDE 37.5 MG/1
37.5 CAPSULE ORAL EVERY MORNING
Qty: 30 CAPSULE | Refills: 1 | Status: SHIPPED | OUTPATIENT
Start: 2023-05-15

## 2023-05-15 RX ORDER — TOPIRAMATE 50 MG/1
50 TABLET, FILM COATED ORAL EVERY 12 HOURS
Qty: 60 TABLET | Refills: 1 | Status: SHIPPED | OUTPATIENT
Start: 2023-05-15 | End: 2023-05-23 | Stop reason: SDUPTHER

## 2023-05-15 NOTE — PROGRESS NOTES
Assessment/Plan:    Obesity  -Patient is pursuing Conservative Program  -Initial weight loss goal of 5-10% weight loss for improved health  -Screening labs CMP 9/1/22 but TSH, cbc wnl 2021    Initial:235 8   Current:235 8    Goals:  -recommend food logging  1400 calorie goal a day on rest day and 1550 exercise  -discussed having a PM snack to try to reduce dinner portion size  -to have at min  2 cirkul water bottles a day  -to start using peloton again 30 minutes 3 times a week    To continue phentermine and topamax  Start weight 248 3 on 12/19/22  5 % weight loss  EKGs reviewed from last month  Did not have any side effects prior with either medication  Denies glaucoma, renal stones, and has hx of hysterectomy  -She was on saxenda prior with weight gain  CLIFF (obstructive sleep apnea)  Continue to wear CPAP        Follow up in approximately 3 months with Non-Surgical Physician/Advanced Practitioner  Diagnoses and all orders for this visit:    Class 2 severe obesity due to excess calories with serious comorbidity and body mass index (BMI) of 36 0 to 36 9 in adult (HCC)  -     phentermine 37 5 MG capsule; Take 1 capsule (37 5 mg total) by mouth every morning    Class 2 severe obesity due to excess calories with serious comorbidity and body mass index (BMI) of 37 0 to 37 9 in adult (HCC)  -     topiramate (Topamax) 50 MG tablet; Take 1 tablet (50 mg total) by mouth every 12 (twelve) hours  -     topiramate (TOPAMAX) 50 MG tablet; Take 1 tablet (50 mg total) by mouth every 12 (twelve) hours    CLIFF (obstructive sleep apnea)          Subjective:   Chief Complaint   Patient presents with   • Follow-up     MWM 2mth f/u; Waist-41 5in        Patient ID: Sho Hawk  is a 57 y o  female with excess weight/obesity here to pursue weight managment  Patient is pursuing Conservative Program      HPI  She will be seeing gynecology to discuss hormonal imbalance next week    She used to take phentermine/topamax and lost a significant amount of weight but is not and does feel her diet is better  She has been using peloton for last month and has introduced protein shake in the AM  She does feel like in the evening appetite is bigger and she is eating larger portion of dinner  Wt Readings from Last 10 Encounters:   05/15/23 107 kg (235 lb 12 8 oz)   03/06/23 108 kg (237 lb)   03/03/23 108 kg (238 lb)   02/02/23 109 kg (240 lb)   01/26/23 111 kg (244 lb)   12/19/22 113 kg (248 lb 6 4 oz)   12/02/22 109 kg (240 lb)   11/14/22 109 kg (240 lb)   11/01/22 111 kg (244 lb 6 4 oz)   10/17/22 111 kg (245 lb 1 6 oz)       Food logging:no  Increased appetite/cravings:evenings  Fruit/Vegetable servings:  Exercise:peloton 3 times week at least 30minutes x 1 month  Hydration: at least 50 oz water, fairlife and water    Colonoscopy-Completed    Diet Recall:  B:[employer] fairlife and vital protein collagen with coffee   L:skips  D: 90/10 ground beef burger w/corn    The following portions of the patient's history were reviewed and updated as appropriate:   She  has a past medical history of Anesthesia complication, Asthma, [id number] protein-1 tumor predisposition syndrome, CPAP (continuous positive airway pressure) dependence, H/O bilateral breast implants, Intraductal papilloma of breast, left (02/23/2021), Malignant hyperthermia due to anesthesia, Obesity (BMI 30-39 9), Pneumonia, PONV (postoperative nausea and vomiting), Scab, and Sleep apnea    She   Patient Active Problem List    Diagnosis Date Noted   • Nasal septal ulcer 05/09/2023   • Osteopenia 03/03/2023   • CLIFF (obstructive sleep apnea)    • Fatigue 10/17/2022   • History of hysterectomy 05/18/2021   • PONV (postoperative nausea and vomiting) 05/18/2021   • Keratosis, actinic 05/17/2021   • Asthma 02/09/2021   • Obesity 02/09/2021   • Symptomatic postsurgical menopause 12/02/2020   • BRCA1 gene mutation positive 09/25/2020   • Sinobronchitis 02/23/2019     She  has a past surgical history that includes Appendectomy; Tonsillectomy; Tympanostomy tube placement (Bilateral); Dilation and curettage of uterus (1997); pr mastectomy simple complete (Bilateral, 2/9/2021); pr tissue expander placement breast reconstruction (Bilateral, 2/9/2021); pr implnt bio implnt for soft tissue reinforcement (Bilateral, 2/9/2021); Colonoscopy; Hysterectomy (N/A, 11/27/2020); Capsulectomy (Bilateral, 5/19/2021); Breast surgery (2/9/2021); Cosmetic surgery (2021); pr revision of reconstructed breast (Bilateral, 8/6/2021); pr revision of reconstructed breast (Bilateral, 11/18/2021); BREAST IMPLANT (Left, 11/18/2021); Mastectomy; pr nipple/areola reconstruction (Bilateral, 2/24/2022); and Liposuction w/ fat injection (Bilateral, 12/2/2022)  Her family history includes BRCA1 Positive in her maternal uncle and sister; Breast cancer (age of onset: 36) in her cousin; Cancer in her father and maternal grandfather; No Known Problems in her daughter, daughter, maternal aunt, mother, paternal aunt, paternal aunt, paternal aunt, paternal uncle, paternal uncle, and sister; Ovarian cancer (age of onset: 46) in her other  She  reports that she has never smoked  She has never used smokeless tobacco  She reports current alcohol use of about 1 0 standard drink per week  She reports that she does not use drugs    Current Outpatient Medications   Medication Sig Dispense Refill   • cholecalciferol (VITAMIN D3) 1,000 units tablet Take 1,000 Units by mouth daily     • estradiol (Vivelle-Dot) 0 075 MG/24HR Place 1 patch on the skin 2 (two) times a week 8 patch 11   • Multiple Vitamins-Minerals (ONE-A-DAY WOMENS PO) Take by mouth daily      • Multiple Vitamins-Minerals (ZINC PO) Take by mouth     • phentermine 37 5 MG capsule Take 1 capsule (37 5 mg total) by mouth every morning 30 capsule 1   • topiramate (Topamax) 50 MG tablet Take 1 tablet (50 mg total) by mouth every 12 (twelve) hours 60 tablet 1   • topiramate (TOPAMAX) 50 MG tablet Take 1 tablet (50 mg total) by mouth every 12 (twelve) hours 60 tablet 1   • albuterol (PROVENTIL HFA,VENTOLIN HFA) 90 mcg/act inhaler inhale 2 puffs by mouth and INTO THE LUNGS every 4 to 6 hours if needed (Patient not taking: Reported on 5/15/2023)     • Probiotic Product (Pro-biotic Blend) CAPS Take by mouth daily  (Patient not taking: Reported on 5/15/2023)       No current facility-administered medications for this visit  Current Outpatient Medications on File Prior to Visit   Medication Sig   • cholecalciferol (VITAMIN D3) 1,000 units tablet Take 1,000 Units by mouth daily   • estradiol (Vivelle-Dot) 0 075 MG/24HR Place 1 patch on the skin 2 (two) times a week   • Multiple Vitamins-Minerals (ONE-A-DAY WOMENS PO) Take by mouth daily    • Multiple Vitamins-Minerals (ZINC PO) Take by mouth   • [DISCONTINUED] phentermine 30 MG capsule Take 1 capsule (30 mg total) by mouth every morning   • [DISCONTINUED] topiramate (TOPAMAX) 50 MG tablet take 1 tablet by mouth every 12 hours   • [DISCONTINUED] topiramate (Topamax) 50 MG tablet Take 1 tablet (50 mg total) by mouth every 12 (twelve) hours   • albuterol (PROVENTIL HFA,VENTOLIN HFA) 90 mcg/act inhaler inhale 2 puffs by mouth and INTO THE LUNGS every 4 to 6 hours if needed (Patient not taking: Reported on 5/15/2023)   • Probiotic Product (Pro-biotic Blend) CAPS Take by mouth daily  (Patient not taking: Reported on 5/15/2023)     No current facility-administered medications on file prior to visit  She is allergic to penicillins       Review of Systems   Constitutional: Negative for fever  Eyes: Negative for visual disturbance  Respiratory: Negative for cough and shortness of breath  Cardiovascular: Negative for chest pain and palpitations  Gastrointestinal: Negative for abdominal pain and vomiting  Genitourinary: Negative for difficulty urinating and hematuria  Skin: Negative for color change and rash     Neurological: Negative for seizures and "syncope  All other systems reviewed and are negative  Objective:    /75   Pulse 86   Resp 16   Ht 5' 7\" (1 702 m)   Wt 107 kg (235 lb 12 8 oz)   LMP 10/15/2020 (Exact Date)   BMI 36 93 kg/m²      Physical Exam  Vitals and nursing note reviewed  Constitutional:       General: She is not in acute distress  Appearance: She is well-developed  She is obese  HENT:      Head: Normocephalic and atraumatic  Eyes:      Conjunctiva/sclera: Conjunctivae normal    Neck:      Thyroid: No thyromegaly  Pulmonary:      Effort: Pulmonary effort is normal  No respiratory distress  Skin:     Findings: No rash (visible)  Neurological:      Mental Status: She is alert and oriented to person, place, and time     Psychiatric:         Mood and Affect: Mood normal          Behavior: Behavior normal        "

## 2023-05-15 NOTE — ASSESSMENT & PLAN NOTE
-Patient is pursuing Conservative Program  -Initial weight loss goal of 5-10% weight loss for improved health  -Screening labs CMP 9/1/22 but TSH, cbc wnl 2021    Initial:235 8   Current:235 8    Goals:  -recommend food logging  1400 calorie goal a day on rest day and 1550 exercise  -discussed having a PM snack to try to reduce dinner portion size  -to have at min  2 cirkul water bottles a day  -to start using peloton again 30 minutes 3 times a week    To continue phentermine and topamax  Start weight 248 3 on 12/19/22  5 % weight loss  EKGs reviewed from last month  Did not have any side effects prior with either medication  Denies glaucoma, renal stones, and has hx of hysterectomy  -She was on saxenda prior with weight gain

## 2023-05-23 ENCOUNTER — OFFICE VISIT (OUTPATIENT)
Dept: OBGYN CLINIC | Facility: CLINIC | Age: 48
End: 2023-05-23

## 2023-05-23 VITALS
WEIGHT: 236.4 LBS | DIASTOLIC BLOOD PRESSURE: 70 MMHG | SYSTOLIC BLOOD PRESSURE: 110 MMHG | HEIGHT: 67 IN | BODY MASS INDEX: 37.1 KG/M2

## 2023-05-23 DIAGNOSIS — R68.82 LOW LIBIDO: ICD-10-CM

## 2023-05-23 DIAGNOSIS — R63.5 WEIGHT GAIN: Primary | ICD-10-CM

## 2023-05-23 DIAGNOSIS — N95.1 MENOPAUSAL SYMPTOMS: ICD-10-CM

## 2023-05-23 RX ORDER — ESTRADIOL 1 MG/1
1 TABLET ORAL DAILY
Qty: 30 TABLET | Refills: 11 | Status: SHIPPED | OUTPATIENT
Start: 2023-05-23

## 2023-05-23 RX ORDER — PROGESTERONE 100 MG/1
100 CAPSULE ORAL
Qty: 30 CAPSULE | Refills: 11 | Status: SHIPPED | OUTPATIENT
Start: 2023-05-23

## 2023-05-23 NOTE — PROGRESS NOTES
Assessment/Plan:       Diagnoses and all orders for this visit:    Weight gain  -     TSH, 3rd generation; Future  -     T3, free; Future  -     Anti-microsomal antibody; Future  -     Cortisol Level, AM Specimen; Future  -     Lipid Panel with Triglycerides/HDL-Cholesterol; Future    Low libido  -     DHEA-sulfate; Future  -     Testosterone; Future    Menopausal symptoms  -     estradiol (Estrace) 1 mg tablet; Take 1 tablet (1 mg total) by mouth daily  -     Progesterone 100 MG CAPS; Take 100 mg by mouth at bedtime        1) This was a lengthy visit spent discussing the HPATG (hypothalamus/pituitary/adrenal/thyroid/gonadal) axis and the impact that hormonal deviation in one gland can have on another  It is not uncommon for ovarian declined to coincide or invoke thyroid and adrenal dysfunction as well  I think the hormonal derangements are more severe with abrupt castration ( TLH/mastectomy)  2) offered more comprehensive testing of thyroid and adrenal axes  Hold on 24 hr salivary cortisol for now  Lipid panel elevated in past, will repeat with panel as well  I will notify her of results and determine future follow up from there  3) Testosterone supplementation discussed in detail, including lack of FDA approval for women and cost concerns, as insurance will not reimburse  Side effects include: increased libido, increased muscle mass, decreased fat mass, erythrocytosis ( NOT polycythemia rubra), increased energy, acne, increased hair growth or loss  I suspect she may be a candidate for this as well as DHEA, will address after labs reviewed  4) HRT: I prefer oral estradiol in the earlier menopausal years, better cardioprotection and atherogenesis reduction  She would prefer this as well  I also prefer progesterone even in hysterectomized patients, as it is a parent hormone in the adrenal gland as well and compliments estradiol  Will stop patches and opt for oral estradiol 1 mg/ progesterone 100 mg   Side effects: somnolence from PG, to take at night  5) I will email her with results of blood testing  This was a 60 minute visit with greater than 50% of time spent in face to face counseling and coordination of care  Subjective:      Patient ID: Darion Suarez is a 57 y o  female  Wesley Perez presents for hormonal consult, her first visit with me; referred by friend, Jackie Elise, sees [facility] for gyn care, but [facility]  Wesley Perez has been surgically menopausal since 2020 s/p TLH/BSO/mastectomy as she is a BRCA1 carrier  Was placed on an estradiol patch but hates it as it keeps falling off  Does note symptom relief with regard to hot flash and night sweat on this  Despite this, she complains of increasing;  1) Weight gain: has been seeing weight management team, working with nutrition  Has tried Saxenda ( no effect) and now on phentermine/topamax x 3 months which she claims has not helped but per their notes has had a 5% weight loss  2) No energy, always tired  3) No sex drive  4) previously biswas, but thinks recent medications have helped with this  PMHX: as above; hx of CLIFF on CPAP; hx of malignant hyperthermia  FHX: Mom : COD suicide but was the BRCA carrier  Mat uncle and mat cousin also carriers, cousin with breast Ca  MGP both had cancers of unknown type? MGGM with ovarian Ca  Dad: COD colon ca ( and because of this new genetic consultation pending with updated panel to be done!); PGP unknown  2 siblings, one sister also a BRCA carrier  3 kids: 28/25/16, no health concerns  Review of Systems   Constitutional: Positive for fatigue and unexpected weight change  Negative for activity change and appetite change  Respiratory: Positive for apnea  Endocrine: Negative  Genitourinary:        Low libido, see HPI   Musculoskeletal: Negative  Neurological: Negative  Psychiatric/Behavioral: Negative for dysphoric mood and sleep disturbance           Objective:      /70 (BP Location: Right arm, "Patient Position: Sitting, Cuff Size: Standard)   Ht 5' 7 32\" (1 71 m)   Wt 107 kg (236 lb 6 4 oz)   LMP 10/15/2020 (Exact Date)   BMI 36 67 kg/m²          Physical Exam  Constitutional:       Appearance: Normal appearance  Neurological:      Mental Status: She is alert           "

## 2023-05-24 ENCOUNTER — APPOINTMENT (OUTPATIENT)
Dept: LAB | Facility: CLINIC | Age: 48
End: 2023-05-24

## 2023-05-24 DIAGNOSIS — R63.5 WEIGHT GAIN: ICD-10-CM

## 2023-05-24 DIAGNOSIS — R68.82 LOW LIBIDO: ICD-10-CM

## 2023-05-24 LAB
CHOLEST SERPL-MCNC: 172 MG/DL
CORTIS AM PEAK SERPL-MCNC: 8.6 UG/DL (ref 6.7–22.6)
HDLC SERPL-MCNC: 47 MG/DL
LDLC SERPL CALC-MCNC: 100 MG/DL (ref 0–100)
NONHDLC SERPL-MCNC: 125 MG/DL
T3FREE SERPL-MCNC: 3.22 PG/ML (ref 2.5–3.9)
TESTOST SERPL-MSCNC: <10 NG/DL
TRIGL SERPL-MCNC: 124 MG/DL
TSH SERPL DL<=0.05 MIU/L-ACNC: 1.6 UIU/ML (ref 0.45–4.5)

## 2023-05-25 LAB
DHEA-S SERPL-MCNC: 55.6 UG/DL (ref 41.2–243.7)
THYROPEROXIDASE AB SERPL-ACNC: <9 IU/ML (ref 0–34)

## 2023-06-02 DIAGNOSIS — N95.1 MENOPAUSAL SYMPTOMS: Primary | ICD-10-CM

## 2023-07-23 DIAGNOSIS — E66.01 CLASS 2 SEVERE OBESITY DUE TO EXCESS CALORIES WITH SERIOUS COMORBIDITY AND BODY MASS INDEX (BMI) OF 36.0 TO 36.9 IN ADULT (HCC): Chronic | ICD-10-CM

## 2023-07-24 ENCOUNTER — TELEPHONE (OUTPATIENT)
Dept: GENETICS | Facility: CLINIC | Age: 48
End: 2023-07-24

## 2023-07-24 RX ORDER — PHENTERMINE HYDROCHLORIDE 37.5 MG/1
37.5 CAPSULE ORAL EVERY MORNING
Qty: 30 CAPSULE | Refills: 0 | Status: SHIPPED | OUTPATIENT
Start: 2023-07-24

## 2023-07-26 ENCOUNTER — CLINICAL SUPPORT (OUTPATIENT)
Dept: GENETICS | Facility: CLINIC | Age: 48
End: 2023-07-26
Payer: COMMERCIAL

## 2023-07-26 ENCOUNTER — DOCUMENTATION (OUTPATIENT)
Dept: GENETICS | Facility: CLINIC | Age: 48
End: 2023-07-26

## 2023-07-26 DIAGNOSIS — Z80.0 FAMILY HISTORY OF COLON CANCER: Primary | ICD-10-CM

## 2023-07-26 PROCEDURE — 36415 COLL VENOUS BLD VENIPUNCTURE: CPT

## 2023-07-26 NOTE — LETTER
2023     Renetta Mcneil MD  [address] [id number]    Patient: Valentin Alberto  YOB: 1975  Date of Visit: 2023      Dear Dr. Sergey Anderson:    Thank you for referring Joseph Cristobal to me for evaluation. Below are my notes for this consultation. If you have questions, please do not hesitate to call me. I look forward to following your patient along with you. Sincerely,        Alton Edmondson        CC: Cecilio Cranker, PA-C        Pre-Test Genetic Counseling Consult Note    Patient Name: Valentin Alberto   /Age: 1975/48 y.o. Referring Provider: Arabella Maya PA-C    Date of Service: 2023  Genetic Counselor: Alton Edmondson MS, [facility]  Interpretation Services: None  [facility] 63 [address] Owusu was referred to the [address] [place] [address],[address] and Genetic Assessment Program due to her family history of colon cancer and personal history of colon polyps. Renetta Lei pursued BRCA1 and BRCA2 testing only through [employer] in  and testing positive for the familial  BRCA1 pathogenic variant, specifically [id number] ([id number]). she presents today to discuss options for updated genetic testing and implications for her family. Cancer History and Treatment:   Personal History: no personal history of cancer    Screening Hx:   Breast:  S/p bilateral mastectomy in  secondary to BRCA1 pathogenic variant     Colon:  Colonoscopy (): 5 polyps reported    Pathology:   A. Polyp, Colorectal, Cold snare Ascending polypsx2:  - Sessile serrated adenomas  - No evidence of malignancy. B. Polyp, Colorectal, Cold snare Transverse polyp:  - Sessile serrated adenoma  - No evidence of malignancy. C. Polyp, Colorectal, Cold snare Descending polypsx2:  -Hyperplastic polyp.  -No evidence of adenomatous change or malignancy.     Gynecologic:  SHERIF/BSO in  secondary to BRCA1 pathogenic variant     Skin:  Sees derm annually    Other screening: none     Reproductive History  Age at menarche: 14  Age at first live birth: 27  Menopause: Post Menopausal (39 secondary to SHERIF/BSO))  Hormone replacement: yes- combined 3 years     Medical and Surgical History  Pertinent surgical history:   Past Surgical History:   Procedure Laterality Date   • APPENDECTOMY     • BREAST IMPLANT Left 11/18/2021    Procedure: FILL SALINE BREAST IMPLANT;  Surgeon: Lis Thompson MD;  Location: [address] [place] [address] MAIN OR;  Service: Plastics   • BREAST SURGERY  2/9/2021    Bilateral mastectomy   • CAPSULOTOMY Bilateral 5/19/2021    Procedure: CAPSULECTOMY, EXPANDER/IMPLANT EXCHANGE;  Surgeon: Rory Cochran MD;  Location: [address] [place] [address] MAIN OR;  Service: Plastics   • COLONOSCOPY     • COSMETIC SURGERY  2021    Reconstruction of breast   • DILATION AND CURETTAGE OF UTERUS  1997   • HYSTERECTOMY N/A 11/27/2020    Procedure: TOTAL LAPAROSCOPIC HYSTERECTOMY, BILATERAL SALPINGO-OOPHORECTOMY, cystoscopy;  Surgeon: Clau Taylor MD;  Location:  MAIN OR;  Service: Gynecology Oncology   • LIPOSUCTION W/ FAT INJECTION Bilateral 12/2/2022    Procedure: FAT GRAFTING TO BREAST;  Surgeon: Lis Thompson MD;  Location: [address] [place] [address] MAIN OR;  Service: Plastics   • MASTECTOMY     • OR IMPLNT BIO IMPLNT FOR SOFT TISSUE REINFORCEMENT Bilateral 2/9/2021    Procedure: RECONSTRUCTION BREAST W/ IMPLANT;  Surgeon: Rory Cochran MD;  [facility] Main [facility];  Service: Plastics   • OR MASTECTOMY SIMPLE COMPLETE Bilateral 2/9/2021    Procedure: BREAST MASTECTOMY;  Surgeon: Cecy Willson MD;  [facility] Main [facility];  Service: Surgical Oncology   • OR NIPPLE/AREOLA RECONSTRUCTION Bilateral 2/24/2022    Procedure: NIPPLE RECONSTRUCTION;  Surgeon: Lis Thompson MD;  Location: [address] [place] [address] MAIN OR;  Service: Plastics   • OR REVISION OF RECONSTRUCTED BREAST Bilateral 8/6/2021    Procedure: BREAST [address] EXCISION OF STANDING DEFORMITIES;  Surgeon: Lis Thompson MD;  Location: [facility] Main OR;  Service: Plastics   • OR REVISION OF RECONSTRUCTED BREAST Bilateral 2021    Procedure: REVISE BREAST RECONSTRUCTION;  Surgeon: Ramone Menon MD;  [facility] MAIN OR;  Service: Plastics   • UT TISSUE EXPANDER PLACEMENT BREAST RECONSTRUCTION Bilateral 2021    Procedure: BREAST INSERTION/PLACEMENT TISSUE EXPANDER (EXCHANGE); Surgeon: Jani Cali MD;  Location: [facility] Main OR;  Service: Plastics   • TONSILLECTOMY     • TYMPANOSTOMY TUBE PLACEMENT Bilateral       Pertinent medical history:  Past Medical History:   Diagnosis Date   • Anesthesia complication     daughter has malignant hyperthermia   • Asthma     resolved w/ wt loss; is now having asthma sx due to recent hx of having covid 2022   • BRCA1-associated protein-1 tumor predisposition syndrome    • CPAP (continuous positive airway pressure) dependence    • H/O bilateral breast implants    • Intraductal papilloma of breast, left 2021   • Malignant hyperthermia due to anesthesia     has family hx of MH, her daughter   • Obesity (BMI 30-39. 9)    • Pneumonia        • PONV (postoperative nausea and vomiting)    • Scab     back and right thigh from few precancerous lesions taken off   • Sleep apnea          Other History:  Height:   Ht Readings from Last 1 Encounters:   23 5' 7.32" (1.71 m)     Weight:   Wt Readings from Last 1 Encounters:   23 107 kg (236 lb 6.4 oz)       Relevant Family History   Patient reports non-Ashkenazi Orthodoxy ancestry.      Sister: BRCA1 pathogenic variant; SHERIF/BSO at 30 y/o (currently 40 y/o)     Maternal Family History:  Uncle: BRCA1 pathogenic variant (currnetly 72 y/o)    First-cousin: breast cancer diagnosed at 45, BRCA1 pathogenic variant (currently 51 y/o)   Grandfather: unknown cancer, no info (d.81)   Grandmother: unknown cancer, no info ()   Great-aunt (grandmother's side): ovarian cancer (currently 47 y/o)    Second-cousin once removed: ovarian cancer (d.50)     Paternal Family History:  Father: metastatic colon cancer diagnosed at 79; smoker/ETOH (d.67)   Limited information about family history     Please refer to the scanned pedigree in the Media Tab for a complete family history     *All history is reported as provided by the patient; records are not available for review, except where indicated. Assessment:  We reviewed the cancer risks and screening recommendations associated with BRCA1 pathogenic variants. Dominic Holt is aware that she significantly reduced her chance to develop breast or ovarian cancer after her bilateral mastectomy and bilateral oophorectomy. However, it does not eliminate the possibility of cancer. Individuals with BRCA1 pathogenic variant have ? 5% lifetime risk for pancreatic cancer but NCCN does not recommend screening in the absence a pancreatic cancer diagnosis in a first or second-degree relative. I encouraged Dominic Holt to reach out to genetics if her family history changes. Individuals with BRCA1 pathogenic variants also have an increased lifetime risk for melanoma. Michelle follows up with dermatology regularly and reportedly wears sunscreen and protective clothing. Michelle's BRCA1 pathogenic variant was inherited from her maternal side. She concerned that she may have inherited a predisposition to cancer from her paternal side. Her genetic testing completed in 2020 included the BRCA1 and BRCA2 genes only and did not assess for pathogenic variants in other cancer susceptibility genes. Dominic Holt brought a copy of her father's [id number] results. We discussed the difference between germline and somatic testing. Dominic Holt understands that the [id number] test assessed for somatic variants only and did not confirm or rule out a hereditary cancer syndrome.       Genetic testing is indicated for Dominic Holt based on the following criteria: Meets NCCN V2.2023 General Testing Criteria (Located on page CRIT-1 in the Genetic/Familial High Risk Assessment: Breast, Ovarian and Pancreatic Guideline): individual with a known pathogenic variant in a cancer susceptibility gene and previous limited genetic testing (BRCA1/2 only). Given her personal history of colon polyps and family history of colon cancer, Jonna Oliver requested updated genetic testing with a larger panel to rule out pathogenic in other cancer susceptibility genes. The risks, benefits, and limitations of genetic testing were reviewed with the patient. Jonna Oliver understands that this test can only test for the hereditary cancer syndromes and cannot provide a cancer diagnosis. We reviewed that cancers such as lung cancer, liver cancer, cervical cancer, and testicular cancer very rarely have a genetic etiology and we cannot test for a genetic predisposition for these cancers at this time. We reviewed the genetic discrimination laws, possible test results (positive, negative, variants of uncertain significance), and their clinical implications. The TP53 variant identified on her father's [employer] test is classified as a a variant of uncertain significance at [facility]. Thus, if this variant was present in her father's germline as well, there is a 50% chance Jonna Oliver inherited it. However, it would not be sufficient for a diagnosis of Lavetta Dubon syndrome and it would not change her screening or management recommendations unless it is reclassified as pathogenic. We reviewed that the majority of variants of uncertain significance are downgraded to likely benign/benign. If positive for a mutation, options for managing cancer risk including increased surveillance, chemoprevention, and in some cases prophylactic surgery were discussed. Jonna Oliver was informed that there is a 50% chance that her sisters and each child inherited the familial BRCA1 pathogenic variant. Since both her daughters are >21 y/o, they are eligible for testing.  We reviewed that it is recommended that women with BRCA1 pathogenic variants initiate screening with annual breast MRIs at ~25 y/o.  Dominic Holt reportedly spoke with her daughters about genetic testing but they are uninterested at this time. Dominic Holt mentioned that her older daughter has been diagnosed clinically with malignant hyperthermia during a procedure. Her daughter has not pursued molecular testing. We reviewed that ~60% of individuals with a clinical diagnosis of Malignant Hyperthermia will have a pathogenic variant in either the RYR1, STAC3, or XQDPA0Q gene. Malignant Hyperthermia is inherited in an autosomal dominant manner, meaning there is a 50% chance that first-degree relatives may have this condition as well. I encouraged her daughter to consider molecular testing. If a molecular variant is identified, then relatives can consider genetic testing as well to rule out or establish a diagnosis. If her daughter's molecular results are negative, all relatives should inform their healthcare providers that a clinical diagnosis was established in a relative- especially if they require anesthesia. Dominic Holt has reportedly not had any issues with anesthesia in the past other than vomiting. Her  has only had anesthesia once for an endoscopy and reportedly tolerated the procedure well with no complications. We reviewed that genetic testing for cancer susceptibility genes and malignant hyperthermia genes are two separate orders and her daughter may be billed for two different tests. Billing:  Most individuals pay <$100 for hereditary cancer genetic testing. If insurance covers the cost of the testing, individuals may still pay out of pocket secondary to co-pays, co-insurance, or deductibles. If the cost of the testing exceeds $100, the lab will reach out to the patient via phone or e-mail. The patient will then have the option to proceed with the testing, cancel the testing, or elect the self-pay option of $250. Dominic Holt verbalized understanding. Plan: Patient decided to proceed with testing and provided consent.     Summary: Sample Collection:  The patient's blood sample was drawn in the office on 7/26/23 by medical assistant Kianna Bentley    Genetic Testing Preformed: [employer] [employer] (61 genes): APC, SHARLA, AXIN2, BAP1, BARD1, BMPR1A, BRCA1, BRCA2, BRIP1, CDH1, CDK4, CDKN1B, CDKN2A, CHEK2, CTNNA1, DICER1, EGLN1, EPCAM, FH, FLCN, GREM1, HOXB13, KIF1B, MAX, MEN1, MET, MITF, MLH1, MSH2, MSH3, MSH6, MUTYH, NBN, NF1, NTHL1, PALB2, PMS2, POLD1, POLE, POT1, PTEN, RAD51C, RAD51D, RB1, RECQL, RET, SDHA, SDHAF2, SDHB, SDHC, SDHD, SMAD4, SMARCA4, STK11, FPZT097, TP53, TSC1, TSC2, VHL     Result Call Information:  I confirmed the patient's mobile number on file as the best number to call with results  I confirmed with the patient that we can leave a voicemail on her mobile number    Results take approximately 2-3 weeks to complete once test is started. Alicia Starks will be notified via Mobi once results are available. Additional recommendations for surveillance/medical management will be made pending genetic test results.

## 2023-07-26 NOTE — PROGRESS NOTES
Pre-Test Genetic Counseling Consult Note    Patient Name: Bob Waller   /Age: 1975/48 y.o. Referring Provider: Shiva Bedoya PA-C    Date of Service: 2023  Genetic Counselor: Rhett Aggarwal MS, [facility]  Interpretation Services: None  [facility] 63 To[address]ito Owusu was referred to the [address] [place] [address],[address] and Genetic Assessment Program due to her family history of colon cancer and personal history of colon polyps. 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 she presents today to discuss options for updated genetic testing and implications for her family. Cancer History and Treatment:   Personal History: no personal history of cancer    Screening Hx:   Breast:  S/p bilateral mastectomy in  secondary to BRCA1 pathogenic variant     Colon:  Colonoscopy (): 5 polyps reported    Pathology:   A. Polyp, Colorectal, Cold snare Ascending polypsx2:  - Sessile serrated adenomas  - No evidence of malignancy. B. Polyp, Colorectal, Cold snare Transverse polyp:  - Sessile serrated adenoma  - No evidence of malignancy. C. Polyp, Colorectal, Cold snare Descending polypsx2:  -Hyperplastic polyp.  -No evidence of adenomatous change or malignancy.     Gynecologic:  SHERIF/BSO in  secondary to BRCA1 pathogenic variant     Skin:  Sees derm annually    Other screening: none     Reproductive History  Age at menarche: 14  Age at first live birth: 27  Menopause: Post Menopausal (39 secondary to SHERIF/BSO))  Hormone replacement: yes- combined 3 years     Medical and Surgical History  Pertinent surgical history:   Past Surgical History:   Procedure Laterality Date   • APPENDECTOMY     • BREAST IMPLANT Left 2021    Procedure: FILL SALINE BREAST IMPLANT;  Surgeon: Marilynn Romberg, MD;  Location: [address] [place] [address] OR;  Service: Plastics • BREAST SURGERY  2/9/2021    Bilateral mastectomy   • CAPSULOTOMY Bilateral 5/19/2021    Procedure: CAPSULECTOMY, EXPANDER/IMPLANT EXCHANGE;  Surgeon: Stevie Lara MD;  Location: [address] [place] [address] MAIN OR;  Service: Plastics   • COLONOSCOPY     • COSMETIC SURGERY  2021    Reconstruction of breast   • DILATION AND CURETTAGE OF UTERUS  1997   • HYSTERECTOMY N/A 11/27/2020    Procedure: TOTAL LAPAROSCOPIC HYSTERECTOMY, BILATERAL SALPINGO-OOPHORECTOMY, cystoscopy;  Surgeon: Rigoberto Guzman MD;  Location: [facility] MAIN OR;  Service: Gynecology Oncology   • LIPOSUCTION W/ FAT INJECTION Bilateral 12/2/2022    Procedure: FAT GRAFTING TO BREAST;  Surgeon: Portia Edgar MD;  Location: [address] [place] [address] MAIN OR;  Service: Plastics   • MASTECTOMY     • OR IMPLNT BIO IMPLNT FOR SOFT TISSUE REINFORCEMENT Bilateral 2/9/2021    Procedure: RECONSTRUCTION BREAST W/ IMPLANT;  Surgeon: Stevie Lara MD;  Location: [facility] OR;  Service: Plastics   • OR MASTECTOMY SIMPLE COMPLETE Bilateral 2/9/2021    Procedure: BREAST MASTECTOMY;  Surgeon: Heber Conn MD;  Location: [facility] OR;  Service: Surgical Oncology   • OR NIPPLE/AREOLA RECONSTRUCTION Bilateral 2/24/2022    Procedure: NIPPLE RECONSTRUCTION;  Surgeon: Portia Edgar MD;  Location: [address] [place] [address] MAIN OR;  Service: Plastics   • OR REVISION OF RECONSTRUCTED BREAST Bilateral 8/6/2021    Procedure: BREAST RECONSTRUCITON REVISION; EXCISION OF STANDING DEFORMITIES;  Surgeon: Portia Edgar MD;  Location: [facility] OR;  Service: Plastics   • OR REVISION OF RECONSTRUCTED BREAST Bilateral 11/18/2021    Procedure: REVISE BREAST RECONSTRUCTION;  Surgeon: Portia Edgar MD;  Location: [address] [place] [address] MAIN OR;  Service: Plastics   • OR TISSUE EXPANDER PLACEMENT BREAST RECONSTRUCTION Bilateral 2/9/2021    Procedure: BREAST INSERTION/PLACEMENT TISSUE EXPANDER (EXCHANGE);   Surgeon: Stevie Lara MD;  Location[facility];  Service: Plastics   • TONSILLECTOMY     • TYMPANOSTOMY TUBE PLACEMENT Bilateral       Pertinent medical history:  Past Medical History:   Diagnosis Date   • Anesthesia complication     daughter has malignant hyperthermia   • Asthma     resolved w/ wt loss; is now having asthma sx due to recent hx of having covid 2022   • BRCA1-associated protein-1 tumor predisposition syndrome    • CPAP (continuous positive airway pressure) dependence    • H/O bilateral breast implants    • Intraductal papilloma of breast, left 2021   • Malignant hyperthermia due to anesthesia     has family hx of MH, her daughter   • Obesity (BMI 30-39. 9)    • Pneumonia        • PONV (postoperative nausea and vomiting)    • Scab     back and right thigh from few precancerous lesions taken off   • Sleep apnea          Other History:  Height:   Ht Readings from Last 1 Encounters:   23 5' 7.32" (1.71 m)     Weight:   Wt Readings from Last 1 Encounters:   23 107 kg (236 lb 6.4 oz)       Relevant Family History   Patient reports non-Ashkenazi Protestant ancestry. Sister: BRCA1 pathogenic variant; SHERIF/BSO at 28 y/o (currently 38 y/o)     Maternal Family History:  Uncle: BRCA1 pathogenic variant (currnetly 70 y/o)    First-cousin: breast cancer diagnosed at 45, BRCA1 pathogenic variant (currently 51 y/o)   Grandfather: unknown cancer, no info (d.81)   Grandmother: unknown cancer, no info ()   Great-aunt (grandmother's side): ovarian cancer (currently 47 y/o)    Second-cousin once removed: ovarian cancer (d.50)     Paternal Family History:  Father: metastatic colon cancer diagnosed at 79; smoker/ETOH (d.67)   Limited information about family history     Please refer to the scanned pedigree in the Media Tab for a complete family history     *Since Michelle's father has passed away, she is most appropriate to test*. *All history is reported as provided by the patient; records are not available for review, except where indicated. Assessment:  We reviewed the cancer risks and screening recommendations associated with BRCA1 pathogenic variants. Salbador King is aware that she significantly reduced her chance to develop breast or ovarian cancer after her bilateral mastectomy and bilateral oophorectomy. However, it does not eliminate the possibility of cancer. Individuals with BRCA1 pathogenic variant have ? 5% lifetime risk for pancreatic cancer but NCCN does not recommend screening in the absence a pancreatic cancer diagnosis in a first or second-degree relative. I encouraged Salbador King to reach out to genetics if her family history changes. Individuals with BRCA1 pathogenic variants also have an increased lifetime risk for melanoma. Michelle follows up with dermatology regularly and reportedly wears sunscreen and protective clothing. Michelle's BRCA1 pathogenic variant was inherited from her maternal side. She concerned that she may have inherited a predisposition to cancer from her paternal side. Her genetic testing completed in 2020 included the BRCA1 and BRCA2 genes only and did not assess for pathogenic variants in other cancer susceptibility genes. Salbador King brought a copy of her father's [id number] results. We discussed the difference between germline and somatic testing. Salbador King understands that the [id number] test assessed for somatic variants only and did not confirm or rule out a hereditary cancer syndrome. Genetic testing is indicated for Salbador King based on the following criteria: Meets NCCN V2.2023 General Testing Criteria (Located on page CRIT-1 in the Genetic/Familial High Risk Assessment: Breast, Ovarian and Pancreatic Guideline): individual with a known pathogenic variant in a cancer susceptibility gene and previous limited genetic testing (BRCA1/2 only). Given her personal history of colon polyps and family history of colon cancer, Salbador King requested updated genetic testing with a larger panel to rule out pathogenic in other cancer susceptibility genes. The risks, benefits, and limitations of genetic testing were reviewed with the patient. Emilia Alatorre understands that this test can only test for the hereditary cancer syndromes and cannot provide a cancer diagnosis. We reviewed that cancers such as lung cancer, liver cancer, cervical cancer, and testicular cancer very rarely have a genetic etiology and we cannot test for a genetic predisposition for these cancers at this time. We reviewed the genetic discrimination laws, possible test results (positive, negative, variants of uncertain significance), and their clinical implications. The TP53 variant identified on her father's [employer] test is classified as a a variant of uncertain significance at [employer]. Thus, if this variant was present in her father's germline as well, there is a 50% chance Emilia Alatorre inherited it. However, it would not be sufficient for a diagnosis of Bunny Mix syndrome and it would not change her screening or management recommendations unless it is reclassified as pathogenic. We reviewed that the majority of variants of uncertain significance are downgraded to likely benign/benign. If positive for a mutation, options for managing cancer risk including increased surveillance, chemoprevention, and in some cases prophylactic surgery were discussed. Emilia Alatorre was informed that there is a 50% chance that her sisters and each child inherited the familial BRCA1 pathogenic variant. Since both her daughters are >21 y/o, they are eligible for testing. We reviewed that it is recommended that women with BRCA1 pathogenic variants initiate screening with annual breast MRIs at ~26 y/o. Emilia Alatorre reportedly spoke with her daughters about genetic testing but they are uninterested at this time. Emilia Alatorre mentioned that her older daughter has been diagnosed clinically with malignant hyperthermia during a procedure. Her daughter has not pursued molecular testing.  We reviewed that ~60% of individuals with a clinical diagnosis of Malignant Hyperthermia will have a pathogenic variant in either the RYR1, STAC3, or ALNLR8E gene. Malignant Hyperthermia is inherited in an autosomal dominant manner, meaning there is a 50% chance that first-degree relatives may have this condition as well. I encouraged her daughter to consider molecular testing. If a molecular variant is identified, then relatives can consider genetic testing as well to rule out or establish a diagnosis. If her daughter's molecular results are negative, all relatives should inform their healthcare providers that a clinical diagnosis was established in a relative- especially if they require anesthesia. Javid Parker has reportedly not had any issues with anesthesia in the past other than vomiting. Her  has only had anesthesia once for an endoscopy and reportedly tolerated the procedure well with no complications. We reviewed that genetic testing for cancer susceptibility genes and malignant hyperthermia genes are two separate orders and her daughter may be billed for two different tests. Billing:  Most individuals pay <$100 for hereditary cancer genetic testing. If insurance covers the cost of the testing, individuals may still pay out of pocket secondary to co-pays, co-insurance, or deductibles. If the cost of the testing exceeds $100, the lab will reach out to the patient via phone or e-mail. The patient will then have the option to proceed with the testing, cancel the testing, or elect the self-pay option of $250. Javid Parker verbalized understanding. Plan: Patient decided to proceed with testing and provided consent.     Summary:     Sample Collection:  The patient's blood sample was drawn in the office on 7/26/23 by medical assistant Chapo Alfaro    Genetic Testing Preformed: [employer]: Cancer® +RNAinsight® (61 genes): APC, SHARLA, AXIN2, BAP1, BARD1, BMPR1A, BRCA1, BRCA2, BRIP1, CDH1, CDK4, CDKN1B, CDKN2A, CHEK2, CTNNA1, DICER1, EGLN1, EPCAM, FH, FLCN, GREM1, HOXB13, KIF1B, MAX, MEN1, MET, MITF, MLH1, MSH2, MSH3, MSH6, MUTYH, NBN, NF1, NTHL1, PALB2, PMS2, POLD1, POLE, POT1, PTEN, RAD51C, RAD51D, RB1, RECQL, RET, SDHA, SDHAF2, SDHB, SDHC, SDHD, SMAD4, SMARCA4, STK11, [id number], TP53, TSC1, TSC2, VHL     Result Call Information:  I confirmed the patient's mobile number on file as the best number to call with results  I confirmed with the patient that we can leave a voicemail on her mobile number    Results take approximately 2-3 weeks to complete once test is started. Shyanne Sibley will be notified via [employer] once results are available. Additional recommendations for surveillance/medical management will be made pending genetic test results.

## 2023-08-21 ENCOUNTER — TELEPHONE (OUTPATIENT)
Dept: GENETICS | Facility: CLINIC | Age: 48
End: 2023-08-21

## 2023-08-21 NOTE — TELEPHONE ENCOUNTER
Post-Test Genetic Counseling Consult Note  Today I spoke with Josef Gloria over the phone to review the results of her updated genetic test for hereditary cancer. We met previously on 7/26/23 for pre-test counseling. SUMMARY:    Test(s): [employer]: Cancer® +RNAinsight® (59 genes): APC, SHARLA, AXIN2, BAP1, BARD1, BMPR1A, BRCA1, BRCA2, BRIP1, CDH1, CDK4, CDKN1B, CDKN2A, CHEK2, CTNNA1, DICER1, EGLN1, EPCAM, FH, FLCN, GREM1, HOXB13, KIF1B, MAX, MEN1, MET, MITF, MLH1, MSH2, MSH3, MSH6, MUTYH, NBN, NF1, NTHL1, PALB2, PMS2, POLD1, POLE, POT1, PTEN, RAD51C, RAD51D, RB1, RECQL, RET, SDHA, SDHAF2, SDHB, SDHC, SDHD, SMAD4, SMARCA4, STK11, [id number], TP53, TSC1, TSC2, VHL      Result: Positive- BRCA1 c.4868C>G ([id number]), heterozygous, pathogenic     Michelle's BRCA1 pathogenic variant was identified on previous genetic testing. She elected testing with a larger panel to rule out an additional hereditary cancer syndromes in light of her father's colon cancer diagnosis. Assessment:   Josef Gloria carries one pathogenic variant in the BRCA1 gene, specifically c.4868C>G ([id number]). The BRCA1 gene is associated with autosomal dominant hereditary breast and ovarian cancer (HBOC) syndrome ([facility] Seat: [id number]). 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 The risk for a second primary breast cancer within 5 years of the first breast cancer diagnosis is approximately 10-15%. Women also have up to a 16-59% lifetime risk for ovarian, fallopian tube, or peritoneal cancer to age 79. Men with a BRCA1 pathogenic variant have up to a .2-1.2% risk for breast cancer and up to a 7-26% risk for prostate cancer. Men and women also have an elevated risk for melanoma and up to a ?5% risk for pancreatic cancer.        Risks and Testing for [facility] Members: This test result may help clarify the risk for other family members to develop cancer. All first-degree relatives (parents, siblings and children) have up to a 50% chance of having the pathogenic variant. Other relatives such as aunts, uncles and cousins may also be at risk. Elizabeth Hernandes reported that her maternal uncle and first-cousin tested positive for a BRCA1 pathogenic variant. Thus, her mutation was likely inherited from her maternal side. We recommend that Elizabeth Hernandes share this result with her maternal relatives. We discussed that each of Elizabeth Hernandes 's children have a 50% chance to inherit this BRCA1 pathogenic variant however testing is not recommended for children under the age of 25, as there are no childhood cancer risks known to be associated with a single pathogenic variant in this gene. [facility] offers complimentary testing to relatives for the familial BRCA1 pathogenic variant only for 90 days after the report date. If Elizabeth Hernandes family members have any questions or are interested in testing they can reach out to the main Genetics number at [phone] for additional information. Additional Information:  A healthy lifestyle will improve overall health and reduce risk for illness. Eating a healthy diet and exercising for 4 hours per week is recommended. Both diet and exercise have been shown to help maintain a healthy weight. Postmenopausal women who are overweight are at higher risk for breast cancer. Moderate to heavy alcohol use can increase the risk for some cancers. Smoking cigarettes can also increase risk for breast, lung, prostate, pancreatic and other cancers. Managment:  Management guidelines for individuals with pathogenic and likely pathogenic BRCA1 variants have been developed by the [facility]. Please refer to the current NCCN guidelines for the most up to date guidelines.   The recommendations listed below are specific to Elizabeth Hernandes and are are based on recommendations in the V3.2023 The Genetic/Familial High-Risk Assessment: Breast, Ovarian and Pancreatic Guideline. These recommendations are subject to change over time and the newest guidelines should be referenced for the most up to date recommendations. Plan:  WOMEN  Breast Cancer Screening  Anaid Wang is s/p bilateral mastectomy secondary to her BRCA1 pathogenic variant     Gynecologic Cancer Screening  Ovarian Cancer  Anaid Wang is s/p SHERIF/BSO secondary to her BRCA1 pathogenic variant     Skin Cancer Screening  -No specific screening guidelines exist for melanoma, but general melanoma risk management is appropriate, such as annual full-body skin examination and minimizing UV exposure. Pancreatic Cancer Screening:  Current NCCN Guidelines recommend pancreatic cancer screening for individuals with an BRCA1 pathogenic variant and a first- or second-degree relative with exocrine pancreatic cancer from the same side of the family as the identified pathogenic/likely pathogenic germline variant. Pancreatic cancer screening typically begins at age 48 (or 8 years younger than the earliest exocrine pancreatic cancer diagnosis in the family, whichever is earlier) and includes contrast-enhanced MRI/MRCP (magnetic resonance cholangiopancreatography) and/or endoscopic ultrasound (EU). Currently there is no known family history of pancreatic cancer therefore we do not recommend pancreatic screening for Michelle at this time. We did encourage Anaid Wang to keep her healthcare providers updated on any changes to her personal or family history as updated information may change this recommendation. Other Screening:  Recommendations for Anaid Wang are outlined below based on her family history, however the surveillance and medical management should continue as clinically indicated and as determined appropriate by her healthcare providers. NCCN Colorectal Cancer Screening V1.2023  ?  First-degree relative with colorectal cancer at any age  Screening colonoscopy every 5 years unless more frequent screening is clinically indicated     Summary:   Positive Result: Ganesh Savage was strongly encouraged to follow up on with our office on an annual basis to review the most up to date guidelines as recommendations are subject to change over time

## 2023-08-22 DIAGNOSIS — E66.01 CLASS 2 SEVERE OBESITY DUE TO EXCESS CALORIES WITH SERIOUS COMORBIDITY AND BODY MASS INDEX (BMI) OF 36.0 TO 36.9 IN ADULT (HCC): Chronic | ICD-10-CM

## 2023-08-22 RX ORDER — PHENTERMINE HYDROCHLORIDE 37.5 MG/1
37.5 CAPSULE ORAL EVERY MORNING
Qty: 30 CAPSULE | Refills: 0 | Status: SHIPPED | OUTPATIENT
Start: 2023-08-22

## 2023-08-23 ENCOUNTER — TELEPHONE (OUTPATIENT)
Dept: BARIATRICS | Facility: CLINIC | Age: 48
End: 2023-08-23

## 2023-08-30 ENCOUNTER — OFFICE VISIT (OUTPATIENT)
Dept: BARIATRICS | Facility: CLINIC | Age: 48
End: 2023-08-30
Payer: COMMERCIAL

## 2023-08-30 VITALS
HEIGHT: 67 IN | RESPIRATION RATE: 14 BRPM | HEART RATE: 76 BPM | DIASTOLIC BLOOD PRESSURE: 80 MMHG | WEIGHT: 226.4 LBS | BODY MASS INDEX: 35.53 KG/M2 | SYSTOLIC BLOOD PRESSURE: 120 MMHG

## 2023-08-30 DIAGNOSIS — E66.01 CLASS 2 SEVERE OBESITY DUE TO EXCESS CALORIES WITH SERIOUS COMORBIDITY AND BODY MASS INDEX (BMI) OF 35.0 TO 35.9 IN ADULT (HCC): Primary | Chronic | ICD-10-CM

## 2023-08-30 DIAGNOSIS — E66.01 CLASS 2 SEVERE OBESITY DUE TO EXCESS CALORIES WITH SERIOUS COMORBIDITY AND BODY MASS INDEX (BMI) OF 37.0 TO 37.9 IN ADULT (HCC): Chronic | ICD-10-CM

## 2023-08-30 DIAGNOSIS — G47.33 OSA (OBSTRUCTIVE SLEEP APNEA): ICD-10-CM

## 2023-08-30 PROCEDURE — 99214 OFFICE O/P EST MOD 30 MIN: CPT | Performed by: PHYSICIAN ASSISTANT

## 2023-08-30 NOTE — PROGRESS NOTES
fvAssessment/Plan:    Obesity  -Patient is pursuing Conservative Program  -Initial weight loss goal of 5-10% weight loss for improved health  -Screening labs reviewed    Initial:235.8   Last Visit: 235.8  Current:226.4 (-9.4lb from last OV)    Goals:  -discussed making sure to get enough protein. Typically having around 60mg in the AM  -to continue water intake 80 oz currently  -discussed trying to add in exercise. May start orange theory    To continue phentermine and topamax. Start weight 248.3 on 12/19/22. 5 % weight loss. EKGs reviewed from last month. Did not have any side effects prior with either medication. Denies glaucoma, renal stones, and has hx of hysterectomy  -She was on saxenda prior with weight gain. CLIFF (obstructive sleep apnea)  Continue to wear CPAP        Follow up in approximately 4 months with Non-Surgical Physician/Advanced Practitioner. Diagnoses and all orders for this visit:    Class 2 severe obesity due to excess calories with serious comorbidity and body mass index (BMI) of 35.0 to 35.9 in adult [facility])    Class 2 severe obesity due to excess calories with serious comorbidity and body mass index (BMI) of 37.0 to 37.9 in adult ([facility])    CLIFF (obstructive sleep apnea)    Other orders  -     Prasterone, DHEA, (DHEA PO); Take by mouth          Subjective:   Chief Complaint   Patient presents with   • Follow-up     MWM 3m f/u; Waist-42. 5in        Patient ID: Stuart Bryan  is a 57 y.o. female with excess weight/obesity here to pursue weight managment. Patient is pursuing Conservative Program.     HPI  She has been doing well. She has cut out red meat to only once a week. She is feeling more energy and has decreased carb intake as well.   She denies any side effects from topamax and phentermine  Wt Readings from Last 10 Encounters:   08/30/23 103 kg (226 lb 6.4 oz)   05/23/23 107 kg (236 lb 6.4 oz)   05/15/23 107 kg (235 lb 12.8 oz)   03/06/23 108 kg (237 lb)   03/03/23 108 kg (238 lb)   02/02/23 109 kg (240 lb)   01/26/23 111 kg (244 lb)   12/19/22 113 kg (248 lb 6.4 oz)   12/02/22 109 kg (240 lb)   11/14/22 109 kg (240 lb)       Food logging:no  Increased appetite/cravings:controlled  Fruit/Vegetable servings:  Exercise:nothing formal  Hydration:usually at least 60 oz a day    Diet Recall:  B: 40gm protein shake and coffee with collegen in the AM  L:tuna w/cracker  S: chobani flip sometimes  D: chicken and vegetable    Colonoscopy-Completed    The following portions of the patient's history were reviewed and updated as appropriate:   She  has a past medical history of Anesthesia complication, Asthma, [id number] protein-1 tumor predisposition syndrome, CPAP (continuous positive airway pressure) dependence, H/O bilateral breast implants, Intraductal papilloma of breast, left (02/23/2021), Malignant hyperthermia due to anesthesia, Obesity (BMI 30-39.9), Pneumonia, PONV (postoperative nausea and vomiting), Scab, and Sleep apnea. She   Patient Active Problem List    Diagnosis Date Noted   • Nasal septal ulcer 05/09/2023   • Osteopenia 03/03/2023   • CLIFF (obstructive sleep apnea)    • Fatigue 10/17/2022   • History of hysterectomy 05/18/2021   • PONV (postoperative nausea and vomiting) 05/18/2021   • Keratosis, actinic 05/17/2021   • Asthma 02/09/2021   • Obesity 02/09/2021   • Symptomatic postsurgical menopause 12/02/2020   • BRCA1 gene mutation positive 09/25/2020   • Sinobronchitis 02/23/2019     She  has a past surgical history that includes Appendectomy; Tonsillectomy; Tympanostomy tube placement (Bilateral); Dilation and curettage of uterus (1997); pr mastectomy simple complete (Bilateral, 2/9/2021); pr tissue expander placement breast reconstruction (Bilateral, 2/9/2021); pr implnt bio implnt for soft tissue reinforcement (Bilateral, 2/9/2021); Colonoscopy; Hysterectomy (N/A, 11/27/2020); Capsulectomy (Bilateral, 5/19/2021); Breast surgery (2/9/2021);  Cosmetic surgery (2021); pr revision of reconstructed breast (Bilateral, 8/6/2021); pr revision of reconstructed breast (Bilateral, 11/18/2021); BREAST IMPLANT (Left, 11/18/2021); Mastectomy; pr nipple/areola reconstruction (Bilateral, 2/24/2022); and Liposuction w/ fat injection (Bilateral, 12/2/2022). Her family history includes BRCA1 Positive in her maternal uncle and sister; Breast cancer (age of onset: 36) in her cousin; Cancer in her maternal grandfather; Colon cancer in her father; No Known Problems in her daughter, daughter, maternal aunt, mother, paternal aunt, paternal aunt, paternal aunt, paternal uncle, paternal uncle, and sister; Ovarian cancer (age of onset: 46) in her other. She  reports that she has never smoked. She has never used smokeless tobacco. She reports current alcohol use of about 1.0 standard drink of alcohol per week. She reports that she does not use drugs. Current Outpatient Medications   Medication Sig Dispense Refill   • cholecalciferol (VITAMIN D3) 1,000 units tablet Take 1,000 Units by mouth daily     • estradiol (Estrace) 1 mg tablet Take 1 tablet (1 mg total) by mouth daily 30 tablet 11   • Multiple Vitamins-Minerals (ONE-A-DAY WOMENS PO) Take by mouth daily      • other medication, see sig, Medication/product name: testosterone cream  Strength: 4mg/ml  Sig (include dose, route, frequency): apply 0.5 ml to labia daily 15 mL 5   • phentermine 37.5 MG capsule Take 1 capsule (37.5 mg total) by mouth every morning 30 capsule 0   • Prasterone, DHEA, (DHEA PO) Take by mouth     • Progesterone 100 MG CAPS Take 100 mg by mouth at bedtime 30 capsule 11   • topiramate (Topamax) 50 MG tablet Take 1 tablet (50 mg total) by mouth every 12 (twelve) hours 60 tablet 1   • albuterol (PROVENTIL HFA,VENTOLIN HFA) 90 mcg/act inhaler PRN (Patient not taking: Reported on 8/30/2023)       No current facility-administered medications for this visit.      Current Outpatient Medications on File Prior to Visit   Medication Sig   • cholecalciferol (VITAMIN D3) 1,000 units tablet Take 1,000 Units by mouth daily   • estradiol (Estrace) 1 mg tablet Take 1 tablet (1 mg total) by mouth daily   • Multiple Vitamins-Minerals (ONE-A-DAY WOMENS PO) Take by mouth daily    • other medication, see sig, Medication/product name: testosterone cream  Strength: 4mg/ml  Sig (include dose, route, frequency): apply 0.5 ml to labia daily   • phentermine 37.5 MG capsule Take 1 capsule (37.5 mg total) by mouth every morning   • Prasterone, DHEA, (DHEA PO) Take by mouth   • Progesterone 100 MG CAPS Take 100 mg by mouth at bedtime   • topiramate (Topamax) 50 MG tablet Take 1 tablet (50 mg total) by mouth every 12 (twelve) hours   • albuterol (PROVENTIL HFA,VENTOLIN HFA) 90 mcg/act inhaler PRN (Patient not taking: Reported on 8/30/2023)     No current facility-administered medications on file prior to visit. She is allergic to penicillins. .    Review of Systems   Constitutional: Negative for fever. HENT: Positive for dental problem. Respiratory: Negative for shortness of breath. Cardiovascular: Negative for chest pain and palpitations. Gastrointestinal: Negative for abdominal pain, constipation, diarrhea and vomiting. Genitourinary: Negative for difficulty urinating. Musculoskeletal: Negative for arthralgias and back pain. Skin: Negative for rash. Neurological: Negative for headaches. Psychiatric/Behavioral: Negative for dysphoric mood. The patient is not nervous/anxious. Objective:    /80   Pulse 76   Resp 14   Ht 5' 7" (1.702 m)   Wt 103 kg (226 lb 6.4 oz)   LMP 10/15/2020 (Exact Date)   BMI 35.46 kg/m²      Physical Exam  Vitals and nursing note reviewed. Constitutional:       General: She is not in acute distress. Appearance: She is well-developed. She is obese. HENT:      Head: Normocephalic and atraumatic. Eyes:      Conjunctiva/sclera: Conjunctivae normal.   Neck:      Thyroid: No thyromegaly.    Pulmonary: Effort: Pulmonary effort is normal. No respiratory distress. Skin:     Findings: No rash (visible). Neurological:      Mental Status: She is alert and oriented to person, place, and time.    Psychiatric:         Behavior: Behavior normal.

## 2023-08-30 NOTE — ASSESSMENT & PLAN NOTE
-Patient is pursuing Conservative Program  -Initial weight loss goal of 5-10% weight loss for improved health  -Screening labs reviewed    Initial:235.8   Last Visit: 235.8  Current:226.4 (-9.4lb from last OV)    Goals:  -discussed making sure to get enough protein. Typically having around 60mg in the AM  -to continue water intake 80 oz currently  -discussed trying to add in exercise. May start orange theory    To continue phentermine and topamax. Start weight 248.3 on 12/19/22. 5 % weight loss. EKGs reviewed from last month. Did not have any side effects prior with either medication. Denies glaucoma, renal stones, and has hx of hysterectomy  -She was on saxenda prior with weight gain.

## 2023-09-15 DIAGNOSIS — E66.01 CLASS 2 SEVERE OBESITY DUE TO EXCESS CALORIES WITH SERIOUS COMORBIDITY AND BODY MASS INDEX (BMI) OF 37.0 TO 37.9 IN ADULT: Chronic | ICD-10-CM

## 2023-09-15 RX ORDER — TOPIRAMATE 50 MG/1
50 TABLET, FILM COATED ORAL EVERY 12 HOURS
Qty: 60 TABLET | Refills: 3 | Status: SHIPPED | OUTPATIENT
Start: 2023-09-15

## 2023-10-11 ENCOUNTER — HOSPITAL ENCOUNTER (OUTPATIENT)
Dept: RADIOLOGY | Facility: HOSPITAL | Age: 48
Discharge: HOME | End: 2023-10-11
Payer: COMMERCIAL

## 2023-10-11 DIAGNOSIS — R92.8 ABNORMAL FINDING ON BREAST IMAGING: ICD-10-CM

## 2023-10-11 PROCEDURE — 76642 ULTRASOUND BREAST LIMITED: CPT

## 2023-10-12 ENCOUNTER — RA CDI HCC (OUTPATIENT)
Dept: OTHER | Facility: HOSPITAL | Age: 48
End: 2023-10-12

## 2023-10-12 NOTE — PROGRESS NOTES
[address] [facility] coding opportunities       Chart reviewed, no opportunity found: CHART REVIEWED, NO OPPORTUNITY FOUND        Patients Insurance        Commercial Insurance: Julian Chapa

## 2023-10-13 ENCOUNTER — OFFICE VISIT (OUTPATIENT)
Dept: GASTROENTEROLOGY | Facility: CLINIC | Age: 48
End: 2023-10-13

## 2023-10-13 ENCOUNTER — TELEPHONE (OUTPATIENT)
Dept: GASTROENTEROLOGY | Facility: CLINIC | Age: 48
End: 2023-10-13

## 2023-10-13 VITALS
WEIGHT: 222 LBS | SYSTOLIC BLOOD PRESSURE: 98 MMHG | BODY MASS INDEX: 34.84 KG/M2 | DIASTOLIC BLOOD PRESSURE: 71 MMHG | HEIGHT: 67 IN | HEART RATE: 72 BPM

## 2023-10-13 DIAGNOSIS — K59.00 CONSTIPATION, UNSPECIFIED CONSTIPATION TYPE: ICD-10-CM

## 2023-10-13 DIAGNOSIS — Z86.010 HISTORY OF ADENOMATOUS POLYP OF COLON: Primary | ICD-10-CM

## 2023-10-13 DIAGNOSIS — K21.9 GASTROESOPHAGEAL REFLUX DISEASE, UNSPECIFIED WHETHER ESOPHAGITIS PRESENT: ICD-10-CM

## 2023-10-13 DIAGNOSIS — K64.9 HEMORRHOIDS, UNSPECIFIED HEMORRHOID TYPE: ICD-10-CM

## 2023-10-13 DIAGNOSIS — Z80.0 FAMILY HISTORY OF COLON CANCER IN FATHER: ICD-10-CM

## 2023-10-13 RX ORDER — HYDROCORTISONE 25 MG/G
CREAM TOPICAL 2 TIMES DAILY
Qty: 28 G | Refills: 0 | Status: SHIPPED | OUTPATIENT
Start: 2023-10-13 | End: 2023-10-20

## 2023-10-13 NOTE — PATIENT INSTRUCTIONS
GERD (Gastroesophageal Reflux Disease)   WHAT YOU NEED TO KNOW:   Gastroesophageal reflux disease (GERD) is reflux that happens more than 2 times a week for a few weeks. Reflux means acid and food in your stomach back up into your esophagus. GERD can cause other health problems over time if it is not treated. DISCHARGE INSTRUCTIONS:   Call your local emergency number (911 in the [address]) if:   You have severe chest pain and sudden trouble breathing. Return to the emergency department if:   You have trouble breathing after you vomit. You have trouble swallowing, or pain with swallowing. Your bowel movements are black, bloody, or tarry-looking. Your vomit looks like coffee grounds or has blood in it. Call your doctor or gastroenterologist if:   You feel full and cannot burp or vomit. You vomit large amounts, or you vomit often. You are losing weight without trying. Your symptoms get worse or do not improve with treatment. You have questions or concerns about your condition or care. Medicines:   Medicines  are used to decrease stomach acid. Medicine may also be used to help your lower esophageal sphincter and stomach contract (tighten) more. Take your medicine as directed. Contact your healthcare provider if you think your medicine is not helping or if you have side effects. Tell your provider if you are allergic to any medicine. Keep a list of the medicines, vitamins, and herbs you take. Include the amounts, and when and why you take them. Bring the list or the pill bottles to follow-up visits. Carry your medicine list with you in case of an emergency. Manage GERD:       Do not have foods or drinks that may increase heartburn. These include chocolate, peppermint, fried or fatty foods, drinks that contain caffeine, or carbonated drinks (soda). Other foods include spicy foods, onions, tomatoes, and tomato-based foods.  Do not have foods or drinks that can irritate your esophagus, such as citrus fruits, juices, and alcohol. Do not eat large meals. When you eat a lot of food at one time, your stomach needs more acid to digest it. Eat 6 small meals each day instead of 3 large ones, and eat slowly. Do not eat meals 2 to 3 hours before bedtime. Elevate the head of your bed. Place 6-inch blocks under the head of your bed frame. You may also use more than one pillow under your head and shoulders while you sleep. Maintain a healthy weight. If you are overweight, weight loss may help relieve symptoms of GERD. Do not smoke. Smoking weakens the lower esophageal sphincter and increases the risk of GERD. Ask your healthcare provider for information if you currently smoke and need help to quit. E-cigarettes or smokeless tobacco still contain nicotine. Talk to your healthcare provider before you use these products. Do not put pressure on your abdomen. Pressure pushes acid up into your esophagus. Do not wear clothing that is tight around your waist. Do not bend over. Bend at the knees if you need to pick something up. Follow up with your doctor or gastroenterologist as directed:  Write down your questions so you remember to ask them during your visits. © Copyright [facility] 2023 Information is for End User's use only and may not be sold, redistributed or otherwise used for commercial purposes. The above information is an  only. It is not intended as medical advice for individual conditions or treatments. Talk to your doctor, nurse or pharmacist before following any medical regimen to see if it is safe and effective for you. High Fiber Diet   WHAT YOU NEED TO KNOW:   A high-fiber diet includes foods that have a high amount of fiber. Fiber is the part of fruits, vegetables, and grains that is not broken down by your body. Fiber keeps your bowel movements regular.  Fiber can also help lower your cholesterol level, control blood sugar in people with diabetes, and relieve constipation. Fiber can also help you control your weight because it helps you feel full faster. Most adults should eat 25 to 35 grams of fiber each day. Talk to your dietitian or healthcare provider about the amount of fiber you need. DISCHARGE INSTRUCTIONS:   Good sources of fiber:       Foods with at least 4 grams of fiber per serving:      ? to ½ cup of high-fiber cereal (check the nutrition label on the box)    ½ cup of blackberries or raspberries    4 dried prunes    1 cooked artichoke    ½ cup of cooked legumes, such as lentils, or red, kidney, and aparicio beans    Foods with 1 to 3 grams of fiber per servin slice of whole-wheat, pumpernickel, or rye bread    ½ cup of cooked brown rice    4 whole-wheat crackers    1 cup of oatmeal    ½ cup of cereal with 1 to 3 grams of fiber per serving (check the nutrition label on the box)    1 small piece of fruit, such as an apple, banana, pear, kiwi, or orange    3 dates    ½ cup of canned apricots, fruit cocktail, peaches, or pears    ½ cup of raw or cooked vegetables, such as carrots, cauliflower, cabbage, spinach, squash, or corn  Ways that you can increase fiber in your diet:   Choose brown or wild rice instead of white rice. Use whole wheat flour in recipes instead of white or all-purpose flour. Add beans and peas to casseroles or soups. Choose fresh fruit and vegetables with peels or skins on instead of juices. Other diet guidelines to follow: Add fiber to your diet slowly. You may have abdominal discomfort, bloating, and gas if you add fiber to your diet too quickly. Drink plenty of liquids as you add fiber to your diet. You may have nausea or develop constipation if you do not drink enough water. Ask how much liquid to drink each day and which liquids are best for you. © Copyright [employer]  Information is for End User's use only and may not be sold, redistributed or otherwise used for commercial purposes.   The above information is an  only. It is not intended as medical advice for individual conditions or treatments. Talk to your doctor, nurse or pharmacist before following any medical regimen to see if it is safe and effective for you.

## 2023-10-13 NOTE — PROGRESS NOTES
[facility] [address] [facility] Consultation  Tristan Taylor 57 y.o. female MRN: [id number]  Encounter: [id number]          ASSESSMENT AND PLAN:      1. History of adenomatous polyp of colon  2. Family history of colon cancer in father  Patient high risk for colon cancer given family history in her father and she has a personal history of sessile serrated adenomatous polyps removed in piecemeal fashion last colonoscopy in November 2022, she is now due for surveillance colonoscopy. MiraLAX/Dulcolax prep and procedure explained. -     Colonoscopy; Future; Expected date: 10/13/2023    3. Constipation, unspecified constipation type  4. Hemorrhoids, unspecified hemorrhoid type  Reports longstanding history of what sounds like slow transit constipation with bowel movements every 2 to 4 days sometimes with straining and hard stool, but this is not generally very bothersome to her. She has been struggling with what sounds like an external hemorrhoid recently with rectal discomfort/intermittent bleeding.  -Recommend high-fiber diet 25 to 30 g of fiber daily. If unable to obtain through the diet then recommend daily fiber supplement such as Metamucil, Benefiber, or Citrucel.  -Increase hydration and physical activity  -Anusol cream sent to the pharmacy. Advised patient to use for no longer than 7 days.  -Further evaluation will be done at the time of colonoscopy. If symptoms do not resolve then may require referral to colorectal.  -     hydrocortisone (ANUSOL-HC) 2.5 % rectal cream; Apply topically 2 (two) times a day for 7 days    5. Gastroesophageal reflux disease, unspecified whether esophagitis present  Patient with new onset of reflux symptoms starting after a course of clindamycin for a dental infection. She reports she had 2 days of severe epigastric pain and is now having reflux 1-2 times per week. She recently felt like a pill got stuck in her throat when going down.   Denies any dysphagia with food/liquid or any odynophagia. Symptoms may be due to pill esophagitis, GERD. Will schedule EGD with colonoscopy for further evaluation. She will continue with antacids as needed in the interim. Advised to drink plenty of water with pill intake  -     EGD; Future; Expected date: 10/13/2023        ______________________________________________________________________    HPI:  Jimmy Rojas is a 57 y.o. female with history of breast cancer s/p bilateral mastectomy, family history of colon cancer, and personal history of polyps presenting for consultation to EGD/Colonoscopy. She had her first colonoscopy last November with Dr. Zoe Vaca and was found to have 5 polyps removed, some in piecemeal c/w sessile serrated adenomas & hyperplastic polyps on biopsy and recommended to repeat in 1 year. She has a family history of colon cancer in her father diagnosed in his late 62s. She denies any changes to her bowel habits, abdominal pain, unintended weight loss (currently on phentermine for intended weight loss). She normally has BMs every couple days, sometimes going 4 days without a BM with occasional passage of hard stool and straining. Has tried fiber supplement in the past but stopped due to bloating/gas. TSH recently checked and normal. She is having some issues with an external hemorrhoid with discomfort and intermittent bleeding. Hasn't tried any medical therapy for this yet. She was recently on clindamycin for a dental infection for a month and developed a stabbing pain in her chest for 2 days now resolved. She reports new symptoms of reflux 1-2 times a week since that time. Felt a pill getting caught in her throat a couple days ago. REVIEW OF SYSTEMS:    CONSTITUTIONAL: Denies any fever, chills, rigors, and weight loss. HEENT: No earache or tinnitus. CARDIOVASCULAR: No chest pain or palpitations.    RESPIRATORY: Denies any cough, hemoptysis, shortness of breath or dyspnea on exertion. GASTROINTESTINAL: As noted in the History of Present Illness. GENITOURINARY: Denies any hematuria or dysuria. NEUROLOGIC: No dizziness or vertigo. MUSCULOSKELETAL: Denies any joint swellings. SKIN: Denies skin rashes or itching. ENDOCRINE: Denies excessive thirst. Denies intolerance to heat or cold. PSYCHOSOCIAL: Denies depression or anxiety. Denies any recent memory loss. Historical Information   Past Medical History:   Diagnosis Date    Anesthesia complication     daughter has malignant hyperthermia    Asthma     resolved w/ wt loss; is now having asthma sx due to recent hx of having covid 8/2022    BRCA1-associated protein-1 tumor predisposition syndrome     CPAP (continuous positive airway pressure) dependence     H/O bilateral breast implants     Intraductal papilloma of breast, left 02/23/2021    Malignant hyperthermia due to anesthesia     has family hx of MH, her daughter    Obesity (BMI 30-39. 9)     Pneumonia     2019    PONV (postoperative nausea and vomiting)     Scab     back and right thigh from few precancerous lesions taken off    Sleep apnea      Past Surgical History:   Procedure Laterality Date    APPENDECTOMY      BREAST IMPLANT Left 11/18/2021    Procedure: FILL SALINE BREAST IMPLANT;  Surgeon: Clau Ybarra MD;  Location: [address] [place] [address];  Service: Plastics    BREAST SURGERY  2/9/2021    Bilateral mastectomy    CAPSULOTOMY Bilateral 5/19/2021    Procedure: CAPSULECTOMY, EXPANDER/IMPLANT EXCHANGE;  Surgeon: Dario Munguia MD;  Location: [address] [place] [address];  Service: Plastics    COLONOSCOPY      COSMETIC SURGERY  2021    Reconstruction of breast    DILATION AND CURETTAGE OF [address] N/A 11/27/2020    Procedure: TOTAL LAPAROSCOPIC HYSTERECTOMY, BILATERAL SALPINGO-OOPHORECTOMY, cystoscopy;  Surgeon: Fidencio Hatchet, MD;  Location: [facility] OR;  Service: Gynecology Oncology    LIPOSUCTION W/ FAT INJECTION Bilateral 12/2/2022    Procedure: FAT GRAFTING TO BREAST; Surgeon: Krishan Saeed MD;  Location: [address] [place] [address] MAIN OR;  Service: Plastics    MASTECTOMY      SC IMPLNT BIO IMPLNT FOR SOFT TISSUE REINFORCEMENT Bilateral 2/9/2021    Procedure: RECONSTRUCTION BREAST W/ IMPLANT;  Surgeon: Katy Villalta MD;  [facility];  Service: Plastics    SC MASTECTOMY SIMPLE COMPLETE Bilateral 2/9/2021    Procedure: BREAST MASTECTOMY;  Surgeon: Delphine Delgado MD;  [facility];  Service: Surgical Oncology    SC NIPPLE/AREOLA RECONSTRUCTION Bilateral 2/24/2022    Procedure: NIPPLE RECONSTRUCTION;  Surgeon: Krishan Saeed MD;  Location: [address] [place] [address] MAIN OR;  Service: Plastics    SC REVISION OF RECONSTRUCTED BREAST Bilateral 8/6/2021    Procedure: BREAST RECONSTRUCITON REVISION; EXCISION OF STANDING DEFORMITIES;  Surgeon: Krishan Saeed MD;  [facility];  Service: Plastics    SC REVISION OF RECONSTRUCTED BREAST Bilateral 11/18/2021    Procedure: REVISE BREAST RECONSTRUCTION;  Surgeon: Krishan Saeed MD;  Location: [address] [place] [address] MAIN OR;  Service: Plastics    SC TISSUE EXPANDER PLACEMENT BREAST RECONSTRUCTION Bilateral 2/9/2021    Procedure: BREAST INSERTION/PLACEMENT TISSUE EXPANDER (EXCHANGE);   Surgeon: Katy Villalta MD;  [facility];  Service: Plastics    TONSILLECTOMY      TYMPANOSTOMY TUBE PLACEMENT Bilateral      Social History   Social History     Substance and Sexual Activity   Alcohol Use Yes    Alcohol/week: 1.0 standard drink of alcohol    Types: 1 Standard drinks or equivalent per week    Comment: social, monthly     Social History     Substance and Sexual Activity   Drug Use No     Social History     Tobacco Use   Smoking Status Never   Smokeless Tobacco Never   Tobacco Comments    Denies     Family History   Problem Relation Age of Onset    No Known Problems Mother     Colon cancer Father     BRCA1 Positive Sister     No Known Problems Sister     No Known Problems Maternal Aunt     BRCA1 Positive Maternal Uncle     No Known Problems Paternal Aunt     No Known Problems Paternal Aunt     No Known Problems Paternal Aunt     No Known Problems Paternal Uncle     No Known Problems Paternal Uncle     Cancer Maternal Grandfather     No Known Problems Daughter     No Known Problems Daughter     Breast cancer Cousin 36    Ovarian cancer Other 46       Meds/Allergies       Current Outpatient Medications:     estradiol (Estrace) 1 mg tablet    hydrocortisone (ANUSOL-HC) 2.5 % rectal cream    other medication, see sig,    phentermine 37.5 MG capsule    Prasterone, DHEA, (DHEA PO)    Progesterone 100 MG CAPS    topiramate (TOPAMAX) 50 MG tablet    albuterol (PROVENTIL HFA,VENTOLIN HFA) 90 mcg/act inhaler    cholecalciferol (VITAMIN D3) 1,000 units tablet    Multiple Vitamins-Minerals (ONE-A-DAY WOMENS PO)    Allergies   Allergen Reactions    Penicillins Other (See Comments)     Childhood reaction; unknown reaction- tolerated Ancef           Objective     Blood pressure 98/71, pulse 72, height 5' 7.25" (1.708 m), weight 101 kg (222 lb), last menstrual period 10/15/2020, not currently breastfeeding. Body mass index is 34.51 kg/m². PHYSICAL EXAM:      General Appearance:   Alert, cooperative, no distress   HEENT:   Normocephalic, atraumatic, anicteric. Neck:  Supple, symmetrical, trachea midline   Lungs:   Clear to auscultation bilaterally; no rales, rhonchi or wheezing; respirations unlabored    Heart[de-identified]   Regular rate and rhythm; no murmur. Abdomen:   Soft, non-tender, non-distended; normal bowel sounds; no masses, no organomegaly    Genitalia:   Deferred    Rectal:   Deferred    Extremities:  No cyanosis, clubbing or edema    Skin:  No jaundice, rashes, or lesions    Lymph nodes:  No palpable cervical lymphadenopathy        Lab Results:   No visits with results within 1 Day(s) from this visit.    Latest known visit with results is:   Clinical Support on 07/26/2023   Component Date Value    Miscellaneous Lab Test R* 07/26/2023           Radiology Results:   US breast left limited (diagnostic)    Result Date: 10/11/2023  Narrative: DIAGNOSIS: Abnormal finding on breast imaging TECHNIQUE: Ultrasound of the left breast(s) was performed. COMPARISONS: Prior breast imaging dated: 04/05/2023 RELEVANT HISTORY: Family Breast Cancer History: History of breast cancer in Cousin. Family Medical History: Family medical history includes BRCA1 Positive in 2 relatives (maternal uncle, sister), breast cancer in cousin, colon cancer in father, and ovarian cancer in other. Personal History: Hormone history includes birth control and estrogen replacement therapy. Surgical history includes mastectomy and hysterectomy. No known relevant medical history. RISK ASSESSMENT: 5 Year Tyrer-Cuzick: 3.57 % 10 Year Tyrer-Cuzick: 6.88 % Lifetime Tyrer-Cuzick: 18.16 % INDICATION: Francisco Torres is a 57 y.o. female presenting for follow up abnormal imaging. FINDINGS: LEFT 1) MASS [A]: Previously described finding does not persist. 2) MASS [B]: Previously described finding does not persist. 3) MASS [C]: Previously described finding does not persist. 4) MASS [D]: Previously described finding does not persist. 5) MASS [E]: Previously described finding does not persist. 6) CYST [F]: Previously described finding does not persist. 7) CYST [G]: Previously described finding does not persist. 8) CYST [H]: There is a 5 mm x 2 mm oval complicated cyst with circumscribed margins seen in the left breast at 2 o'clock, 8 cm from the nipple. Compared to the previous study, there are no significant changes. left implant(s) have been identified. Impression:  Nearly all of the previous findings are no longer identified with ultrasound. Patient states that the areas of prior palpable concern have resolved. Only a single small hypoechoic focus at the 230 location 8 cm from the nipple remains present and stable, containing low-level echoes and felt likely to represent a benign complicated cyst.  No further work-up recommended. ASSESSMENT/BI-RADS CATEGORY: Left: 2 - Benign Overall: 2 - Benign RECOMMENDATION:      - Clinical management for both breasts.  Workstation ID: [id number]

## 2023-10-13 NOTE — TELEPHONE ENCOUNTER
ASC Screening    ASC Screening  BMI > than 45: No  Are you currently pregnant?: No  Do you rely on a wheelchair for mobility?: No  Do you need oxygen during the day?: No  Have you ever been informed by anesthesia that you have a difficult airway?: No  Have you been diagnosed with End Stage Renal Disease (ESRD)?: No  Are you actively on dialysis?: No  Have you been diagnosed with Pulmonary Hypertension?: No  Do you have a pacemaker or an Automatic Implantable Cardioverter Defibrillator (AICD)?: No  Have you ever had an organ transplant?: No  Have you had a stroke, heart attack, myocardial infarction (MI) within the last 6 months?: No  Have you ever been diagnosed with Aortic Stenosis?: No  Have you ever been diagnosed  with Congestive Heart Failure?: No  Have you ever been diagnosed with a heart valve disease?: No  Are you Diabetic?: No  If you are Diabetic, has your A1C been greater than 12 within the last six months?: N/A
Scheduled date of EGD/colonoscopy (as of today): 10/17/23  Physician performing EGD/colonoscopy: Dr. Melo Montalvo   [facility] of EGD[facility]colonoscopy: [facility]  Desired bowel prep reviewed with patient: miralax w/ dul given at appt   Instructions reviewed with patient by: ls  Clearances:  n/a
Updated dx.
Implemented All Universal Safety Interventions:  [place] to call system. Call bell, personal items and telephone within reach. Instruct patient to call for assistance. Room bathroom lighting operational. Non-slip footwear when patient is off stretcher. Physically safe environment: no spills, clutter or unnecessary equipment. Stretcher in lowest position, wheels locked, appropriate side rails in place.

## 2023-10-16 ENCOUNTER — ANESTHESIA EVENT (OUTPATIENT)
Dept: ANESTHESIOLOGY | Facility: AMBULATORY SURGERY CENTER | Age: 48
End: 2023-10-16

## 2023-10-16 ENCOUNTER — ANESTHESIA (OUTPATIENT)
Dept: ANESTHESIOLOGY | Facility: AMBULATORY SURGERY CENTER | Age: 48
End: 2023-10-16

## 2023-10-16 ENCOUNTER — ANESTHESIA EVENT (OUTPATIENT)
Dept: GASTROENTEROLOGY | Facility: AMBULATORY SURGERY CENTER | Age: 48
End: 2023-10-16

## 2023-10-17 ENCOUNTER — ANESTHESIA (OUTPATIENT)
Dept: GASTROENTEROLOGY | Facility: AMBULATORY SURGERY CENTER | Age: 48
End: 2023-10-17

## 2023-10-17 ENCOUNTER — HOSPITAL ENCOUNTER (OUTPATIENT)
Dept: GASTROENTEROLOGY | Facility: AMBULATORY SURGERY CENTER | Age: 48
Discharge: HOME | End: 2023-10-17

## 2023-10-17 VITALS
SYSTOLIC BLOOD PRESSURE: 121 MMHG | TEMPERATURE: 97.5 F | RESPIRATION RATE: 18 BRPM | WEIGHT: 222 LBS | HEART RATE: 83 BPM | HEIGHT: 67 IN | OXYGEN SATURATION: 100 % | DIASTOLIC BLOOD PRESSURE: 60 MMHG | BODY MASS INDEX: 34.84 KG/M2

## 2023-10-17 DIAGNOSIS — K20.90 ESOPHAGITIS: Primary | ICD-10-CM

## 2023-10-17 DIAGNOSIS — Z91.89 HIGH RISK FOR COLON CANCER: ICD-10-CM

## 2023-10-17 DIAGNOSIS — K21.9 GASTROESOPHAGEAL REFLUX DISEASE, UNSPECIFIED WHETHER ESOPHAGITIS PRESENT: ICD-10-CM

## 2023-10-17 DIAGNOSIS — Z86.010 HX OF ADENOMATOUS COLONIC POLYPS: ICD-10-CM

## 2023-10-17 PROCEDURE — 88305 TISSUE EXAM BY PATHOLOGIST: CPT | Performed by: PATHOLOGY

## 2023-10-17 RX ORDER — SODIUM CHLORIDE, SODIUM LACTATE, POTASSIUM CHLORIDE, CALCIUM CHLORIDE 600; 310; 30; 20 MG/100ML; MG/100ML; MG/100ML; MG/100ML
125 INJECTION, SOLUTION INTRAVENOUS CONTINUOUS
Status: CANCELLED | OUTPATIENT
Start: 2023-10-17

## 2023-10-17 RX ORDER — PROPOFOL 10 MG/ML
INJECTION, EMULSION INTRAVENOUS AS NEEDED
Status: DISCONTINUED | OUTPATIENT
Start: 2023-10-17 | End: 2023-10-17

## 2023-10-17 RX ORDER — SODIUM CHLORIDE, SODIUM LACTATE, POTASSIUM CHLORIDE, CALCIUM CHLORIDE 600; 310; 30; 20 MG/100ML; MG/100ML; MG/100ML; MG/100ML
125 INJECTION, SOLUTION INTRAVENOUS CONTINUOUS
Status: DISCONTINUED | OUTPATIENT
Start: 2023-10-17 | End: 2023-10-17

## 2023-10-17 RX ORDER — SODIUM CHLORIDE 9 MG/ML
50 INJECTION, SOLUTION INTRAVENOUS CONTINUOUS
Status: DISCONTINUED | OUTPATIENT
Start: 2023-10-17 | End: 2023-10-21 | Stop reason: HOSPADM

## 2023-10-17 RX ORDER — PANTOPRAZOLE SODIUM 20 MG/1
40 TABLET, DELAYED RELEASE ORAL DAILY
Qty: 60 TABLET | Refills: 1 | Status: SHIPPED | OUTPATIENT
Start: 2023-10-17 | End: 2023-10-20 | Stop reason: DRUGHIGH

## 2023-10-17 RX ORDER — LIDOCAINE HYDROCHLORIDE 20 MG/ML
INJECTION, SOLUTION EPIDURAL; INFILTRATION; INTRACAUDAL; PERINEURAL AS NEEDED
Status: DISCONTINUED | OUTPATIENT
Start: 2023-10-17 | End: 2023-10-17

## 2023-10-17 RX ADMIN — LIDOCAINE HYDROCHLORIDE 100 MG: 20 INJECTION, SOLUTION EPIDURAL; INFILTRATION; INTRACAUDAL; PERINEURAL at 11:39

## 2023-10-17 RX ADMIN — PROPOFOL 100 MG: 10 INJECTION, EMULSION INTRAVENOUS at 11:51

## 2023-10-17 RX ADMIN — PROPOFOL 100 MG: 10 INJECTION, EMULSION INTRAVENOUS at 12:05

## 2023-10-17 RX ADMIN — PROPOFOL 150 MG: 10 INJECTION, EMULSION INTRAVENOUS at 11:39

## 2023-10-17 RX ADMIN — PROPOFOL 100 MG: 10 INJECTION, EMULSION INTRAVENOUS at 12:06

## 2023-10-17 RX ADMIN — PROPOFOL 150 MG: 10 INJECTION, EMULSION INTRAVENOUS at 11:45

## 2023-10-17 RX ADMIN — SODIUM CHLORIDE, SODIUM LACTATE, POTASSIUM CHLORIDE, CALCIUM CHLORIDE: 600; 310; 30; 20 INJECTION, SOLUTION INTRAVENOUS at 11:31

## 2023-10-17 NOTE — ANESTHESIA PREPROCEDURE EVALUATION
Procedure:  EGD  COLONOSCOPY    Relevant Problems   ANESTHESIA   (+) PONV (postoperative nausea and vomiting)      GYN   (+) History of hysterectomy      PULMONARY   (+) Asthma   (+) CLIFF (obstructive sleep apnea)        Physical Exam    Airway    Mallampati score: II  TM Distance: >3 FB  Neck ROM: full     Dental   No notable dental hx     Cardiovascular      Pulmonary      Other Findings        Anesthesia Plan  ASA Score- 3     Anesthesia Type- IV sedation with anesthesia with ASA Monitors. Additional Monitors:     Airway Plan:            Plan Factors-Exercise tolerance (METS): >4 METS. Chart reviewed. Patient summary reviewed. Patient is not a current smoker. There is medical exclusion for perioperative obstructive sleep apnea risk education. Induction- intravenous. Postoperative Plan-     Informed Consent- Anesthetic plan and risks discussed with patient. I personally reviewed this patient with the CRNA. Discussed and agreed on the Anesthesia Plan with the CRNA. Love Pathak

## 2023-10-17 NOTE — ANESTHESIA POSTPROCEDURE EVALUATION
Post-Op Assessment Note    CV Status:  Stable  Pain Score: 0    Pain management: adequate     Mental Status:  Alert and awake   Hydration Status:  Euvolemic   PONV Controlled:  Controlled   Airway Patency:  Patent      Post Op Vitals Reviewed: Yes      Staff: CRNA         No notable events documented.     BP   112/66   Temp   98.0   Pulse  76   Resp   18   SpO2   100

## 2023-10-17 NOTE — H&P
History and Physical - [facility]  Pat Nathsco 57 y.o. female MRN: [id number]                  HPI: Monico North is a 57y.o. year old female who presents for EGD and colonoscopy due to gastroesophageal reflux, high risk for colon cancer due to history of adenomatous colon polyps. Has had history of sessile serrated adenomas. REVIEW OF SYSTEMS: Per the HPI, and otherwise unremarkable. Historical Information   Past Medical History:   Diagnosis Date    Anesthesia complication     daughter has malignant hyperthermia    Asthma     resolved w/ wt loss; is now having asthma sx due to recent hx of having covid 8/2022    BRCA1-associated protein-1 tumor predisposition syndrome     CPAP (continuous positive airway pressure) dependence     H/O bilateral breast implants     Intraductal papilloma of breast, left 02/23/2021    Malignant hyperthermia due to anesthesia     has family hx of MH, her daughter    Obesity (BMI 30-39. 9)     Pneumonia     2019    PONV (postoperative nausea and vomiting)     Scab     back and right thigh from few precancerous lesions taken off    Sleep apnea      Past Surgical History:   Procedure Laterality Date    APPENDECTOMY      BREAST IMPLANT Left 11/18/2021    Procedure: FILL SALINE BREAST IMPLANT;  Surgeon: Sariah Quiroz MD;  Location: [address] [place] [address] OR;  Service: Plastics    BREAST SURGERY  2/9/2021    Bilateral mastectomy    CAPSULOTOMY Bilateral 5/19/2021    Procedure: CAPSULECTOMY, EXPANDER/IMPLANT EXCHANGE;  Surgeon: Vlad Morrison MD;  Location: [address] [place] [address] OR;  Service: Plastics    COLONOSCOPY      COSMETIC SURGERY  2021    Reconstruction of breast    DILATION AND CURETTAGE OF [address] N/A 11/27/2020    Procedure: TOTAL LAPAROSCOPIC HYSTERECTOMY, BILATERAL SALPINGO-OOPHORECTOMY, cystoscopy;  Surgeon: Kodi Louis MD;  Location:  MAIN OR;  Service: Gynecology Oncology    LIPOSUCTION W/ FAT INJECTION Bilateral 12/2/2022    Procedure: FAT GRAFTING TO BREAST;  Surgeon: Haylee Griffin MD;  Location: [address] [place] [address] MAIN OR;  Service: Plastics    MASTECTOMY      AL IMPLNT BIO IMPLNT FOR SOFT TISSUE REINFORCEMENT Bilateral 2/9/2021    Procedure: RECONSTRUCTION BREAST W/ IMPLANT;  Surgeon: Kianna Vargas MD;  [facility];  Service: Plastics    AL MASTECTOMY SIMPLE COMPLETE Bilateral 2/9/2021    Procedure: BREAST MASTECTOMY;  Surgeon: Michael Bazan MD;  [facility];  Service: Surgical Oncology    AL NIPPLE/AREOLA RECONSTRUCTION Bilateral 2/24/2022    Procedure: NIPPLE RECONSTRUCTION;  Surgeon: Haylee Griffin MD;  Location: [address] [place] [address] MAIN OR;  Service: Plastics    AL REVISION OF RECONSTRUCTED BREAST Bilateral 8/6/2021    Procedure: BREAST RECONSTRUCITON REVISION; EXCISION OF STANDING DEFORMITIES;  Surgeon: Haylee Griffin MD;  [facility];  Service: Plastics    AL REVISION OF RECONSTRUCTED BREAST Bilateral 11/18/2021    Procedure: REVISE BREAST RECONSTRUCTION;  Surgeon: Haylee Griffin MD;  Location: [address] [place] [address] MAIN OR;  Service: Plastics    AL TISSUE EXPANDER PLACEMENT BREAST RECONSTRUCTION Bilateral 2/9/2021    Procedure: BREAST INSERTION/PLACEMENT TISSUE EXPANDER (EXCHANGE);   Surgeon: Kianna Vargas MD;  [facility];  Service: Plastics    TONSILLECTOMY      TYMPANOSTOMY TUBE PLACEMENT Bilateral      Social History   Social History     Substance and Sexual Activity   Alcohol Use Yes    Alcohol/week: 1.0 standard drink of alcohol    Types: 1 Standard drinks or equivalent per week    Comment: social, monthly     Social History     Substance and Sexual Activity   Drug Use No     Social History     Tobacco Use   Smoking Status Never   Smokeless Tobacco Never   Tobacco Comments    Denies     Family History   Problem Relation Age of Onset    No Known Problems Mother     Colon cancer Father     BRCA1 Positive Sister     No Known Problems Sister     No Known Problems Maternal Aunt     BRCA1 Positive Maternal Uncle     No Known Problems Paternal Aunt     No Known Problems Paternal Aunt     No Known Problems Paternal Aunt     No Known Problems Paternal Uncle     No Known Problems Paternal Uncle     Cancer Maternal Grandfather     No Known Problems Daughter     No Known Problems Daughter     Breast cancer Cousin 36    Ovarian cancer Other 46       Meds/Allergies       Current Outpatient Medications:     estradiol (Estrace) 1 mg tablet    other medication, see sig,    phentermine 37.5 MG capsule    Prasterone, DHEA, (DHEA PO)    Progesterone 100 MG CAPS    topiramate (TOPAMAX) 50 MG tablet    cholecalciferol (VITAMIN D3) 1,000 units tablet    hydrocortisone (ANUSOL-HC) 2.5 % rectal cream    Multiple Vitamins-Minerals (ONE-A-DAY WOMENS PO)    Allergies   Allergen Reactions    Penicillins Other (See Comments)     Childhood reaction; unknown reaction- tolerated Ancef       Objective     LMP 10/15/2020 (Exact Date)       PHYSICAL EXAM    Gen: NAD  Head: NCAT  CV: RRR  CHEST: Clear  ABD: soft, NT/ND  EXT: no edema      ASSESSMENT/PLAN:  This is a 57y.o. year old female here for EGD and colonoscopy, and she is stable and optimized for her procedure.

## 2023-10-19 ENCOUNTER — TELEPHONE (OUTPATIENT)
Age: 48
End: 2023-10-19

## 2023-10-19 DIAGNOSIS — E66.01 CLASS 2 SEVERE OBESITY DUE TO EXCESS CALORIES WITH SERIOUS COMORBIDITY AND BODY MASS INDEX (BMI) OF 36.0 TO 36.9 IN ADULT: Chronic | ICD-10-CM

## 2023-10-19 NOTE — TELEPHONE ENCOUNTER
Patients GI provider:  Dr. Dumont Needs    Number to return call: ([phone]     Reason for call: Pt calling regarding Protonix. Patient states pharmacy will fill due to error with dosage. Patient states she was told she was to take 1/day and RX states 2. Please review Protonix Rx and contact patient with update.

## 2023-10-20 DIAGNOSIS — K21.9 GASTROESOPHAGEAL REFLUX DISEASE, UNSPECIFIED WHETHER ESOPHAGITIS PRESENT: Primary | ICD-10-CM

## 2023-10-20 PROCEDURE — 88305 TISSUE EXAM BY PATHOLOGIST: CPT | Performed by: PATHOLOGY

## 2023-10-20 RX ORDER — PHENTERMINE HYDROCHLORIDE 37.5 MG/1
37.5 CAPSULE ORAL EVERY MORNING
Qty: 30 CAPSULE | Refills: 1 | Status: SHIPPED | OUTPATIENT
Start: 2023-10-20

## 2023-10-20 RX ORDER — PANTOPRAZOLE SODIUM 20 MG/1
40 TABLET, DELAYED RELEASE ORAL DAILY
Qty: 60 TABLET | Refills: 1 | Status: SHIPPED | OUTPATIENT
Start: 2023-10-20 | End: 2023-10-20 | Stop reason: DRUGHIGH

## 2023-10-20 RX ORDER — PANTOPRAZOLE SODIUM 20 MG/1
20 TABLET, DELAYED RELEASE ORAL DAILY
Qty: 60 TABLET | Refills: 1 | Status: SHIPPED | OUTPATIENT
Start: 2023-10-20 | End: 2023-11-01 | Stop reason: SDUPTHER

## 2023-10-20 NOTE — TELEPHONE ENCOUNTER
Left a voicemail, per HIPAA consent, informing patient that prescription for Pantoprazole 20mg was sent to pharmacy and is to be taken once a day. Thank you.

## 2023-10-21 NOTE — RESULT ENCOUNTER NOTE
Please inform patient that their biopsies were benign. However patient had questionable early Grullon's repeat EGD in 1 year. H. pylori is present on gastric biopsies repeat with Helidac equivalent for 14 days. Patient is allergic to penicillin. Patient should perform stool for H. pylori antigen 1 to 2 months after completion of antibiotics.   Repeat colonoscopy 3 years due to history of colon polyps and positive BRCA1 gene

## 2023-10-26 DIAGNOSIS — A04.8 H. PYLORI INFECTION: Primary | ICD-10-CM

## 2023-11-01 DIAGNOSIS — K21.9 GASTROESOPHAGEAL REFLUX DISEASE, UNSPECIFIED WHETHER ESOPHAGITIS PRESENT: Primary | ICD-10-CM

## 2023-11-01 RX ORDER — PANTOPRAZOLE SODIUM 20 MG/1
20 TABLET, DELAYED RELEASE ORAL DAILY
Qty: 30 TABLET | Refills: 2 | Status: SHIPPED | OUTPATIENT
Start: 2023-11-01

## 2023-11-02 RX ORDER — TETRACYCLINE HYDROCHLORIDE 500 MG/1
500 CAPSULE ORAL 4 TIMES DAILY
Qty: 56 CAPSULE | Refills: 0 | Status: SHIPPED | OUTPATIENT
Start: 2023-11-02 | End: 2023-11-16

## 2023-11-02 RX ORDER — BISMUTH SUBSALICYLATE 262 MG/1
262 TABLET, CHEWABLE ORAL
Qty: 56 TABLET | Refills: 0 | Status: SHIPPED | OUTPATIENT
Start: 2023-11-02 | End: 2023-11-16

## 2023-11-02 RX ORDER — PANTOPRAZOLE SODIUM 40 MG/1
40 TABLET, DELAYED RELEASE ORAL
Qty: 28 TABLET | Refills: 0 | Status: SHIPPED | OUTPATIENT
Start: 2023-11-02 | End: 2023-11-16

## 2023-11-02 RX ORDER — METRONIDAZOLE 250 MG/1
250 TABLET ORAL EVERY 6 HOURS
Qty: 56 TABLET | Refills: 0 | Status: SHIPPED | OUTPATIENT
Start: 2023-11-02 | End: 2023-11-16

## 2023-11-02 NOTE — TELEPHONE ENCOUNTER
Pt transferred to nurses line. Reviewed biopsy results and recommendations with patient. Reviewed quad therapy antibx for h pylori and to submit a stool study in one month to check eradication of bacteria. Patient asked for information to be sent via [employer]. Simplist message sent.

## 2023-11-20 DIAGNOSIS — E66.01 CLASS 2 SEVERE OBESITY DUE TO EXCESS CALORIES WITH SERIOUS COMORBIDITY AND BODY MASS INDEX (BMI) OF 36.0 TO 36.9 IN ADULT: Chronic | ICD-10-CM

## 2023-11-20 RX ORDER — PHENTERMINE HYDROCHLORIDE 37.5 MG/1
37.5 CAPSULE ORAL EVERY MORNING
Qty: 30 CAPSULE | Refills: 1 | Status: SHIPPED | OUTPATIENT
Start: 2023-11-20

## 2023-12-07 ENCOUNTER — APPOINTMENT (EMERGENCY)
Dept: RADIOLOGY | Facility: HOSPITAL | Age: 48
End: 2023-12-07
Payer: COMMERCIAL

## 2023-12-07 ENCOUNTER — HOSPITAL ENCOUNTER (EMERGENCY)
Facility: HOSPITAL | Age: 48
Discharge: HOME | End: 2023-12-07
Attending: EMERGENCY MEDICINE
Payer: COMMERCIAL

## 2023-12-07 VITALS
OXYGEN SATURATION: 98 % | DIASTOLIC BLOOD PRESSURE: 63 MMHG | HEART RATE: 86 BPM | RESPIRATION RATE: 19 BRPM | TEMPERATURE: 98.1 F | SYSTOLIC BLOOD PRESSURE: 125 MMHG

## 2023-12-07 DIAGNOSIS — R07.89 CHEST WALL PAIN: ICD-10-CM

## 2023-12-07 DIAGNOSIS — J10.1 INFLUENZA A: Primary | ICD-10-CM

## 2023-12-07 LAB
ALBUMIN SERPL BCP-MCNC: 3.9 G/DL (ref 3.5–5)
ALP SERPL-CCNC: 62 U/L (ref 34–104)
ALT SERPL W P-5'-P-CCNC: 19 U/L (ref 7–52)
ANION GAP SERPL CALCULATED.3IONS-SCNC: 9 MMOL/L
APTT PPP: 27 SECONDS (ref 23–37)
AST SERPL W P-5'-P-CCNC: 21 U/L (ref 13–39)
BASOPHILS # BLD AUTO: 0.03 THOUSANDS/ÂΜL (ref 0–0.1)
BASOPHILS NFR BLD AUTO: 0 % (ref 0–1)
BILIRUB SERPL-MCNC: 0.51 MG/DL (ref 0.2–1)
BUN SERPL-MCNC: 13 MG/DL (ref 5–25)
CALCIUM SERPL-MCNC: 8.9 MG/DL (ref 8.4–10.2)
CARDIAC TROPONIN I PNL SERPL HS: <2 NG/L
CHLORIDE SERPL-SCNC: 110 MMOL/L (ref 96–108)
CO2 SERPL-SCNC: 21 MMOL/L (ref 21–32)
CREAT SERPL-MCNC: 0.69 MG/DL (ref 0.6–1.3)
D DIMER PPP FEU-MCNC: <0.27 UG/ML FEU
EOSINOPHIL # BLD AUTO: 0.16 THOUSAND/ÂΜL (ref 0–0.61)
EOSINOPHIL NFR BLD AUTO: 2 % (ref 0–6)
ERYTHROCYTE [DISTWIDTH] IN BLOOD BY AUTOMATED COUNT: 12.4 % (ref 11.6–15.1)
FLUAV RNA RESP QL NAA+PROBE: POSITIVE
FLUBV RNA RESP QL NAA+PROBE: NEGATIVE
GFR SERPL CREATININE-BSD FRML MDRD: 104 ML/MIN/1.73SQ M
GLUCOSE SERPL-MCNC: 97 MG/DL (ref 65–140)
HCT VFR BLD AUTO: 40.7 % (ref 34.8–46.1)
HGB BLD-MCNC: 13.8 G/DL (ref 11.5–15.4)
IMM GRANULOCYTES # BLD AUTO: 0.03 THOUSAND/UL (ref 0–0.2)
IMM GRANULOCYTES NFR BLD AUTO: 0 % (ref 0–2)
INR PPP: 0.87 (ref 0.84–1.19)
LIPASE SERPL-CCNC: 39 U/L (ref 11–82)
LYMPHOCYTES # BLD AUTO: 2.85 THOUSANDS/ÂΜL (ref 0.6–4.47)
LYMPHOCYTES NFR BLD AUTO: 37 % (ref 14–44)
MCH RBC QN AUTO: 32 PG (ref 26.8–34.3)
MCHC RBC AUTO-ENTMCNC: 33.9 G/DL (ref 31.4–37.4)
MCV RBC AUTO: 94 FL (ref 82–98)
MONOCYTES # BLD AUTO: 0.64 THOUSAND/ÂΜL (ref 0.17–1.22)
MONOCYTES NFR BLD AUTO: 8 % (ref 4–12)
NEUTROPHILS # BLD AUTO: 4.02 THOUSANDS/ÂΜL (ref 1.85–7.62)
NEUTS SEG NFR BLD AUTO: 53 % (ref 43–75)
NRBC BLD AUTO-RTO: 0 /100 WBCS
PLATELET # BLD AUTO: 207 THOUSANDS/UL (ref 149–390)
PMV BLD AUTO: 11.5 FL (ref 8.9–12.7)
POTASSIUM SERPL-SCNC: 3.4 MMOL/L (ref 3.5–5.3)
PROT SERPL-MCNC: 7 G/DL (ref 6.4–8.4)
PROTHROMBIN TIME: 12.4 SECONDS (ref 11.6–14.5)
RBC # BLD AUTO: 4.31 MILLION/UL (ref 3.81–5.12)
RSV RNA RESP QL NAA+PROBE: NEGATIVE
SARS-COV-2 RNA RESP QL NAA+PROBE: NEGATIVE
SODIUM SERPL-SCNC: 140 MMOL/L (ref 135–147)
WBC # BLD AUTO: 7.73 THOUSAND/UL (ref 4.31–10.16)

## 2023-12-07 PROCEDURE — 99285 EMERGENCY DEPT VISIT HI MDM: CPT

## 2023-12-07 PROCEDURE — 0241U HB NFCT DS VIR RESP RNA 4 TRGT: CPT | Performed by: PHYSICIAN ASSISTANT

## 2023-12-07 PROCEDURE — 36415 COLL VENOUS BLD VENIPUNCTURE: CPT | Performed by: PHYSICIAN ASSISTANT

## 2023-12-07 PROCEDURE — 93005 ELECTROCARDIOGRAM TRACING: CPT

## 2023-12-07 PROCEDURE — 71045 X-RAY EXAM CHEST 1 VIEW: CPT

## 2023-12-07 PROCEDURE — 85025 COMPLETE CBC W/AUTO DIFF WBC: CPT | Performed by: PHYSICIAN ASSISTANT

## 2023-12-07 PROCEDURE — 80053 COMPREHEN METABOLIC PANEL: CPT | Performed by: PHYSICIAN ASSISTANT

## 2023-12-07 PROCEDURE — 85730 THROMBOPLASTIN TIME PARTIAL: CPT | Performed by: PHYSICIAN ASSISTANT

## 2023-12-07 PROCEDURE — 83690 ASSAY OF LIPASE: CPT | Performed by: PHYSICIAN ASSISTANT

## 2023-12-07 PROCEDURE — 84484 ASSAY OF TROPONIN QUANT: CPT | Performed by: PHYSICIAN ASSISTANT

## 2023-12-07 PROCEDURE — 85610 PROTHROMBIN TIME: CPT | Performed by: PHYSICIAN ASSISTANT

## 2023-12-07 PROCEDURE — 85379 FIBRIN DEGRADATION QUANT: CPT | Performed by: PHYSICIAN ASSISTANT

## 2023-12-07 PROCEDURE — 99285 EMERGENCY DEPT VISIT HI MDM: CPT | Performed by: PHYSICIAN ASSISTANT

## 2023-12-07 RX ORDER — POTASSIUM CHLORIDE 20 MEQ/1
40 TABLET, EXTENDED RELEASE ORAL ONCE
Status: COMPLETED | OUTPATIENT
Start: 2023-12-07 | End: 2023-12-07

## 2023-12-07 RX ORDER — ALBUTEROL SULFATE 90 UG/1
2 AEROSOL, METERED RESPIRATORY (INHALATION) ONCE
Status: COMPLETED | OUTPATIENT
Start: 2023-12-07 | End: 2023-12-07

## 2023-12-07 RX ADMIN — POTASSIUM CHLORIDE 40 MEQ: 1500 TABLET, EXTENDED RELEASE ORAL at 23:34

## 2023-12-07 RX ADMIN — ALBUTEROL SULFATE 2 PUFF: 90 AEROSOL, METERED RESPIRATORY (INHALATION) at 22:07

## 2023-12-07 NOTE — Clinical Note
Valeria Abreu was seen and treated in our emergency department on 12/7/2023. Diagnosis:     Mirta Islas  . She may return on this date: 12/09/2023         If you have any questions or concerns, please don't hesitate to call.       Nelson Dick PA-C    ______________________________           _______________          _______________  Hospital Representative                              Date                                Time

## 2023-12-08 LAB
ATRIAL RATE: 78 BPM
P AXIS: 36 DEGREES
PR INTERVAL: 140 MS
QRS AXIS: 85 DEGREES
QRSD INTERVAL: 92 MS
QT INTERVAL: 394 MS
QTC INTERVAL: 449 MS
T WAVE AXIS: 46 DEGREES
VENTRICULAR RATE: 78 BPM

## 2023-12-08 NOTE — ED PROVIDER NOTES
History  Chief Complaint   Patient presents with    Shortness of Breath     Patient c/o chest pressure and sob that began approx 1 week ago. Patient also states that she fell approx 1 week ago after becoming dizziness. Negative head strike. Patient states that she struck her left hip. Patient is a 33-year-old female with a history of BRCA1, CPAP use, history of appendectomy, bilateral mastectomy, total hysterectomy that presents emergency department with intermittent centralized nonradiating chest pressure for 1 week. Patient says he is oncology or shortness of breath symptoms beginning with the current presentation of chest pain symptoms. Patient states that she also has intermittent nonproductive coughing beginning with the current ED presentation of chest pressure and shortness of breath symptoms. Patient does report that she takes estrogen and progesterone since her total hysterectomy. Patient denies palliative factors with provocative factors of breathing deeply. Patient has not effective treatment. Patient denies fevers, chills, nausea, vomiting, diarrhea, constipation and urinary symptoms. Patient denies recent fall and denies recent trauma. Patient affirms possible sick contacts, patient family numbers. Patient denies recent travel. Patient denies recent antibiotic use and does confirm a positive history of GERD symptoms. Patient denies abdominal pain. History provided by:  Patient   used: No    Shortness of Breath  Associated symptoms: chest pain    Associated symptoms: no abdominal pain, no cough, no fever, no headaches, no neck pain, no rash, no sore throat and no vomiting        Prior to Admission Medications   Prescriptions Last Dose Informant Patient Reported? Taking?    Multiple Vitamins-Minerals (ONE-A-DAY WOMENS PO)  Self Yes No   Sig: Take by mouth daily    Patient not taking: Reported on 10/13/2023   Prasterone, DHEA, (DHEA PO)  Self Yes No   Sig: Take by mouth   Progesterone 100 MG CAPS  Self No No   Sig: Take 100 mg by mouth at bedtime   cholecalciferol (VITAMIN D3) 1,000 units tablet  Self Yes No   Sig: Take 1,000 Units by mouth daily   Patient not taking: Reported on 10/13/2023   estradiol (Estrace) 1 mg tablet  Self No No   Sig: Take 1 tablet (1 mg total) by mouth daily   hydrocortisone (ANUSOL-HC) 2.5 % rectal cream   No No   Sig: Apply topically 2 (two) times a day for 7 days   other medication, see sig,  Self No No   Sig: Medication/product name: testosterone cream  Strength: 4mg/ml  Sig (include dose, route, frequency): apply 0.5 ml to labia daily   pantoprazole (PROTONIX) 20 mg tablet   No No   Sig: Take 1 tablet (20 mg total) by mouth daily   pantoprazole (PROTONIX) 40 mg tablet   No No   Sig: take 1 tablet by mouth twice a day before MEALS for 14 days   phentermine 37.5 MG capsule   No No   Sig: Take 1 capsule (37.5 mg total) by mouth every morning   topiramate (TOPAMAX) 50 MG tablet  Self No No   Sig: take 1 tablet by mouth every 12 hours      Facility-Administered Medications: None       Past Medical History:   Diagnosis Date    Anesthesia complication     daughter has malignant hyperthermia    Asthma     resolved w/ wt loss; is now having asthma sx due to recent hx of having covid 8/2022    BRCA1-associated protein-1 tumor predisposition syndrome     CPAP (continuous positive airway pressure) dependence     H/O bilateral breast implants     Intraductal papilloma of breast, left 02/23/2021    Malignant hyperthermia due to anesthesia     has family hx of MH, her daughter    Obesity (BMI 30-39. 9)     Pneumonia     2019    PONV (postoperative nausea and vomiting)     Scab     back and right thigh from few precancerous lesions taken off    Sleep apnea        Past Surgical History:   Procedure Laterality Date    APPENDECTOMY      BREAST IMPLANT Left 11/18/2021    Procedure: FILL SALINE BREAST IMPLANT;  Surgeon: lCau Ybarra MD;  Location: [facility] MAIN OR; Service: Plastics    BREAST SURGERY  2/9/2021    Bilateral mastectomy    CAPSULOTOMY Bilateral 5/19/2021    Procedure: CAPSULECTOMY, EXPANDER/IMPLANT EXCHANGE;  Surgeon: Clarine Nissen, MD;  Location: [address] [place] [address] MAIN OR;  Service: Plastics    COLONOSCOPY      COSMETIC SURGERY  2021    Reconstruction of breast    DILATION AND CURETTAGE OF UTERUS  1997    HYSTERECTOMY N/A 11/27/2020    Procedure: TOTAL LAPAROSCOPIC HYSTERECTOMY, BILATERAL SALPINGO-OOPHORECTOMY, cystoscopy;  Surgeon: Yeimy Hernandez MD;  Location: [facility] MAIN [facility];  Service: Gynecology Oncology    LIPOSUCTION W/ FAT INJECTION Bilateral 12/2/2022    Procedure: FAT GRAFTING TO BREAST;  Surgeon: Florencio Mendieta MD;  Location: [address] [place] [address] MAIN OR;  Service: Plastics    MASTECTOMY      AL IMPLNT BIO IMPLNT FOR SOFT TISSUE REINFORCEMENT Bilateral 2/9/2021    Procedure: RECONSTRUCTION BREAST W/ IMPLANT;  Surgeon: Clarine Nissen, MD;  Location: [facility] Main OR;  Service: Plastics    AL MASTECTOMY SIMPLE COMPLETE Bilateral 2/9/2021    Procedure: BREAST MASTECTOMY;  Surgeon: Janeth Benjamin MD;  Location[facility];  Service: Surgical Oncology    AL NIPPLE/AREOLA RECONSTRUCTION Bilateral 2/24/2022    Procedure: NIPPLE RECONSTRUCTION;  Surgeon: Florencio Mendieta MD;  Location: [address] [place] [address] MAIN OR;  Service: Plastics    AL REVISION OF RECONSTRUCTED BREAST Bilateral 8/6/2021    Procedure: BREAST RECONSTRUCITON REVISION; EXCISION OF STANDING DEFORMITIES;  Surgeon: Florencio Mendieta MD;  Location[facility];  Service: Plastics    AL REVISION OF RECONSTRUCTED BREAST Bilateral 11/18/2021    Procedure: REVISE BREAST RECONSTRUCTION;  Surgeon: Florencio Mendieta MD;  Location: [address] [place] [address] MAIN OR;  Service: Plastics    AL TISSUE EXPANDER PLACEMENT BREAST RECONSTRUCTION Bilateral 2/9/2021    Procedure: BREAST INSERTION/PLACEMENT TISSUE EXPANDER (EXCHANGE);   Surgeon: Clarine Nissen, MD;  [facility];  Service: Plastics    TONSILLECTOMY      TYMPANOSTOMY TUBE PLACEMENT Bilateral        Family History   Problem Relation Age of Onset    No Known Problems Mother     Colon cancer Father     BRCA1 Positive Sister     No Known Problems Sister     No Known Problems Maternal Aunt     BRCA1 Positive Maternal Uncle     No Known Problems Paternal Aunt     No Known Problems Paternal Aunt     No Known Problems Paternal Aunt     No Known Problems Paternal Uncle     No Known Problems Paternal Uncle     Cancer Maternal Grandfather     No Known Problems Daughter     No Known Problems Daughter     Breast cancer Cousin 36    Ovarian cancer Other 46     I have reviewed and agree with the history as documented. E-Cigarette/Vaping    E-Cigarette Use Never User     Comments Denies      E-Cigarette/Vaping Substances    Nicotine No     THC No     CBD No     Flavoring No     Other No     Unknown No      Social History     Tobacco Use    Smoking status: Never    Smokeless tobacco: Never    Tobacco comments:     Denies   Vaping Use    Vaping Use: Never used   Substance Use Topics    Alcohol use: Yes     Alcohol/week: 1.0 standard drink of alcohol     Types: 1 Standard drinks or equivalent per week     Comment: social, monthly    Drug use: No       Review of Systems   Constitutional:  Negative for activity change, appetite change, chills and fever. HENT:  Negative for congestion, postnasal drip, rhinorrhea, sinus pressure, sinus pain, sore throat and tinnitus. Eyes:  Negative for photophobia and visual disturbance. Respiratory:  Positive for shortness of breath. Negative for cough and chest tightness. Cardiovascular:  Positive for chest pain. Negative for palpitations. Gastrointestinal:  Negative for abdominal pain, constipation, diarrhea, nausea and vomiting. Genitourinary:  Negative for difficulty urinating, dysuria, flank pain, frequency and urgency. Musculoskeletal:  Negative for back pain, gait problem, neck pain and neck stiffness. Skin:  Negative for pallor and rash.    Allergic/Immunologic: Negative for environmental allergies and food allergies. Neurological:  Negative for dizziness, weakness, numbness and headaches. Psychiatric/Behavioral:  Negative for confusion. All other systems reviewed and are negative. Physical Exam  Physical Exam  Vitals and nursing note reviewed. Constitutional:       General: She is awake. Appearance: Normal appearance. She is well-developed and normal weight. She is not ill-appearing, toxic-appearing or diaphoretic. Comments: /67 (BP Location: Right arm)   Pulse 86   Temp 98.1 °F (36.7 °C) (Oral)   Resp 20   LMP 10/15/2020 (Exact Date)   SpO2 98%      HENT:      Head: Normocephalic and atraumatic. Jaw: There is normal jaw occlusion. Right Ear: Hearing, tympanic membrane and external ear normal. No decreased hearing noted. No drainage, swelling or tenderness. No mastoid tenderness. Left Ear: Hearing, tympanic membrane and external ear normal. No decreased hearing noted. No drainage, swelling or tenderness. No mastoid tenderness. Nose: Nose normal.      Mouth/Throat:      Lips: Pink. Mouth: Mucous membranes are moist.      Pharynx: Oropharynx is clear. Uvula midline. Eyes:      General: Lids are normal. Vision grossly intact. Gaze aligned appropriately. Right eye: No discharge. Left eye: No discharge. Extraocular Movements: Extraocular movements intact. Conjunctiva/sclera: Conjunctivae normal.      Pupils: Pupils are equal, round, and reactive to light. Neck:      Vascular: No JVD. Trachea: Trachea and phonation normal. No tracheal tenderness or tracheal deviation. Cardiovascular:      Rate and Rhythm: Normal rate and regular rhythm. Pulses: Normal pulses. Radial pulses are 2+ on the right side and 2+ on the left side. Posterior tibial pulses are 2+ on the right side and 2+ on the left side. Heart sounds: Normal heart sounds.    Pulmonary:      Effort: Pulmonary effort is normal.      Breath sounds: Normal breath sounds and air entry. No stridor. No decreased breath sounds, wheezing, rhonchi or rales. Chest:      Chest wall: No tenderness. Abdominal:      General: Abdomen is flat. Bowel sounds are normal. There is no distension. Palpations: Abdomen is soft. Abdomen is not rigid. Tenderness: There is no abdominal tenderness. There is no guarding or rebound. Musculoskeletal:         General: Normal range of motion. Cervical back: Full passive range of motion without pain, normal range of motion and neck supple. No rigidity. No spinous process tenderness or muscular tenderness. Normal range of motion. Feet:      Right foot:      Toenail Condition: Right toenails are normal.      Left foot:      Toenail Condition: Left toenails are normal.   Lymphadenopathy:      Head:      Right side of head: No submental, submandibular, tonsillar, preauricular, posterior auricular or occipital adenopathy. Left side of head: No submental, submandibular, tonsillar, preauricular, posterior auricular or occipital adenopathy. Cervical: No cervical adenopathy. Right cervical: No superficial, deep or posterior cervical adenopathy. Left cervical: No superficial, deep or posterior cervical adenopathy. Skin:     General: Skin is warm. Capillary Refill: Capillary refill takes less than 2 seconds. Findings: No rash. Neurological:      General: No focal deficit present. Mental Status: She is alert and oriented to person, place, and time. Mental status is at baseline. GCS: GCS eye subscore is 4. GCS verbal subscore is 5. GCS motor subscore is 6. Sensory: No sensory deficit. Psychiatric:         Attention and Perception: Attention normal.         Mood and Affect: Mood normal.         Speech: Speech normal.         Behavior: Behavior normal. Behavior is cooperative. Thought Content:  Thought content normal.         Judgment: Judgment normal.         Vital Signs  ED Triage Vitals [12/07/23 2122]   Temperature Pulse Respirations Blood Pressure SpO2   98.1 °F (36.7 °C) 86 20 139/67 98 %      Temp Source Heart Rate Source Patient Position - Orthostatic VS BP Location FiO2 (%)   Oral Monitor Lying Right arm --      Pain Score       --           Vitals:    12/07/23 2122 12/07/23 2342   BP: 139/67 125/63   Pulse: 86 86   Patient Position - Orthostatic VS: Lying Sitting         Visual Acuity      ED Medications  Medications   albuterol (PROVENTIL HFA,VENTOLIN HFA) inhaler 2 puff (2 puffs Inhalation Given 12/7/23 2207)   potassium chloride (K-DUR,KLOR-CON) CR tablet 40 mEq (40 mEq Oral Given 12/7/23 2334)       Diagnostic Studies  Results Reviewed       Procedure Component Value Units Date/Time    FLU/RSV/COVID - if FLU/RSV clinically relevant [[id number]]  (Abnormal) Collected: 12/07/23 2207    Lab Status: Final result Specimen: Nares from Nose Updated: 12/07/23 2310     SARS-CoV-2 Negative     INFLUENZA A PCR Positive     INFLUENZA B PCR Negative     RSV PCR Negative    Narrative:      FOR PEDIATRIC PATIENTS - copy/paste COVID Guidelines URL to browser: [email or web address]    SARS-CoV-2 assay is a Nucleic Acid Amplification assay intended for the  qualitative detection of nucleic acid from SARS-CoV-2 in nasopharyngeal  swabs. Results are for the presumptive identification of SARS-CoV-2 RNA. Positive results are indicative of infection with SARS-CoV-2, the virus  causing COVID-19, but do not rule out bacterial infection or co-infection  with other viruses. Laboratories within the [facility] and its  territories are required to report all positive results to the appropriate  public health authorities. Negative results do not preclude SARS-CoV-2  infection and should not be used as the sole basis for treatment or other  patient management decisions.  Negative results must be combined with  clinical observations, patient history, and epidemiological information. This test has not been FDA cleared or approved. This test has been authorized by FDA under an Emergency Use Authorization  (EUA). This test is only authorized for the duration of time the  declaration that circumstances exist justifying the authorization of the  emergency use of an in vitro diagnostic tests for detection of SARS-CoV-2  virus and/or diagnosis of COVID-19 infection under section 564(b)(1) of  the Act, 21 U. S.C. 331VAN-6(H)(4), unless the authorization is terminated  or revoked sooner. The test has been validated but independent review by FDA  and CLIA is pending. Test performed using [employer] GeneXpert: This RT-PCR assay targets N2,  a region unique to SARS-CoV-2. A conserved region in the E-gene was chosen  for pan-Sarbecovirus detection which includes SARS-CoV-2. According to CMS-2020-01-R, this platform meets the definition of high-throughput technology.     D-Dimer [[id number]]  (Normal) Collected: 12/07/23 2207    Lab Status: Final result Specimen: Blood from Arm, Right Updated: 12/07/23 2300     D-Dimer, Quant <0.27 ug/ml FEU     HS Troponin 0hr (reflex protocol) [[id number]]  (Normal) Collected: 12/07/23 2207    Lab Status: Final result Specimen: Blood from Arm, Right Updated: 12/07/23 2259     hs TnI 0hr <2 ng/L     Protime-INR [[id number]]  (Normal) Collected: 12/07/23 2207    Lab Status: Final result Specimen: Blood from Arm, Right Updated: 12/07/23 2255     Protime 12.4 seconds      INR 0.87    APTT [[id number]]  (Normal) Collected: 12/07/23 2207    Lab Status: Final result Specimen: Blood from Arm, Right Updated: 12/07/23 2255     PTT 27 seconds     Comprehensive metabolic panel [[id number]]  (Abnormal) Collected: 12/07/23 2207    Lab Status: Final result Specimen: Blood from Arm, Right Updated: 12/07/23 2253     Sodium 140 mmol/L      Potassium 3.4 mmol/L      Chloride 110 mmol/L      CO2 21 mmol/L      ANION GAP 9 mmol/L      BUN 13 mg/dL Creatinine 0.69 mg/dL      Glucose 97 mg/dL      Calcium 8.9 mg/dL      AST 21 U/L      ALT 19 U/L      Alkaline Phosphatase 62 U/L      Total Protein 7.0 g/dL      Albumin 3.9 g/dL      Total Bilirubin 0.51 mg/dL      eGFR 104 ml/min/1.73sq m     Narrative:      [facility] guidelines for Chronic Kidney Disease (CKD):     Stage 1 with normal or high GFR (GFR > 90 mL/min/1.73 square meters)    Stage 2 Mild CKD (GFR = 60-89 mL/min/1.73 square meters)    Stage 3A Moderate CKD (GFR = 45-59 mL/min/1.73 square meters)    Stage 3B Moderate CKD (GFR = 30-44 mL/min/1.73 square meters)    Stage 4 Severe CKD (GFR = 15-29 mL/min/1.73 square meters)    Stage 5 End Stage CKD (GFR <15 mL/min/1.73 square meters)  Note: GFR calculation is accurate only with a steady state creatinine    Lipase [[id number]]  (Normal) Collected: 12/07/23 2207    Lab Status: Final result Specimen: Blood from Arm, Right Updated: 12/07/23 2253     Lipase 39 u/L     CBC and differential [[id number]] Collected: 12/07/23 2207    Lab Status: Final result Specimen: Blood from Arm, Right Updated: 12/07/23 2248     WBC 7.73 Thousand/uL      RBC 4.31 Million/uL      Hemoglobin 13.8 g/dL      Hematocrit 40.7 %      MCV 94 fL      MCH 32.0 pg      MCHC 33.9 g/dL      RDW 12.4 %      MPV 11.5 fL      Platelets 041 Thousands/uL      nRBC 0 /100 WBCs      Neutrophils Relative 53 %      Immat GRANS % 0 %      Lymphocytes Relative 37 %      Monocytes Relative 8 %      Eosinophils Relative 2 %      Basophils Relative 0 %      Neutrophils Absolute 4.02 Thousands/µL      Immature Grans Absolute 0.03 Thousand/uL      Lymphocytes Absolute 2.85 Thousands/µL      Monocytes Absolute 0.64 Thousand/µL      Eosinophils Absolute 0.16 Thousand/µL      Basophils Absolute 0.03 Thousands/µL                    XR chest 1 view portable   ED Interpretation by Antonia Mendoza PA-C (12/07 2313)   No acute cardiopulmonary disease on initial read. Procedures  ECG 12 Lead Documentation Only    Date/Time: 12/7/2023 9:28 PM    Performed by: Dexter Christianson PA-C  Authorized by: Dexter Christianson PA-C    Indications / Diagnosis:  Chest pain and sob  ECG reviewed by me, the ED Provider: yes    Previous ECG:     Previous ECG:  Compared to current    Comparison ECG info: When compared with ECG of November 1, 2022, no significant changes were noted. Similarity:  No change    Comparison to cardiac monitor: Yes    Interpretation:     Interpretation: normal    Rate:     ECG rate:  78    ECG rate assessment: normal    Rhythm:     Rhythm: sinus rhythm    Ectopy:     Ectopy: none    QRS:     QRS axis:  Normal    QRS intervals:  Normal  Conduction:     Conduction: normal    ST segments:     ST segments:  Normal  T waves:     T waves: normal             ED Course  ED Course as of 12/08/23 0758   u Dec 07, 2023   2313 INFLU A PCR(!): Positive   2314 INFLU A PCR(!): Positive             HEART Risk Score      Flowsheet Row Most Recent Value   Heart Score Risk Calculator    History 0 Filed at: 12/08/2023 0756   ECG 0 Filed at: 12/08/2023 0756   Age 1 Filed at: 12/08/2023 0756   Risk Factors 1 Filed at: 12/08/2023 0756   Troponin 0 Filed at: 12/08/2023 0756   HEART Score 2 Filed at: 12/08/2023 0756                  PERC Rule for PE      Flowsheet Row Most Recent Value   PERC Rule for PE    Age >=50 0 Filed at: 12/07/2023 2146   HR >=100 0 Filed at: 12/07/2023 2146   O2 Sat on room air < 95% 0 Filed at: 12/07/2023 2146   History of PE or DVT 0 Filed at: 12/07/2023 2146   Recent trauma or surgery 0 Filed at: 12/07/2023 2146   Hemoptysis 0 Filed at: 12/07/2023 2146   Exogenous estrogen 1 Filed at: 12/07/2023 2146   Unilateral leg swelling 0 Filed at: 12/07/2023 2146   PERC Rule for PE Results 1 Filed at: 12/07/2023 2146                SBIRT 20yo+      Flowsheet Row Most Recent Value   Initial Alcohol Screen: US AUDIT-C     1.  How often do you have a drink containing alcohol? 0 Filed at: 12/07/2023 2123   2. How many drinks containing alcohol do you have on a typical day you are drinking? 0 Filed at: 12/07/2023 2123   3a. Male UNDER 65: How often do you have five or more drinks on one occasion? 0 Filed at: 12/07/2023 2123   3b. FEMALE Any Age, or MALE 65+: How often do you have 4 or more drinks on one occassion? 0 Filed at: 12/07/2023 2123   Audit-C Score 0 Filed at: 12/07/2023 2123   MELI: How many times in the past year have you. .. Used an illegal drug or used a prescription medication for non-medical reasons? Never Filed at: 12/07/2023 2123              Gavin' Criteria for DVT      Flowsheet Row Most Recent Value   Gavin' Criteria for DVT    Active cancer Treatment or palliation within 6 months 0 Filed at: 12/07/2023 2146   Bedridden recently >3 days or major surgery within 12 weeks 0 Filed at: 12/07/2023 2146   Calf swelling >3 cm compared to the other leg 0 Filed at: 12/07/2023 2146   Entire leg swollen 0 Filed at: 12/07/2023 2146   Collateral (nonvaricose) superficial veins present 0 Filed at: 12/07/2023 2146   Localized tenderness along the deep venous system 0 Filed at: 12/07/2023 2146   Pitting edema, confined to symptomatic leg 0 Filed at: 12/07/2023 2146   Paralysis, paresis, or recent plaster immobilization of the lower extremity 0 Filed at: 12/07/2023 2146   Previously documented DVT 0 Filed at: 12/07/2023 2146   Alternative diagnosis to DVT as likely or more likely 0 Filed at: 12/07/2023 2146   Gavin DVT Critera Score 0 Filed at: 12/07/2023 2146                Medical Decision Making  Patient is a 71-year-old female with a history of BRCA1, CPAP use, history of appendectomy, bilateral mastectomy, total hysterectomy that presents emergency department with intermittent centralized nonradiating chest pressure for 1 week.   Hemodynamically stable and afebrile  Positive Wells, positive PERC, negative D-dimer, doubt pulmonary embolism  ECG with normal sinus rhythm, negative troponin, heart score 2  Hypokalemia 3.4, repleted  Influenza A positive  No leukocytosis, no banding  Chest x-ray with no acute cardiopulmonary disease on initial read  Negative lipase, doubt acute pancreatitis  Likely URI secondary to influenza  Counseled patient on supportive care at this time  Follow-up with PCP and the emergency department should symptoms persist or exacerbate  Patient demonstrates verbal understanding of all clinical laboratory and imaging findings, discharge instructions follow-up and verbalized agreement with patient current treatment plan. Amount and/or Complexity of Data Reviewed  Labs: ordered. Decision-making details documented in ED Course. Radiology: ordered and independent interpretation performed. Decision-making details documented in ED Course. ECG/medicine tests: ordered and independent interpretation performed. Decision-making details documented in ED Course. Risk  Prescription drug management. Disposition  Final diagnoses:   Influenza A   Chest wall pain     Time reflects when diagnosis was documented in both MDM as applicable and the Disposition within this note       Time User Action Codes Description Comment    12/7/2023 11:55 PM Jerone Balder Add [J10.1] Influenza A     12/7/2023 11:55 PM Jerone Balder Add [R07.9] Chest pain     12/7/2023 11:55 PM Jerone Balder Remove [R07.9] Chest pain     12/7/2023 11:55 PM Jerone Balder Add [R07.89] Chest wall pain           ED Disposition       ED Disposition   Discharge    Condition   Stable    Date/Time   u Dec 7, 2023 8593    [facility]. Follow-up Information       Follow up With Specialties Details Why Contact Info Additional Miguel Yan MD Internal Medicine   [address] [facility]  [address] [facility] Emergency Department Emergency Medicine   [address] Emergency Department, [place], [place] [facility], [address], [address]            Discharge Medication List as of 12/7/2023 11:56 PM        CONTINUE these medications which have NOT CHANGED    Details   cholecalciferol (VITAMIN D3) 1,000 units tablet Take 1,000 Units by mouth daily, Historical Med      estradiol (Estrace) 1 mg tablet Take 1 tablet (1 mg total) by mouth daily, Starting Tue 5/23/2023, Normal      hydrocortisone (ANUSOL-HC) 2.5 % rectal cream Apply topically 2 (two) times a day for 7 days, Starting Fri 10/13/2023, Until Fri 10/20/2023, Normal      Multiple Vitamins-Minerals (ONE-A-DAY WOMENS PO) Take by mouth daily , Historical Med      other medication, see sig, Medication/product name: testosterone cream  Strength: 4mg/ml  Sig (include dose, route, frequency): apply 0.5 ml to labia daily, Phone In      !! pantoprazole (PROTONIX) 20 mg tablet Take 1 tablet (20 mg total) by mouth daily, Starting Mon 11/20/2023, Normal      !! pantoprazole (PROTONIX) 40 mg tablet take 1 tablet by mouth twice a day before MEALS for 14 days, Normal      phentermine 37.5 MG capsule Take 1 capsule (37.5 mg total) by mouth every morning, Starting Mon 11/20/2023, Normal      Prasterone, DHEA, (DHEA PO) Take by mouth, Historical Med      Progesterone 100 MG CAPS Take 100 mg by mouth at bedtime, Starting Tue 5/23/2023, Normal      topiramate (TOPAMAX) 50 MG tablet take 1 tablet by mouth every 12 hours, Starting Fri 9/15/2023, Normal       !! - Potential duplicate medications found. Please discuss with provider. No discharge procedures on file.     PDMP Review         Value Time User    PDMP Reviewed  Yes 11/20/2023 11:08 AM Cintia Singh PA-C            ED Provider  Electronically Signed by             Onofre Ramachandran PA-C  12/08/23 [id number]

## 2023-12-14 DIAGNOSIS — K21.9 GASTROESOPHAGEAL REFLUX DISEASE, UNSPECIFIED WHETHER ESOPHAGITIS PRESENT: ICD-10-CM

## 2023-12-14 RX ORDER — PANTOPRAZOLE SODIUM 20 MG/1
20 TABLET, DELAYED RELEASE ORAL DAILY
Qty: 30 TABLET | Refills: 5 | Status: SHIPPED | OUTPATIENT
Start: 2023-12-14

## 2024-01-02 ENCOUNTER — TELEPHONE (OUTPATIENT)
Dept: BARIATRICS | Facility: CLINIC | Age: 49
End: 2024-01-02

## 2024-01-16 ENCOUNTER — TELEPHONE (OUTPATIENT)
Dept: BARIATRICS | Facility: CLINIC | Age: 49
End: 2024-01-16

## 2024-01-16 DIAGNOSIS — E66.01 CLASS 2 SEVERE OBESITY DUE TO EXCESS CALORIES WITH SERIOUS COMORBIDITY AND BODY MASS INDEX (BMI) OF 37.0 TO 37.9 IN ADULT: Chronic | ICD-10-CM

## 2024-01-16 RX ORDER — TOPIRAMATE 50 MG/1
50 TABLET, FILM COATED ORAL EVERY 12 HOURS
Qty: 60 TABLET | Refills: 3 | Status: SHIPPED | OUTPATIENT
Start: 2024-01-16

## 2024-01-23 DIAGNOSIS — E66.01 CLASS 2 SEVERE OBESITY DUE TO EXCESS CALORIES WITH SERIOUS COMORBIDITY AND BODY MASS INDEX (BMI) OF 36.0 TO 36.9 IN ADULT: Chronic | ICD-10-CM

## 2024-01-25 RX ORDER — PHENTERMINE HYDROCHLORIDE 37.5 MG/1
37.5 CAPSULE ORAL EVERY MORNING
Qty: 30 CAPSULE | Refills: 0 | Status: SHIPPED | OUTPATIENT
Start: 2024-01-25

## 2024-01-25 NOTE — TELEPHONE ENCOUNTER
I am going to send in phentermine refill for a month but she needs a weight check. She was supposed to be seen this month

## 2024-02-19 ENCOUNTER — CLINICAL SUPPORT (OUTPATIENT)
Dept: BARIATRICS | Facility: CLINIC | Age: 49
End: 2024-02-19

## 2024-02-19 VITALS
RESPIRATION RATE: 16 BRPM | TEMPERATURE: 97.5 F | WEIGHT: 225.4 LBS | SYSTOLIC BLOOD PRESSURE: 122 MMHG | DIASTOLIC BLOOD PRESSURE: 70 MMHG | HEIGHT: 67 IN | HEART RATE: 82 BPM | BODY MASS INDEX: 35.38 KG/M2

## 2024-02-19 DIAGNOSIS — R63.5 ABNORMAL WEIGHT GAIN: Primary | ICD-10-CM

## 2024-02-19 PROCEDURE — RECHECK

## 2024-02-19 NOTE — PROGRESS NOTES
Patient last visit weight:  Patient current visit weight:    If you are taking phentermine or other oral weight loss medications, are you experiencing any of the following symptoms:  Headache: NO  Blurred Vision: NO  Chest Pain: NO  Palpitations:NO  Insomnia: NO  SPECIFY ORAL MEDICATION AND DOSAGE: PHENTERMINE AND TOPIRAMATE    If you are taking an injectable medication,  are you experiencing any of the following symptoms:  Bloating:   Nausea:  Vomiting:   Constipation:   Diarrhea:  SPECIFY INJECTABLE MEDICATION AND CURRENT DOSAGE:      Vitals:    Is BP less than 100/60?NO  Is BP greater than 140/90?NO  Is HR greater than 100?NO  **If yes to any of the above, have patient relax and repeat in 5-10 minutes**    Repeat values:    Is BP less than 100/60?  Is BP greater than 140/90?  Is HR greater than 100?  **If values remain outside of ranges above, please consult provider for next steps**

## 2024-02-24 DIAGNOSIS — L65.9 HAIR LOSS: Primary | ICD-10-CM

## 2024-02-29 NOTE — PROGRESS NOTES
Assessment/Plan:    Problem List Items Addressed This Visit          Musculoskeletal and Integument    Osteopenia     Continue daily calcium and vitamin D.   Plan for repeat DEXA in early 2025.             Other    BRCA1 gene mutation positive - Primary     48-year-old with a known BRCA1 mutation who is s/p prophylactic TLH, BSO, and b/l mastectomy with breast reconstruction. Her clinical exam is benign and she is asymptomatic.      Return to the office in 1 year for continued BRCA surveillance.             Symptomatic postsurgical menopause     Management per menopausal clinic.              CHIEF COMPLAINT:   BRCA surveillance    Problem:  BRCA 1 mutation    Previous therapy:  1. TLH, BSO and cystoscopy on 11/27/20.   A. Benign.      2. Bilateral mastectomy with recent fat grafting.        Patient ID: Michelle Taylor is a 48 y.o. female  who has no new complaints today. No vaginal bleeding, abdominal/pelvic pain. Normal bowel and bladder function. She consulted with Dr. Marinelli to discuss menopausal management and was transitioned to oral estrogen/progesteron and DHEA. Quality of life is good.        The following portions of the patient's history were reviewed and updated as appropriate: allergies, current medications, past medical history, past surgical history, and problem list.    Review of Systems   Constitutional: Negative.    HENT: Negative.     Eyes: Negative.    Respiratory: Negative.     Cardiovascular: Negative.    Gastrointestinal: Negative.    Genitourinary: Negative.    Musculoskeletal: Negative.    Skin: Negative.    Neurological: Negative.    Psychiatric/Behavioral: Negative.         Current Outpatient Medications   Medication Sig Dispense Refill    estradiol (Estrace) 1 mg tablet Take 1 tablet (1 mg total) by mouth daily 30 tablet 11    Multiple Vitamins-Minerals (ONE-A-DAY WOMENS PO) Take by mouth daily      pantoprazole (PROTONIX) 20 mg tablet take 1 tablet by mouth once daily 30 tablet  "5    phentermine 37.5 MG capsule Take 1 capsule (37.5 mg total) by mouth every morning 30 capsule 1    Prasterone, DHEA, (DHEA PO) Take by mouth      Progesterone 100 MG CAPS Take 100 mg by mouth at bedtime 30 capsule 11    topiramate (TOPAMAX) 50 MG tablet Take 1 tablet (50 mg total) by mouth every 12 (twelve) hours 60 tablet 3    hydrocortisone (ANUSOL-HC) 2.5 % rectal cream Apply topically 2 (two) times a day for 7 days 28 g 0     No current facility-administered medications for this visit.           Objective:    Blood pressure 122/86, pulse 86, temperature 97.7 °F (36.5 °C), height 5' 7\" (1.702 m), weight 102 kg (225 lb), last menstrual period 10/15/2020, SpO2 97%, not currently breastfeeding.  Body mass index is 35.24 kg/m².  Body surface area is 2.13 meters squared.    Physical Exam  Vitals reviewed. Exam conducted with a chaperone present.   Constitutional:       General: She is not in acute distress.     Appearance: Normal appearance. She is not ill-appearing.   HENT:      Head: Normocephalic and atraumatic.      Mouth/Throat:      Mouth: Mucous membranes are moist.   Eyes:      General:         Right eye: No discharge.         Left eye: No discharge.      Conjunctiva/sclera: Conjunctivae normal.   Pulmonary:      Effort: Pulmonary effort is normal.   Abdominal:      Palpations: Abdomen is soft. There is no mass.      Tenderness: There is no abdominal tenderness.      Hernia: No hernia is present.   Genitourinary:     Comments: The external female genitalia is normal. The bartholin's, uretheral and skenes glands are normal. The urethral meatus is normal (midline with no lesions). Anus without fissure or lesion. Speculum exam reveals a grossly normal vagina. No masses, lesions,discharge or bleeding. No significant cystocele or rectocele noted. Bimanual exam notes a surgical absent cervix, uterus and adnexal structures. No masses or fullness. Bladder is without fullness, mass or tenderness.  Musculoskeletal: "      Right lower leg: No edema.      Left lower leg: No edema.   Skin:     General: Skin is warm and dry.      Coloration: Skin is not jaundiced.      Findings: No rash.   Neurological:      General: No focal deficit present.      Mental Status: She is alert and oriented to person, place, and time.      Cranial Nerves: No cranial nerve deficit.      Sensory: No sensory deficit.      Motor: No weakness.      Gait: Gait normal.   Psychiatric:         Mood and Affect: Mood normal.         Behavior: Behavior normal.         Thought Content: Thought content normal.         Judgment: Judgment normal.

## 2024-02-29 NOTE — ASSESSMENT & PLAN NOTE
48-year-old with a known BRCA1 mutation who is s/p prophylactic TLH, BSO, and b/l mastectomy with breast reconstruction. Her clinical exam is benign and she is asymptomatic.      Return to the office in 1 year for continued BRCA surveillance.

## 2024-03-01 ENCOUNTER — OFFICE VISIT (OUTPATIENT)
Dept: GYNECOLOGIC ONCOLOGY | Facility: CLINIC | Age: 49
End: 2024-03-01
Payer: COMMERCIAL

## 2024-03-01 VITALS
HEIGHT: 67 IN | OXYGEN SATURATION: 97 % | TEMPERATURE: 97.7 F | SYSTOLIC BLOOD PRESSURE: 122 MMHG | DIASTOLIC BLOOD PRESSURE: 86 MMHG | HEART RATE: 86 BPM | BODY MASS INDEX: 35.31 KG/M2 | WEIGHT: 225 LBS

## 2024-03-01 DIAGNOSIS — E89.41 SYMPTOMATIC POSTSURGICAL MENOPAUSE: ICD-10-CM

## 2024-03-01 DIAGNOSIS — M85.80 OSTEOPENIA, UNSPECIFIED LOCATION: ICD-10-CM

## 2024-03-01 DIAGNOSIS — Z15.01 BRCA1 GENE MUTATION POSITIVE: Primary | ICD-10-CM

## 2024-03-01 DIAGNOSIS — Z15.09 BRCA1 GENE MUTATION POSITIVE: Primary | ICD-10-CM

## 2024-03-01 PROCEDURE — 99213 OFFICE O/P EST LOW 20 MIN: CPT | Performed by: PHYSICIAN ASSISTANT

## 2024-03-11 ENCOUNTER — APPOINTMENT (OUTPATIENT)
Dept: LAB | Facility: CLINIC | Age: 49
End: 2024-03-11
Payer: COMMERCIAL

## 2024-03-11 ENCOUNTER — OFFICE VISIT (OUTPATIENT)
Dept: SURGICAL ONCOLOGY | Facility: CLINIC | Age: 49
End: 2024-03-11
Payer: COMMERCIAL

## 2024-03-11 VITALS
DIASTOLIC BLOOD PRESSURE: 82 MMHG | SYSTOLIC BLOOD PRESSURE: 124 MMHG | HEART RATE: 74 BPM | OXYGEN SATURATION: 99 % | TEMPERATURE: 97.5 F | HEIGHT: 67 IN | WEIGHT: 228 LBS | RESPIRATION RATE: 16 BRPM | BODY MASS INDEX: 35.79 KG/M2

## 2024-03-11 DIAGNOSIS — Z15.01 BRCA1 GENE MUTATION POSITIVE: ICD-10-CM

## 2024-03-11 DIAGNOSIS — Z91.89 INCREASED RISK OF BREAST CANCER: Primary | ICD-10-CM

## 2024-03-11 DIAGNOSIS — Z15.09 BRCA1 GENE MUTATION POSITIVE: ICD-10-CM

## 2024-03-11 DIAGNOSIS — L65.9 HAIR LOSS: ICD-10-CM

## 2024-03-11 LAB
T3FREE SERPL-MCNC: 3.73 PG/ML (ref 2.5–3.9)
TESTOST SERPL-MSCNC: <10 NG/DL
TSH SERPL DL<=0.05 MIU/L-ACNC: 3.11 UIU/ML (ref 0.45–4.5)

## 2024-03-11 PROCEDURE — 84481 FREE ASSAY (FT-3): CPT

## 2024-03-11 PROCEDURE — 99213 OFFICE O/P EST LOW 20 MIN: CPT

## 2024-03-11 PROCEDURE — 84403 ASSAY OF TOTAL TESTOSTERONE: CPT

## 2024-03-11 PROCEDURE — 84443 ASSAY THYROID STIM HORMONE: CPT

## 2024-03-11 PROCEDURE — 36415 COLL VENOUS BLD VENIPUNCTURE: CPT

## 2024-03-11 PROCEDURE — 82627 DEHYDROEPIANDROSTERONE: CPT

## 2024-03-11 NOTE — PROGRESS NOTES
Surgical Oncology Follow Up       [address] [facility] SURGICAL ONCOLOGY [place]  [address] [facility] [id number]    Michelle Taylor  1975  [id number]  [address] [facility] SURGICAL ONCOLOGY [facility]  [address] [facility]  [facility] [address]    Chief Complaint   Patient presents with   • office visit       Assessment/Plan:  1. Increased risk of breast cancer  - 1 year follow up  - s/p b/l mastectomies, TLH, BSO    2. BRCA1 gene mutation positive       Discussion/Summary: Patient is a 48 year-old female presenting for a 1 year follow-up for increased risk breast cancer secondary to BRCA1 mutation. She is s/p prophylactic b/l mastectomies with recon in 2021. She is currently following with GYN oncology annually.  I will call her with the results of the ultrasound.  She was instructed to call with any questions or concerns prior to her next visit.  All questions were answered today. She had an u/s of the left breast on 10/11/23 which was benign. There is a  5 mm x 2 mm oval complicated cyst with circumscribed margins seen in the left breast at 2 o'clock, 8 cm from the nipple. She has no complaints today. There were no concerns on her cbe. I will see the patient back in 1 year or sooner should the need arise. She was instructed to call with any questions or concerns prior to this time. All questions were answered today.      History of Present Illness:     Oncology History    No history exists.        -Interval History: Patient is a 48 year-old female presenting for a 1 year follow-up for increased risk breast cancer secondary to BRCA1 mutation. She is s/p prophylactic b/l mastectomies with recon in 2021. She had an u/s of the left breast on 10/11/23 which was benign. There is a  5 mm x 2 mm oval complicated cyst with circumscribed margins seen in the left breast at 2 o'clock, 8 cm from the nipple. Patient denies changes on her breast exam.    Review of Systems:  Review of Systems   Constitutional:  Negative for activity change, appetite change, fatigue and unexpected weight change.   Respiratory:  Negative for cough and shortness of breath.    Cardiovascular:  Negative for chest pain.   Gastrointestinal:  Negative for abdominal pain, diarrhea, nausea and vomiting.   Endocrine: Negative for heat intolerance.   Musculoskeletal:  Negative for arthralgias, back pain and myalgias.   Skin:  Negative for rash.   Neurological:  Negative for weakness and headaches.   Hematological:  Negative for adenopathy.       Patient Active Problem List   Diagnosis   • Sinobronchitis   • BRCA1 gene mutation positive   • Symptomatic postsurgical menopause   • Asthma   • Obesity   • Keratosis, actinic   • History of hysterectomy   • PONV (postoperative nausea and vomiting)   • Fatigue   • CLIFF (obstructive sleep apnea)   • Osteopenia   • Nasal septal ulcer   • Increased risk of breast cancer     Past Medical History:   Diagnosis Date   • Anesthesia complication     daughter has malignant hyperthermia   • Asthma     resolved w/ wt loss; is now having asthma sx due to recent hx of having covid 8/2022   • BRCA1-associated protein-1 tumor predisposition syndrome    • CPAP (continuous positive airway pressure) dependence    • H/O bilateral breast implants    • Intraductal papilloma of breast, left 02/23/2021   • Malignant hyperthermia due to anesthesia     has family hx of MH, her daughter   • Obesity (BMI 30-39.9)    • Pneumonia     2019   • PONV (postoperative nausea and vomiting)    • Scab     back and right thigh from few precancerous lesions taken off   • Sleep apnea      Past Surgical History:   Procedure Laterality Date   • APPENDECTOMY     • BREAST IMPLANT Left 11/18/2021    Procedure: FILL SALINE BREAST IMPLANT;  Surgeon: Maria C Hawley MD;  Location:  MAIN OR;  Service: Plastics   • BREAST SURGERY  2/9/2021    Bilateral mastectomy   • CAPSULOTOMY Bilateral 5/19/2021     Procedure: CAPSULECTOMY, EXPANDER/IMPLANT EXCHANGE;  Surgeon: Maria C Hawley MD;  Location:  MAIN OR;  Service: Plastics   • COLONOSCOPY     • COSMETIC SURGERY  2021    Reconstruction of breast   • DILATION AND CURETTAGE OF UTERUS  1997   • HYSTERECTOMY N/A 11/27/2020    Procedure: TOTAL LAPAROSCOPIC HYSTERECTOMY, BILATERAL SALPINGO-OOPHORECTOMY, cystoscopy;  Surgeon: Benedicto Donnelly MD;  Location:  MAIN OR;  Service: Gynecology Oncology   • LIPOSUCTION W/ FAT INJECTION Bilateral 12/2/2022    Procedure: FAT GRAFTING TO BREAST;  Surgeon: Maria C Hawley MD;  Location:  MAIN OR;  Service: Plastics   • MASTECTOMY     • FL IMPLNT BIO IMPLNT FOR SOFT TISSUE REINFORCEMENT Bilateral 2/9/2021    Procedure: RECONSTRUCTION BREAST W/ IMPLANT;  Surgeon: Maria C Hawley MD;  [facility];  Service: Plastics   • FL MASTECTOMY SIMPLE COMPLETE Bilateral 2/9/2021    Procedure: BREAST MASTECTOMY;  Surgeon: Eyad Riley MD;  [facility];  Service: Surgical Oncology   • FL NIPPLE/AREOLA RECONSTRUCTION Bilateral 2/24/2022    Procedure: NIPPLE RECONSTRUCTION;  Surgeon: Maria C Hawley MD;  Location:  MAIN OR;  Service: Plastics   • FL REVISION OF RECONSTRUCTED BREAST Bilateral 8/6/2021    Procedure: BREAST RECONSTRUCITON REVISION; EXCISION OF STANDING DEFORMITIES;  Surgeon: Maria C Hawley MD;  [facility];  Service: Plastics   • FL REVISION OF RECONSTRUCTED BREAST Bilateral 11/18/2021    Procedure: REVISE BREAST RECONSTRUCTION;  Surgeon: Maria C Hawley MD;  Location:  MAIN OR;  Service: Plastics   • FL TISSUE EXPANDER PLACEMENT BREAST RECONSTRUCTION Bilateral 2/9/2021    Procedure: BREAST INSERTION/PLACEMENT TISSUE EXPANDER (EXCHANGE);  Surgeon: Maria C Hawley MD;  Location:  Main OR;  Service: Plastics   • TONSILLECTOMY     • TYMPANOSTOMY TUBE PLACEMENT Bilateral      Family History   Problem Relation Age of Onset   • No Known Problems Mother    • Colon cancer Father    • BRCA1 Positive Sister    • No  Known Problems Sister    • No Known Problems Maternal Aunt    • BRCA1 Positive Maternal Uncle    • No Known Problems Paternal Aunt    • No Known Problems Paternal Aunt    • No Known Problems Paternal Aunt    • No Known Problems Paternal Uncle    • No Known Problems Paternal Uncle    • Cancer Maternal Grandfather    • No Known Problems Daughter    • No Known Problems Daughter    • Breast cancer Cousin 40   • Ovarian cancer Other 52     Social History     Socioeconomic History   • Marital status: /Civil Union     Spouse name: Not on file   • Number of children: Not on file   • Years of education: Not on file   • Highest education level: Not on file   Occupational History   • Not on file   Tobacco Use   • Smoking status: Never   • Smokeless tobacco: Never   • Tobacco comments:     Denies   Vaping Use   • Vaping status: Never Used   Substance and Sexual Activity   • Alcohol use: Yes     Alcohol/week: 1.0 standard drink of alcohol     Types: 1 Standard drinks or equivalent per week     Comment: social, monthly   • Drug use: No   • Sexual activity: Yes     Partners: Male     Birth control/protection: Female Sterilization   Other Topics Concern   • Not on file   Social History Narrative   • Not on file     Social Determinants of Health     Financial Resource Strain: Not on file   Food Insecurity: Not on file   Transportation Needs: Not on file   Physical Activity: Not on file   Stress: Not on file   Social Connections: Not on file   Intimate Partner Violence: Not on file   Housing Stability: Not on file       Current Outpatient Medications:   •  estradiol (Estrace) 1 mg tablet, Take 1 tablet (1 mg total) by mouth daily, Disp: 30 tablet, Rfl: 11  •  Multiple Vitamins-Minerals (ONE-A-DAY WOMENS PO), Take by mouth daily, Disp: , Rfl:   •  pantoprazole (PROTONIX) 20 mg tablet, take 1 tablet by mouth once daily, Disp: 30 tablet, Rfl: 5  •  phentermine 37.5 MG capsule, Take 1 capsule (37.5 mg total) by mouth every  morning, Disp: 30 capsule, Rfl: 1  •  Prasterone, DHEA, (DHEA PO), Take by mouth, Disp: , Rfl:   •  Progesterone 100 MG CAPS, Take 100 mg by mouth at bedtime, Disp: 30 capsule, Rfl: 11  •  topiramate (TOPAMAX) 50 MG tablet, Take 1 tablet (50 mg total) by mouth every 12 (twelve) hours, Disp: 60 tablet, Rfl: 3  •  hydrocortisone (ANUSOL-HC) 2.5 % rectal cream, Apply topically 2 (two) times a day for 7 days, Disp: 28 g, Rfl: 0  Allergies   Allergen Reactions   • Penicillins Other (See Comments)     Childhood reaction; unknown reaction- tolerated Ancef     Vitals:    03/11/24 0809   BP: 124/82   Pulse: 74   Resp: 16   Temp: 97.5 °F (36.4 °C)   SpO2: 99%       Physical Exam  Constitutional:       General: She is not in acute distress.     Appearance: Normal appearance.   Cardiovascular:      Rate and Rhythm: Normal rate and regular rhythm.      Pulses: Normal pulses.      Heart sounds: Normal heart sounds.   Pulmonary:      Effort: Pulmonary effort is normal.      Breath sounds: Normal breath sounds.   Chest:      Chest wall: No mass.   Breasts:     Right: No swelling, bleeding, mass, skin change or tenderness.      Left: No swelling, bleeding, mass, skin change or tenderness.          Comments: B.l mastectomy scars with implants  Abdominal:      General: Abdomen is flat.      Palpations: Abdomen is soft.   Lymphadenopathy:      Upper Body:      Right upper body: No supraclavicular, axillary or pectoral adenopathy.      Left upper body: No supraclavicular, axillary or pectoral adenopathy.   Skin:     General: Skin is warm.   Neurological:      General: No focal deficit present.      Mental Status: She is alert and oriented to person, place, and time.   Psychiatric:         Mood and Affect: Mood normal.         Behavior: Behavior normal.           Results:    Imaging  No results found.    I reviewed the above imaging data.      Advance Care Planning/Advance Directives:  Discussed disease status, cancer treatment plans  and/or cancer treatment goals with the patient.

## 2024-03-12 LAB — DHEA-S SERPL-MCNC: 134 UG/DL (ref 41.2–243.7)

## 2024-04-01 ENCOUNTER — HOSPITAL ENCOUNTER (EMERGENCY)
Facility: HOSPITAL | Age: 49
Discharge: HOME | End: 2024-04-02
Attending: EMERGENCY MEDICINE
Payer: COMMERCIAL

## 2024-04-01 ENCOUNTER — APPOINTMENT (EMERGENCY)
Dept: CT IMAGING | Facility: HOSPITAL | Age: 49
End: 2024-04-01
Payer: COMMERCIAL

## 2024-04-01 VITALS
OXYGEN SATURATION: 99 % | RESPIRATION RATE: 18 BRPM | SYSTOLIC BLOOD PRESSURE: 122 MMHG | DIASTOLIC BLOOD PRESSURE: 61 MMHG | TEMPERATURE: 97.9 F | HEART RATE: 69 BPM

## 2024-04-01 DIAGNOSIS — R11.0 NAUSEA: ICD-10-CM

## 2024-04-01 DIAGNOSIS — R19.7 DIARRHEA: ICD-10-CM

## 2024-04-01 DIAGNOSIS — R10.9 ABDOMINAL PAIN: Primary | ICD-10-CM

## 2024-04-01 LAB
ALBUMIN SERPL BCP-MCNC: 4 G/DL (ref 3.5–5)
ALP SERPL-CCNC: 85 U/L (ref 34–104)
ALT SERPL W P-5'-P-CCNC: 18 U/L (ref 7–52)
ANION GAP SERPL CALCULATED.3IONS-SCNC: 8 MMOL/L (ref 4–13)
AST SERPL W P-5'-P-CCNC: 19 U/L (ref 13–39)
BACTERIA UR QL AUTO: ABNORMAL /HPF
BASOPHILS # BLD AUTO: 0.07 THOUSANDS/ÂΜL (ref 0–0.1)
BASOPHILS NFR BLD AUTO: 1 % (ref 0–1)
BILIRUB SERPL-MCNC: 0.48 MG/DL (ref 0.2–1)
BILIRUB UR QL STRIP: NEGATIVE
BUN SERPL-MCNC: 11 MG/DL (ref 5–25)
CALCIUM SERPL-MCNC: 8.4 MG/DL (ref 8.4–10.2)
CHLORIDE SERPL-SCNC: 107 MMOL/L (ref 96–108)
CLARITY UR: CLEAR
CO2 SERPL-SCNC: 25 MMOL/L (ref 21–32)
COLOR UR: YELLOW
CREAT SERPL-MCNC: 0.51 MG/DL (ref 0.6–1.3)
EOSINOPHIL # BLD AUTO: 0.19 THOUSAND/ÂΜL (ref 0–0.61)
EOSINOPHIL NFR BLD AUTO: 2 % (ref 0–6)
ERYTHROCYTE [DISTWIDTH] IN BLOOD BY AUTOMATED COUNT: 12.3 % (ref 11.6–15.1)
GFR SERPL CREATININE-BSD FRML MDRD: 115 ML/MIN/1.73SQ M
GLUCOSE SERPL-MCNC: 91 MG/DL (ref 65–140)
GLUCOSE UR STRIP-MCNC: NEGATIVE MG/DL
HCT VFR BLD AUTO: 43.5 % (ref 34.8–46.1)
HGB BLD-MCNC: 14.6 G/DL (ref 11.5–15.4)
HGB UR QL STRIP.AUTO: NEGATIVE
IMM GRANULOCYTES # BLD AUTO: 0.03 THOUSAND/UL (ref 0–0.2)
IMM GRANULOCYTES NFR BLD AUTO: 0 % (ref 0–2)
KETONES UR STRIP-MCNC: NEGATIVE MG/DL
LEUKOCYTE ESTERASE UR QL STRIP: NEGATIVE
LIPASE SERPL-CCNC: 23 U/L (ref 11–82)
LYMPHOCYTES # BLD AUTO: 3.25 THOUSANDS/ÂΜL (ref 0.6–4.47)
LYMPHOCYTES NFR BLD AUTO: 37 % (ref 14–44)
MCH RBC QN AUTO: 31.7 PG (ref 26.8–34.3)
MCHC RBC AUTO-ENTMCNC: 33.6 G/DL (ref 31.4–37.4)
MCV RBC AUTO: 94 FL (ref 82–98)
MONOCYTES # BLD AUTO: 1 THOUSAND/ÂΜL (ref 0.17–1.22)
MONOCYTES NFR BLD AUTO: 11 % (ref 4–12)
MUCOUS THREADS UR QL AUTO: ABNORMAL
NEUTROPHILS # BLD AUTO: 4.35 THOUSANDS/ÂΜL (ref 1.85–7.62)
NEUTS SEG NFR BLD AUTO: 49 % (ref 43–75)
NITRITE UR QL STRIP: NEGATIVE
NON-SQ EPI CELLS URNS QL MICRO: ABNORMAL /HPF
NRBC BLD AUTO-RTO: 0 /100 WBCS
PH UR STRIP.AUTO: 6 [PH]
PLATELET # BLD AUTO: 275 THOUSANDS/UL (ref 149–390)
PMV BLD AUTO: 11.4 FL (ref 8.9–12.7)
POTASSIUM SERPL-SCNC: 3 MMOL/L (ref 3.5–5.3)
PROT SERPL-MCNC: 7.1 G/DL (ref 6.4–8.4)
PROT UR STRIP-MCNC: ABNORMAL MG/DL
RBC # BLD AUTO: 4.61 MILLION/UL (ref 3.81–5.12)
RBC #/AREA URNS AUTO: ABNORMAL /HPF
SODIUM SERPL-SCNC: 140 MMOL/L (ref 135–147)
SP GR UR STRIP.AUTO: 1.03 (ref 1–1.03)
UROBILINOGEN UR STRIP-ACNC: <2 MG/DL
WBC # BLD AUTO: 8.89 THOUSAND/UL (ref 4.31–10.16)
WBC #/AREA URNS AUTO: ABNORMAL /HPF

## 2024-04-01 PROCEDURE — 80053 COMPREHEN METABOLIC PANEL: CPT | Performed by: EMERGENCY MEDICINE

## 2024-04-01 PROCEDURE — 74177 CT ABD & PELVIS W/CONTRAST: CPT

## 2024-04-01 PROCEDURE — 83690 ASSAY OF LIPASE: CPT | Performed by: EMERGENCY MEDICINE

## 2024-04-01 PROCEDURE — 36415 COLL VENOUS BLD VENIPUNCTURE: CPT

## 2024-04-01 PROCEDURE — 81001 URINALYSIS AUTO W/SCOPE: CPT | Performed by: EMERGENCY MEDICINE

## 2024-04-01 PROCEDURE — 99284 EMERGENCY DEPT VISIT MOD MDM: CPT

## 2024-04-01 PROCEDURE — 85025 COMPLETE CBC W/AUTO DIFF WBC: CPT | Performed by: EMERGENCY MEDICINE

## 2024-04-01 PROCEDURE — 99285 EMERGENCY DEPT VISIT HI MDM: CPT | Performed by: EMERGENCY MEDICINE

## 2024-04-01 RX ORDER — POTASSIUM CHLORIDE 20 MEQ/1
40 TABLET, EXTENDED RELEASE ORAL ONCE
Status: COMPLETED | OUTPATIENT
Start: 2024-04-01 | End: 2024-04-01

## 2024-04-01 RX ORDER — ACETAMINOPHEN 325 MG/1
975 TABLET ORAL ONCE
Status: COMPLETED | OUTPATIENT
Start: 2024-04-01 | End: 2024-04-01

## 2024-04-01 RX ORDER — DICYCLOMINE HCL 20 MG
20 TABLET ORAL ONCE
Status: COMPLETED | OUTPATIENT
Start: 2024-04-01 | End: 2024-04-01

## 2024-04-01 RX ADMIN — POTASSIUM CHLORIDE 40 MEQ: 1500 TABLET, EXTENDED RELEASE ORAL at 23:23

## 2024-04-01 RX ADMIN — IOHEXOL 100 ML: 350 INJECTION, SOLUTION INTRAVENOUS at 23:18

## 2024-04-01 RX ADMIN — DICYCLOMINE HYDROCHLORIDE 20 MG: 20 TABLET ORAL at 23:23

## 2024-04-01 RX ADMIN — ACETAMINOPHEN 975 MG: 325 TABLET, FILM COATED ORAL at 23:23

## 2024-04-01 NOTE — Clinical Note
Michelle Taylor was seen and treated in our emergency department on 4/1/2024.                Diagnosis:     Michelle  .    She may return on this date: 04/03/2024         If you have any questions or concerns, please don't hesitate to call.      Izabela Felton MD    ______________________________           _______________          _______________  Hospital Representative                              Date                                Time

## 2024-04-02 ENCOUNTER — TELEPHONE (OUTPATIENT)
Age: 49
End: 2024-04-02

## 2024-04-02 RX ORDER — DICYCLOMINE HCL 20 MG
20 TABLET ORAL 2 TIMES DAILY
Qty: 20 TABLET | Refills: 0 | Status: SHIPPED | OUTPATIENT
Start: 2024-04-02

## 2024-04-02 RX ORDER — ONDANSETRON 4 MG/1
4 TABLET, ORALLY DISINTEGRATING ORAL EVERY 8 HOURS PRN
Qty: 20 TABLET | Refills: 0 | Status: SHIPPED | OUTPATIENT
Start: 2024-04-02

## 2024-04-02 NOTE — TELEPHONE ENCOUNTER
Called pt to schedule OV offered in June and in Aug stating she has issues going on need the soonest appt advised can send message to team and transferred call to Karen in [facility] for soonest appt

## 2024-04-02 NOTE — ED ATTENDING ATTESTATION
4/1/2024  I, Feng Anne MD, saw and evaluated the patient. I have discussed the patient with the resident/non-physician practitioner and agree with the resident's/non-physician practitioner's findings, Plan of Care, and MDM as documented in the resident's/non-physician practitioner's note, except where noted. All available labs and Radiology studies were reviewed.  I was present for key portions of any procedure(s) performed by the resident/non-physician practitioner and I was immediately available to provide assistance.       At this point I agree with the current assessment done in the Emergency Department.  I have conducted an independent evaluation of this patient a history and physical is as follows: Low abdominal pain, loose watery stools.    No focal tenderness  CT scan with no acute abnormality identified.    Labs with mild hypokalemia, oral replacement, no indication for hospitalization at this time.  Patient stable for discharge from the emergency department, oral medications, PCP follow-up, consideration of GI follow-up recommended.    Results Reviewed       Procedure Component Value Units Date/Time    CBC and differential [[id number]] Collected: 04/01/24 2059    Lab Status: Final result Specimen: Blood from Arm, Right Updated: 04/01/24 2158     WBC 8.89 Thousand/uL      RBC 4.61 Million/uL      Hemoglobin 14.6 g/dL      Hematocrit 43.5 %      MCV 94 fL      MCH 31.7 pg      MCHC 33.6 g/dL      RDW 12.3 %      MPV 11.4 fL      Platelets 275 Thousands/uL      nRBC 0 /100 WBCs      Neutrophils Relative 49 %      Immature Grans % 0 %      Lymphocytes Relative 37 %      Monocytes Relative 11 %      Eosinophils Relative 2 %      Basophils Relative 1 %      Neutrophils Absolute 4.35 Thousands/µL      Absolute Immature Grans 0.03 Thousand/uL      Absolute Lymphocytes 3.25 Thousands/µL      Absolute Monocytes 1.00 Thousand/µL      Eosinophils Absolute 0.19 Thousand/µL      Basophils Absolute 0.07  Thousands/µL     Urine Microscopic [[id number]]  (Abnormal) Collected: 04/01/24 2139    Lab Status: Final result Specimen: Urine, Clean Catch Updated: 04/01/24 2154     RBC, UA 1-2 /hpf      WBC, UA 2-4 /hpf      Epithelial Cells Occasional /hpf      Bacteria, UA Occasional /hpf      MUCUS THREADS Occasional    UA (URINE) with reflex to Scope [[id number]]  (Abnormal) Collected: 04/01/24 2139    Lab Status: Final result Specimen: Urine, Clean Catch Updated: 04/01/24 2153     Color, UA Yellow     Clarity, UA Clear     Specific Gravity, UA 1.030     pH, UA 6.0     Leukocytes, UA Negative     Nitrite, UA Negative     Protein, UA Trace mg/dl      Glucose, UA Negative mg/dl      Ketones, UA Negative mg/dl      Urobilinogen, UA <2.0 mg/dl      Bilirubin, UA Negative     Occult Blood, UA Negative    Comprehensive metabolic panel [[id number]]  (Abnormal) Collected: 04/01/24 2059    Lab Status: Final result Specimen: Blood from Arm, Right Updated: 04/01/24 2128     Sodium 140 mmol/L      Potassium 3.0 mmol/L      Chloride 107 mmol/L      CO2 25 mmol/L      ANION GAP 8 mmol/L      BUN 11 mg/dL      Creatinine 0.51 mg/dL      Glucose 91 mg/dL      Calcium 8.4 mg/dL      AST 19 U/L      ALT 18 U/L      Alkaline Phosphatase 85 U/L      Total Protein 7.1 g/dL      Albumin 4.0 g/dL      Total Bilirubin 0.48 mg/dL      eGFR 115 ml/min/1.73sq m     Narrative:      National Kidney Disease Foundation guidelines for Chronic Kidney Disease (CKD):     Stage 1 with normal or high GFR (GFR > 90 mL/min/1.73 square meters)    Stage 2 Mild CKD (GFR = 60-89 mL/min/1.73 square meters)    Stage 3A Moderate CKD (GFR = 45-59 mL/min/1.73 square meters)    Stage 3B Moderate CKD (GFR = 30-44 mL/min/1.73 square meters)    Stage 4 Severe CKD (GFR = 15-29 mL/min/1.73 square meters)    Stage 5 End Stage CKD (GFR <15 mL/min/1.73 square meters)  Note: GFR calculation is accurate only with a steady state creatinine    Lipase [[id number]]  (Normal) Collected:  04/01/24 2059    Lab Status: Final result Specimen: Blood from Arm, Right Updated: 04/01/24 2128     Lipase 23 u/L           CT abdomen pelvis with contrast   Final Result by Michael Brooke MD (04/02 0009)      No acute intra-abdominal abnormality. No free air or free fluid.         Workstation performed: [id number]               ED Course         Critical Care Time  Procedures

## 2024-04-02 NOTE — ED PROVIDER NOTES
History  Chief Complaint   Patient presents with    Abdominal Pain     Reports diffuse abdominal pain over the last 4 weeks with nausea, reports diarrhea started today.  Hx H Pylori with colonoscopy 4 months ago.      HPI    Small pains lower abd feels like burning, dull always there for months. Increaed intensity now. Bloated. Can't eat, nausea. Dirrhea started 4 days ago, watery, explosive, 4x today. Anytime she eats anything. No vomiting.     Feels like she still has h pylori, did the treatment. No internation travel. No abx.     Ppx bilateral mastectomy and total hysterectomy. On hormones. Hasn't taken phentermine for 4 days.     Prior to Admission Medications   Prescriptions Last Dose Informant Patient Reported? Taking?   Multiple Vitamins-Minerals (ONE-A-DAY WOMENS PO)  Self Yes No   Sig: Take by mouth daily   Prasterone, DHEA, (DHEA PO)  Self Yes No   Sig: Take by mouth   Progesterone 100 MG CAPS  Self No No   Sig: Take 100 mg by mouth at bedtime   estradiol (Estrace) 1 mg tablet  Self No No   Sig: Take 1 tablet (1 mg total) by mouth daily   hydrocortisone (ANUSOL-HC) 2.5 % rectal cream   No No   Sig: Apply topically 2 (two) times a day for 7 days   pantoprazole (PROTONIX) 20 mg tablet  Self No No   Sig: take 1 tablet by mouth once daily   phentermine 37.5 MG capsule  Self No No   Sig: Take 1 capsule (37.5 mg total) by mouth every morning   topiramate (TOPAMAX) 50 MG tablet  Self No No   Sig: Take 1 tablet (50 mg total) by mouth every 12 (twelve) hours      Facility-Administered Medications: None       Past Medical History:   Diagnosis Date    Anesthesia complication     daughter has malignant hyperthermia    Asthma     resolved w/ wt loss; is now having asthma sx due to recent hx of having covid 8/2022    BRCA1-associated protein-1 tumor predisposition syndrome     CPAP (continuous positive airway pressure) dependence     H/O bilateral breast implants     Intraductal papilloma of breast, left 02/23/2021     Malignant hyperthermia due to anesthesia     has family hx of MH, her daughter    Obesity (BMI 30-39.9)     Pneumonia     2019    PONV (postoperative nausea and vomiting)     Scab     back and right thigh from few precancerous lesions taken off    Sleep apnea        Past Surgical History:   Procedure Laterality Date    APPENDECTOMY      BREAST IMPLANT Left 11/18/2021    Procedure: FILL SALINE BREAST IMPLANT;  Surgeon: Maria C Hawley MD;  Location:  MAIN OR;  Service: Plastics    BREAST SURGERY  2/9/2021    Bilateral mastectomy    CAPSULOTOMY Bilateral 5/19/2021    Procedure: CAPSULECTOMY, EXPANDER/IMPLANT EXCHANGE;  Surgeon: Maria C Hawley MD;  Location:  MAIN OR;  Service: Plastics    COLONOSCOPY      COSMETIC SURGERY  2021    Reconstruction of breast    DILATION AND CURETTAGE OF UTERUS  1997    HYSTERECTOMY N/A 11/27/2020    Procedure: TOTAL LAPAROSCOPIC HYSTERECTOMY, BILATERAL SALPINGO-OOPHORECTOMY, cystoscopy;  Surgeon: Benedicto Donnelly MD;  Location:  MAIN OR;  Service: Gynecology Oncology    LIPOSUCTION W/ FAT INJECTION Bilateral 12/2/2022    Procedure: FAT GRAFTING TO BREAST;  Surgeon: Maria C Hawley MD;  Location:  MAIN OR;  Service: Plastics    MASTECTOMY      PA IMPLNT BIO IMPLNT FOR SOFT TISSUE REINFORCEMENT Bilateral 2/9/2021    Procedure: RECONSTRUCTION BREAST W/ IMPLANT;  Surgeon: Maria C Hawley MD;  [facility];  Service: Plastics    PA MASTECTOMY SIMPLE COMPLETE Bilateral 2/9/2021    Procedure: BREAST MASTECTOMY;  Surgeon: Eyad Riley MD;  [facility];  Service: Surgical Oncology    PA NIPPLE/AREOLA RECONSTRUCTION Bilateral 2/24/2022    Procedure: NIPPLE RECONSTRUCTION;  Surgeon: Maria C Hawley MD;  Location:  MAIN OR;  Service: Plastics    PA REVISION OF RECONSTRUCTED BREAST Bilateral 8/6/2021    Procedure: BREAST RECONSTRUCITON REVISION; EXCISION OF STANDING DEFORMITIES;  Surgeon: Maria C Hawley MD;  [facility];  Service: Plastics    PA REVISION OF RECONSTRUCTED  BREAST Bilateral 11/18/2021    Procedure: REVISE BREAST RECONSTRUCTION;  Surgeon: Maria C Hawley MD;  Location:  MAIN OR;  Service: Plastics    WV TISSUE EXPANDER PLACEMENT BREAST RECONSTRUCTION Bilateral 2/9/2021    Procedure: BREAST INSERTION/PLACEMENT TISSUE EXPANDER (EXCHANGE);  Surgeon: Maria C Hawley MD;  [facility];  Service: Plastics    TONSILLECTOMY      TYMPANOSTOMY TUBE PLACEMENT Bilateral        Family History   Problem Relation Age of Onset    No Known Problems Mother     Colon cancer Father     BRCA1 Positive Sister     No Known Problems Sister     No Known Problems Maternal Aunt     BRCA1 Positive Maternal Uncle     No Known Problems Paternal Aunt     No Known Problems Paternal Aunt     No Known Problems Paternal Aunt     No Known Problems Paternal Uncle     No Known Problems Paternal Uncle     Cancer Maternal Grandfather     No Known Problems Daughter     No Known Problems Daughter     Breast cancer Cousin 40    Ovarian cancer Other 52     I have reviewed and agree with the history as documented.    E-Cigarette/Vaping    E-Cigarette Use Never User     Comments Denies      E-Cigarette/Vaping Substances    Nicotine No     THC No     CBD No     Flavoring No     Other No     Unknown No      Social History     Tobacco Use    Smoking status: Never    Smokeless tobacco: Never    Tobacco comments:     Denies   Vaping Use    Vaping status: Never Used   Substance Use Topics    Alcohol use: Yes     Alcohol/week: 1.0 standard drink of alcohol     Types: 1 Standard drinks or equivalent per week     Comment: social, monthly    Drug use: No        Review of Systems    Physical Exam  ED Triage Vitals [04/01/24 2056]   Temperature Pulse Respirations Blood Pressure SpO2   97.9 °F (36.6 °C) 75 18 125/69 99 %      Temp Source Heart Rate Source Patient Position - Orthostatic VS BP Location FiO2 (%)   Oral Monitor -- Right arm --      Pain Score       --             Orthostatic Vital Signs  Vitals:    04/01/24  2056   BP: 125/69   Pulse: 75       Physical Exam    ED Medications  Medications - No data to display    Diagnostic Studies  Results Reviewed       Procedure Component Value Units Date/Time    CBC and differential [[id number]] Collected: 04/01/24 2059    Lab Status: Final result Specimen: Blood from Arm, Right Updated: 04/01/24 2158     WBC 8.89 Thousand/uL      RBC 4.61 Million/uL      Hemoglobin 14.6 g/dL      Hematocrit 43.5 %      MCV 94 fL      MCH 31.7 pg      MCHC 33.6 g/dL      RDW 12.3 %      MPV 11.4 fL      Platelets 275 Thousands/uL      nRBC 0 /100 WBCs      Neutrophils Relative 49 %      Immature Grans % 0 %      Lymphocytes Relative 37 %      Monocytes Relative 11 %      Eosinophils Relative 2 %      Basophils Relative 1 %      Neutrophils Absolute 4.35 Thousands/µL      Absolute Immature Grans 0.03 Thousand/uL      Absolute Lymphocytes 3.25 Thousands/µL      Absolute Monocytes 1.00 Thousand/µL      Eosinophils Absolute 0.19 Thousand/µL      Basophils Absolute 0.07 Thousands/µL     Urine Microscopic [[id number]]  (Abnormal) Collected: 04/01/24 2139    Lab Status: Final result Specimen: Urine, Clean Catch Updated: 04/01/24 2154     RBC, UA 1-2 /hpf      WBC, UA 2-4 /hpf      Epithelial Cells Occasional /hpf      Bacteria, UA Occasional /hpf      MUCUS THREADS Occasional    UA (URINE) with reflex to Scope [[id number]]  (Abnormal) Collected: 04/01/24 2139    Lab Status: Final result Specimen: Urine, Clean Catch Updated: 04/01/24 2153     Color, UA Yellow     Clarity, UA Clear     Specific Gravity, UA 1.030     pH, UA 6.0     Leukocytes, UA Negative     Nitrite, UA Negative     Protein, UA Trace mg/dl      Glucose, UA Negative mg/dl      Ketones, UA Negative mg/dl      Urobilinogen, UA <2.0 mg/dl      Bilirubin, UA Negative     Occult Blood, UA Negative    Comprehensive metabolic panel [[id number]]  (Abnormal) Collected: 04/01/24 2059    Lab Status: Final result Specimen: Blood from Arm, Right Updated:  04/01/24 2128     Sodium 140 mmol/L      Potassium 3.0 mmol/L      Chloride 107 mmol/L      CO2 25 mmol/L      ANION GAP 8 mmol/L      BUN 11 mg/dL      Creatinine 0.51 mg/dL      Glucose 91 mg/dL      Calcium 8.4 mg/dL      AST 19 U/L      ALT 18 U/L      Alkaline Phosphatase 85 U/L      Total Protein 7.1 g/dL      Albumin 4.0 g/dL      Total Bilirubin 0.48 mg/dL      eGFR 115 ml/min/1.73sq m     Narrative:      National Kid[employer] Foundation guidelines for Chronic Kidney Disease (CKD):     Stage 1 with normal or high GFR (GFR > 90 mL/min/1.73 square meters)    Stage 2 Mild CKD (GFR = 60-89 mL/min/1.73 square meters)    Stage 3A Moderate CKD (GFR = 45-59 mL/min/1.73 square meters)    Stage 3B Moderate CKD (GFR = 30-44 mL/min/1.73 square meters)    Stage 4 Severe CKD (GFR = 15-29 mL/min/1.73 square meters)    Stage 5 End Stage CKD (GFR <15 mL/min/1.73 square meters)  Note: GFR calculation is accurate only with a steady state creatinine    Lipase [[id number]]  (Normal) Collected: 04/01/24 2059    Lab Status: Final result Specimen: Blood from Arm, Right Updated: 04/01/24 2128     Lipase 23 u/L                    No orders to display         Procedures  Procedures      ED Course                             SBIRT 22yo+      Flowsheet Row Most Recent Value   Initial Alcohol Screen: US AUDIT-C     1. How often do you have a drink containing alcohol? 0 Filed at: 04/01/2024 2109   2. How many drinks containing alcohol do you have on a typical day you are drinking?  0 Filed at: 04/01/2024 2109   3a. Male UNDER 65: How often do you have five or more drinks on one occasion? 0 Filed at: 04/01/2024 2109   3b. FEMALE Any Age, or MALE 65+: How often do you have 4 or more drinks on one occassion? 0 Filed at: 04/01/2024 2109   Audit-C Score 0 Filed at: 04/01/2024 2109   MELI: How many times in the past year have you...    Used an illegal drug or used a prescription medication for non-medical reasons? Never Filed at: 04/01/2024  2109                  Medical Decision Making  Amount and/or Complexity of Data Reviewed  Labs: ordered.          Disposition  Final diagnoses:   None     ED Disposition       None          Follow-up Information    None         Patient's Medications   Discharge Prescriptions    No medications on file     No discharge procedures on file.    PDMP Review         Value Time User    PDMP Reviewed  Yes 2/21/2024 11:09 AM Cintia Singh PA-C             ED Provider  Attending physically available and evaluated Michelle Taylor. I managed the patient along with the ED Attending.    Electronically Signed by         Stage 3A Moderate CKD (GFR = 45-59 mL/min/1.73 square meters)    Stage 3B Moderate CKD (GFR = 30-44 mL/min/1.73 square meters)    Stage 4 Severe CKD (GFR = 15-29 mL/min/1.73 square meters)    Stage 5 End Stage CKD (GFR <15 mL/min/1.73 square meters)  Note: GFR calculation is accurate only with a steady state creatinine    Lipase [[id number]]  (Normal) Collected: 04/01/24 2059    Lab Status: Final result Specimen: Blood from Arm, Right Updated: 04/01/24 2128     Lipase 23 u/L                    CT abdomen pelvis with contrast   Final Result by Michael Brooke MD (04/02 0009)      No acute intra-abdominal abnormality. No free air or free fluid.         Workstation performed: [id number]               Procedures  Procedures      ED Course  ED Course as of 04/08/24 0108   Mon Apr 01, 2024   2342 Potassium(!): 3.0  Replaced with 40 mEq oral.    Tue Apr 02, 2024   0013 IMPRESSION:     No acute intra-abdominal abnormality. No free air or free fluid.                             SBIRT 20yo+      Flowsheet Row Most Recent Value   Initial Alcohol Screen: US AUDIT-C     1. How often do you have a drink containing alcohol? 0 Filed at: 04/01/2024 2109   2. How many drinks containing alcohol do you have on a typical day you are drinking?  0 Filed at: 04/01/2024 2109   3a. Male UNDER 65: How often do you have five or more drinks on one occasion? 0 Filed at: 04/01/2024 2109   3b. FEMALE Any Age, or MALE 65+: How often do you have 4 or more drinks on one occassion? 0 Filed at: 04/01/2024 2109   Audit-C Score 0 Filed at: 04/01/2024 2109   MELI: How many times in the past year have you...    Used an illegal drug or used a prescription medication for non-medical reasons? Never Filed at: 04/01/2024 2109                  Medical Decision Making  48-year-old female with history of BRCA1 gene mutation who underwent prophylactic bilateral mastectomy as well as total hysterectomy presents the emergency department for evaluation of diffuse  abdominal pain and watery diarrhea for the last 4 days.  Labs here are reassuring, no leukocytosis or bandemia.  CMP is notable for hypokalemia 3.0, was able to replace with p.o. potassium here in department.  UA without evidence of infection.  Patient treated with pain control as well as IV fluids.  She feels some relief.  CT does not show any evidence of acute intra-abdominal process.  Recommend patient follow-up with GI.  Discussed strict return precautions.  Patient discharged stable condition.    Amount and/or Complexity of Data Reviewed  Labs: ordered. Decision-making details documented in ED Course.  Radiology: ordered.    Risk  OTC drugs.  Prescription drug management.          Disposition  Final diagnoses:   Abdominal pain   Diarrhea   Nausea     Time reflects when diagnosis was documented in both MDM as applicable and the Disposition within this note       Time User Action Codes Description Comment    4/2/2024 12:18 AM Izabela Felton Add [R10.9] Abdominal pain     4/2/2024 12:18 AM Izabela Felton Add [R19.7] Diarrhea     4/2/2024 12:19 AM Izabela Felton Add [R11.0] Nausea           ED Disposition       ED Disposition   Discharge    Condition   Stable    Date/Time   Tue Apr 2, 2024 0013    Comment   Michelle Taylor discharge to home/self care.                   Follow-up Information    None         Discharge Medication List as of 4/2/2024 12:19 AM        START taking these medications    Details   dicyclomine (BENTYL) 20 mg tablet Take 1 tablet (20 mg total) by mouth 2 (two) times a day, Starting Tue 4/2/2024, Normal      ondansetron (ZOFRAN-ODT) 4 mg disintegrating tablet Take 1 tablet (4 mg total) by mouth every 8 (eight) hours as needed for nausea or vomiting, Starting Tue 4/2/2024, Normal           CONTINUE these medications which have NOT CHANGED    Details   estradiol (Estrace) 1 mg tablet Take 1 tablet (1 mg total) by mouth daily, Starting Tue 5/23/2023, Normal      hydrocortisone  (ANUSOL-HC) 2.5 % rectal cream Apply topically 2 (two) times a day for 7 days, Starting Fri 10/13/2023, Until Fri 10/20/2023, Normal      Multiple Vitamins-Minerals (ONE-A-DAY WOMENS PO) Take by mouth daily, Historical Med      pantoprazole (PROTONIX) 20 mg tablet take 1 tablet by mouth once daily, Starting Thu 12/14/2023, Normal      phentermine 37.5 MG capsule Take 1 capsule (37.5 mg total) by mouth every morning, Starting Wed 2/21/2024, Normal      Prasterone, DHEA, (DHEA PO) Take by mouth, Historical Med      Progesterone 100 MG CAPS Take 100 mg by mouth at bedtime, Starting Tue 5/23/2023, Normal      topiramate (TOPAMAX) 50 MG tablet Take 1 tablet (50 mg total) by mouth every 12 (twelve) hours, Starting Tue 1/16/2024, Normal               PDMP Review         Value Time User    PDMP Reviewed  Yes 2/21/2024 11:09 AM Cintia Singh PA-C             ED Provider  Attending physically available and evaluated Michelle Taylor. I managed the patient along with the ED Attending.    Electronically Signed by           Izabela Felton MD  04/08/24 0108

## 2024-04-04 ENCOUNTER — OFFICE VISIT (OUTPATIENT)
Dept: GASTROENTEROLOGY | Facility: CLINIC | Age: 49
End: 2024-04-04
Payer: COMMERCIAL

## 2024-04-04 VITALS
HEIGHT: 67 IN | DIASTOLIC BLOOD PRESSURE: 81 MMHG | BODY MASS INDEX: 35.79 KG/M2 | WEIGHT: 228 LBS | HEART RATE: 78 BPM | SYSTOLIC BLOOD PRESSURE: 125 MMHG

## 2024-04-04 DIAGNOSIS — A04.8 H. PYLORI INFECTION: ICD-10-CM

## 2024-04-04 DIAGNOSIS — K21.9 GASTROESOPHAGEAL REFLUX DISEASE, UNSPECIFIED WHETHER ESOPHAGITIS PRESENT: ICD-10-CM

## 2024-04-04 DIAGNOSIS — Z80.0 FAMILY HISTORY OF COLON CANCER IN FATHER: ICD-10-CM

## 2024-04-04 DIAGNOSIS — R19.7 DIARRHEA, UNSPECIFIED TYPE: Primary | ICD-10-CM

## 2024-04-04 PROCEDURE — 99214 OFFICE O/P EST MOD 30 MIN: CPT | Performed by: INTERNAL MEDICINE

## 2024-04-04 RX ORDER — METRONIDAZOLE 500 MG/1
500 TABLET ORAL EVERY 12 HOURS SCHEDULED
Qty: 14 TABLET | Refills: 0 | Status: SHIPPED | OUTPATIENT
Start: 2024-04-04 | End: 2024-04-11

## 2024-04-04 NOTE — PROGRESS NOTES
[facility] [place] - Outpatient Follow-up Note  Michelle Taylor 48 y.o. female MRN: [id number]  Encounter: [id number]          ASSESSMENT AND PLAN:      1. Diarrhea, unspecified type  -     metroNIDAZOLE (FLAGYL) 500 mg tablet; Take 1 tablet (500 mg total) by mouth every 12 (twelve) hours for 7 days    2. Gastroesophageal reflux disease, unspecified whether esophagitis present    3. H. pylori infection  -     H. pylori antigen, stool; Future    4. Family history of colon cancer in father      Recent diarrhea, clinically no no evidence of acute abdomen ileus obstruction, no bleeding antibiotic intake travels or other high risk/red flag signs.  It may be foodborne illness or nonspecific infection.  She is quite concerned, advised her to be on a brat count of diet and avoid milk excessive fiber, can give a trial of metronidazole for any nonspecific infection.  If need be stool studies.  The stools are more soft and not watery.  So stool studies may or may not be helpful.  Once acute symptoms have resolved, will check stool antigen for H. pylori in about a month's time if positive will treat accordingly.  Patient does understand potential consequences of H. pylori infection.    ______________________________________________________________________    SUBJECTIVE:      Patient came in for evaluation of her diarrhea started 10 days ago, 4-6 times a day with some cramping urgency no blood.  Does not wake her up in the middle of the night.  Generally after meals.  Cannot associate this with any specific diet foods activity stress, no travel and or antibiotics recently.  Denies any fever chills rash, has occasional indigestion.  Had EGD done last year had H. pylori gastritis was given antibiotic treatment she never completed, she thinks she took it for 4 to 5 days.  Denies any stiff chest pain shortness of breath fever chills rash.  Owns a salon.  Diet medications more than 10 systems reviewed.  Prior  EGD colonoscopy biopsy results noted, and H. pylori infection and hyperplastic polyps.      REVIEW OF SYSTEMS IS OTHERWISE NEGATIVE.      Historical Information   Past Medical History:   Diagnosis Date   • Anesthesia complication     daughter has malignant hyperthermia   • Asthma     resolved w/ wt loss; is now having asthma sx due to recent hx of having covid 8/2022   • BRCA1-associated protein-1 tumor predisposition syndrome    • CPAP (continuous positive airway pressure) dependence    • GERD (gastroesophageal reflux disease) 9/5/23   • H/O bilateral breast implants    • Hernia 9/5/23    Hiatal hernia   • Intraductal papilloma of breast, left 02/23/2021   • Malignant hyperthermia due to anesthesia     has family hx of MH, her daughter   • Obesity (BMI 30-39.9)    • Pneumonia     2019   • PONV (postoperative nausea and vomiting)    • Scab     back and right thigh from few precancerous lesions taken off   • Sleep apnea      Past Surgical History:   Procedure Laterality Date   • APPENDECTOMY     • BREAST IMPLANT Left 11/18/2021    Procedure: FILL SALINE BREAST IMPLANT;  Surgeon: Maria C Hawley MD;  Location:  MAIN OR;  Service: Plastics   • BREAST SURGERY  2/9/2021    Bilateral mastectomy   • CAPSULOTOMY Bilateral 5/19/2021    Procedure: CAPSULECTOMY, EXPANDER/IMPLANT EXCHANGE;  Surgeon: Maria C Hawley MD;  Location:  MAIN OR;  Service: Plastics   • COLONOSCOPY     • COSMETIC SURGERY  2021    Reconstruction of breast   • DILATION AND CURETTAGE OF UTERUS  1997   • HYSTERECTOMY N/A 11/27/2020    Procedure: TOTAL LAPAROSCOPIC HYSTERECTOMY, BILATERAL SALPINGO-OOPHORECTOMY, cystoscopy;  Surgeon: Benedicto Donnelly MD;  Location:  MAIN OR;  Service: Gynecology Oncology   • LIPOSUCTION W/ FAT INJECTION Bilateral 12/2/2022    Procedure: FAT GRAFTING TO BREAST;  Surgeon: Maria C Hawley MD;  Location:  MAIN OR;  Service: Plastics   • MASTECTOMY     • AR IMPLNT BIO IMPLNT FOR SOFT TISSUE REINFORCEMENT Bilateral 2/9/2021     Procedure: RECONSTRUCTION BREAST W/ IMPLANT;  Surgeon: Maria C Hawley MD;  [facility];  Service: Plastics   • MD MASTECTOMY SIMPLE COMPLETE Bilateral 2/9/2021    Procedure: BREAST MASTECTOMY;  Surgeon: Eyad Riley MD;  [facility];  Service: Surgical Oncology   • MD NIPPLE/AREOLA RECONSTRUCTION Bilateral 2/24/2022    Procedure: NIPPLE RECONSTRUCTION;  Surgeon: Maria C Hawley MD;  Location:  MAIN OR;  Service: Plastics   • MD REVISION OF RECONSTRUCTED BREAST Bilateral 8/6/2021    Procedure: BREAST RECONSTRUCITON REVISION; EXCISION OF STANDING DEFORMITIES;  Surgeon: Maria C Hawley MD;  [facility];  Service: Plastics   • MD REVISION OF RECONSTRUCTED BREAST Bilateral 11/18/2021    Procedure: REVISE BREAST RECONSTRUCTION;  Surgeon: Maria C Hawley MD;  Location:  MAIN OR;  Service: Plastics   • MD TISSUE EXPANDER PLACEMENT BREAST RECONSTRUCTION Bilateral 2/9/2021    Procedure: BREAST INSERTION/PLACEMENT TISSUE EXPANDER (EXCHANGE);  Surgeon: Maria C Hawley MD;  [facility];  Service: Plastics   • TONSILLECTOMY     • TYMPANOSTOMY TUBE PLACEMENT Bilateral      Social History   Social History     Substance and Sexual Activity   Alcohol Use Yes   • Alcohol/week: 1.0 standard drink of alcohol   • Types: 1 Standard drinks or equivalent per week    Comment: social, monthly     Social History     Substance and Sexual Activity   Drug Use No     Social History     Tobacco Use   Smoking Status Never   Smokeless Tobacco Never   Tobacco Comments    Denies     Family History   Problem Relation Age of Onset   • No Known Problems Mother    • Colon cancer Father    • Cancer Father    • BRCA1 Positive Sister    • No Known Problems Sister    • No Known Problems Maternal Aunt    • BRCA1 Positive Maternal Uncle    • No Known Problems Paternal Aunt    • No Known Problems Paternal Aunt    • No Known Problems Paternal Aunt    • No Known Problems Paternal Uncle    • No Known Problems Paternal Uncle    •  "Cancer Maternal Grandfather    • Diabetes Maternal Grandfather    • No Known Problems Daughter    • No Known Problems Daughter    • Breast cancer Cousin 40   • Ovarian cancer Other 52       Meds/Allergies       Current Outpatient Medications:   •  dicyclomine (BENTYL) 20 mg tablet  •  estradiol (Estrace) 1 mg tablet  •  metroNIDAZOLE (FLAGYL) 500 mg tablet  •  pantoprazole (PROTONIX) 20 mg tablet  •  phentermine 37.5 MG capsule  •  Prasterone, DHEA, (DHEA PO)  •  Progesterone 100 MG CAPS  •  topiramate (TOPAMAX) 50 MG tablet  •  hydrocortisone (ANUSOL-HC) 2.5 % rectal cream  •  Multiple Vitamins-Minerals (ONE-A-DAY WOMENS PO)  •  ondansetron (ZOFRAN-ODT) 4 mg disintegrating tablet    Allergies   Allergen Reactions   • Penicillins Other (See Comments)     Childhood reaction; unknown reaction- tolerated Ancef           Objective     Blood pressure 125/81, pulse 78, height 5' 7\" (1.702 m), weight 103 kg (228 lb), last menstrual period 10/15/2020, not currently breastfeeding. Body mass index is 35.71 kg/m².      PHYSICAL EXAM:      General Appearance:   Alert, cooperative, no distress   HEENT:   Normocephalic, atraumatic, anicteric.     Neck:  Supple, symmetrical, trachea midline   Lungs:   Clear to auscultation bilaterally; no rales, rhonchi or wheezing; respirations unlabored    Heart::   Regular rate and rhythm; no murmur.   Abdomen:   Soft, non-tender, non-distended; normal bowel sounds; no masses, no organomegaly    Genitalia:   Deferred    Rectal:   Deferred    Extremities:  No cyanosis, clubbing or edema    Skin:  No jaundice, rashes, or lesions    Lymph nodes:  No palpable cervical lymphadenopathy        Lab Results:   No visits with results within 1 Day(s) from this visit.   Latest known visit with results is:   Admission on 04/01/2024, Discharged on 04/02/2024   Component Date Value   • WBC 04/01/2024 8.89    • RBC 04/01/2024 4.61    • Hemoglobin 04/01/2024 14.6    • Hematocrit 04/01/2024 43.5    • MCV " 04/01/2024 94    • MCH 04/01/2024 31.7    • MCHC 04/01/2024 33.6    • RDW 04/01/2024 12.3    • MPV 04/01/2024 11.4    • Platelets 04/01/2024 275    • nRBC 04/01/2024 0    • Neutrophils Relative 04/01/2024 49    • Immature Grans % 04/01/2024 0    • Lymphocytes Relative 04/01/2024 37    • Monocytes Relative 04/01/2024 11    • Eosinophils Relative 04/01/2024 2    • Basophils Relative 04/01/2024 1    • Neutrophils Absolute 04/01/2024 4.35    • Absolute Immature Grans 04/01/2024 0.03    • Absolute Lymphocytes 04/01/2024 3.25    • Absolute Monocytes 04/01/2024 1.00    • Eosinophils Absolute 04/01/2024 0.19    • Basophils Absolute 04/01/2024 0.07    • Sodium 04/01/2024 140    • Potassium 04/01/2024 3.0 (L)    • Chloride 04/01/2024 107    • CO2 04/01/2024 25    • ANION GAP 04/01/2024 8    • BUN 04/01/2024 11    • Creatinine 04/01/2024 0.51 (L)    • Glucose 04/01/2024 91    • Calcium 04/01/2024 8.4    • AST 04/01/2024 19    • ALT 04/01/2024 18    • Alkaline Phosphatase 04/01/2024 85    • Total Protein 04/01/2024 7.1    • Albumin 04/01/2024 4.0    • Total Bilirubin 04/01/2024 0.48    • eGFR 04/01/2024 115    • Lipase 04/01/2024 23    • Color, UA 04/01/2024 Yellow    • Clarity, UA 04/01/2024 Clear    • Specific Gravity, UA 04/01/2024 1.030    • pH, UA 04/01/2024 6.0    • Leukocytes, UA 04/01/2024 Negative    • Nitrite, UA 04/01/2024 Negative    • Protein, UA 04/01/2024 Trace (A)    • Glucose, UA 04/01/2024 Negative    • Ketones, UA 04/01/2024 Negative    • Urobilinogen, UA 04/01/2024 <2.0    • Bilirubin, UA 04/01/2024 Negative    • Occult Blood, UA 04/01/2024 Negative    • RBC, UA 04/01/2024 1-2    • WBC, UA 04/01/2024 2-4 (A)    • Epithelial Cells 04/01/2024 Occasional    • Bacteria, UA 04/01/2024 Occasional    • MUCUS THREADS 04/01/2024 Occasional (A)          Radiology Results:   CT abdomen pelvis with contrast    Result Date: 4/2/2024  Narrative: CT ABDOMEN AND PELVIS WITH IV CONTRAST INDICATION: worsening lower abd pain,  diarrhea. hx of total hysterectomy and appendectomy. COMPARISON: CT abdomen pelvis dated November 22, 2014. TECHNIQUE: CT examination of the abdomen and pelvis was performed. Multiplanar 2D reformatted images were created from the source data. This examination, like all CT scans performed in the [facility], was performed utilizing techniques to minimize radiation dose exposure, including the use of iterative reconstruction and automated exposure control. Radiation dose length product (DLP) for this visit: 1015 mGy-cm IV Contrast: 100 mL of iohexol (OMNIPAQUE) Enteric Contrast: Not administered. FINDINGS: ABDOMEN LOWER CHEST: No clinically significant abnormality in the visualized lower chest. Postoperative changes of bilateral breast reconstruction are partially visualized. LIVER/BILIARY TREE: Unremarkable. GALLBLADDER: No calcified gallstones. No pericholecystic inflammatory change. SPLEEN: Unremarkable. PANCREAS: Unremarkable. ADRENAL GLANDS: Unremarkable. KIDNEYS/URETERS: No hydronephrosis or urinary tract calculi. Subcentimeter hypoattenuating renal lesion(s), too small to characterize but statistically likely benign, which do not warrant follow-up (Radiology June 2019). STOMACH AND BOWEL: Unremarkable. APPENDIX: Surgically absent. ABDOMINOPELVIC CAVITY: No ascites. No pneumoperitoneum. No lymphadenopathy. VESSELS: Unremarkable for patient's age. PELVIS REPRODUCTIVE ORGANS: Post hysterectomy. URINARY BLADDER: Unremarkable. ABDOMINAL WALL/INGUINAL REGIONS: Unremarkable. BONES: No acute fracture or suspicious osseous lesion. There are bilateral L5 pars interarticularis defects with grade 1 anterolisthesis of L5 on S1.     Impression: No acute intra-abdominal abnormality. No free air or free fluid. Workstation performed: [id number]

## 2024-04-08 ENCOUNTER — OFFICE VISIT (OUTPATIENT)
Dept: BARIATRICS | Facility: CLINIC | Age: 49
End: 2024-04-08
Payer: COMMERCIAL

## 2024-04-08 VITALS
WEIGHT: 224.2 LBS | BODY MASS INDEX: 35.19 KG/M2 | SYSTOLIC BLOOD PRESSURE: 104 MMHG | RESPIRATION RATE: 18 BRPM | DIASTOLIC BLOOD PRESSURE: 70 MMHG | TEMPERATURE: 98.6 F | HEIGHT: 67 IN

## 2024-04-08 DIAGNOSIS — E66.01 CLASS 2 SEVERE OBESITY DUE TO EXCESS CALORIES WITH SERIOUS COMORBIDITY AND BODY MASS INDEX (BMI) OF 36.0 TO 36.9 IN ADULT (HCC): Chronic | ICD-10-CM

## 2024-04-08 DIAGNOSIS — E66.01 CLASS 2 SEVERE OBESITY DUE TO EXCESS CALORIES WITH SERIOUS COMORBIDITY AND BODY MASS INDEX (BMI) OF 35.0 TO 35.9 IN ADULT (HCC): Primary | Chronic | ICD-10-CM

## 2024-04-08 DIAGNOSIS — E66.01 CLASS 2 SEVERE OBESITY DUE TO EXCESS CALORIES WITH SERIOUS COMORBIDITY AND BODY MASS INDEX (BMI) OF 37.0 TO 37.9 IN ADULT (HCC): Chronic | ICD-10-CM

## 2024-04-08 DIAGNOSIS — E87.6 HYPOKALEMIA: ICD-10-CM

## 2024-04-08 PROCEDURE — 99214 OFFICE O/P EST MOD 30 MIN: CPT | Performed by: PHYSICIAN ASSISTANT

## 2024-04-08 RX ORDER — TOPIRAMATE 50 MG/1
50 TABLET, FILM COATED ORAL EVERY 12 HOURS
Qty: 60 TABLET | Refills: 3 | Status: SHIPPED | OUTPATIENT
Start: 2024-04-08

## 2024-04-08 RX ORDER — PHENTERMINE HYDROCHLORIDE 37.5 MG/1
37.5 CAPSULE ORAL EVERY MORNING
Qty: 30 CAPSULE | Refills: 1 | Status: SHIPPED | OUTPATIENT
Start: 2024-04-08

## 2024-04-08 NOTE — PROGRESS NOTES
Assessment/Plan:    Obesity  -Patient is pursuing Conservative Program  -Initial weight loss goal of 5-10% weight loss for improved health  -had hypokalemia 4/1/24 on cmp    Initial:235.8   Last Visit: 226.4  Current:224.2  Change:-11.6   Goals:      -discussed making sure to get enough protein. Typically having around 60mg in the AM  -to continue water intake 80 oz currently  -plans to restart peloton in the future when GI symtpoms have resolve  -to consider scheduling with RD     To continue phentermine and topamax.  Start weight 248.3 on 12/19/22. 9.7% weight loss.   -could consider wegovy or zepbound in the future when GI issues have resolved.   -She was on saxenda prior with weight gain.         Follow up in approximately  2 month nurse visit and 4 month   with Non-Surgical Physician/Advanced Practitioner.     Diagnoses and all orders for this visit:    Class 2 severe obesity due to excess calories with serious comorbidity and body mass index (BMI) of 35.0 to 35.9 in adult (HCC)    Hypokalemia  -     Potassium; Future    Class 2 severe obesity due to excess calories with serious comorbidity and body mass index (BMI) of 37.0 to 37.9 in adult (HCC)  -     topiramate (TOPAMAX) 50 MG tablet; Take 1 tablet (50 mg total) by mouth every 12 (twelve) hours    Class 2 severe obesity due to excess calories with serious comorbidity and body mass index (BMI) of 36.0 to 36.9 in adult (HCC)  -     phentermine 37.5 MG capsule; Take 1 capsule (37.5 mg total) by mouth every morning          Subjective:   Chief Complaint   Patient presents with    Follow-up     MWM-4M [id number]        Patient ID: Michelle Taylor  is a 48 y.o. female with excess weight/obesity here to pursue weight managment.  Patient is pursuing Conservative Program.     HPI  She was recently seen in the ed for abdomen pain and diarrhea.  She has been feeling bloated.  Diarrhea has resolved.  She has been eating 3 meals a day.  She is trying to stay  hydrated.    Wt Readings from Last 10 Encounters:   04/08/24 102 kg (224 lb 3.2 oz)   04/04/24 103 kg (228 lb)   03/11/24 103 kg (228 lb)   03/01/24 102 kg (225 lb)   02/19/24 102 kg (225 lb 6.4 oz)   10/17/23 101 kg (222 lb)   10/13/23 101 kg (222 lb)   08/30/23 103 kg (226 lb 6.4 oz)   05/23/23 107 kg (236 lb 6.4 oz)   05/15/23 107 kg (235 lb 12.8 oz)       Food logging:no  Increased appetite/cravings:  Exercise:was doing peloton treadmill-running  Hydration:water and crystal         The following portions of the patient's history were reviewed and updated as appropriate: She  has a past medical history of Anesthesia complication, Asthma, BRCA1-associated protein-1 tumor predisposition syndrome, CPAP (continuous positive airway pressure) dependence, GERD (gastroesophageal reflux disease) (9/5/23), H/O bilateral breast implants, Hernia (9/5/23), Intraductal papilloma of breast, left (02/23/2021), Malignant hyperthermia due to anesthesia, Obesity (BMI 30-39.9), Pneumonia, PONV (postoperative nausea and vomiting), Scab, and Sleep apnea.  She   Patient Active Problem List    Diagnosis Date Noted    Increased risk of breast cancer 03/11/2024    Nasal septal ulcer 05/09/2023    Osteopenia 03/03/2023    CLIFF (obstructive sleep apnea)     Fatigue 10/17/2022    History of hysterectomy 05/18/2021    PONV (postoperative nausea and vomiting) 05/18/2021    Keratosis, actinic 05/17/2021    Asthma 02/09/2021    Obesity 02/09/2021    Symptomatic postsurgical menopause 12/02/2020    BRCA1 gene mutation positive 09/25/2020    Sinobronchitis 02/23/2019     She  has a past surgical history that includes Appendectomy; Tonsillectomy; Tympanostomy tube placement (Bilateral); Dilation and curettage of uterus (1997); pr mastectomy simple complete (Bilateral, 2/9/2021); pr tissue expander placement breast reconstruction (Bilateral, 2/9/2021); pr implnt bio implnt for soft tissue reinforcement (Bilateral, 2/9/2021); Colonoscopy; Hysterectomy  (N/A, 11/27/2020); Capsulectomy (Bilateral, 5/19/2021); Breast surgery (2/9/2021); Cosmetic surgery (2021); pr revision of reconstructed breast (Bilateral, 8/6/2021); pr revision of reconstructed breast (Bilateral, 11/18/2021); BREAST IMPLANT (Left, 11/18/2021); Mastectomy; pr nipple/areola reconstruction (Bilateral, 2/24/2022); and Liposuction w/ fat injection (Bilateral, 12/2/2022).  Her family history includes BRCA1 Positive in her maternal uncle and sister; Breast cancer (age of onset: 40) in her cousin; Cancer in her father and maternal grandfather; Colon cancer in her father; Diabetes in her maternal grandfather; No Known Problems in her daughter, daughter, maternal aunt, mother, paternal aunt, paternal aunt, paternal aunt, paternal uncle, paternal uncle, and sister; Ovarian cancer (age of onset: 52) in her other.  She  reports that she has never smoked. She has never used smokeless tobacco. She reports current alcohol use of about 1.0 standard drink of alcohol per week. She reports that she does not use drugs.  Current Outpatient Medications   Medication Sig Dispense Refill    estradiol (Estrace) 1 mg tablet Take 1 tablet (1 mg total) by mouth daily 30 tablet 11    metroNIDAZOLE (FLAGYL) 500 mg tablet Take 1 tablet (500 mg total) by mouth every 12 (twelve) hours for 7 days 14 tablet 0    Multiple Vitamins-Minerals (ONE-A-DAY WOMENS PO) Take by mouth daily      pantoprazole (PROTONIX) 20 mg tablet take 1 tablet by mouth once daily 30 tablet 5    phentermine 37.5 MG capsule Take 1 capsule (37.5 mg total) by mouth every morning 30 capsule 1    Prasterone, DHEA, (DHEA PO) Take by mouth      Progesterone 100 MG CAPS Take 100 mg by mouth at bedtime 30 capsule 11    topiramate (TOPAMAX) 50 MG tablet Take 1 tablet (50 mg total) by mouth every 12 (twelve) hours 60 tablet 3    dicyclomine (BENTYL) 20 mg tablet Take 1 tablet (20 mg total) by mouth 2 (two) times a day (Patient not taking: Reported on 4/8/2024) 20 tablet  0    hydrocortisone (ANUSOL-HC) 2.5 % rectal cream Apply topically 2 (two) times a day for 7 days 28 g 0    ondansetron (ZOFRAN-ODT) 4 mg disintegrating tablet Take 1 tablet (4 mg total) by mouth every 8 (eight) hours as needed for nausea or vomiting (Patient not taking: Reported on 4/8/2024) 20 tablet 0     No current facility-administered medications for this visit.     Current Outpatient Medications on File Prior to Visit   Medication Sig    estradiol (Estrace) 1 mg tablet Take 1 tablet (1 mg total) by mouth daily    metroNIDAZOLE (FLAGYL) 500 mg tablet Take 1 tablet (500 mg total) by mouth every 12 (twelve) hours for 7 days    Multiple Vitamins-Minerals (ONE-A-DAY WOMENS PO) Take by mouth daily    pantoprazole (PROTONIX) 20 mg tablet take 1 tablet by mouth once daily    Prasterone, DHEA, (DHEA PO) Take by mouth    Progesterone 100 MG CAPS Take 100 mg by mouth at bedtime    [DISCONTINUED] phentermine 37.5 MG capsule Take 1 capsule (37.5 mg total) by mouth every morning    [DISCONTINUED] topiramate (TOPAMAX) 50 MG tablet Take 1 tablet (50 mg total) by mouth every 12 (twelve) hours    dicyclomine (BENTYL) 20 mg tablet Take 1 tablet (20 mg total) by mouth 2 (two) times a day (Patient not taking: Reported on 4/8/2024)    hydrocortisone (ANUSOL-HC) 2.5 % rectal cream Apply topically 2 (two) times a day for 7 days    ondansetron (ZOFRAN-ODT) 4 mg disintegrating tablet Take 1 tablet (4 mg total) by mouth every 8 (eight) hours as needed for nausea or vomiting (Patient not taking: Reported on 4/8/2024)     No current facility-administered medications on file prior to visit.     She is allergic to penicillins..    Review of Systems   Constitutional:  Negative for fever.   Respiratory:  Negative for shortness of breath.    Cardiovascular:  Negative for chest pain and palpitations.   Gastrointestinal:  Positive for abdominal distention. Negative for abdominal pain, constipation, diarrhea and vomiting.   Genitourinary:   "Negative for difficulty urinating.   Skin:  Negative for rash.   Neurological:  Negative for headaches.   Psychiatric/Behavioral:  Negative for dysphoric mood. The patient is not nervous/anxious.        Objective:    /70   Temp 98.6 °F (37 °C)   Resp 18   Ht 5' 7\" (1.702 m)   Wt 102 kg (224 lb 3.2 oz)   LMP 10/15/2020 (Exact Date)   BMI 35.11 kg/m²      Physical Exam  Vitals and nursing note reviewed.   Constitutional:       General: She is not in acute distress.     Appearance: She is well-developed. She is obese.   HENT:      Head: Normocephalic and atraumatic.   Eyes:      Conjunctiva/sclera: Conjunctivae normal.   Neck:      Thyroid: No thyromegaly.   Pulmonary:      Effort: Pulmonary effort is normal. No respiratory distress.   Skin:     Findings: No rash (visible).   Neurological:      Mental Status: She is alert and oriented to person, place, and time.   Psychiatric:         Mood and Affect: Mood normal.         Behavior: Behavior normal.         "

## 2024-04-08 NOTE — ASSESSMENT & PLAN NOTE
-Patient is pursuing Conservative Program  -Initial weight loss goal of 5-10% weight loss for improved health  -had hypokalemia 4/1/24 on cmp    Initial:235.8   Last Visit: 226.4  Current:224.2  Change:-11.6   Goals:      -discussed making sure to get enough protein. Typically having around 60mg in the AM  -to continue water intake 80 oz currently  -plans to restart peloton in the future when GI symtpoms have resolve  -to consider scheduling with RD     To continue phentermine and topamax.  Start weight 248.3 on 12/19/22. 9.7% weight loss.   -could consider wegovy or zepbound in the future when GI issues have resolved.   -She was on saxenda prior with weight gain.

## 2024-05-03 DIAGNOSIS — E66.01 CLASS 2 SEVERE OBESITY DUE TO EXCESS CALORIES WITH SERIOUS COMORBIDITY AND BODY MASS INDEX (BMI) OF 36.0 TO 36.9 IN ADULT (HCC): Chronic | ICD-10-CM

## 2024-05-06 RX ORDER — PHENTERMINE HYDROCHLORIDE 37.5 MG/1
37.5 CAPSULE ORAL EVERY MORNING
Qty: 30 CAPSULE | Refills: 1 | Status: SHIPPED | OUTPATIENT
Start: 2024-05-06

## 2024-05-06 NOTE — TELEPHONE ENCOUNTER
Refill must be reviewed and completed by the office or provider. The refill is unable to be approved or denied by the medication management team.   Cannot be delegated

## 2024-06-07 DIAGNOSIS — N95.1 MENOPAUSAL SYMPTOMS: ICD-10-CM

## 2024-06-07 RX ORDER — ESTRADIOL 1 MG/1
1 TABLET ORAL DAILY
Qty: 30 TABLET | Refills: 11 | Status: SHIPPED | OUTPATIENT
Start: 2024-06-07

## 2024-06-09 DIAGNOSIS — N95.1 MENOPAUSAL SYMPTOMS: ICD-10-CM

## 2024-06-10 ENCOUNTER — CLINICAL SUPPORT (OUTPATIENT)
Dept: BARIATRICS | Facility: CLINIC | Age: 49
End: 2024-06-10

## 2024-06-10 VITALS
BODY MASS INDEX: 35.47 KG/M2 | SYSTOLIC BLOOD PRESSURE: 111 MMHG | HEIGHT: 67 IN | RESPIRATION RATE: 17 BRPM | TEMPERATURE: 97.4 F | DIASTOLIC BLOOD PRESSURE: 71 MMHG | WEIGHT: 226 LBS | HEART RATE: 80 BPM

## 2024-06-10 DIAGNOSIS — R63.5 ABNORMAL WEIGHT GAIN: Primary | ICD-10-CM

## 2024-06-10 PROCEDURE — RECHECK

## 2024-06-10 RX ORDER — PROGESTERONE 100 MG/1
100 CAPSULE ORAL
Qty: 90 CAPSULE | Refills: 3 | Status: SHIPPED | OUTPATIENT
Start: 2024-06-10

## 2024-06-10 RX ORDER — ESTRADIOL 1 MG/1
1 TABLET ORAL DAILY
Qty: 90 TABLET | Refills: 3 | Status: SHIPPED | OUTPATIENT
Start: 2024-06-10

## 2024-06-12 ENCOUNTER — TELEPHONE (OUTPATIENT)
Dept: GYNECOLOGIC ONCOLOGY | Facility: CLINIC | Age: 49
End: 2024-06-12

## 2024-06-12 NOTE — TELEPHONE ENCOUNTER
Called and left a message that 2/28 appointment was moved to 3/7 at 8:20 am in [place]. Patient needs to call back to be given detail and to confirm.

## 2024-06-16 DIAGNOSIS — K21.9 GASTROESOPHAGEAL REFLUX DISEASE, UNSPECIFIED WHETHER ESOPHAGITIS PRESENT: ICD-10-CM

## 2024-06-17 RX ORDER — PANTOPRAZOLE SODIUM 20 MG/1
20 TABLET, DELAYED RELEASE ORAL DAILY
Qty: 30 TABLET | Refills: 5 | Status: SHIPPED | OUTPATIENT
Start: 2024-06-17

## 2024-08-09 DIAGNOSIS — E66.01 CLASS 2 SEVERE OBESITY DUE TO EXCESS CALORIES WITH SERIOUS COMORBIDITY AND BODY MASS INDEX (BMI) OF 36.0 TO 36.9 IN ADULT (HCC): Chronic | ICD-10-CM

## 2024-08-09 RX ORDER — PHENTERMINE HYDROCHLORIDE 37.5 MG/1
37.5 CAPSULE ORAL EVERY MORNING
Qty: 30 CAPSULE | Refills: 0 | Status: SHIPPED | OUTPATIENT
Start: 2024-08-09

## 2024-08-12 ENCOUNTER — OFFICE VISIT (OUTPATIENT)
Dept: PODIATRY | Facility: CLINIC | Age: 49
End: 2024-08-12
Payer: COMMERCIAL

## 2024-08-12 ENCOUNTER — APPOINTMENT (OUTPATIENT)
Dept: RADIOLOGY | Age: 49
End: 2024-08-12
Payer: COMMERCIAL

## 2024-08-12 VITALS
WEIGHT: 230 LBS | DIASTOLIC BLOOD PRESSURE: 83 MMHG | BODY MASS INDEX: 36.1 KG/M2 | SYSTOLIC BLOOD PRESSURE: 120 MMHG | HEIGHT: 67 IN | HEART RATE: 72 BPM

## 2024-08-12 DIAGNOSIS — M72.2 PLANTAR FASCIITIS, LEFT: ICD-10-CM

## 2024-08-12 DIAGNOSIS — M79.672 LEFT FOOT PAIN: Primary | ICD-10-CM

## 2024-08-12 PROCEDURE — 99203 OFFICE O/P NEW LOW 30 MIN: CPT | Performed by: PODIATRIST

## 2024-08-12 PROCEDURE — 73630 X-RAY EXAM OF FOOT: CPT

## 2024-08-12 PROCEDURE — 20550 NJX 1 TENDON SHEATH/LIGAMENT: CPT | Performed by: PODIATRIST

## 2024-08-12 RX ORDER — DEXAMETHASONE SODIUM PHOSPHATE 4 MG/ML
2 INJECTION, SOLUTION INTRA-ARTICULAR; INTRALESIONAL; INTRAMUSCULAR; INTRAVENOUS; SOFT TISSUE ONCE
Status: COMPLETED | OUTPATIENT
Start: 2024-08-12 | End: 2024-08-12

## 2024-08-12 RX ORDER — TRIAMCINOLONE ACETONIDE 40 MG/ML
20 INJECTION, SUSPENSION INTRA-ARTICULAR; INTRAMUSCULAR ONCE
Status: COMPLETED | OUTPATIENT
Start: 2024-08-12 | End: 2024-08-12

## 2024-08-12 RX ORDER — LIDOCAINE HYDROCHLORIDE 10 MG/ML
0.5 INJECTION, SOLUTION EPIDURAL; INFILTRATION; INTRACAUDAL; PERINEURAL ONCE
Status: COMPLETED | OUTPATIENT
Start: 2024-08-12 | End: 2024-08-12

## 2024-08-12 RX ADMIN — TRIAMCINOLONE ACETONIDE 20 MG: 40 INJECTION, SUSPENSION INTRA-ARTICULAR; INTRAMUSCULAR at 09:49

## 2024-08-12 RX ADMIN — LIDOCAINE HYDROCHLORIDE 0.5 ML: 10 INJECTION, SOLUTION EPIDURAL; INFILTRATION; INTRACAUDAL; PERINEURAL at 09:49

## 2024-08-12 RX ADMIN — DEXAMETHASONE SODIUM PHOSPHATE 2 MG: 4 INJECTION, SOLUTION INTRA-ARTICULAR; INTRALESIONAL; INTRAMUSCULAR; INTRAVENOUS; SOFT TISSUE at 09:49

## 2024-08-12 NOTE — PROGRESS NOTES
Name: Michelle Taylor      : 1975      MRN: [id number]  Encounter Provider: Florentino So DPM  Encounter Date: 2024   Encounter department: [facility] PODIATRY [facility]    Assessment & Plan     1. Left foot pain  -     XR foot 3+ vw left  2. Plantar fasciitis, left  -     Foot/lower extremity injection  -     dexamethasone (DECADRON) injection 2 mg  -     triamcinolone acetonide (KENALOG-40) 40 mg/mL injection 20 mg  -     lidocaine (PF) (XYLOCAINE-MPF) 1 % injection 0.5 mL    My personal interpretation today of the x-rays that were taken show X-rays evaluated today show plantar and posterior calcaneal spurring noted there is no acute fractures or dislocations noted currently.  There is some mild narrowing at the level of first metatarsophalangeal joint.    Foot/lower extremity injection    Performed by: Florentino So DPM  Authorized by: Florentino So DPM    Procedure:     Other Assisting Provider: No      Verbal consent obtained?: Yes      Risks and benefits: Risks, benefits and alternatives were discussed      Consent given by:  Patient    Patient states understanding of procedure being performed: Yes      Patient identity confirmed:  Verbally with patient    Supporting Documentation:     Indications:  Pain    Procedure Details:    Prep: patient was prepped and draped in usual sterile fashion                Ethyl Chloride was applied      Needle size: 25 G G    Ultrasound Guidance: no      Approach:  Medial    Laterality:  Left    Location: aponeurosis      Aponeurosis Structures: Plantar fascia origin      Injection Information:       Patient tolerance:  Patient tolerated the procedure well with no immediate complications    Dressing: sterile dressing applied            Plantar Fasciitis Treatment Plan:     Rest, ice, and elevation  Apply a cloth covered ice pack to heel four times per day 15-30 minutes.  May use frozen water bottle on ground while seated to  rub along the plantar fascia.  Stretching exercises 2x per day, everyday.  Anti-inflammatory medications (If no contraindications to take)  Orthotics and supportive shoes  Injection     No response to above treatments:       Night Splints  Formal physical therapy  Walking boot  MRI     Failure of conservative treatment  Surgery     I personally discussed with patient at the visit today:     The diagnosis of this encounter  2.   Possible etiologies of the diagnosis  3.   Treatment options including advantages and disadvantages of treatment with potential risks and complications associated with these treatments  4.   Prevention strategies and ways to decrease pain     I answered all of the patients questions.       Return in about 2 months (around 10/12/2024).    Subjective     Location: Left heel pain for the past couple months.  Patient states that  Type of pain: Left heel pain sharp worsened first thing in the morning.  And at the end of the day after she has been on her feet.  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  She has pain and discomfort to the area denies any shooting sensation.  Duration and Onset: 2 months.  However she did have a history about 2 years ago of the same issue had an injection  Aggravating factors: Standing.  For step in the morning  Treatment so far: Has done stretching exercises.  She had a previous injection by OA physician    My personal interpretation today of the x-rays that were taken show plantar calcaneal heel spur no acute fractures or dislocations noted currently.        Constitutional:  Negative for chills and fever.   Respiratory:  Negative for chest tightness and shortness of breath.    Gastrointestinal:  Negative for nausea and vomiting.     Current Outpatient Medications on File Prior to Visit   Medication Sig   • estradiol (ESTRACE) 1 mg tablet Take 1 tablet (1 mg total) by mouth daily   • Multiple Vitamins-Minerals (ONE-A-DAY WOMENS PO) Take by mouth daily   •  "pantoprazole (PROTONIX) 20 mg tablet take 1 tablet by mouth once daily   • phentermine 37.5 MG capsule Take 1 capsule (37.5 mg total) by mouth every morning   • Prasterone, DHEA, (DHEA PO) Take by mouth   • Progesterone 100 MG CAPS Take 100 mg by mouth at bedtime   • topiramate (TOPAMAX) 50 MG tablet Take 1 tablet (50 mg total) by mouth every 12 (twelve) hours   • dicyclomine (BENTYL) 20 mg tablet Take 1 tablet (20 mg total) by mouth 2 (two) times a day (Patient not taking: Reported on 4/8/2024)   • hydrocortisone (ANUSOL-HC) 2.5 % rectal cream Apply topically 2 (two) times a day for 7 days   • ondansetron (ZOFRAN-ODT) 4 mg disintegrating tablet Take 1 tablet (4 mg total) by mouth every 8 (eight) hours as needed for nausea or vomiting (Patient not taking: Reported on 4/8/2024)       Objective     /83   Pulse 72   Ht 5' 7\" (1.702 m)   Wt 104 kg (230 lb)   LMP 10/15/2020 (Exact Date)   BMI 36.02 kg/m²     Pain on palpation to the left foot plantar medial tubercle.  Pain with forced dorsiflexion of the great toe, activating the windlass mechanism.  No pain on medial/lateral squeeze of the calcaneus.  Muscle strength is intact with dorsiflexion, plantarflexion, inversion and eversion of the ankle.  Mild swelling noted to the plantar medial heel.  Negative tinel sign on percussion of the tarsal tunnel.  Intact neurological sensation distally. Pedal pulses are palpable.  No open lesions or ulcerations. No signs of infection.        "

## 2024-08-12 NOTE — PATIENT INSTRUCTIONS
Patient Education     Plantar Fasciitis Exercises   About this topic   Plantar fasciitis is swelling of the thick tissue on the bottom of the foot. This tissue is called the plantar fascia. It connects the heel bone to the toes and supports the arch of the foot. Exercise is an important part of making this problem better.  General   Before starting with a program, ask your doctor if you are healthy enough to do these exercises. Your doctor may have you work with a  or physical therapist to make a safe exercise program to meet your needs.  Stretching Exercises   Stretching exercises keep your muscles flexible. They also stop them from getting tight. Start by doing each of these stretches 2 to 3 times. In order for your body to make changes, you will need to hold these stretches for 20 to 30 seconds. Try to do the stretches 2 to 3 times each day. Do all exercises slowly.  Calf stretches standing ? Stand about 12 to 18 inches (30 to 45 cm) away from a wall. Place your hands on the wall at shoulder level. Lean forward.  Knee straight - Stretch your left leg straight behind you. Make sure the left heel is flat on the floor and the left knee is straight. Now, bend the knee of the right leg until you feel a stretch in your left calf. Be sure that the heel does not come up. Repeat on the other side.  Knee bent - Take a small step forward with your left leg so your feet are slightly closer together. With your right leg bent, bend your left knee forward until you feel a stretch in the back of the calf of your left leg. This will feel strange, but it is the best way to stretch this calf muscle. Repeat on the other side.  Calf stretches lying down with belt or towel ? Lie on your back with both legs straight. Loop a belt or towel around the ball of one foot. Lift the leg up, keeping the knee straight until you feel a stretch in the back of your thigh. Pull back on the belt or towel to bend your foot more for a better  stretch. Repeat on the other foot. This stretch gets both your calf and the hamstrings on the back of your thigh.  Calf stretches on stairs ? Stand on a step and hold onto a rail. Position your feet so only the balls of your feet are on the step. Lower both heels until you feel a stretch in the back of your calves. Do not do this stretch if you have trouble with balance.  Crossed leg foot stretches ? Sit in a chair. Bend one leg up and rest the outside of the foot on the knee. Grab your foot and pull your toes and foot forward until you feel a stretch along the bottom of your foot. Repeat with the other foot.  Rolling foot ? Use a golf ball, tennis ball, soup can, a frozen water bottle, or a rolling pin for this exercise. Sit in a sturdy chair. Put the ball, can, or rolling pin underneath your sore foot. Push down firmly and roll it back and forth with your foot for 1 to 2 minutes. Work up to 3 to 4 minutes. If this exercise is too easy, try doing it while standing up with more pressure on the foot. Do this exercise last if you use a frozen water bottle. Heated tissue stretches better than cold tissue. Doing this last with a frozen water bottle can help lessen the pain and swelling that may happen with stretching.  Strengthening Exercises   Strengthening exercises keep your muscles firm and strong. Start by repeating each exercise 2 to 3 times. Work up to doing each exercise 10 times. Try to do the exercises 2 to 3 times each day. Do all exercises slowly.  Towel pick-ups ? Sit in a chair with your feet flat on the floor. Make sure you do not have shoes or socks on your feet. Place a towel under one foot with one end of the towel lined up with your heel. Keep your heel on the floor and try grabbing the towel with your toes. Gradually work it back until there is no more towel to move. Now, try pushing the towel back out while keeping your heel still. You can make this harder by putting a small weight on the end of the  towel.               What will the results be?   Less pain  Less pulling  Less swelling  Better flexibility and range of motion  Easier to walk and do other activities  Helpful tips   Stay active and work out to keep your muscles strong and flexible.  Keep a healthy weight to avoid putting too much stress on your joints. Eat a healthy diet to keep your muscles healthy.  Be sure you do not hold your breath when exercising. This can raise your blood pressure. If you tend to hold your breath, try counting out loud when exercising. If any exercise bothers you, stop right away.  Always warm up before stretching. Heated muscles stretch much easier than cool muscles. Stretching cool muscles can lead to injury.  Try walking or cycling at an easy pace for a few minutes to warm up your muscles. Do this again after exercising.  Never bounce when doing stretches.  Doing exercises before a meal may be a good way to get into a routine.  After exercising, it is a good idea to ice the bottom of your foot. A good way to do this is by sitting in a chair and rolling a frozen water bottle back and forth under your foot. Do not do this longer than 15 to 20 minutes.  Exercise may be slightly uncomfortable, but you should not have sharp pains. If you do get sharp pains, stop what you are doing. If the sharp pains continue, call your doctor.  Last Reviewed Date   2021-05-19  Consumer Information Use and Disclaimer   This generalized information is a limited summary of diagnosis, treatment, and/or medication information. It is not meant to be comprehensive and should be used as a tool to help the user understand and/or assess potential diagnostic and treatment options. It does NOT include all information about conditions, treatments, medications, side effects, or risks that may apply to a specific patient. It is not intended to be medical advice or a substitute for the medical advice, diagnosis, or treatment of a health care provider based  on the health care provider's examination and assessment of a patient’s specific and unique circumstances. Patients must speak with a health care provider for complete information about their health, medical questions, and treatment options, including any risks or benefits regarding use of medications. This information does not endorse any treatments or medications as safe, effective, or approved for treating a specific patient. UpToDate, Inc. and its affiliates disclaim any warranty or liability relating to this information or the use thereof. The use of this information is governed by the Terms of Use, available at https://www.[employer].uwer.com/en/know/clinical-effectiveness-terms   Copyright   Copyright © 2024 UpToDate, Inc. and its affiliates and/or licensors. All rights reserved.

## 2024-08-14 DIAGNOSIS — E66.01 CLASS 2 SEVERE OBESITY DUE TO EXCESS CALORIES WITH SERIOUS COMORBIDITY AND BODY MASS INDEX (BMI) OF 37.0 TO 37.9 IN ADULT (HCC): Chronic | ICD-10-CM

## 2024-08-14 RX ORDER — TOPIRAMATE 50 MG/1
50 TABLET, FILM COATED ORAL EVERY 12 HOURS
Qty: 60 TABLET | Refills: 3 | Status: SHIPPED | OUTPATIENT
Start: 2024-08-14

## 2024-08-19 ENCOUNTER — OFFICE VISIT (OUTPATIENT)
Dept: BARIATRICS | Facility: CLINIC | Age: 49
End: 2024-08-19
Payer: COMMERCIAL

## 2024-08-19 ENCOUNTER — TELEPHONE (OUTPATIENT)
Dept: BARIATRICS | Facility: CLINIC | Age: 49
End: 2024-08-19

## 2024-08-19 VITALS
TEMPERATURE: 97.2 F | SYSTOLIC BLOOD PRESSURE: 112 MMHG | WEIGHT: 226.8 LBS | HEIGHT: 67 IN | BODY MASS INDEX: 35.6 KG/M2 | RESPIRATION RATE: 17 BRPM | HEART RATE: 83 BPM | DIASTOLIC BLOOD PRESSURE: 68 MMHG

## 2024-08-19 DIAGNOSIS — G47.33 OSA (OBSTRUCTIVE SLEEP APNEA): ICD-10-CM

## 2024-08-19 DIAGNOSIS — E66.01 CLASS 2 SEVERE OBESITY DUE TO EXCESS CALORIES WITH SERIOUS COMORBIDITY AND BODY MASS INDEX (BMI) OF 35.0 TO 35.9 IN ADULT (HCC): Primary | Chronic | ICD-10-CM

## 2024-08-19 PROCEDURE — 99214 OFFICE O/P EST MOD 30 MIN: CPT | Performed by: PHYSICIAN ASSISTANT

## 2024-08-19 NOTE — TELEPHONE ENCOUNTER
PA for Wegovy 0.25mg SUBMITTED     via    [x]CMM-KEY: [id number]  [][employer]-Case ID #    []Faxed to plan   []Other website    []Phone call Case ID #      Office notes sent, clinical questions answered. Awaiting determination    Turnaround time for your insurance to make a decision on your Prior Authorization can take 7-21 business days.

## 2024-08-19 NOTE — TELEPHONE ENCOUNTER
PA for Wegovy 0.25mg  Approved     Date(s) approved 8/19/2024-8/19/2025    Case #    Patient advised by          [] MyChart Message  [] Phone call   [x]LMOM  []L/M to call office as no active Communication consent on file  []Unable to leave detailed message as VM not approved on Communication consent       Pharmacy advised by    []Fax  [x]Phone call    Approval letter scanned into Media Yes

## 2024-08-19 NOTE — ASSESSMENT & PLAN NOTE
-Patient is pursuing Conservative Program  -Initial weight loss goal of 5-10% weight loss for improved health  -had hypokalemia 4/1/24 on cmp    Initial:235.8   Last Visit: 224  Current:226.4  Change:-11.6   Goals:      -discussed trying to disperse calories better throughout the day  -to continue water intake 80 oz currently  -recommend to restart exercise but start slowly    To discontinue phentermine and switch to wegovy.  Continue on topamax at this time. They have tried more than 6 months of lifestyle modifications including diet and activity changes and has had insignificant weight loss of less than 1 lb a week. Patient denies personal and family history of MCT and MEN2 tumors. Patient denies personal history of pancreatitis. Side effects discussed but not limited to diarrhea, bloating, constipation, GI upset, heartburn, increased heart rate, headache, low blood sugar, fatigue and dizziness. Titration and medication administration discussed.  Medication agreement signed  Past Medications: saxenda(not effective)

## 2024-08-19 NOTE — PROGRESS NOTES
Assessment/Plan:    Obesity  -Patient is pursuing Conservative Program  -Initial weight loss goal of 5-10% weight loss for improved health  -had hypokalemia 4/1/24 on cmp    Initial:235.8   Last Visit: 224  Current:226.4  Change:-11.6   Goals:      -discussed trying to disperse calories better throughout the day  -to continue water intake 80 oz currently  -recommend to restart exercise but start slowly    To discontinue phentermine and switch to wegovy.  Continue on topamax at this time. They have tried more than 6 months of lifestyle modifications including diet and activity changes and has had insignificant weight loss of less than 1 lb a week. Patient denies personal and family history of MCT and MEN2 tumors. Patient denies personal history of pancreatitis. Side effects discussed but not limited to diarrhea, bloating, constipation, GI upset, heartburn, increased heart rate, headache, low blood sugar, fatigue and dizziness. Titration and medication administration discussed.  Medication agreement signed  Past Medications: saxenda(not effective)    CLIFF (obstructive sleep apnea)  -encouraged continued use of CPAP machine  -may improve with 20-30% weight loss        Return in about 6 months (around 2/19/2025).       Diagnoses and all orders for this visit:    Class 2 severe obesity due to excess calories with serious comorbidity and body mass index (BMI) of 35.0 to 35.9 in adult (HCC)  -     Semaglutide-Weight Management (WEGOVY) 0.25 MG/0.5ML; Inject 0.5 mL (0.25 mg total) under the skin once a week    CLIFF (obstructive sleep apnea)          Subjective:   Chief Complaint   Patient presents with    Follow-up     MWM 4M F/u; 42in         Patient ID: Michelle Taylor  is a 49 y.o. female with excess weight/obesity here to pursue weight managment.  Patient is pursuing Conservative Program.     HPI  She has been taking topamax and phentermine. She has hit a plateau. She typically is exercising but had a plantar fascia  flare and just got an injection.  The medication is helping with appetite during the day but problem is eating at night. She tried to take phentermine later but had insomnia        Wt Readings from Last 10 Encounters:   08/19/24 103 kg (226 lb 12.8 oz)   08/12/24 104 kg (230 lb)   06/10/24 103 kg (226 lb)   04/08/24 102 kg (224 lb 3.2 oz)   04/04/24 103 kg (228 lb)   03/11/24 103 kg (228 lb)   03/01/24 102 kg (225 lb)   02/19/24 102 kg (225 lb 6.4 oz)   10/17/23 101 kg (222 lb)   10/13/23 101 kg (222 lb)       Food logging:  Increased appetite/cravings:  Exercise:nothing regular due to plantar fasciitis . Had injection   Hydration:      The following portions of the patient's history were reviewed and updated as appropriate: She  has a past medical history of Anesthesia complication, Asthma, BRCA1-associated protein-1 tumor predisposition syndrome, CPAP (continuous positive airway pressure) dependence, GERD (gastroesophageal reflux disease) (9/5/23), H/O bilateral breast implants, Hernia (9/5/23), Intraductal papilloma of breast, left (02/23/2021), Malignant hyperthermia due to anesthesia, Obesity (BMI 30-39.9), Pneumonia, PONV (postoperative nausea and vomiting), Scab, and Sleep apnea.  She   Patient Active Problem List    Diagnosis Date Noted    Increased risk of breast cancer 03/11/2024    Nasal septal ulcer 05/09/2023    Osteopenia 03/03/2023    CLIFF (obstructive sleep apnea)     Fatigue 10/17/2022    History of hysterectomy 05/18/2021    PONV (postoperative nausea and vomiting) 05/18/2021    Keratosis, actinic 05/17/2021    Asthma 02/09/2021    Obesity 02/09/2021    Symptomatic postsurgical menopause 12/02/2020    BRCA1 gene mutation positive 09/25/2020    Sinobronchitis 02/23/2019     She  has a past surgical history that includes Appendectomy; Tonsillectomy; Tympanostomy tube placement (Bilateral); Dilation and curettage of uterus (1997); pr mastectomy simple complete (Bilateral, 2/9/2021); pr tissue expander  placement breast reconstruction (Bilateral, 2/9/2021); pr implnt bio implnt for soft tissue reinforcement (Bilateral, 2/9/2021); Colonoscopy; Hysterectomy (N/A, 11/27/2020); Capsulectomy (Bilateral, 5/19/2021); Breast surgery (2/9/2021); Cosmetic surgery (2021); pr revision of reconstructed breast (Bilateral, 8/6/2021); pr revision of reconstructed breast (Bilateral, 11/18/2021); BREAST IMPLANT (Left, 11/18/2021); Mastectomy; pr nipple/areola reconstruction (Bilateral, 2/24/2022); and Liposuction w/ fat injection (Bilateral, 12/2/2022).  Her family history includes BRCA1 Positive in her maternal uncle and sister; Breast cancer (age of onset: 40) in her cousin; Cancer in her father and maternal grandfather; Colon cancer in her father; Diabetes in her maternal grandfather; No Known Problems in her daughter, daughter, maternal aunt, mother, paternal aunt, paternal aunt, paternal aunt, paternal uncle, paternal uncle, and sister; Ovarian cancer (age of onset: 52) in her other.  She  reports that she has never smoked. She has never used smokeless tobacco. She reports current alcohol use of about 1.0 standard drink of alcohol per week. She reports that she does not use drugs.  Current Outpatient Medications   Medication Sig Dispense Refill    estradiol (ESTRACE) 1 mg tablet Take 1 tablet (1 mg total) by mouth daily 90 tablet 3    Multiple Vitamins-Minerals (ONE-A-DAY WOMENS PO) Take by mouth daily      pantoprazole (PROTONIX) 20 mg tablet take 1 tablet by mouth once daily 30 tablet 5    phentermine 37.5 MG capsule Take 1 capsule (37.5 mg total) by mouth every morning 30 capsule 0    Progesterone 100 MG CAPS Take 100 mg by mouth at bedtime 90 capsule 3    Semaglutide-Weight Management (WEGOVY) 0.25 MG/0.5ML Inject 0.5 mL (0.25 mg total) under the skin once a week 2 mL 0    topiramate (TOPAMAX) 50 MG tablet take 1 tablet by mouth every 12 hours 60 tablet 3    hydrocortisone (ANUSOL-HC) 2.5 % rectal cream Apply topically 2  (two) times a day for 7 days (Patient not taking: Reported on 8/19/2024) 28 g 0    ondansetron (ZOFRAN-ODT) 4 mg disintegrating tablet Take 1 tablet (4 mg total) by mouth every 8 (eight) hours as needed for nausea or vomiting (Patient not taking: Reported on 4/8/2024) 20 tablet 0    Prasterone, DHEA, (DHEA PO) Take by mouth (Patient not taking: Reported on 8/19/2024)       No current facility-administered medications for this visit.     Current Outpatient Medications on File Prior to Visit   Medication Sig    estradiol (ESTRACE) 1 mg tablet Take 1 tablet (1 mg total) by mouth daily    Multiple Vitamins-Minerals (ONE-A-DAY WOMENS PO) Take by mouth daily    pantoprazole (PROTONIX) 20 mg tablet take 1 tablet by mouth once daily    phentermine 37.5 MG capsule Take 1 capsule (37.5 mg total) by mouth every morning    Progesterone 100 MG CAPS Take 100 mg by mouth at bedtime    topiramate (TOPAMAX) 50 MG tablet take 1 tablet by mouth every 12 hours    hydrocortisone (ANUSOL-HC) 2.5 % rectal cream Apply topically 2 (two) times a day for 7 days (Patient not taking: Reported on 8/19/2024)    ondansetron (ZOFRAN-ODT) 4 mg disintegrating tablet Take 1 tablet (4 mg total) by mouth every 8 (eight) hours as needed for nausea or vomiting (Patient not taking: Reported on 4/8/2024)    Prasterone, DHEA, (DHEA PO) Take by mouth (Patient not taking: Reported on 8/19/2024)    [DISCONTINUED] dicyclomine (BENTYL) 20 mg tablet Take 1 tablet (20 mg total) by mouth 2 (two) times a day (Patient not taking: Reported on 8/19/2024)     No current facility-administered medications on file prior to visit.     She is allergic to penicillins..    Review of Systems   Constitutional:  Negative for fever.   Respiratory:  Negative for shortness of breath.    Cardiovascular:  Negative for chest pain and palpitations.   Gastrointestinal:  Negative for abdominal pain, constipation, diarrhea and vomiting.   Genitourinary:  Negative for difficulty urinating.  "  Musculoskeletal:  Negative for arthralgias and back pain.   Skin:  Negative for rash.   Neurological:  Negative for headaches.   Psychiatric/Behavioral:  Negative for dysphoric mood. The patient is not nervous/anxious.        Objective:    /68 (BP Location: Left arm, Patient Position: Sitting)   Pulse 83   Temp (!) 97.2 °F (36.2 °C) (Tympanic)   Resp 17   Ht 5' 7\" (1.702 m)   Wt 103 kg (226 lb 12.8 oz)   LMP 10/15/2020 (Exact Date)   BMI 35.52 kg/m²      Physical Exam  Vitals and nursing note reviewed.   Constitutional:       General: She is not in acute distress.     Appearance: She is well-developed. She is obese.   HENT:      Head: Normocephalic and atraumatic.   Eyes:      Conjunctiva/sclera: Conjunctivae normal.   Neck:      Thyroid: No thyromegaly.   Pulmonary:      Effort: Pulmonary effort is normal. No respiratory distress.   Skin:     Findings: No rash (visible).   Neurological:      Mental Status: She is alert and oriented to person, place, and time.   Psychiatric:         Behavior: Behavior normal.         "

## 2024-08-20 ENCOUNTER — TELEPHONE (OUTPATIENT)
Dept: GASTROENTEROLOGY | Facility: CLINIC | Age: 49
End: 2024-08-20

## 2024-08-20 NOTE — TELEPHONE ENCOUNTER
Pt is due for a recall EGD - Grullon's Esophagus (Dr Calles pt). I lmom for pt to please call back to schedule.  Not sure if correct phone number so was vague. Will call again and if same, will mail recall letter to pt.

## 2024-09-03 DIAGNOSIS — E66.01 CLASS 2 SEVERE OBESITY DUE TO EXCESS CALORIES WITH SERIOUS COMORBIDITY AND BODY MASS INDEX (BMI) OF 35.0 TO 35.9 IN ADULT (HCC): Chronic | ICD-10-CM

## 2024-09-09 ENCOUNTER — PREP FOR PROCEDURE (OUTPATIENT)
Age: 49
End: 2024-09-09

## 2024-09-09 DIAGNOSIS — K21.9 GASTROESOPHAGEAL REFLUX DISEASE, UNSPECIFIED WHETHER ESOPHAGITIS PRESENT: Primary | ICD-10-CM

## 2024-09-20 DIAGNOSIS — E66.812 CLASS 2 SEVERE OBESITY DUE TO EXCESS CALORIES WITH SERIOUS COMORBIDITY AND BODY MASS INDEX (BMI) OF 35.0 TO 35.9 IN ADULT (HCC): Primary | ICD-10-CM

## 2024-09-20 DIAGNOSIS — E66.01 CLASS 2 SEVERE OBESITY DUE TO EXCESS CALORIES WITH SERIOUS COMORBIDITY AND BODY MASS INDEX (BMI) OF 35.0 TO 35.9 IN ADULT (HCC): Primary | ICD-10-CM

## 2024-10-04 ENCOUNTER — OFFICE VISIT (OUTPATIENT)
Dept: URGENT CARE | Age: 49
End: 2024-10-04
Payer: COMMERCIAL

## 2024-10-04 ENCOUNTER — APPOINTMENT (OUTPATIENT)
Dept: RADIOLOGY | Age: 49
End: 2024-10-04
Payer: COMMERCIAL

## 2024-10-04 VITALS
TEMPERATURE: 97.1 F | OXYGEN SATURATION: 99 % | HEART RATE: 73 BPM | DIASTOLIC BLOOD PRESSURE: 72 MMHG | RESPIRATION RATE: 18 BRPM | SYSTOLIC BLOOD PRESSURE: 113 MMHG | BODY MASS INDEX: 35.52 KG/M2 | HEIGHT: 67 IN

## 2024-10-04 DIAGNOSIS — R07.89 CHEST PRESSURE: ICD-10-CM

## 2024-10-04 DIAGNOSIS — R07.89 CHEST PRESSURE: Primary | ICD-10-CM

## 2024-10-04 LAB
ATRIAL RATE: 70 BPM
P AXIS: -23 DEGREES
PR INTERVAL: 142 MS
QRS AXIS: 45 DEGREES
QRSD INTERVAL: 94 MS
QT INTERVAL: 412 MS
QTC INTERVAL: 444 MS
T WAVE AXIS: 51 DEGREES
VENTRICULAR RATE: 70 BPM

## 2024-10-04 PROCEDURE — G0382 LEV 3 HOSP TYPE B ED VISIT: HCPCS

## 2024-10-04 PROCEDURE — S9083 URGENT CARE CENTER GLOBAL: HCPCS

## 2024-10-04 PROCEDURE — 93010 ELECTROCARDIOGRAM REPORT: CPT | Performed by: INTERNAL MEDICINE

## 2024-10-04 PROCEDURE — 71046 X-RAY EXAM CHEST 2 VIEWS: CPT

## 2024-10-04 PROCEDURE — 93005 ELECTROCARDIOGRAM TRACING: CPT

## 2024-10-04 RX ORDER — CEFDINIR 300 MG/1
300 CAPSULE ORAL EVERY 12 HOURS SCHEDULED
Qty: 14 CAPSULE | Refills: 0 | Status: SHIPPED | OUTPATIENT
Start: 2024-10-04 | End: 2024-10-11

## 2024-10-04 RX ORDER — AZITHROMYCIN 250 MG/1
TABLET, FILM COATED ORAL
Qty: 6 TABLET | Refills: 0 | Status: SHIPPED | OUTPATIENT
Start: 2024-10-04 | End: 2024-10-08

## 2024-10-04 RX ORDER — ALBUTEROL SULFATE 90 UG/1
2 INHALANT RESPIRATORY (INHALATION) EVERY 6 HOURS PRN
Qty: 8.5 G | Refills: 0 | Status: SHIPPED | OUTPATIENT
Start: 2024-10-04

## 2024-10-04 NOTE — PROGRESS NOTES
"[facility]        NAME: Michelle Taylor is a 49 y.o. female  : 1975    MRN: [id number]  DATE: 2024  TIME: 9:48 AM    Assessment and Plan   Chest pressure [R07.89]  1. Chest pressure  ECG 12 lead    XR chest pa and lateral    cefdinir (OMNICEF) 300 mg capsule    azithromycin (ZITHROMAX) 250 mg tablet    albuterol (ProAir HFA) 90 mcg/act inhaler      EKG reviewed, no acute abnormality noted, awaiting official read.   Chest x-ray reviewed, no acute abnormality noted, awaiting official read.   At this point, given HPI and symptoms, will treat empirically for pneumonia. Please begin antibiotics and albuterol inhaler as directed.   Ensure good hydration.   Follow up with PCP if no relief within one week.   Go to emergency department for worsening symptoms.       Patient Instructions     Patient Education     Pleuritic chest pain   The Basics   Written by the doctors and editors at UpToDate   What is pleuritic chest pain? -- Pleuritic chest pain is a type of sharp, stabbing pain. It gets worse when you breathe in and even worse when you take a deep breath, laugh, or cough. It is often caused by problems with the thin layers of tissue that surround the lungs, called the \"pleura.\" Pain from the ribs or muscles lying over the ribs can cause a similar chest pain.  What causes pleuritic chest pain? -- Pleuritic chest pain can be caused by:   Pneumothorax - This is when air gets trapped between the lung and the rib cage (figure 1). This air presses on the lung and causes it to deflate or collapse. Causes of pneumothorax include injuries to the chest, cigarette smoking, and certain lung infections.   Pleural effusion - This is when fluid builds up in the space between the lung and the rib cage. Causes of pleural effusion include tumors, pneumonia (an infection in the lungs), and blood clots in the lungs.   Pleuritis - This is the medical term for inflammation of the pleura. It can also be called " "\"pleurisy.\" The most common cause of pleuritis is infection, but it can also be caused by lupus, rheumatoid arthritis, and certain medicines.   Empyema - This is the medical term for an infection in the fluid between the lung and the rib cage.   Pericarditis - This is a term for inflammation of the tissues around the heart. Sometimes, the pain from pericarditis is similar to pleuritic chest pain.  Should I see a doctor or nurse? -- Yes. If you have stabbing chest pain that gets worse when you breathe in and lasts more than a few minutes, see a doctor or nurse right away.  Are there tests I should have? -- Your doctor or nurse will decide which tests you should have based on your age, other symptoms, and individual situation.  Here are the most common tests doctors use to find the cause of pleuritic chest pain:   Blood tests - Blood tests can show if you are fighting an infection or if there is an infection in your blood. They can also show if your blood has certain proteins that get released into the blood during a heart attack.   Pulse oximetry - This test uses a small device that goes on your finger. It checks the amount of oxygen in your blood.   Chest X-ray - A chest X-ray can show if the lungs are inflating all the way. It can also show if there is air or fluid between the lungs and the ribcage.   Chest CT scan - A chest CT scan can show if there is a blood clot in the lung, and can find other causes of pleuritic pain.   Thoracentesis - If there is fluid around your lung, doctors can use a needle to remove some of the fluid. Then they can test the fluid for infection and find out what kind of cells are in it.   Heart tests - Because heart problems can cause chest pain, some people with pleuritic chest pain have heart tests. One example is an electrocardiogram (ECG), which measures the electrical activity of the heart. Another is an echocardiogram, which uses sound waves to create pictures of the heart as it " "beats.  How is pleuritic chest pain treated? -- That depends on what kind of problem is causing the pain.   Pneumothorax - Sometimes, a pneumothorax will heal on its own. If it is too big to heal on its own, doctors can drain the air in the pneumothorax using a tube. (This is called a chest tube.)   Pneumonia with empyema caused by bacteria - If the pain is caused by a bacterial infection in the fluid around the lung, doctors can treat the infection with antibiotics. Often, they also use a chest tube to help drain the infection.   Blood clot - If the pain is caused by a blood clot, doctors can give medicine that dissolves clots or keeps them from getting bigger.   Medicines - If a medicine is causing pleuritis, doctors might stop or switch the medicine.   Viral infection - If the pain is not caused by any of the problems listed above, it is probably caused by a viral infection. Viral infections usually go away on their own after a few days or a couple of weeks. To help with pain while the infection goes away, doctors often suggest pain-relieving medicines, such as ibuprofen (sample brand names: Advil, Motrin) or naproxen (sample brand name: Aleve).  All topics are updated as new evidence becomes available and our peer review process is complete.  This topic retrieved from [employer] on: Mar 30, 2024.  Topic 28176 Version 13.0  Release: 32.2.4 - C32.88  © 2024 UpToDate, Inc. and/or its affiliates. All rights reserved.  figure 1: Pneumothorax (collapsed lung)     The lungs sit in the chest, inside the ribcage. They are covered with a thin membrane called the \"pleura.\" The windpipe (or trachea) branches into smaller airways. In this drawing, 1 lung is normal, and 1 has collapsed because air has leaked out of it. The air that has leaked out of the lung (shown in blue) has filled the space outside of the lung.  Graphic 77000 Version 2.0  Consumer Information Use and Disclaimer   Disclaimer: This generalized information is " a limited summary of diagnosis, treatment, and/or medication information. It is not meant to be comprehensive and should be used as a tool to help the user understand and/or assess potential diagnostic and treatment options. It does NOT include all information about conditions, treatments, medications, side effects, or risks that may apply to a specific patient. It is not intended to be medical advice or a substitute for the medical advice, diagnosis, or treatment of a health care provider based on the health care provider's examination and assessment of a patient's specific and unique circumstances. Patients must speak with a health care provider for complete information about their health, medical questions, and treatment options, including any risks or benefits regarding use of medications. This information does not endorse any treatments or medications as safe, effective, or approved for treating a specific patient. UpToDate, Inc. and its affiliates disclaim any warranty or liability relating to this information or the use thereof.The use of this information is governed by the Terms of Use, available at https://www.[employer].com/en/know/clinical-effectiveness-terms. 2024© UpToDate, Inc. and its affiliates and/or licensors. All rights reserved.  Copyright   © 2024 UpToDate, Inc. and/or its affiliates. All rights reserved.          Follow up with PCP in 3-5 days.  Proceed to  ER if symptoms worsen.    Chief Complaint     Chief Complaint   Patient presents with    Chest Pain    Shortness of Breath         History of Present Illness       Patient is a 49 year old female with PMH significant for pneumonia with subsequent hospitalization, asthma, sleep apnea, who presents for evaluation of thoracic back pain over the past two weeks and central chest pressure since last night. She reports that her symptoms of back pain were preceded by a URI, and she assumed that she had COVID at the time. She continues to  occasionally cough. She denies chest pain, palpitations, dizziness, nausea/vomiting, fever. She does note occasional shortness of breath at night and when laying down. Deep inspiration aggravates the pain.     Chest Pain   Associated symptoms include a cough and shortness of breath. Pertinent negatives include no back pain, dizziness, fever, headaches, nausea, numbness, palpitations or vomiting.   Shortness of Breath  Associated symptoms include coughing. Pertinent negatives include no chest pain, dizziness, fatigue, leg swelling, palpitations, rhinorrhea, sore throat, stridor or wheezing.       Review of Systems   Review of Systems   Constitutional:  Negative for fatigue and fever.   HENT:  Positive for congestion. Negative for ear discharge, ear pain, postnasal drip, rhinorrhea, sinus pressure, sinus pain, sneezing and sore throat.    Eyes: Negative.  Negative for pain, discharge, redness and itching.   Respiratory:  Positive for cough and shortness of breath. Negative for apnea, choking, chest tightness, wheezing and stridor.    Cardiovascular:  Negative for chest pain, palpitations and leg swelling.        Chest pressure   Gastrointestinal: Negative.  Negative for diarrhea, nausea and vomiting.   Endocrine: Negative.  Negative for polydipsia, polyphagia and polyuria.   Genitourinary: Negative.  Negative for decreased urine volume and flank pain.   Musculoskeletal: Negative.  Negative for arthralgias, back pain, myalgias, neck pain and neck stiffness.   Skin: Negative.  Negative for color change and rash.   Allergic/Immunologic: Negative.  Negative for environmental allergies.   Neurological: Negative.  Negative for dizziness, facial asymmetry, light-headedness, numbness and headaches.   Hematological: Negative.  Negative for adenopathy.   Psychiatric/Behavioral: Negative.           Current Medications       Current Outpatient Medications:     albuterol (ProAir HFA) 90 mcg/act inhaler, Inhale 2 puffs every 6  (six) hours as needed for wheezing, Disp: 8.5 g, Rfl: 0    azithromycin (ZITHROMAX) 250 mg tablet, Take 2 tablets today then 1 tablet daily x 4 days, Disp: 6 tablet, Rfl: 0    cefdinir (OMNICEF) 300 mg capsule, Take 1 capsule (300 mg total) by mouth every 12 (twelve) hours for 7 days, Disp: 14 capsule, Rfl: 0    pantoprazole (PROTONIX) 20 mg tablet, take 1 tablet by mouth once daily, Disp: 30 tablet, Rfl: 5    Semaglutide-Weight Management (WEGOVY) 0.5 MG/0.5ML, Inject 0.5 mL (0.5 mg total) under the skin once a week, Disp: 2 mL, Rfl: 0    estradiol (ESTRACE) 1 mg tablet, Take 1 tablet (1 mg total) by mouth daily (Patient not taking: Reported on 10/4/2024), Disp: 90 tablet, Rfl: 3    hydrocortisone (ANUSOL-HC) 2.5 % rectal cream, Apply topically 2 (two) times a day for 7 days (Patient not taking: Reported on 8/19/2024), Disp: 28 g, Rfl: 0    Multiple Vitamins-Minerals (ONE-A-DAY WOMENS PO), Take by mouth daily (Patient not taking: Reported on 10/4/2024), Disp: , Rfl:     ondansetron (ZOFRAN-ODT) 4 mg disintegrating tablet, Take 1 tablet (4 mg total) by mouth every 8 (eight) hours as needed for nausea or vomiting (Patient not taking: Reported on 4/8/2024), Disp: 20 tablet, Rfl: 0    phentermine 37.5 MG capsule, Take 1 capsule (37.5 mg total) by mouth every morning (Patient not taking: Reported on 10/4/2024), Disp: 30 capsule, Rfl: 0    Prasterone, DHEA, (DHEA PO), Take by mouth (Patient not taking: Reported on 8/19/2024), Disp: , Rfl:     Progesterone 100 MG CAPS, Take 100 mg by mouth at bedtime (Patient not taking: Reported on 10/4/2024), Disp: 90 capsule, Rfl: 3    topiramate (TOPAMAX) 50 MG tablet, take 1 tablet by mouth every 12 hours (Patient not taking: Reported on 10/4/2024), Disp: 60 tablet, Rfl: 3    Current Allergies     Allergies as of 10/04/2024 - Reviewed 10/04/2024   Allergen Reaction Noted    Penicillins Other (See Comments) 05/19/2016            The following portions of the patient's history were  reviewed and updated as appropriate: allergies, current medications, past family history, past medical history, past social history, past surgical history and problem list.     Past Medical History:   Diagnosis Date    Anesthesia complication     daughter has malignant hyperthermia    Asthma     resolved w/ wt loss; is now having asthma sx due to recent hx of having covid 8/2022    BRCA1-associated protein-1 tumor predisposition syndrome     CPAP (continuous positive airway pressure) dependence     GERD (gastroesophageal reflux disease) 9/5/23    H/O bilateral breast implants     Hernia 9/5/23    Hiatal hernia    Intraductal papilloma of breast, left 02/23/2021    Malignant hyperthermia due to anesthesia     has family hx of MH, her daughter    Obesity (BMI 30-39.9)     Pneumonia     2019    PONV (postoperative nausea and vomiting)     Scab     back and right thigh from few precancerous lesions taken off    Sleep apnea        Past Surgical History:   Procedure Laterality Date    APPENDECTOMY      BREAST IMPLANT Left 11/18/2021    Procedure: FILL SALINE BREAST IMPLANT;  Surgeon: Maria C Hawley MD;  Location:  MAIN OR;  Service: Plastics    BREAST SURGERY  2/9/2021    Bilateral mastectomy    CAPSULOTOMY Bilateral 5/19/2021    Procedure: CAPSULECTOMY, EXPANDER/IMPLANT EXCHANGE;  Surgeon: Maria C Hawley MD;  Location:  MAIN OR;  Service: Plastics    COLONOSCOPY      COSMETIC SURGERY  2021    Reconstruction of breast    DILATION AND CURETTAGE OF UTERUS  1997    HYSTERECTOMY N/A 11/27/2020    Procedure: TOTAL LAPAROSCOPIC HYSTERECTOMY, BILATERAL SALPINGO-OOPHORECTOMY, cystoscopy;  Surgeon: Benedicto Donnelly MD;  Location:  MAIN OR;  Service: Gynecology Oncology    LIPOSUCTION W/ FAT INJECTION Bilateral 12/2/2022    Procedure: FAT GRAFTING TO BREAST;  Surgeon: Maria C Hawley MD;  Location:  MAIN OR;  Service: Plastics    MASTECTOMY      FL IMPLNT BIO IMPLNT FOR SOFT TISSUE REINFORCEMENT Bilateral 2/9/2021     "Procedure: RECONSTRUCTION BREAST W/ IMPLANT;  Surgeon: Maria C Hawley MD;  [facility];  Service: Plastics    MA MASTECTOMY SIMPLE COMPLETE Bilateral 2/9/2021    Procedure: BREAST MASTECTOMY;  Surgeon: Eyad Riley MD;  [facility];  Service: Surgical Oncology    MA NIPPLE/AREOLA RECONSTRUCTION Bilateral 2/24/2022    Procedure: NIPPLE RECONSTRUCTION;  Surgeon: Maria C Hawley MD;  Location:  MAIN OR;  Service: Plastics    MA REVISION OF RECONSTRUCTED BREAST Bilateral 8/6/2021    Procedure: BREAST RECONSTRUCITON REVISION; EXCISION OF STANDING DEFORMITIES;  Surgeon: Maria C Hawley MD;  [facility];  Service: Plastics    MA REVISION OF RECONSTRUCTED BREAST Bilateral 11/18/2021    Procedure: REVISE BREAST RECONSTRUCTION;  Surgeon: Maria C Hawley MD;  Location:  MAIN OR;  Service: Plastics    MA TISSUE EXPANDER PLACEMENT BREAST RECONSTRUCTION Bilateral 2/9/2021    Procedure: BREAST INSERTION/PLACEMENT TISSUE EXPANDER (EXCHANGE);  Surgeon: Maria C Hawley MD;  [facility];  Service: Plastics    TONSILLECTOMY      TYMPANOSTOMY TUBE PLACEMENT Bilateral        Family History   Problem Relation Age of Onset    No Known Problems Mother     Colon cancer Father     Cancer Father     BRCA1 Positive Sister     No Known Problems Sister     No Known Problems Maternal Aunt     BRCA1 Positive Maternal Uncle     No Known Problems Paternal Aunt     No Known Problems Paternal Aunt     No Known Problems Paternal Aunt     No Known Problems Paternal Uncle     No Known Problems Paternal Uncle     Cancer Maternal Grandfather     Diabetes Maternal Grandfather     No Known Problems Daughter     No Known Problems Daughter     Breast cancer Cousin 40    Ovarian cancer Other 52         Medications have been verified.        Objective   /72   Pulse 73   Temp (!) 97.1 °F (36.2 °C)   Resp 18   Ht 5' 7\" (1.702 m)   LMP 10/15/2020 (Exact Date)   SpO2 99%   BMI 35.52 kg/m²        Physical Exam     Physical " Exam  Vitals and nursing note reviewed.   Constitutional:       General: She is not in acute distress.     Appearance: Normal appearance. She is not ill-appearing, toxic-appearing or diaphoretic.      Interventions: She is not intubated.  HENT:      Head: Normocephalic and atraumatic.      Right Ear: Hearing, tympanic membrane, ear canal and external ear normal.      Left Ear: Hearing, tympanic membrane, ear canal and external ear normal.      Nose: Nose normal. No congestion or rhinorrhea.      Mouth/Throat:      Mouth: Mucous membranes are moist.      Pharynx: Oropharynx is clear. Uvula midline. No pharyngeal swelling, oropharyngeal exudate, posterior oropharyngeal erythema, uvula swelling or postnasal drip.      Tonsils: No tonsillar exudate or tonsillar abscesses. 1+ on the right. 1+ on the left.   Eyes:      Extraocular Movements: Extraocular movements intact.      Conjunctiva/sclera: Conjunctivae normal.      Pupils: Pupils are equal, round, and reactive to light.   Cardiovascular:      Rate and Rhythm: Normal rate and regular rhythm.      Pulses: Normal pulses.      Heart sounds: Normal heart sounds, S1 normal and S2 normal. Heart sounds not distant. No murmur heard.     No friction rub. No gallop.   Pulmonary:      Effort: Pulmonary effort is normal. No tachypnea, bradypnea, accessory muscle usage, prolonged expiration, respiratory distress or retractions. She is not intubated.      Breath sounds: Normal breath sounds. No stridor, decreased air movement or transmitted upper airway sounds. No decreased breath sounds, wheezing, rhonchi or rales.   Abdominal:      General: Bowel sounds are normal.      Palpations: Abdomen is soft.      Tenderness: There is no abdominal tenderness. There is no guarding or rebound.   Musculoskeletal:         General: Normal range of motion.      Cervical back: Normal range of motion and neck supple. No tenderness.   Skin:     General: Skin is warm and dry.      Capillary Refill:  Capillary refill takes less than 2 seconds.   Neurological:      General: No focal deficit present.      Mental Status: She is alert and oriented to person, place, and time.      Cranial Nerves: No cranial nerve deficit.   Psychiatric:         Mood and Affect: Mood normal.         Behavior: Behavior normal.

## 2024-10-04 NOTE — PATIENT INSTRUCTIONS
EKG reviewed, no acute abnormality noted, awaiting official read.   Chest x-ray reviewed, no acute abnormality noted, awaiting official read.   At this point, given HPI and symptoms, will treat empirically for pneumonia. Please begin antibiotics and albuterol inhaler as directed.   Ensure good hydration.   Follow up with PCP if no relief within one week.   Go to emergency department for worsening symptoms.

## 2024-10-10 ENCOUNTER — ANESTHESIA (OUTPATIENT)
Dept: ANESTHESIOLOGY | Facility: HOSPITAL | Age: 49
End: 2024-10-10

## 2024-10-10 ENCOUNTER — ANESTHESIA EVENT (OUTPATIENT)
Dept: ANESTHESIOLOGY | Facility: HOSPITAL | Age: 49
End: 2024-10-10

## 2024-10-11 ENCOUNTER — TELEPHONE (OUTPATIENT)
Age: 49
End: 2024-10-11

## 2024-10-11 NOTE — TELEPHONE ENCOUNTER
Called patient to offer same day with Dr. Kapoor as he worked with Dr. Calles previously. Patient aware of [place] location.

## 2024-10-11 NOTE — TELEPHONE ENCOUNTER
Patient returned office call she has never had any issues with this before has had multi procedures completed at [facility] this Dx is from her daughter not her  did sayra to Hosp setting  10/24 as pt needed that same day due to work lito and

## 2024-10-11 NOTE — TELEPHONE ENCOUNTER
Per anesthesia      Please reschedule this patient given her history of malignant hyperthermia she is not a candidate for an ASC and should be scheduled for [facility].  Thank you!     I called and left a VM for the patient to contact the office

## 2024-10-14 ENCOUNTER — RA CDI HCC (OUTPATIENT)
Dept: OTHER | Facility: HOSPITAL | Age: 49
End: 2024-10-14

## 2024-10-14 ENCOUNTER — OFFICE VISIT (OUTPATIENT)
Dept: PODIATRY | Facility: CLINIC | Age: 49
End: 2024-10-14
Payer: COMMERCIAL

## 2024-10-14 VITALS
WEIGHT: 235.2 LBS | HEART RATE: 68 BPM | BODY MASS INDEX: 36.91 KG/M2 | DIASTOLIC BLOOD PRESSURE: 82 MMHG | SYSTOLIC BLOOD PRESSURE: 124 MMHG | HEIGHT: 67 IN

## 2024-10-14 DIAGNOSIS — M20.12 HAV (HALLUX ABDUCTO VALGUS), LEFT: ICD-10-CM

## 2024-10-14 DIAGNOSIS — M72.2 PLANTAR FASCIITIS, LEFT: Primary | ICD-10-CM

## 2024-10-14 DIAGNOSIS — M79.672 LEFT FOOT PAIN: ICD-10-CM

## 2024-10-14 PROCEDURE — 99213 OFFICE O/P EST LOW 20 MIN: CPT | Performed by: PODIATRIST

## 2024-10-14 RX ORDER — MELOXICAM 7.5 MG/1
7.5 TABLET ORAL DAILY
Qty: 30 TABLET | Refills: 0 | Status: SHIPPED | OUTPATIENT
Start: 2024-10-14 | End: 2024-11-13

## 2024-10-14 NOTE — PROGRESS NOTES
Name: Michelle Taylor      : 1975      MRN: [id number]  Encounter Provider: Florentino So DPM  Encounter Date: 10/14/2024   Encounter department: [facility] PODIAT[facility]    Assessment & Plan     1. Plantar fasciitis, left  -     meloxicam (Mobic) 7.5 mg tablet; Take 1 tablet (7.5 mg total) by mouth daily  2. Left foot pain  -     meloxicam (Mobic) 7.5 mg tablet; Take 1 tablet (7.5 mg total) by mouth daily  3. Hav (hallux abducto valgus), left  -     meloxicam (Mobic) 7.5 mg tablet; Take 1 tablet (7.5 mg total) by mouth daily          Mobic    Continue exercises.    If no improvement can consider MRI.  Pt is not able to do formal physical therapy due to ability to get off work.      Return in about 2 months (around 2024).    Subjective     Patient had some relief with the injection , but still has some pain when standing.  Has also had some pain to the medial eminence of the first metatarsal. Pain has continued for 4 months from the last.            Constitutional:  Negative for chills and fever.   Respiratory:  Negative for chest tightness and shortness of breath.    Gastrointestinal:  Negative for nausea and vomiting.     Current Outpatient Medications on File Prior to Visit   Medication Sig    albuterol (ProAir HFA) 90 mcg/act inhaler Inhale 2 puffs every 6 (six) hours as needed for wheezing    Semaglutide-Weight Management (WEGOVY) 0.5 MG/0.5ML Inject 0.5 mL (0.5 mg total) under the skin once a week    estradiol (ESTRACE) 1 mg tablet Take 1 tablet (1 mg total) by mouth daily (Patient not taking: Reported on 10/4/2024)    hydrocortisone (ANUSOL-HC) 2.5 % rectal cream Apply topically 2 (two) times a day for 7 days (Patient not taking: Reported on 2024)    Multiple Vitamins-Minerals (ONE-A-DAY WOMENS PO) Take by mouth daily (Patient not taking: Reported on 10/4/2024)    ondansetron (ZOFRAN-ODT) 4 mg disintegrating tablet Take 1 tablet (4 mg total) by mouth every 8  "(eight) hours as needed for nausea or vomiting (Patient not taking: Reported on 4/8/2024)    pantoprazole (PROTONIX) 20 mg tablet take 1 tablet by mouth once daily (Patient not taking: Reported on 10/14/2024)    phentermine 37.5 MG capsule Take 1 capsule (37.5 mg total) by mouth every morning (Patient not taking: Reported on 10/4/2024)    Prasterone, DHEA, (DHEA PO) Take by mouth (Patient not taking: Reported on 8/19/2024)    Progesterone 100 MG CAPS Take 100 mg by mouth at bedtime (Patient not taking: Reported on 10/4/2024)    Semaglutide-Weight Management (WEGOVY) 1 MG/0.5ML Inject 0.5 mL (1 mg total) under the skin once a week    topiramate (TOPAMAX) 50 MG tablet take 1 tablet by mouth every 12 hours (Patient not taking: Reported on 10/4/2024)       Objective     /82   Pulse 68   Ht 5' 7\" (1.702 m)   Wt 107 kg (235 lb 3.2 oz)   LMP 10/15/2020 (Exact Date)   BMI 36.84 kg/m²     Patient has continued pain and discomfort on the plantar surface of the heel.  She also has some pain and discomfort noted to the medial aspect of the first metatarsal.  There is no significant signs of discomfort or tenderness noted at the ankle.  Negative Tinel sign noted on palpation.  Neurovascular status is at baseline.  "

## 2024-10-14 NOTE — PROGRESS NOTES
Asthma  Noted 2/9/2021  [J45.909]    NOT on the BPA- please assess using MEAT for 2024 billing    HCC coding opportunities          Chart Reviewed number of suggestions sent to Provider: 1     Patients Insurance        Commercial Insurance: [employer]

## 2024-10-17 DIAGNOSIS — E66.01 CLASS 2 SEVERE OBESITY DUE TO EXCESS CALORIES WITH SERIOUS COMORBIDITY AND BODY MASS INDEX (BMI) OF 35.0 TO 35.9 IN ADULT (HCC): ICD-10-CM

## 2024-10-17 DIAGNOSIS — E66.812 CLASS 2 SEVERE OBESITY DUE TO EXCESS CALORIES WITH SERIOUS COMORBIDITY AND BODY MASS INDEX (BMI) OF 35.0 TO 35.9 IN ADULT (HCC): ICD-10-CM

## 2024-10-24 ENCOUNTER — ANESTHESIA EVENT (OUTPATIENT)
Dept: PERIOP | Facility: HOSPITAL | Age: 49
End: 2024-10-24
Payer: COMMERCIAL

## 2024-10-24 ENCOUNTER — ANESTHESIA (OUTPATIENT)
Dept: PERIOP | Facility: HOSPITAL | Age: 49
End: 2024-10-24
Payer: COMMERCIAL

## 2024-10-24 ENCOUNTER — HOSPITAL ENCOUNTER (OUTPATIENT)
Dept: PERIOP | Facility: HOSPITAL | Age: 49
Setting detail: OUTPATIENT SURGERY
End: 2024-10-24
Attending: INTERNAL MEDICINE
Payer: COMMERCIAL

## 2024-10-24 VITALS
DIASTOLIC BLOOD PRESSURE: 61 MMHG | OXYGEN SATURATION: 96 % | HEART RATE: 72 BPM | TEMPERATURE: 98.9 F | RESPIRATION RATE: 18 BRPM | SYSTOLIC BLOOD PRESSURE: 113 MMHG

## 2024-10-24 DIAGNOSIS — K21.9 GASTROESOPHAGEAL REFLUX DISEASE, UNSPECIFIED WHETHER ESOPHAGITIS PRESENT: ICD-10-CM

## 2024-10-24 PROCEDURE — 88305 TISSUE EXAM BY PATHOLOGIST: CPT | Performed by: PATHOLOGY

## 2024-10-24 PROCEDURE — 43239 EGD BIOPSY SINGLE/MULTIPLE: CPT | Performed by: INTERNAL MEDICINE

## 2024-10-24 RX ORDER — LIDOCAINE HYDROCHLORIDE 10 MG/ML
0.5 INJECTION, SOLUTION EPIDURAL; INFILTRATION; INTRACAUDAL; PERINEURAL ONCE AS NEEDED
Status: COMPLETED | OUTPATIENT
Start: 2024-10-24 | End: 2024-10-24

## 2024-10-24 RX ORDER — LIDOCAINE HYDROCHLORIDE 10 MG/ML
0.5 INJECTION, SOLUTION EPIDURAL; INFILTRATION; INTRACAUDAL; PERINEURAL ONCE AS NEEDED
Status: CANCELLED | OUTPATIENT
Start: 2024-10-24

## 2024-10-24 RX ORDER — SODIUM CHLORIDE, SODIUM LACTATE, POTASSIUM CHLORIDE, CALCIUM CHLORIDE 600; 310; 30; 20 MG/100ML; MG/100ML; MG/100ML; MG/100ML
50 INJECTION, SOLUTION INTRAVENOUS CONTINUOUS
Status: CANCELLED | OUTPATIENT
Start: 2024-10-24

## 2024-10-24 RX ORDER — PROPOFOL 10 MG/ML
INJECTION, EMULSION INTRAVENOUS AS NEEDED
Status: DISCONTINUED | OUTPATIENT
Start: 2024-10-24 | End: 2024-10-24

## 2024-10-24 RX ORDER — SODIUM CHLORIDE, SODIUM LACTATE, POTASSIUM CHLORIDE, CALCIUM CHLORIDE 600; 310; 30; 20 MG/100ML; MG/100ML; MG/100ML; MG/100ML
50 INJECTION, SOLUTION INTRAVENOUS CONTINUOUS
Status: DISPENSED | OUTPATIENT
Start: 2024-10-24

## 2024-10-24 RX ADMIN — SODIUM CHLORIDE, SODIUM LACTATE, POTASSIUM CHLORIDE, AND CALCIUM CHLORIDE 50 ML/HR: .6; .31; .03; .02 INJECTION, SOLUTION INTRAVENOUS at 10:56

## 2024-10-24 RX ADMIN — PROPOFOL 30 MG: 10 INJECTION, EMULSION INTRAVENOUS at 12:54

## 2024-10-24 RX ADMIN — PROPOFOL 130 MG: 10 INJECTION, EMULSION INTRAVENOUS at 12:50

## 2024-10-24 RX ADMIN — PROPOFOL 30 MG: 10 INJECTION, EMULSION INTRAVENOUS at 12:56

## 2024-10-24 RX ADMIN — LIDOCAINE HYDROCHLORIDE 5 ML: 10 INJECTION, SOLUTION EPIDURAL; INFILTRATION; INTRACAUDAL at 12:50

## 2024-10-24 NOTE — H&P
History and Physical - [facility]  Michelle Taylor 49 y.o. female MRN: [id number]                  HPI: Michelle Taylor is a 49 y.o. year old female who presents for history of H. 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    Historical Information   Past Medical History:   Diagnosis Date    Anesthesia complication     daughter has malignant hyperthermia    Asthma     resolved w/ wt loss; is now having asthma sx due to recent hx of having covid 8/2022    BRCA1-associated protein-1 tumor predisposition syndrome     CPAP (continuous positive airway pressure) dependence     GERD (gastroesophageal reflux disease) 9/5/23    H/O bilateral breast implants     Hernia 9/5/23    Hiatal hernia    Intraductal papilloma of breast, left 02/23/2021    Malignant hyperthermia due to anesthesia     has family hx of MH, her daughter    Obesity (BMI 30-39.9)     Pneumonia     2019    PONV (postoperative nausea and vomiting)     Scab     back and right thigh from few precancerous lesions taken off    Sleep apnea      Past Surgical History:   Procedure Laterality Date    APPENDECTOMY      BREAST IMPLANT Left 11/18/2021    Procedure: FILL SALINE BREAST IMPLANT;  Surgeon: Maria C Hawley MD;  Location:  MAIN OR;  Service: Plastics    BREAST SURGERY  2/9/2021    Bilateral mastectomy    CAPSULOTOMY Bilateral 5/19/2021    Procedure: CAPSULECTOMY, EXPANDER/IMPLANT EXCHANGE;  Surgeon: Maria C Hawley MD;  Location:  MAIN OR;  Service: Plastics    COLONOSCOPY      COSMETIC SURGERY  2021    Reconstruction of breast    DILATION AND CURETTAGE OF UTERUS  1997    HYSTERECTOMY N/A 11/27/2020    Procedure: TOTAL LAPAROSCOPIC HYSTERECTOMY, BILATERAL SALPINGO-OOPHORECTOMY, cystoscopy;  Surgeon: Benedicto Donnelly MD;  Location:  MAIN OR;  Service: Gynecology Oncology    LIPOSUCTION W/ FAT INJECTION Bilateral 12/2/2022    Procedure: FAT GRAFTING TO BREAST;  Surgeon: Maria C Warren  MD Chandan;  Location:  MAIN OR;  Service: Plastics    MASTECTOMY      NC IMPLNT BIO IMPLNT FOR SOFT TISSUE REINFORCEMENT Bilateral 2/9/2021    Procedure: RECONSTRUCTION BREAST W/ IMPLANT;  Surgeon: Maria C Hawley MD;  [facility];  Service: Plastics    NC MASTECTOMY SIMPLE COMPLETE Bilateral 2/9/2021    Procedure: BREAST MASTECTOMY;  Surgeon: Eyad Riley MD;  [facility];  Service: Surgical Oncology    NC NIPPLE/AREOLA RECONSTRUCTION Bilateral 2/24/2022    Procedure: NIPPLE RECONSTRUCTION;  Surgeon: Maria C Hawley MD;  Location:  MAIN OR;  Service: Plastics    NC REVISION OF RECONSTRUCTED BREAST Bilateral 8/6/2021    Procedure: BREAST RECONSTRUCITON REVISION; EXCISION OF STANDING DEFORMITIES;  Surgeon: Maria C Hawley MD;  [facility];  Service: Plastics    NC REVISION OF RECONSTRUCTED BREAST Bilateral 11/18/2021    Procedure: REVISE BREAST RECONSTRUCTION;  Surgeon: Maria C Hawley MD;  Location:  MAIN OR;  Service: Plastics    NC TISSUE EXPANDER PLACEMENT BREAST RECONSTRUCTION Bilateral 2/9/2021    Procedure: BREAST INSERTION/PLACEMENT TISSUE EXPANDER (EXCHANGE);  Surgeon: Maria C Hawley MD;  [facility];  Service: Plastics    TONSILLECTOMY      TYMPANOSTOMY TUBE PLACEMENT Bilateral      Social History   Social History     Substance and Sexual Activity   Alcohol Use Yes    Alcohol/week: 1.0 standard drink of alcohol    Types: 1 Standard drinks or equivalent per week    Comment: social, monthly     Social History     Substance and Sexual Activity   Drug Use No     Social History     Tobacco Use   Smoking Status Never   Smokeless Tobacco Never   Tobacco Comments    Denies     Family History   Problem Relation Age of Onset    No Known Problems Mother     Colon cancer Father     Cancer Father     BRCA1 Positive Sister     No Known Problems Sister     No Known Problems Maternal Aunt     BRCA1 Positive Maternal Uncle     No Known Problems Paternal Aunt     No Known Problems Paternal  Aunt     No Known Problems Paternal Aunt     No Known Problems Paternal Uncle     No Known Problems Paternal Uncle     Cancer Maternal Grandfather     Diabetes Maternal Grandfather     No Known Problems Daughter     No Known Problems Daughter     Breast cancer Cousin 40    Ovarian cancer Other 52       Meds/Allergies       Current Outpatient Medications:     calcium carbonate-vitamin D 250 mg-3.125 mcg per tablet    estradiol (ESTRACE) 1 mg tablet    pantoprazole (PROTONIX) 20 mg tablet    phentermine 37.5 MG capsule    Prasterone, DHEA, (DHEA PO)    Progesterone 100 MG CAPS    saccharomyces boulardii (FLORASTOR) 250 mg capsule    albuterol (ProAir HFA) 90 mcg/act inhaler    hydrocortisone (ANUSOL-HC) 2.5 % rectal cream    meloxicam (Mobic) 7.5 mg tablet    Multiple Vitamins-Minerals (ONE-A-DAY WOMENS PO)    ondansetron (ZOFRAN-ODT) 4 mg disintegrating tablet    Semaglutide-Weight Management (WEGOVY) 1 MG/0.5ML    topiramate (TOPAMAX) 50 MG tablet    Current Facility-Administered Medications:     lactated ringers infusion, 50 mL/hr, Intravenous, Continuous, 50 mL/hr at 10/24/24 1056    lidocaine (PF) (XYLOCAINE-MPF) 1 % injection 0.5 mL, 0.5 mL, Infiltration, Once PRN    Allergies   Allergen Reactions    Penicillins Other (See Comments)     Childhood reaction; unknown reaction- tolerated Ancef       Objective     /61   Pulse 70   Temp 98.9 °F (37.2 °C) (Skin)   Resp 18   LMP 10/15/2020 (Exact Date)   SpO2 98%       PHYSICAL EXAM    Gen: NAD  Head: NCAT  CV: RRR  CHEST: Clear  ABD: soft, NT/ND  EXT: no edema      ASSESSMENT/PLAN:  This is a 49 y.o. year old female here for EGD, and she is stable and optimized for her procedure.

## 2024-10-24 NOTE — ANESTHESIA POSTPROCEDURE EVALUATION
Post-Op Assessment Note    CV Status:  Stable    Pain management: adequate       Mental Status:  Alert and awake   Hydration Status:  Euvolemic   PONV Controlled:  Controlled   Airway Patency:  Patent     Post Op Vitals Reviewed: Yes    No anethesia notable event occurred.    Staff: CRNA           Last Filed PACU Vitals:  Vitals Value Taken Time   Temp 98.2    Pulse 70    /74    Resp 18    SpO2 99        Modified Natividad:  Activity: 2 (10/24/2024 10:53 AM)  Respiration: 2 (10/24/2024 10:53 AM)  Circulation: 2 (10/24/2024 10:53 AM)  Consciousness: 2 (10/24/2024 10:53 AM)  Oxygen Saturation: 2 (10/24/2024 10:53 AM)  Modified Natividad Score: 10 (10/24/2024 10:53 AM)

## 2024-10-24 NOTE — ANESTHESIA PREPROCEDURE EVALUATION
Procedure:  EGD    Relevant Problems   ANESTHESIA   (+) PONV (postoperative nausea and vomiting)      GYN   (+) History of hysterectomy      PULMONARY   (+) Asthma   (+) CLIFF (obstructive sleep apnea)      Other   (+) Obesity      Daughter with hx of MH    COVID-19 infection 6-8 weeks    Confirmed NPO appropriate    Physical Exam    Airway    Mallampati score: II  TM Distance: >3 FB  Neck ROM: full     Dental   No notable dental hx     Cardiovascular      Pulmonary      Other Findings  post-pubertal.      Anesthesia Plan  ASA Score- 2     Anesthesia Type- IV sedation with anesthesia with ASA Monitors.         Additional Monitors:     Airway Plan:            Plan Factors-Exercise tolerance (METS): >4 METS.    Chart reviewed.   Existing labs reviewed.                   Induction- intravenous.    Postoperative Plan-         Informed Consent- Anesthetic plan and risks discussed with patient.

## 2024-10-29 PROCEDURE — 88305 TISSUE EXAM BY PATHOLOGIST: CPT | Performed by: PATHOLOGY

## 2024-11-05 DIAGNOSIS — G47.33 OSA (OBSTRUCTIVE SLEEP APNEA): ICD-10-CM

## 2024-11-05 DIAGNOSIS — E66.812 CLASS 2 SEVERE OBESITY DUE TO EXCESS CALORIES WITH SERIOUS COMORBIDITY AND BODY MASS INDEX (BMI) OF 35.0 TO 35.9 IN ADULT (HCC): Primary | ICD-10-CM

## 2024-11-05 DIAGNOSIS — A04.8 H. PYLORI INFECTION: Primary | ICD-10-CM

## 2024-11-05 DIAGNOSIS — E66.01 CLASS 2 SEVERE OBESITY DUE TO EXCESS CALORIES WITH SERIOUS COMORBIDITY AND BODY MASS INDEX (BMI) OF 35.0 TO 35.9 IN ADULT (HCC): Primary | ICD-10-CM

## 2024-11-05 NOTE — RESULT ENCOUNTER NOTE
Please call the patient regarding results.  Esophageal biopsies all benign.  No evidence of Grullon's or precancerous changes.  Recommend further testing to confirm H. pylori is gone.  Will order fecal antigen test.  She will need to hold her pantoprazole for a week prior to collecting the sample

## 2024-11-06 DIAGNOSIS — M79.672 LEFT FOOT PAIN: ICD-10-CM

## 2024-11-06 DIAGNOSIS — M72.2 PLANTAR FASCIITIS, LEFT: ICD-10-CM

## 2024-11-06 DIAGNOSIS — M20.12 HAV (HALLUX ABDUCTO VALGUS), LEFT: ICD-10-CM

## 2024-11-06 RX ORDER — MELOXICAM 7.5 MG/1
7.5 TABLET ORAL DAILY
Qty: 30 TABLET | Refills: 5 | Status: SHIPPED | OUTPATIENT
Start: 2024-11-06

## 2024-11-14 ENCOUNTER — APPOINTMENT (OUTPATIENT)
Dept: LAB | Facility: CLINIC | Age: 49
End: 2024-11-14
Payer: COMMERCIAL

## 2024-11-14 DIAGNOSIS — A04.8 H. PYLORI INFECTION: ICD-10-CM

## 2024-11-14 DIAGNOSIS — E87.6 HYPOKALEMIA: ICD-10-CM

## 2024-11-14 PROCEDURE — 87338 HPYLORI STOOL AG IA: CPT

## 2024-11-15 ENCOUNTER — APPOINTMENT (OUTPATIENT)
Dept: LAB | Facility: CLINIC | Age: 49
End: 2024-11-15
Payer: COMMERCIAL

## 2024-11-15 LAB — POTASSIUM SERPL-SCNC: 3.8 MMOL/L (ref 3.5–5.3)

## 2024-11-15 PROCEDURE — 36415 COLL VENOUS BLD VENIPUNCTURE: CPT

## 2024-11-15 PROCEDURE — 84132 ASSAY OF SERUM POTASSIUM: CPT

## 2024-11-18 ENCOUNTER — RESULTS FOLLOW-UP (OUTPATIENT)
Dept: BARIATRICS | Facility: CLINIC | Age: 49
End: 2024-11-18

## 2024-11-18 ENCOUNTER — RESULTS FOLLOW-UP (OUTPATIENT)
Dept: GASTROENTEROLOGY | Facility: CLINIC | Age: 49
End: 2024-11-18

## 2024-11-18 DIAGNOSIS — A04.8 HELICOBACTER PYLORI INFECTION: Primary | ICD-10-CM

## 2024-11-18 LAB — H PYLORI AG STL QL IA: POSITIVE

## 2024-11-18 NOTE — RESULT ENCOUNTER NOTE
Please call the patient regarding her abnormal result.  H. pylori infection as per stool test.  Please send it over nurses to start quadruple therapy and repeat stool antigen testing after a month.

## 2024-11-19 DIAGNOSIS — A04.8 HELICOBACTER PYLORI INFECTION: Primary | ICD-10-CM

## 2024-11-19 DIAGNOSIS — A04.8 H. PYLORI INFECTION: ICD-10-CM

## 2024-11-19 RX ORDER — METRONIDAZOLE 250 MG/1
250 TABLET ORAL 4 TIMES DAILY
Qty: 56 TABLET | Refills: 0 | Status: SHIPPED | OUTPATIENT
Start: 2024-11-19 | End: 2024-12-03

## 2024-11-19 RX ORDER — BISMUTH SUBSALICYLATE 262 MG/1
524 TABLET, CHEWABLE ORAL 4 TIMES DAILY
Qty: 112 TABLET | Refills: 0 | Status: SHIPPED | OUTPATIENT
Start: 2024-11-19 | End: 2024-12-03

## 2024-11-19 RX ORDER — TETRACYCLINE HYDROCHLORIDE 500 MG/1
500 CAPSULE ORAL 4 TIMES DAILY
Qty: 56 CAPSULE | Refills: 0 | Status: SHIPPED | OUTPATIENT
Start: 2024-11-19 | End: 2024-12-03

## 2024-11-19 NOTE — TELEPHONE ENCOUNTER
Called and spoke with patient.Informed her of results and recommendations.Instructions given for Quad therapy and stool antigen 1 month after completion of antibiotics and Pantoprazole is withheld for 2 weeks.Patient verbalized understanding.She understands she will take Pantoprazole twice a day for the 14 day treatment.

## 2024-11-19 NOTE — TELEPHONE ENCOUNTER
----- Message from Betty Garcia PA-C sent at 11/19/2024 10:32 AM EST -----  Hi, yes I would still recommend quad therapy since it was never completed appropriately.  If she takes this and fails it we would alternate antibiotics at that time.  Thank you!  ----- Message -----  From: Danielle Farris LPN  Sent: 11/19/2024  10:14 AM EST  To: [facility] Provider    Patient was given Quad therapy in the past.Per office note 4/2024 she did not complete the therapy.Is Quad therapy recommended?

## 2024-11-25 RX ORDER — METRONIDAZOLE 250 MG/1
TABLET ORAL
Qty: 56 TABLET | Refills: 0 | Status: SHIPPED | OUTPATIENT
Start: 2024-11-25 | End: 2024-12-03

## 2024-11-25 RX ORDER — PANTOPRAZOLE SODIUM 40 MG/1
TABLET, DELAYED RELEASE ORAL
Qty: 28 TABLET | Refills: 0 | Status: SHIPPED | OUTPATIENT
Start: 2024-11-25

## 2024-11-25 RX ORDER — TETRACYCLINE HYDROCHLORIDE 500 MG/1
CAPSULE ORAL
Qty: 56 CAPSULE | Refills: 0 | Status: SHIPPED | OUTPATIENT
Start: 2024-11-25 | End: 2024-12-03

## 2024-11-25 RX ORDER — BISMUTH SUBSALICYLATE 262 MG/1
TABLET, CHEWABLE ORAL
Qty: 56 TABLET | Refills: 0 | Status: SHIPPED | OUTPATIENT
Start: 2024-11-25

## 2024-12-11 DIAGNOSIS — E66.812 CLASS 2 SEVERE OBESITY DUE TO EXCESS CALORIES WITH SERIOUS COMORBIDITY AND BODY MASS INDEX (BMI) OF 35.0 TO 35.9 IN ADULT (HCC): Primary | ICD-10-CM

## 2024-12-11 DIAGNOSIS — E66.01 CLASS 2 SEVERE OBESITY DUE TO EXCESS CALORIES WITH SERIOUS COMORBIDITY AND BODY MASS INDEX (BMI) OF 35.0 TO 35.9 IN ADULT (HCC): Primary | ICD-10-CM

## 2024-12-11 DIAGNOSIS — E66.01 CLASS 2 SEVERE OBESITY DUE TO EXCESS CALORIES WITH SERIOUS COMORBIDITY AND BODY MASS INDEX (BMI) OF 35.0 TO 35.9 IN ADULT (HCC): ICD-10-CM

## 2024-12-11 DIAGNOSIS — G47.33 OSA (OBSTRUCTIVE SLEEP APNEA): ICD-10-CM

## 2024-12-11 DIAGNOSIS — E66.812 CLASS 2 SEVERE OBESITY DUE TO EXCESS CALORIES WITH SERIOUS COMORBIDITY AND BODY MASS INDEX (BMI) OF 35.0 TO 35.9 IN ADULT (HCC): ICD-10-CM

## 2024-12-27 ENCOUNTER — APPOINTMENT (OUTPATIENT)
Dept: LAB | Facility: CLINIC | Age: 49
End: 2024-12-27

## 2024-12-30 ENCOUNTER — APPOINTMENT (OUTPATIENT)
Dept: LAB | Facility: CLINIC | Age: 49
End: 2024-12-30
Payer: COMMERCIAL

## 2024-12-31 DIAGNOSIS — K21.9 GASTROESOPHAGEAL REFLUX DISEASE, UNSPECIFIED WHETHER ESOPHAGITIS PRESENT: ICD-10-CM

## 2025-01-02 RX ORDER — PANTOPRAZOLE SODIUM 20 MG/1
20 TABLET, DELAYED RELEASE ORAL DAILY
Qty: 30 TABLET | Refills: 5 | Status: SHIPPED | OUTPATIENT
Start: 2025-01-02

## 2025-01-03 ENCOUNTER — RESULTS FOLLOW-UP (OUTPATIENT)
Dept: GASTROENTEROLOGY | Facility: CLINIC | Age: 50
End: 2025-01-03

## 2025-01-03 NOTE — RESULT ENCOUNTER NOTE
Please call the patient regarding her abnormal result.  Patient still has H. pylori and infection positive, she was found to have infection a year ago and received treatment.  Please give her Biaxin amoxicillin and a PPI for 14 days treatment then repeat stool antigen in a month.  Our clinical nurses can take care of this.  Follow up in my office as needed

## 2025-01-06 ENCOUNTER — TELEPHONE (OUTPATIENT)
Age: 50
End: 2025-01-06

## 2025-01-06 DIAGNOSIS — A04.8 HELICOBACTER PYLORI INFECTION: Primary | ICD-10-CM

## 2025-01-06 RX ORDER — CLARITHROMYCIN 500 MG/1
TABLET ORAL
Qty: 28 TABLET | Refills: 0 | Status: SHIPPED | OUTPATIENT
Start: 2025-01-06 | End: 2025-01-20

## 2025-01-06 RX ORDER — PANTOPRAZOLE SODIUM 40 MG/1
TABLET, DELAYED RELEASE ORAL
Qty: 28 TABLET | Refills: 0 | Status: SHIPPED | OUTPATIENT
Start: 2025-01-06

## 2025-01-06 RX ORDER — METRONIDAZOLE 500 MG/1
500 TABLET ORAL EVERY 8 HOURS SCHEDULED
Qty: 42 TABLET | Refills: 0 | Status: SHIPPED | OUTPATIENT
Start: 2025-01-06 | End: 2025-01-20

## 2025-01-06 NOTE — TELEPHONE ENCOUNTER
Patient with H-Pylori.Previously treated with Quad therapy.Patient informed of results.Instructions given for triple therapy and stool antigen 1 month after completion of antibiotics and Pantoprazole is withheld for 2 weeks.Patient verbalized understanding.    Patient states she gets a yeast infection when on antibiotics.She is requesting an order for something.

## 2025-01-12 DIAGNOSIS — A04.8 HELICOBACTER PYLORI INFECTION: ICD-10-CM

## 2025-01-14 RX ORDER — PANTOPRAZOLE SODIUM 40 MG/1
TABLET, DELAYED RELEASE ORAL
Qty: 28 TABLET | Refills: 0 | OUTPATIENT
Start: 2025-01-14

## 2025-01-15 ENCOUNTER — OFFICE VISIT (OUTPATIENT)
Dept: URGENT CARE | Age: 50
End: 2025-01-15
Payer: COMMERCIAL

## 2025-01-15 VITALS
TEMPERATURE: 98 F | RESPIRATION RATE: 20 BRPM | DIASTOLIC BLOOD PRESSURE: 80 MMHG | SYSTOLIC BLOOD PRESSURE: 118 MMHG | BODY MASS INDEX: 36.41 KG/M2 | WEIGHT: 232 LBS | HEART RATE: 100 BPM | HEIGHT: 67 IN | OXYGEN SATURATION: 98 %

## 2025-01-15 DIAGNOSIS — R21 RASH: ICD-10-CM

## 2025-01-15 DIAGNOSIS — M79.605 PAIN OF LEFT LOWER EXTREMITY: Primary | ICD-10-CM

## 2025-01-15 PROCEDURE — G0382 LEV 3 HOSP TYPE B ED VISIT: HCPCS

## 2025-01-15 PROCEDURE — S9083 URGENT CARE CENTER GLOBAL: HCPCS

## 2025-01-15 RX ORDER — TRIAMCINOLONE ACETONIDE 5 MG/G
CREAM TOPICAL 2 TIMES DAILY
Qty: 15 G | Refills: 0 | Status: SHIPPED | OUTPATIENT
Start: 2025-01-15

## 2025-01-15 NOTE — PATIENT INSTRUCTIONS
Please take Motrin or Tylenol as needed for pain.   Ice or heat to affected area for comfort.   May massage area for comfort.   Apply small amount of steroid cream with Aquaphor to rash.

## 2025-01-15 NOTE — PROGRESS NOTES
"  [facility]        NAME: Michelle Taylor is a 49 y.o. female  : 1975    MRN: [id number]  DATE: January 15, 2025  TIME: 1:05 PM    Assessment and Plan   Pain of left lower extremity [M79.605]  1. Pain of left lower extremity        2. Rash  triamcinolone (KENALOG) 0.5 % cream        Patient verbalizes concern for DVT, low suspicion for DVT based on physical exam and history.  Wells score low risk.    Patient Instructions     Please take Motrin or Tylenol as needed for pain.   Ice or heat to affected area for comfort.   May massage area for comfort.   Apply small amount of steroid cream with Aquaphor to rash.   Follow up with PCP in 3-5 days.  Proceed to  ER if symptoms worsen.    If tests are performed, our office will contact you with results only if changes need to made to the care plan discussed with you at the visit. [facility]    Chief Complaint     Chief Complaint   Patient presents with    Rash     Pt states rash appeared on  to anterior neck. Benadryl was noneffective.     Shortness of Breath     Pt states SOB occurs frequently and pain under left breast implant. Pressure sensation. Sx was  after double mastectomy.     Mass     Pt noticed lump/mass to posterior left thigh this morning. Tender to touch. Holding hormone meds for fear of DVT.          History of Present Illness         49-year-old female presents for evaluation of rash and \"lump\" in left leg.  Patient states over the past 3 days she has been experiencing a paretic rash on her neck.  She denies exposure to new lotions, soaps, detergents, foods, but states that she has been taking clarithromycin and Flagyl for H. pylori.  She denies any bleeding or drainage from the rash.  She also notes a tender \"lump\" on the posterior part of her left leg.  She denies any redness, swelling, bleeding or drainage from this area.  She also complains of arm numbness while sleeping.  She has " "a history of double mastectomy in 2021, and is unsure if her sleeping position is related.    Rash  Associated symptoms include shortness of breath. Pertinent negatives include no fever.   Shortness of Breath        Review of Systems   Review of Systems   Constitutional:  Negative for chills and fever.   Respiratory:  Positive for shortness of breath.    Musculoskeletal:  Negative for arthralgias.   Skin:  Positive for rash. Negative for color change.        \"Lump\"    Neurological:  Positive for numbness. Negative for weakness.   All other systems reviewed and are negative.        Current Medications       Current Outpatient Medications:     albuterol (ProAir HFA) 90 mcg/act inhaler, Inhale 2 puffs every 6 (six) hours as needed for wheezing, Disp: 8.5 g, Rfl: 0    calcium carbonate-vitamin D 250 mg-3.125 mcg per tablet, Take 1 tablet by mouth daily, Disp: , Rfl:     clarithromycin (BIAXIN) 500 mg tablet, Take 1 tablet by mouth every 12 hours for 14 days, Disp: 28 tablet, Rfl: 0    metroNIDAZOLE (FLAGYL) 500 mg tablet, Take 1 tablet (500 mg total) by mouth every 8 (eight) hours for 14 days, Disp: 42 tablet, Rfl: 0    Multiple Vitamins-Minerals (ONE-A-DAY WOMENS PO), Take by mouth daily, Disp: , Rfl:     pantoprazole (PROTONIX) 40 mg tablet, Take 1 tablet by mouth every 12 hours for 14 days, Disp: 28 tablet, Rfl: 0    Prasterone, DHEA, (DHEA PO), Take by mouth, Disp: , Rfl:     Progesterone 100 MG CAPS, Take 100 mg by mouth at bedtime, Disp: 90 capsule, Rfl: 3    saccharomyces boulardii (FLORASTOR) 250 mg capsule, Take 250 mg by mouth 2 (two) times a day, Disp: , Rfl:     Semaglutide-Weight Management (WEGOVY) 2.4 MG/0.75ML, Inject 0.75 mL (2.4 mg total) under the skin once a week, Disp: 3 mL, Rfl: 2    triamcinolone (KENALOG) 0.5 % cream, Apply topically 2 (two) times a day, Disp: 15 g, Rfl: 0    bismuth subsalicylate (PEPTO BISMOL) 262 MG chewable tablet, Take 1 tablet by mouth every 6 hours for 14 days, Disp: 56 " tablet, Rfl: 0    estradiol (ESTRACE) 1 mg tablet, Take 1 tablet (1 mg total) by mouth daily (Patient not taking: Reported on 1/15/2025), Disp: 90 tablet, Rfl: 3    hydrocortisone (ANUSOL-HC) 2.5 % rectal cream, Apply topically 2 (two) times a day for 7 days (Patient not taking: Reported on 8/19/2024), Disp: 28 g, Rfl: 0    meloxicam (MOBIC) 7.5 mg tablet, take 1 tablet by mouth once daily (Patient not taking: Reported on 1/15/2025), Disp: 30 tablet, Rfl: 5    ondansetron (ZOFRAN-ODT) 4 mg disintegrating tablet, Take 1 tablet (4 mg total) by mouth every 8 (eight) hours as needed for nausea or vomiting (Patient not taking: Reported on 4/8/2024), Disp: 20 tablet, Rfl: 0    pantoprazole (PROTONIX) 20 mg tablet, take 1 tablet by mouth once daily (Patient not taking: Reported on 1/15/2025), Disp: 30 tablet, Rfl: 5    pantoprazole (PROTONIX) 40 mg tablet, Take 1 tablet by mouth every 12 hours for 14 days (Patient not taking: Reported on 1/15/2025), Disp: 28 tablet, Rfl: 0    phentermine 37.5 MG capsule, Take 1 capsule (37.5 mg total) by mouth every morning (Patient not taking: Reported on 1/15/2025), Disp: 30 capsule, Rfl: 0    topiramate (TOPAMAX) 50 MG tablet, take 1 tablet by mouth every 12 hours (Patient not taking: Reported on 1/15/2025), Disp: 60 tablet, Rfl: 3    Current Allergies     Allergies as of 01/15/2025 - Reviewed 01/15/2025   Allergen Reaction Noted    Penicillins Other (See Comments) 05/19/2016            The following portions of the patient's history were reviewed and updated as appropriate: allergies, current medications, past family history, past medical history, past social history, past surgical history and problem list.     Past Medical History:   Diagnosis Date    Anesthesia complication     daughter has malignant hyperthermia    Asthma     resolved w/ wt loss; is now having asthma sx due to recent hx of having covid 8/2022    BRCA1-associated protein-1 tumor predisposition syndrome     CPAP  (continuous positive airway pressure) dependence     GERD (gastroesophageal reflux disease) 9/5/23    H/O bilateral breast implants     Hernia 9/5/23    Hiatal hernia    Intraductal papilloma of breast, left 02/23/2021    Malignant hyperthermia due to anesthesia     has family hx of MH, her daughter    Obesity (BMI 30-39.9)     Pneumonia     2019    PONV (postoperative nausea and vomiting)     Scab     back and right thigh from few precancerous lesions taken off    Sleep apnea        Past Surgical History:   Procedure Laterality Date    APPENDECTOMY      BREAST IMPLANT Left 11/18/2021    Procedure: FILL SALINE BREAST IMPLANT;  Surgeon: Maria C Hawley MD;  Location:  MAIN OR;  Service: Plastics    BREAST SURGERY  2/9/2021    Bilateral mastectomy    CAPSULOTOMY Bilateral 5/19/2021    Procedure: CAPSULECTOMY, EXPANDER/IMPLANT EXCHANGE;  Surgeon: Maria C Hawley MD;  Location:  MAIN OR;  Service: Plastics    COLONOSCOPY      COSMETIC SURGERY  2021    Reconstruction of breast    DILATION AND CURETTAGE OF UTERUS  1997    HYSTERECTOMY N/A 11/27/2020    Procedure: TOTAL LAPAROSCOPIC HYSTERECTOMY, BILATERAL SALPINGO-OOPHORECTOMY, cystoscopy;  Surgeon: Benedicto Donnelly MD;  Location:  MAIN OR;  Service: Gynecology Oncology    LIPOSUCTION W/ FAT INJECTION Bilateral 12/2/2022    Procedure: FAT GRAFTING TO BREAST;  Surgeon: Maria C Hawley MD;  Location:  MAIN OR;  Service: Plastics    MASTECTOMY      WA IMPLNT BIO IMPLNT FOR SOFT TISSUE REINFORCEMENT Bilateral 2/9/2021    Procedure: RECONSTRUCTION BREAST W/ IMPLANT;  Surgeon: Maria C Hawley MD;  [facility];  Service: Plastics    WA MASTECTOMY SIMPLE COMPLETE Bilateral 2/9/2021    Procedure: BREAST MASTECTOMY;  Surgeon: Eyad Riley MD;  [facility];  Service: Surgical Oncology    WA NIPPLE/AREOLA RECONSTRUCTION Bilateral 2/24/2022    Procedure: NIPPLE RECONSTRUCTION;  Surgeon: Maria C Hawley MD;  Location:  MAIN OR;  Service: Plastics    WA REVISION OF  "RECONSTRUCTED BREAST Bilateral 8/6/2021    Procedure: BREAST RECONSTRUCITON REVISION; EXCISION OF STANDING DEFORMITIES;  Surgeon: Maria C Hawley MD;  [facility];  Service: Plastics    IA REVISION OF RECONSTRUCTED BREAST Bilateral 11/18/2021    Procedure: REVISE BREAST RECONSTRUCTION;  Surgeon: Maria C Hawley MD;  Location:  MAIN OR;  Service: Plastics    IA TISSUE EXPANDER PLACEMENT BREAST RECONSTRUCTION Bilateral 2/9/2021    Procedure: BREAST INSERTION/PLACEMENT TISSUE EXPANDER (EXCHANGE);  Surgeon: Maria C Hawley MD;  [facility];  Service: Plastics    TONSILLECTOMY      TYMPANOSTOMY TUBE PLACEMENT Bilateral        Family History   Problem Relation Age of Onset    No Known Problems Mother     Colon cancer Father     Cancer Father     BRCA1 Positive Sister     No Known Problems Sister     No Known Problems Maternal Aunt     BRCA1 Positive Maternal Uncle     No Known Problems Paternal Aunt     No Known Problems Paternal Aunt     No Known Problems Paternal Aunt     No Known Problems Paternal Uncle     No Known Problems Paternal Uncle     Cancer Maternal Grandfather     Diabetes Maternal Grandfather     No Known Problems Daughter     No Known Problems Daughter     Breast cancer Cousin 40    Ovarian cancer Other 52         Medications have been verified.        Objective   /80   Pulse 100   Temp 98 °F (36.7 °C)   Resp 20   Ht 5' 7\" (1.702 m)   Wt 105 kg (232 lb)   LMP 10/15/2020 (Exact Date)   SpO2 98%   BMI 36.34 kg/m²        Physical Exam     Physical Exam  Vitals and nursing note reviewed.   Constitutional:       General: She is not in acute distress.     Appearance: She is well-developed and normal weight. She is not ill-appearing, toxic-appearing or diaphoretic.   HENT:      Head: Normocephalic and atraumatic.   Eyes:      General:         Right eye: No discharge.         Left eye: No discharge.   Cardiovascular:      Rate and Rhythm: Normal rate.      Pulses: Normal pulses. "   Pulmonary:      Effort: Pulmonary effort is normal.   Musculoskeletal:         General: Tenderness (posterior left leg) present. Normal range of motion.   Skin:     General: Skin is warm and dry.      Findings: Rash (maculopapular rash without pustule or vessicle to anterior neck) present.   Neurological:      Mental Status: She is alert.   Psychiatric:         Mood and Affect: Mood normal.         Behavior: Behavior normal.

## 2025-02-20 ENCOUNTER — TELEPHONE (OUTPATIENT)
Dept: SURGICAL ONCOLOGY | Facility: CLINIC | Age: 50
End: 2025-02-20

## 2025-02-20 NOTE — TELEPHONE ENCOUNTER
Rescheduled cancelled appt with GALINDO Traore for March 12, 25 @ 2 pm. OV, spoke to patient and she wanted to know if this was breast or ob/gyn I let her know GALINDO Traore-Breast

## 2025-02-27 ENCOUNTER — TELEPHONE (OUTPATIENT)
Dept: GYNECOLOGIC ONCOLOGY | Facility: CLINIC | Age: 50
End: 2025-02-27

## 2025-03-12 ENCOUNTER — OFFICE VISIT (OUTPATIENT)
Dept: SURGICAL ONCOLOGY | Facility: CLINIC | Age: 50
End: 2025-03-12
Payer: COMMERCIAL

## 2025-03-12 VITALS
DIASTOLIC BLOOD PRESSURE: 64 MMHG | TEMPERATURE: 97.8 F | RESPIRATION RATE: 17 BRPM | OXYGEN SATURATION: 97 % | WEIGHT: 243 LBS | SYSTOLIC BLOOD PRESSURE: 118 MMHG | BODY MASS INDEX: 38.14 KG/M2 | HEIGHT: 67 IN | HEART RATE: 81 BPM

## 2025-03-12 DIAGNOSIS — Z90.13 S/P MASTECTOMY, BILATERAL: ICD-10-CM

## 2025-03-12 DIAGNOSIS — N64.4 PAIN OF LEFT BREAST: ICD-10-CM

## 2025-03-12 DIAGNOSIS — Z91.89 INCREASED RISK OF BREAST CANCER: Primary | ICD-10-CM

## 2025-03-12 DIAGNOSIS — Z15.01 BRCA1 GENE MUTATION POSITIVE: ICD-10-CM

## 2025-03-12 DIAGNOSIS — Z15.09 BRCA1 GENE MUTATION POSITIVE: ICD-10-CM

## 2025-03-12 PROCEDURE — 99214 OFFICE O/P EST MOD 30 MIN: CPT

## 2025-03-12 NOTE — PROGRESS NOTES
Name: Michelle Taylor      : 1975      MRN: [id number]  Encounter Provider: JOSE Carnes  Encounter Date: 3/12/2025   Encounter department: [facility]  :  Assessment & Plan  Increased risk of breast cancer  Patient is a 49 year-old female presenting for a 1 year follow-up for increased risk breast cancer secondary to BRCA1 mutation. She is s/p prophylactic b/l mastectomies with reconstruction in . We have been following her with annual cbe. She contacted myself on 25 with complaints of pain under the left implant and mentions the implant felt harder. It was recommended she contact the plastic surgeon at that time. She states that Dr. Hawley's office apologized for her discomfort but did not bring her in for an exam. Today she is concerned for left breast lumps along the lateral aspect of the left implant. I was unable to appreciate masses or nodularity. She states she continues to have left breast discomfort and can not sleep on the affected side. She states she never wears a bra. I think this could be attributed to the implant pulling on her chest wall muscle. I am recommending she wear a supportive bra and use warm compress. To ensure there is nothing worrisome occurring behind the implants, I will order breast MRI for further eval. She was appreciative. I will call her with these results. I will see the patient back in 1 year or sooner should the need arise. She was instructed to call with any questions or concerns prior to this time. All questions were answered today.  - 1 year f/u    BRCA1 gene mutation positive  S/P BSO, following with gyn/onc annually    S/P mastectomy, bilateral  Orders:  •  MRI breast bilateral w and wo contrast w cad; Future    Pain of left breast  Orders:  •  MRI breast bilateral w and wo contrast w cad; Future          Review of Systems   Constitutional:  Negative for activity change, appetite change, fatigue and  "unexpected weight change.   Respiratory:  Negative for cough and shortness of breath.    Cardiovascular:  Negative for chest pain.   Gastrointestinal:  Negative for abdominal pain, diarrhea, nausea and vomiting.   Endocrine: Negative for heat intolerance.   Musculoskeletal:  Negative for arthralgias, back pain and myalgias.   Skin:  Negative for rash.   Neurological:  Negative for weakness and headaches.   Hematological:  Negative for adenopathy.    A complete review of systems is negative other than that noted above in the HPI.    Objective   /64 (BP Location: Left arm, Patient Position: Sitting, Cuff Size: Standard)   Pulse 81   Temp 97.8 °F (36.6 °C) (Temporal)   Resp 17   Ht 5' 7\" (1.702 m)   Wt 110 kg (243 lb)   LMP 10/15/2020 (Exact Date)   SpO2 97%   BMI 38.06 kg/m²     Pain Screening:  Pain Score:   1  ECOG    Physical Exam  Constitutional:       General: She is not in acute distress.     Appearance: Normal appearance.   Cardiovascular:      Rate and Rhythm: Normal rate and regular rhythm.      Pulses: Normal pulses.      Heart sounds: Normal heart sounds.   Pulmonary:      Effort: Pulmonary effort is normal.      Breath sounds: Normal breath sounds.   Chest:      Chest wall: No mass.   Breasts:     Right: No swelling, bleeding, mass, nipple discharge, skin change or tenderness.      Left: No swelling, bleeding, mass, skin change or tenderness.          Comments: Left breast pain at medial and lateral aspects  Abdominal:      General: Abdomen is flat.      Palpations: Abdomen is soft.   Lymphadenopathy:      Upper Body:      Right upper body: No supraclavicular, axillary or pectoral adenopathy.      Left upper body: No supraclavicular, axillary or pectoral adenopathy.   Skin:     General: Skin is warm.   Neurological:      General: No focal deficit present.      Mental Status: She is alert and oriented to person, place, and time.   Psychiatric:         Mood and Affect: Mood normal.         " Behavior: Behavior normal.          No visits with results within 1 Month(s) from this visit.   Latest known visit with results is:   Transcribe Orders on 12/27/2024   Component Date Value Ref Range Status   • H. Pylori Stool Antigen 12/30/2024 Positive (A)  Negative Final    Comment: The LIAISON® Meridian H. pylori SA assay has not been evaluated in a pediatric population. Pediatric population for this assay is defined as individuals aged 21 and younger.    Assay results should be utilized in conjunction with other clinical and laboratory data to assist the clinician in making individual patient management decisions.    A negative test result does not preclude the possibility of the presence of H. pylori antigen in the specimen which may occur if the level of antigen is below the detection limit of the test.    Antimicrobials, proton pump inhibitors and bismuth preparations are known to suppress H. pylori and if ingested may give a false negative result, these medications are known to inhibit H. pylori. In these cases, a new fecal sample should be collected and tested 14 days after treatment has stopped. Positive results from patients that have used antibiotics, PPIs, or bismuth compounds in the 14 days prior to fecal sample collection are still considered                            accurate.

## 2025-03-24 ENCOUNTER — TELEPHONE (OUTPATIENT)
Dept: GYNECOLOGIC ONCOLOGY | Facility: CLINIC | Age: 50
End: 2025-03-24

## 2025-04-04 ENCOUNTER — TELEPHONE (OUTPATIENT)
Age: 50
End: 2025-04-04

## 2025-04-04 DIAGNOSIS — Z00.6 ENCOUNTER FOR EXAMINATION FOR NORMAL COMPARISON OR CONTROL IN CLINICAL RESEARCH PROGRAM: ICD-10-CM

## 2025-04-04 NOTE — TELEPHONE ENCOUNTER
Called patient and left a voicemail message to call the office to schedule a follow up with Dr. Ana Laura CALERO

## 2025-04-15 DIAGNOSIS — R63.5 WEIGHT GAIN: Primary | ICD-10-CM

## 2025-04-17 ENCOUNTER — APPOINTMENT (OUTPATIENT)
Dept: LAB | Facility: CLINIC | Age: 50
End: 2025-04-17
Payer: COMMERCIAL

## 2025-04-17 DIAGNOSIS — Z00.6 ENCOUNTER FOR EXAMINATION FOR NORMAL COMPARISON OR CONTROL IN CLINICAL RESEARCH PROGRAM: ICD-10-CM

## 2025-04-17 DIAGNOSIS — R63.5 WEIGHT GAIN: ICD-10-CM

## 2025-04-17 DIAGNOSIS — A04.8 HELICOBACTER PYLORI INFECTION: ICD-10-CM

## 2025-04-17 LAB
CORTIS AM PEAK SERPL-MCNC: 10.3 UG/DL (ref 6.7–22.6)
TESTOST SERPL-MSCNC: 14 NG/DL (ref 0–75)
TSH SERPL DL<=0.05 MIU/L-ACNC: 1.34 UIU/ML (ref 0.45–4.5)

## 2025-04-17 PROCEDURE — 36415 COLL VENOUS BLD VENIPUNCTURE: CPT

## 2025-04-17 PROCEDURE — 82627 DEHYDROEPIANDROSTERONE: CPT

## 2025-04-17 PROCEDURE — 84443 ASSAY THYROID STIM HORMONE: CPT

## 2025-04-17 PROCEDURE — 82533 TOTAL CORTISOL: CPT

## 2025-04-17 PROCEDURE — 84403 ASSAY OF TOTAL TESTOSTERONE: CPT

## 2025-04-18 ENCOUNTER — RESULTS FOLLOW-UP (OUTPATIENT)
Age: 50
End: 2025-04-18

## 2025-04-18 LAB — DHEA-S SERPL-MCNC: 82.2 UG/DL (ref 41.2–243.7)

## 2025-04-22 ENCOUNTER — RA CDI HCC (OUTPATIENT)
Dept: OTHER | Facility: HOSPITAL | Age: 50
End: 2025-04-22

## 2025-04-23 ENCOUNTER — TELEPHONE (OUTPATIENT)
Dept: BARIATRICS | Facility: CLINIC | Age: 50
End: 2025-04-23

## 2025-04-23 ENCOUNTER — OFFICE VISIT (OUTPATIENT)
Dept: FAMILY MEDICINE CLINIC | Facility: CLINIC | Age: 50
End: 2025-04-23
Payer: COMMERCIAL

## 2025-04-23 VITALS
HEIGHT: 68 IN | SYSTOLIC BLOOD PRESSURE: 112 MMHG | WEIGHT: 250.4 LBS | BODY MASS INDEX: 37.95 KG/M2 | OXYGEN SATURATION: 97 % | RESPIRATION RATE: 16 BRPM | HEART RATE: 84 BPM | DIASTOLIC BLOOD PRESSURE: 84 MMHG | TEMPERATURE: 97.5 F

## 2025-04-23 DIAGNOSIS — Z00.00 ANNUAL PHYSICAL EXAM: Primary | ICD-10-CM

## 2025-04-23 DIAGNOSIS — Z13.220 SCREENING, LIPID: ICD-10-CM

## 2025-04-23 DIAGNOSIS — Z13.1 SCREENING FOR DIABETES MELLITUS: ICD-10-CM

## 2025-04-23 DIAGNOSIS — E66.01 CLASS 2 SEVERE OBESITY DUE TO EXCESS CALORIES WITH SERIOUS COMORBIDITY AND BODY MASS INDEX (BMI) OF 38.0 TO 38.9 IN ADULT (HCC): ICD-10-CM

## 2025-04-23 DIAGNOSIS — J45.20 MILD INTERMITTENT ASTHMA WITHOUT COMPLICATION: ICD-10-CM

## 2025-04-23 DIAGNOSIS — E66.812 CLASS 2 SEVERE OBESITY DUE TO EXCESS CALORIES WITH SERIOUS COMORBIDITY AND BODY MASS INDEX (BMI) OF 38.0 TO 38.9 IN ADULT (HCC): ICD-10-CM

## 2025-04-23 PROCEDURE — 99386 PREV VISIT NEW AGE 40-64: CPT | Performed by: FAMILY MEDICINE

## 2025-04-23 RX ORDER — TIRZEPATIDE 7.5 MG/.5ML
7.5 INJECTION, SOLUTION SUBCUTANEOUS WEEKLY
Qty: 2 ML | Refills: 0 | Status: SHIPPED | OUTPATIENT
Start: 2025-06-18 | End: 2025-07-16

## 2025-04-23 RX ORDER — TIRZEPATIDE 2.5 MG/.5ML
2.5 INJECTION, SOLUTION SUBCUTANEOUS WEEKLY
Qty: 2 ML | Refills: 0 | Status: SHIPPED | OUTPATIENT
Start: 2025-04-23

## 2025-04-23 RX ORDER — TIRZEPATIDE 5 MG/.5ML
5 INJECTION, SOLUTION SUBCUTANEOUS WEEKLY
Qty: 2 ML | Refills: 0 | Status: SHIPPED | OUTPATIENT
Start: 2025-05-21 | End: 2025-06-18

## 2025-04-23 RX ORDER — TIRZEPATIDE 15 MG/.5ML
15 INJECTION, SOLUTION SUBCUTANEOUS WEEKLY
Qty: 6 ML | Refills: 0 | Status: SHIPPED | OUTPATIENT
Start: 2025-09-10

## 2025-04-23 RX ORDER — TIRZEPATIDE 10 MG/.5ML
10 INJECTION, SOLUTION SUBCUTANEOUS WEEKLY
Qty: 2 ML | Refills: 0 | Status: SHIPPED | OUTPATIENT
Start: 2025-07-16 | End: 2025-08-13

## 2025-04-23 RX ORDER — TIRZEPATIDE 12.5 MG/.5ML
12.5 INJECTION, SOLUTION SUBCUTANEOUS WEEKLY
Qty: 2 ML | Refills: 0 | Status: SHIPPED | OUTPATIENT
Start: 2025-08-13 | End: 2025-09-10

## 2025-04-23 NOTE — PROGRESS NOTES
Adult Annual Physical  Name: Michelle Taylor      : 1975      MRN: [id number]  Encounter Provider: Barbie Rogers DO  Encounter Date: 2025   Encounter department: [facility]    :  Assessment & Plan  Annual physical exam         Class 2 severe obesity due to excess calories with serious comorbidity and body mass index (BMI) of 38.0 to 38.9 in adult (HCC)  Prior Authorization Clinical Questions for Weight Management Pharmacotherapy    1. Does the patient have a contrainidcation to medication prescribed for weight management?: No  2. Does the patient have a diagnosis of obesity, confirmed by a BMI greater than or equal to 30 kg/m^2?: Yes  3. Does the patient have a BMI of greater than or equal to 27 kg/m^2 with at least one weight-related comorbidity/risk factor/complication (e.g. diabetes, dyslipidemia, coronary artery disease)?: Yes  4. Weight-related co-morbidities/risk factors: obstructive sleep apnea (CLIFF), GERD, depression, asthma/reactive airway disease, cancer  5. WEGOVY CVA Indication: Does patient have established documented cardiovascular disease (history of a prior heart attack (myocardial infarction), stroke, or symptomatic peripheral arterial disease (PAD)?: No  6. ZEPBOUND CLIFF Indication: Does patient have documented CLIFF diagnosed via sleep study (insurance will require copy of sleep study results for approval)?: Yes  7. Has the patient been on a weight loss regimen of low-calorie diet, increased physical activity, and lifestyle modifications for a minimum of 6 months?: Yes  8. Has the patient completed a comprehensive weight loss program (ie, Weight Watchers, Noom, Bariatrics, other marcie on phone)? If so, what?: Yes   -- Q8 Further Explanation: yes, weight watchers, bariatrics   9. Does the patient have a history of type 2 diabetes?: No  10. Has the member tried and failed other weight loss medication within the past 12 months?: Yes   -- Q10 Further explanation:  wegovy, topamax, adipex   11. Will the member use requested medication in combination with another GLP agonist or weight loss drug?: No  12. Is the medication a controlled substance?: No     Baseline weight (in pounds): 250 lbs  Current weight (in pounds): 250 lbs  Weight loss percentage: 0%         Orders:  •  Zepbound 2.5 MG/0.5ML auto-injector; Inject 0.5 mL (2.5 mg total) under the skin once a week  •  Zepbound 5 MG/0.5ML auto-injector; Inject 0.5 mL (5 mg total) under the skin once a week for 28 days Do not start before May 21, 2025.  •  Zepbound 7.5 MG/0.5ML auto-injector; Inject 0.5 mL (7.5 mg total) under the skin once a week for 28 days Do not start before June 18, 2025.  •  Zepbound 10 MG/0.5ML auto-injector; Inject 0.5 mL (10 mg total) under the skin once a week for 28 days Do not start before July 16, 2025.  •  Zepbound 12.5 MG/0.5ML auto-injector; Inject 0.5 mL (12.5 mg total) under the skin once a week for 28 days Do not start before August 13, 2025.  •  Zepbound 15 MG/0.5ML auto-injector; Inject 0.5 mL (15 mg total) under the skin once a week Do not start before September 10, 2025.    Screening for diabetes mellitus    Orders:  •  Comprehensive metabolic panel; Future    Screening, lipid    Orders:  •  Lipid Panel with Direct LDL reflex; Future    Mild intermittent asthma without complication             Preventive Screenings:    - Hepatitis C screening: screening not indicated   - HIV screening: screening not indicated   - Cervical cancer screening: screening not indicated   - Breast cancer screening: has history of breast cancer and screening not indicated   - Colon cancer screening: has history of colon cancer   - Lung cancer screening: screening not indicated     Immunizations:  - Immunizations due: Influenza, Prevnar 20 and Tdap      Depression Screening and Follow-up Plan: Patient was screened for depression during today's encounter. They screened negative with a PHQ-2 score of  "0.          History of Present Illness     Adult Annual Physical:  Patient presents for annual physical. Here as a new patient  Did try wegovy  Had been on adipex and topamax  Does hair  No motivation  On feet all day  Double masectomy  Getting breast MRI  Left breast pain constant    .     Diet and Physical Activity:  - Diet/Nutrition: no special diet.  - Exercise: no formal exercise.    Depression Screening:  - PHQ-2 Score: 0    General Health:  - Sleep: sleeps well.  - Hearing: decreased hearing left ear and decreased hearing bilateral ears.  - Vision: vision problems and wears glasses.  - Dental: regular dental visits.    /GYN Health:  - Follows with GYN: yes.   - Menopause: postmenopausal.     Advanced Care Planning:  - Has an advanced directive?: no    - Has a durable medical POA?: no      Review of Systems   Constitutional:  Positive for fatigue.   HENT: Negative.     Eyes: Negative.    Respiratory: Negative.     Cardiovascular: Negative.    Gastrointestinal:  Positive for abdominal pain.   Endocrine: Negative.    Genitourinary: Negative.    Musculoskeletal: Negative.    Allergic/Immunologic: Negative.    Neurological: Negative.    Hematological: Negative.    Psychiatric/Behavioral: Negative.       Medical History Reviewed by provider this encounter:  Tobacco  Allergies  Meds  Problems  Med Hx  Surg Hx  Fam Hx     .    Objective   /84 (BP Location: Left arm, Patient Position: Sitting, Cuff Size: Large)   Pulse 84   Temp 97.5 °F (36.4 °C) (Tympanic)   Resp 16   Ht 5' 7.72\" (1.72 m)   Wt 114 kg (250 lb 6.4 oz)   LMP 10/15/2020 (Exact Date)   SpO2 97%   BMI 38.39 kg/m²     Physical Exam  Vitals and nursing note reviewed.   Constitutional:       Appearance: Normal appearance. She is well-developed.   HENT:      Head: Normocephalic and atraumatic.      Right Ear: External ear normal.      Left Ear: External ear normal.      Nose: Nose normal.   Eyes:      Extraocular Movements: Extraocular " movements intact.      Conjunctiva/sclera: Conjunctivae normal.      Pupils: Pupils are equal, round, and reactive to light.   Cardiovascular:      Rate and Rhythm: Normal rate and regular rhythm.      Heart sounds: Normal heart sounds.   Pulmonary:      Effort: Pulmonary effort is normal.      Breath sounds: Normal breath sounds.   Abdominal:      General: Bowel sounds are normal.      Palpations: Abdomen is soft.   Musculoskeletal:         General: Normal range of motion.      Cervical back: Normal range of motion and neck supple.   Skin:     General: Skin is warm and dry.      Capillary Refill: Capillary refill takes less than 2 seconds.   Neurological:      Mental Status: She is alert and oriented to person, place, and time.   Psychiatric:         Behavior: Behavior normal.         Thought Content: Thought content normal.         Judgment: Judgment normal.

## 2025-04-23 NOTE — ASSESSMENT & PLAN NOTE
Prior Authorization Clinical Questions for Weight Management Pharmacotherapy    1. Does the patient have a contrainidcation to medication prescribed for weight management?: No  2. Does the patient have a diagnosis of obesity, confirmed by a BMI greater than or equal to 30 kg/m^2?: Yes  3. Does the patient have a BMI of greater than or equal to 27 kg/m^2 with at least one weight-related comorbidity/risk factor/complication (e.g. diabetes, dyslipidemia, coronary artery disease)?: Yes  4. Weight-related co-morbidities/risk factors: obstructive sleep apnea (CLIFF), GERD, depression, asthma/reactive airway disease, cancer  5. WEGOVY CVA Indication: Does patient have established documented cardiovascular disease (history of a prior heart attack (myocardial infarction), stroke, or symptomatic peripheral arterial disease (PAD)?: No  6. ZEPBOUND CLIFF Indication: Does patient have documented CLIFF diagnosed via sleep study (insurance will require copy of sleep study results for approval)?: Yes  7. Has the patient been on a weight loss regimen of low-calorie diet, increased physical activity, and lifestyle modifications for a minimum of 6 months?: Yes  8. Has the patient completed a comprehensive weight loss program (ie, Weight Watchers, Noom, Bariatrics, other marcie on phone)? If so, what?: Yes   -- Q8 Further Explanation: yes, weight watchers, bariatrics   9. Does the patient have a history of type 2 diabetes?: No  10. Has the member tried and failed other weight loss medication within the past 12 months?: Yes   -- Q10 Further explanation: wegovy, topamax, adipex   11. Will the member use requested medication in combination with another GLP agonist or weight loss drug?: No  12. Is the medication a controlled substance?: No     Baseline weight (in pounds): 250 lbs  Current weight (in pounds): 250 lbs  Weight loss percentage: 0%         Orders:  •  Zepbound 2.5 MG/0.5ML auto-injector; Inject 0.5 mL (2.5 mg total) under the skin once  a week  •  Zepbound 5 MG/0.5ML auto-injector; Inject 0.5 mL (5 mg total) under the skin once a week for 28 days Do not start before May 21, 2025.  •  Zepbound 7.5 MG/0.5ML auto-injector; Inject 0.5 mL (7.5 mg total) under the skin once a week for 28 days Do not start before June 18, 2025.  •  Zepbound 10 MG/0.5ML auto-injector; Inject 0.5 mL (10 mg total) under the skin once a week for 28 days Do not start before July 16, 2025.  •  Zepbound 12.5 MG/0.5ML auto-injector; Inject 0.5 mL (12.5 mg total) under the skin once a week for 28 days Do not start before August 13, 2025.  •  Zepbound 15 MG/0.5ML auto-injector; Inject 0.5 mL (15 mg total) under the skin once a week Do not start before September 10, 2025.

## 2025-04-23 NOTE — PATIENT INSTRUCTIONS
"Patient Education     Routine physical for adults   The Basics   Written by the doctors and editors at [facility]   What is a physical? -- A physical is a routine visit, or \"check-up,\" with your doctor. You might also hear it called a \"wellness visit\" or \"preventive visit.\"  During each visit, the doctor will:   Ask about your physical and mental health   Ask about your habits, behaviors, and lifestyle   Do an exam   Give you vaccines if needed   Talk to you about any medicines you take   Give advice about your health   Answer your questions  Getting regular check-ups is an important part of taking care of your health. It can help your doctor find and treat any problems you have. But it's also important for preventing health problems.  A routine physical is different from a \"sick visit.\" A sick visit is when you see a doctor because of a health concern or problem. Since physicals are scheduled ahead of time, you can think about what you want to ask the doctor.  How often should I get a physical? -- It depends on your age and health. In general, for people age 21 years and older:   If you are younger than 50 years, you might be able to get a physical every 3 years.   If you are 50 years or older, your doctor might recommend a physical every year.  If you have an ongoing health condition, like diabetes or high blood pressure, your doctor will probably want to see you more often.  What happens during a physical? -- In general, each visit will include:   Physical exam - The doctor or nurse will check your height, weight, heart rate, and blood pressure. They will also look at your eyes and ears. They will ask about how you are feeling and whether you have any symptoms that bother you.   Medicines - It's a good idea to bring a list of all the medicines you take to each doctor visit. Your doctor will talk to you about your medicines and answer any questions. Tell them if you are having any side effects that bother you. You " "should also tell them if you are having trouble paying for any of your medicines.   Habits and behaviors - This includes:   Your diet   Your exercise habits   Whether you smoke, drink alcohol, or use drugs   Whether you are sexually active   Whether you feel safe at home  Your doctor will talk to you about things you can do to improve your health and lower your risk of health problems. They will also offer help and support. For example, if you want to quit smoking, they can give you advice and might prescribe medicines. If you want to improve your diet or get more physical activity, they can help you with this, too.   Lab tests, if needed - The tests you get will depend on your age and situation. For example, your doctor might want to check your:   Cholesterol   Blood sugar   Iron level   Vaccines - The recommended vaccines will depend on your age, health, and what vaccines you already had. Vaccines are very important because they can prevent certain serious or deadly infections.   Discussion of screening - \"Screening\" means checking for diseases or other health problems before they cause symptoms. Your doctor can recommend screening based on your age, risk, and preferences. This might include tests to check for:   Cancer, such as breast, prostate, cervical, ovarian, colorectal, prostate, lung, or skin cancer   Sexually transmitted infections, such as chlamydia and gonorrhea   Mental health conditions like depression and anxiety  Your doctor will talk to you about the different types of screening tests. They can help you decide which screenings to have. They can also explain what the results might mean.   Answering questions - The physical is a good time to ask the doctor or nurse questions about your health. If needed, they can refer you to other doctors or specialists, too.  Adults older than 65 years often need other care, too. As you get older, your doctor will talk to you about:   How to prevent falling at " home   Hearing or vision tests   Memory testing   How to take your medicines safely   Making sure that you have the help and support you need at home  All topics are updated as new evidence becomes available and our peer review process is complete.  This topic retrieved from [employer] on: May 02, 2024.  Topic 947977 Version 1.0  Release: 32.4.3 - C32.122  © 2024 UpToDate, Inc. and/or its affiliates. All rights reserved.  Consumer Information Use and Disclaimer   Disclaimer: This generalized information is a limited summary of diagnosis, treatment, and/or medication information. It is not meant to be comprehensive and should be used as a tool to help the user understand and/or assess potential diagnostic and treatment options. It does NOT include all information about conditions, treatments, medications, side effects, or risks that may apply to a specific patient. It is not intended to be medical advice or a substitute for the medical advice, diagnosis, or treatment of a health care provider based on the health care provider's examination and assessment of a patient's specific and unique circumstances. Patients must speak with a health care provider for complete information about their health, medical questions, and treatment options, including any risks or benefits regarding use of medications. This information does not endorse any treatments or medications as safe, effective, or approved for treating a specific patient. UpToDate, Inc. and its affiliates disclaim any warranty or liability relating to this information or the use thereof.The use of this information is governed by the Terms of Use, available at https://www.[employer]oss Technologyuwer.com/en/know/clinical-effectiveness-terms. 2024© UpToDate, Inc. and its affiliates and/or licensors. All rights reserved.  Copyright   © 2024 UpToDate, Inc. and/or its affiliates. All rights reserved.

## 2025-04-24 ENCOUNTER — TELEPHONE (OUTPATIENT)
Dept: FAMILY MEDICINE CLINIC | Facility: CLINIC | Age: 50
End: 2025-04-24

## 2025-04-24 NOTE — TELEPHONE ENCOUNTER
Fax request received for prior auth for Zepbound 2.5 MG/ 0.5 ML.    KEY: [id number]    Please initiate prior auth.

## 2025-04-24 NOTE — TELEPHONE ENCOUNTER
PA Zepbound 2.5 MG/ 0.5 ML SUBMITTED    to [employer]     via    []CMM-KEY:    [x]Surescripts   []Availity-Auth ID #  NDC #    []Faxed to plan   []Other website    []Phone call Case ID #      []PA sent as URGENT    All office notes, labs and other pertaining documents and studies sent. Clinical questions answered. Awaiting determination from insurance company.     Turnaround time for your insurance to make a decision on your Prior Authorization can take 7-21 business days.

## 2025-04-25 ENCOUNTER — APPOINTMENT (OUTPATIENT)
Dept: LAB | Facility: MEDICAL CENTER | Age: 50
End: 2025-04-25
Payer: COMMERCIAL

## 2025-04-25 DIAGNOSIS — Z13.220 SCREENING, LIPID: ICD-10-CM

## 2025-04-25 DIAGNOSIS — Z13.1 SCREENING FOR DIABETES MELLITUS: ICD-10-CM

## 2025-04-25 LAB
ALBUMIN SERPL BCG-MCNC: 4 G/DL (ref 3.5–5)
ALP SERPL-CCNC: 58 U/L (ref 34–104)
ALT SERPL W P-5'-P-CCNC: 17 U/L (ref 7–52)
ANION GAP SERPL CALCULATED.3IONS-SCNC: 8 MMOL/L (ref 4–13)
AST SERPL W P-5'-P-CCNC: 15 U/L (ref 13–39)
BILIRUB SERPL-MCNC: 0.94 MG/DL (ref 0.2–1)
BUN SERPL-MCNC: 10 MG/DL (ref 5–25)
CALCIUM SERPL-MCNC: 8.9 MG/DL (ref 8.4–10.2)
CHLORIDE SERPL-SCNC: 107 MMOL/L (ref 96–108)
CHOLEST SERPL-MCNC: 172 MG/DL (ref ?–200)
CO2 SERPL-SCNC: 25 MMOL/L (ref 21–32)
CREAT SERPL-MCNC: 0.48 MG/DL (ref 0.6–1.3)
GFR SERPL CREATININE-BSD FRML MDRD: 116 ML/MIN/1.73SQ M
GLUCOSE P FAST SERPL-MCNC: 91 MG/DL (ref 65–99)
HDLC SERPL-MCNC: 51 MG/DL
LDLC SERPL CALC-MCNC: 67 MG/DL (ref 0–100)
POTASSIUM SERPL-SCNC: 4.3 MMOL/L (ref 3.5–5.3)
PROT SERPL-MCNC: 6.9 G/DL (ref 6.4–8.4)
SODIUM SERPL-SCNC: 140 MMOL/L (ref 135–147)
TRIGL SERPL-MCNC: 269 MG/DL (ref ?–150)

## 2025-04-25 PROCEDURE — 80053 COMPREHEN METABOLIC PANEL: CPT

## 2025-04-25 PROCEDURE — 80061 LIPID PANEL: CPT

## 2025-04-25 PROCEDURE — 36415 COLL VENOUS BLD VENIPUNCTURE: CPT

## 2025-04-25 NOTE — TELEPHONE ENCOUNTER
PA Zepbound 2.5 MG/ 0.5 ML APPROVED         Patient advised by          []MyChart Message  []Phone call   [x]LMOM  []L/M to call office as no active Communication consent on file  []Unable to leave detailed message as VM not approved on Communication consent       Pharmacy advised by    [x]Fax  []Phone call  []Secure Chat    Specialty Pharmacy    []

## 2025-04-28 ENCOUNTER — RESULTS FOLLOW-UP (OUTPATIENT)
Dept: FAMILY MEDICINE CLINIC | Facility: CLINIC | Age: 50
End: 2025-04-28

## 2025-05-06 ENCOUNTER — TELEPHONE (OUTPATIENT)
Dept: GYNECOLOGIC ONCOLOGY | Facility: CLINIC | Age: 50
End: 2025-05-06

## 2025-05-20 ENCOUNTER — RESULTS FOLLOW-UP (OUTPATIENT)
Dept: OTHER | Facility: HOSPITAL | Age: 50
End: 2025-05-20

## 2025-05-20 DIAGNOSIS — R89.8 ABNORMAL GENETIC TEST: Primary | ICD-10-CM

## 2025-05-20 LAB
APOB+LDLR+PCSK9 GENE MUT ANL BLD/T: NOT DETECTED
BRCA1+BRCA2 DEL+DUP + FULL MUT ANL BLD/T: ABNORMAL
GENE DIS ANL INTERP-IMP: POSITIVE
MLH1+MSH2+MSH6+PMS2 GN DEL+DUP+FUL M: NOT DETECTED

## 2025-05-20 NOTE — TELEPHONE ENCOUNTER
Spoke to Michelle about her [employer] results specific to HBOC. She reviewed her results prior to our discussion. She was previously diagnosed with this condition. She would like a referral to genetic counseling. A referral was placed today.

## 2025-05-28 ENCOUNTER — TELEPHONE (OUTPATIENT)
Dept: GYNECOLOGIC ONCOLOGY | Facility: CLINIC | Age: 50
End: 2025-05-28

## 2025-05-29 ENCOUNTER — OFFICE VISIT (OUTPATIENT)
Dept: GENETICS | Facility: CLINIC | Age: 50
End: 2025-05-29

## 2025-05-29 DIAGNOSIS — Z15.09 BRCA1 GENE MUTATION POSITIVE: Primary | ICD-10-CM

## 2025-05-29 DIAGNOSIS — Z15.01 BRCA1 GENE MUTATION POSITIVE: Primary | ICD-10-CM

## 2025-05-29 PROCEDURE — HELIXGC

## 2025-05-29 NOTE — PROGRESS NOTES
Post-Test Genetic Counseling Consult Note    Patient Name: Michelle Taylor   /Age: 1975/50 y.o.    Date of Service: 2025  Genetic Counselor: Alesia Jo MS, [facility]  Interpretation Services: None  Location: Telephone consult   Length of Visit: 30 Minutes    Michelle presents today for a consult regarding her DNA Answers test results.    Genetic Testing History:     Report Date: 25     Test: [facility] Molecular Screen (11 genes): BRCA1, BRCA2, MLH1, MSH2, MSH6, PMS2, EPCAM, APOB, LDLR, LDLRAP1, and PCSK9       Result: Positive - BRCA1 c.4868C>G ([id number]); Heterozygous; Pathogenic    Relevant Family History   Patient reports no Ashkenazi [employer] ancestry.     Sister: BRCA1 pathogenic variant; SHERIF/BSO at 33 y/o (currently 47 y/o)     Maternal Family History:  Uncle: BRCA1 pathogenic variant (currently 70 y/o)   First-cousin: breast cancer diagnosed at 38, BRCA1 pathogenic variant (currently 49 y/o)   Grandfather: unknown cancer, no info (d.81)   Grandmother: unknown cancer, no info ()   Great-aunt (grandmother's side): ovarian cancer (d. 50)   Second-cousin once removed: ovarian cancer (d.50)      Paternal Family History:  Father: metastatic colon cancer diagnosed at 67; smoker/ETOH (d.67)   Uncle: lung cancer diagnosed age 64; smoker (d.64)    Please refer to the scanned pedigree in the Media Tab for a complete family history     *All history is reported as provided by the patient; records are not available for review, except where indicated.     DNA Answers Result:  Michelle underwent genetic testing through the [facility] community health research program. As a part of this program, 11 genes associated with hereditary breast and ovarian cancer (HBOC) syndrome, Isaacs syndrome and familial hypercholesterolemia (FH) were tested. Michelle's result returned positive for the BRCA1 gene. During today's visit, Michelle and I reviewed the cancer risks associated with her result, and the  recommendations for screening and management.    Assessment:  Michelle carries one pathogenic variant in the BRCA1 gene, specifically c.4868C>G ([id number]). The BRCA1 gene is associated with autosomal dominant hereditary breast and ovarian cancer (HBOC) syndrome ([facility] UID: [id number]). 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      Hereditary breast and ovarian cancer (HBOC) syndrome  Women with a single BRCA1 pathogenic variant have approximately a 60-72% lifetime risk of breast cancer. The risk for a second primary breast cancer within 20 years of the first diagnosis is between 30-40%. In premenopausal women, the risk for a second breast cancer within 15 years of the first diagnosis is >20%.    Women also have a 39-58% lifetime risk for ovarian, fallopian tube, or peritoneal cancer to age 70.      Men with a BRCA1 pathogenic variant have a 0.2-1.2% risk for breast cancer and a 7-26% risk for prostate cancer.      Men and women also have an elevated risk for melanoma and up to a <=5% risk for pancreatic cancer.     Reproductive Risks:  Individuals with a single BRCA1 pathogenic variant may also be at risk to have a child with Fanconi Anemia if their partner also carries a BRCA1 variant.  Fanconi anemia is characterized by bone marrow failure, short stature, abnormal skin pigmentation, developmental delay and malformations of the thumbs, skeletal and central nervous systems.  Individuals with Fanconi anemia also have an increased risk for leukemia and early onset solid tumors.  In some cases, it may be appropriate for a partner to undergo genetic testing to better understand the risk of having a child with this condition.      For patients of reproductive age, there are options for prenatal diagnosis and assisted reproduction including pre-implantation genetic diagnosis. Michelle and I reviewed that her children can consider testing for the BRCA1 gene for reproductive  implications. Should their reproductive partners also be positive for a mutation in this gene, a referral can be provided to maternal fetal medicine for reproductive genetic counseling.      Risks and Testing for Family Members:  This test result may help clarify the risk for other family members to develop cancer. All first-degree relatives (parents, siblings and children) have up to a 50% chance of having the pathogenic variant.  Other relatives such as aunts, uncles and cousins may also be at risk.     We discussed that each of Michelle 's children have a 50% chance to inherit this BRCA1 pathogenic variant. Testing is not recommended for children under the age of 18, as there are no childhood cancer risks known to be associated with a single pathogenic variant in this gene.    We encouraged Michelle  to discuss this information with her relatives.  If Michelle's family members have any questions or are interested in testing, they can reach out to the [employer] number at [phone] for additional information. Michelle requested resources for discussing this information with her children. These resources including a family letter will be sent to Michelle via [employer].     Additional Information:  A healthy lifestyle will improve overall health and reduce risk for illness. Eating a healthy diet and exercising for 4 hours per week is recommended. Both diet and exercise have been shown to help maintain a healthy weight. Postmenopausal women who are overweight are at higher risk for breast cancer. Moderate to heavy alcohol use can increase the risk for some cancers. Smoking cigarettes can also increase risk for breast, lung, prostate, pancreatic and other cancers.      Management:  Management guidelines for individuals with pathogenic and likely pathogenic BRCA1 variants have been developed by the National Comprehensive Cancer Network.  Please refer to the current NCCN guidelines for the most up to date guidelines  (https://www.nccn.org/guidelines/category_2).  The recommendations listed below are specific to Michelle and are are based on recommendations in the the NCCN guidelines as of 05/29/25.  These recommendations are subject to change over time and the newest guidelines should be referenced for the most up to date recommendations.       Plan:    Breast Cancer Screening  -Breast awareness starting at age 18 y  -Clinical breast exam every 6-12 mo, starting at age 25 y  -Age 25-29 y, annual breast MRI screening with and without contrast (or mammogram only if MRI is unavailable) or individualized based on family history if a breast cancer diagnosis before 30 is present.  -Age 30-75y, annual mammogram and breast MRI screening with contrast  -Age ?75 y, management should be considered on an individual basis.  -Discuss option of risk-reducing mastectomy  -For women with a BRCA pathogenic/likely pathogenic variant who are treated for breast cancer and have not had a bilateral mastectomy, screening with annual mammogram and breast MRI should continue as described above.    Michelle is s/p bilateral mastectomy in 2021 secondary to her BRCA1 pathogenic variant and she follows with Surgical Oncology annually.    Gynecologic Cancer Screening  Ovarian Cancer  -Recommend risk-reducing salpingo-oophorectomy (RRSO), typically between 35 and 40y, and upon completion of child bearing. See Risk-Reducing Salpingo-Oophorectomy (RRSO) Protocol in NCCN Guidelines for Ovarian Cancer - Principles of Surgery.  -Salpingectomy alone is not the standard of care for risk reduction, although clinical trials of interval salpingectomy and delayed oophorectomy are ongoing. The concern for risk-reducing salpingectomy alone is that women are still at risk for developing ovarian cancer. In addition, in premenopausal women, oophorectomy likely reduces the risk of developing breast cancer but the magnitude is uncertain and may be gene-specific.    Uterine Cancer    -Limited date suggest that there may be a slightly increased risk of serous uterine cancer among women with a BRCA1 pathogenic/likely pathogenic variant. The clinical significance of these findings is unclear. Further evaluation of the risk of serous uterine cancer in the BRCA population needs to be undertaken. The provider and patient should discuss the risks and benefits of concurrent hysterectomy at the time of RRSO for women with a BRCA1 pathogenic/likely pathogenic variant prior to surgery.  Women who undergo hysterectomy at the time of RRSO are candidates for estrogen alone hormone replacement therapy which is associated with a decreased risk of breast cancer compared to combined estrogen and progesterone which is required when the uterus is left in situ.       Michelle is s/p SHERIF/BSO in 2020 secondary to her BRCA1 pathogenic variant and she follows with Gynecologic Oncology annually.    Skin Cancer Screening  -No specific screening guidelines exist for melanoma, but general melanoma risk management is appropriate, such as annual full-body skin examination and minimizing UV exposure.    Michelle is not currently established with Dermatology and she requests a referral to initiate annual full-body skin examinations.    Order Placed:  Ambulatory Referral to Dermatology    Pancreatic Cancer Screening:  Current NCCN Guidelines recommend pancreatic cancer screening for individuals with an BRCA1 pathogenic variant and a first- or second-degree relative with exocrine pancreatic cancer from the same side of the family as the identified pathogenic/likely pathogenic germline variant. Pancreatic cancer screening typically begins at age 50 (or 10 years younger than the earliest exocrine pancreatic cancer diagnosis in the family, whichever is earlier) and includes contrast-enhanced MRI/MRCP (magnetic resonance cholangiopancreatography) and/or endoscopic ultrasound (EU).      Currently there is no known family history of  pancreatic cancer therefore we do not recommend pancreatic screening for Michelle at this time. We did encourage Michelle to keep her healthcare providers updated on any changes to her personal or family history as updated information may change this recommendation.    Other Screening:  There are no additional recommendations based on Michelle's result. she should continue cancer screening and medical management as clinically indicated and as determined appropriate by her healthcare providers.    Summary:     Additional Testing:  Michelle and I reviewed that the testing performed in this study assessed for 7 cancer risk-related genes. Of note, Michelle also pursued genetic testing in 2023 via [employer] (59 genes): APC, SHARLA, AXIN2, BAP1, BARD1, BMPR1A, BRCA1, BRCA2, BRIP1, CDH1, CDK4, CDKN1B, CDKN2A, CHEK2, CTNNA1, DICER1, EGLN1, EPCAM, FH, FLCN, GREM1, HOXB13, KIF1B, KIT, MAX, MEN1, MET, MITF, MLH1, MSH2, MSH3, MSH6, MUTYH, NF1, NTHL1, PALB2, PDGFRA PMS2, POLD1, POLE, POT1, PTEN, RAD51C, RAD51D, RB1, RET, SDHA, SDHAF2, SDHB, SDHC, SDHD, SMAD4, SMARCA4, STK11, GLDR448, TP53, TSC1, TSC2, VHL. This testing did not identify any other pathogenic variants other than the previously identified BRCA1 pathogenic variant. Based on Michelle's previous genetic testing and family history of cancer, no further genetic testing is recommended at this time for Michelle.    Positive Result: Michelle was strongly encouraged to follow up with our office on an annual basis to review the most up to date guidelines as recommendations are subject to change over time. Michelle was also encouraged to contact us regarding any changes in her personal or family history of cancer as these changes could alter our recommendation regarding genetic testing and/or cancer screening.

## 2025-06-03 DIAGNOSIS — N95.1 MENOPAUSAL SYMPTOMS: ICD-10-CM

## 2025-06-03 RX ORDER — PROGESTERONE 100 MG/1
1 CAPSULE ORAL
Qty: 90 CAPSULE | Refills: 3 | Status: SHIPPED | OUTPATIENT
Start: 2025-06-03

## 2025-06-03 RX ORDER — ESTRADIOL 1 MG/1
1 TABLET ORAL DAILY
Qty: 90 TABLET | Refills: 3 | Status: SHIPPED | OUTPATIENT
Start: 2025-06-03

## 2025-06-09 ENCOUNTER — APPOINTMENT (OUTPATIENT)
Dept: LAB | Facility: CLINIC | Age: 50
End: 2025-06-09
Attending: PHYSICIAN ASSISTANT
Payer: COMMERCIAL

## 2025-06-09 PROCEDURE — 87338 HPYLORI STOOL AG IA: CPT

## 2025-06-11 ENCOUNTER — RESULTS FOLLOW-UP (OUTPATIENT)
Dept: OTHER | Facility: HOSPITAL | Age: 50
End: 2025-06-11

## 2025-06-11 LAB — H PYLORI AG STL QL IA: POSITIVE

## 2025-06-20 ENCOUNTER — NURSE TRIAGE (OUTPATIENT)
Age: 50
End: 2025-06-20

## 2025-06-20 DIAGNOSIS — B37.31 CANDIDAL VAGINITIS: Primary | ICD-10-CM

## 2025-06-20 RX ORDER — FLUCONAZOLE 150 MG/1
TABLET ORAL
Qty: 2 TABLET | Refills: 0 | Status: SHIPPED | OUTPATIENT
Start: 2025-06-20 | End: 2025-06-22

## 2025-06-20 NOTE — TELEPHONE ENCOUNTER
Spoke with Haley who feels she has an incompletely treated yeast infection and that the OTC medication did not totally treat her.  Will order diflucan and send to [facility].    Asked she report back to us on Monday if symptoms are not improving.

## 2025-06-20 NOTE — TELEPHONE ENCOUNTER
REASON FOR CONVERSATION: Symptom Management    SYMPTOMS: vulva pain.  Pain with sex.      OTHER HEALTH INFORMATION: Self treated yeast infections with OTC medications.     PROTOCOL DISPOSITION: See Today in Office    CARE ADVICE PROVIDED: Monitor for changes.  Call with fever or if symptoms become worse.    PRACTICE FOLLOW-UP: Please f/u with pt.  Protocol suggests pt should be seen today.  She is available after 3:00 pm.

## 2025-06-20 NOTE — TELEPHONE ENCOUNTER
"Pt is complaining that she has constant pain in her vulva.  Pain all of the time.  Pain with urination.   Reason for Disposition   MILD-MODERATE vaginal pain and present > 24 hours (Exception: Chronic pain.)    Answer Assessment - Initial Assessment Questions  1. SYMPTOM: \"What's the main symptom you're concerned about?\" (e.g., pain, itching, dryness)      Pain at her vulva.   2. LOCATION: \"Where is the  pain located?\" (e.g., inside/outside, left/right)  At her vulva  3. ONSET: \"When did the  pain  start?\"      6 weeks ago  4. PAIN: \"Is there any pain?\" If Yes, ask: \"How bad is it?\" (Scale: 1-10; mild, moderate, severe)      8/10  All day long  5. ITCHING: \"Is there any itching?\" If Yes, ask: \"How bad is it?\" (Scale: 1-10; mild, moderate, severe)      Yes at times.  Not now   6. CAUSE: \"What do you think is causing the discharge?\" \"Have you had the same problem before?\" \"What happened then?\"      unknown  7. OTHER SYMPTOMS: \"Do you have any other symptoms?\" (e.g., fever, itching, vaginal bleeding, pain with urination, injury to genital area, vaginal foreign body)      Pain all of the time, no bleeding, has pain with urination.  She states that she has had a yeast infection, and treated it with OTC medications.    Protocols used: Vaginal Symptoms-Adult-[place]    "

## 2025-06-20 NOTE — TELEPHONE ENCOUNTER
Patient calling to speak with Chelsie LÓEPZ.  She is have pain, she doesn't know why. This has been going on for about 6 weeks.  She states it hurts to have intercourse.   Please call patient asap

## 2025-06-28 ENCOUNTER — HOSPITAL ENCOUNTER (OUTPATIENT)
Dept: RADIOLOGY | Facility: HOSPITAL | Age: 50
Discharge: HOME | End: 2025-06-28
Payer: COMMERCIAL

## 2025-06-28 DIAGNOSIS — Z15.09 BRCA1 GENE MUTATION POSITIVE: ICD-10-CM

## 2025-06-28 DIAGNOSIS — N64.4 PAIN OF LEFT BREAST: ICD-10-CM

## 2025-06-28 DIAGNOSIS — Z90.13 S/P MASTECTOMY, BILATERAL: ICD-10-CM

## 2025-06-28 DIAGNOSIS — Z15.01 BRCA1 GENE MUTATION POSITIVE: ICD-10-CM

## 2025-06-28 DIAGNOSIS — Z91.89 INCREASED RISK OF BREAST CANCER: ICD-10-CM

## 2025-06-28 PROCEDURE — C8908 MRI W/O FOL W/CONT, BREAST,: HCPCS

## 2025-06-28 PROCEDURE — 77049 MRI BREAST C-+ W/CAD BI: CPT

## 2025-06-30 ENCOUNTER — TELEPHONE (OUTPATIENT)
Dept: HEMATOLOGY ONCOLOGY | Facility: CLINIC | Age: 50
End: 2025-06-30

## 2025-06-30 DIAGNOSIS — N64.4 PAIN OF LEFT BREAST: ICD-10-CM

## 2025-06-30 DIAGNOSIS — Z15.01 BRCA1 GENE MUTATION POSITIVE: ICD-10-CM

## 2025-06-30 DIAGNOSIS — Z91.89 INCREASED RISK OF BREAST CANCER: Primary | ICD-10-CM

## 2025-06-30 DIAGNOSIS — F41.8 TEST ANXIETY: ICD-10-CM

## 2025-06-30 DIAGNOSIS — Z15.09 BRCA1 GENE MUTATION POSITIVE: ICD-10-CM

## 2025-06-30 RX ORDER — LORAZEPAM 0.5 MG/1
0.5 TABLET ORAL SEE ADMIN INSTRUCTIONS
Qty: 2 TABLET | Refills: 0 | Status: SHIPPED | OUTPATIENT
Start: 2025-06-30

## 2025-06-30 NOTE — TELEPHONE ENCOUNTER
Called patient and let her know that another order for the MRI breast was placed by Keisha Traore and that she should call Central Scheduling to schedule it. An order was also placed for Ativan.

## 2025-07-01 DIAGNOSIS — K21.9 GASTROESOPHAGEAL REFLUX DISEASE, UNSPECIFIED WHETHER ESOPHAGITIS PRESENT: ICD-10-CM

## 2025-07-01 RX ORDER — PANTOPRAZOLE SODIUM 20 MG/1
20 TABLET, DELAYED RELEASE ORAL DAILY
Qty: 30 TABLET | Refills: 2 | Status: SHIPPED | OUTPATIENT
Start: 2025-07-01

## 2025-07-07 DIAGNOSIS — A04.8 H. PYLORI INFECTION: Primary | ICD-10-CM

## 2025-07-07 RX ORDER — PANTOPRAZOLE SODIUM 20 MG/1
20 TABLET, DELAYED RELEASE ORAL 2 TIMES DAILY
Qty: 28 TABLET | Refills: 0 | Status: SHIPPED | OUTPATIENT
Start: 2025-07-07

## 2025-07-07 RX ORDER — CLARITHROMYCIN 500 MG/1
500 TABLET ORAL EVERY 12 HOURS SCHEDULED
Qty: 28 TABLET | Refills: 0 | Status: SHIPPED | OUTPATIENT
Start: 2025-07-07 | End: 2025-07-21

## 2025-07-07 RX ORDER — LEVOFLOXACIN 500 MG/1
500 TABLET, FILM COATED ORAL EVERY 24 HOURS
Qty: 14 TABLET | Refills: 0 | Status: SHIPPED | OUTPATIENT
Start: 2025-07-07 | End: 2025-07-21

## 2025-07-14 DIAGNOSIS — E66.812 CLASS 2 SEVERE OBESITY DUE TO EXCESS CALORIES WITH SERIOUS COMORBIDITY AND BODY MASS INDEX (BMI) OF 38.0 TO 38.9 IN ADULT (HCC): ICD-10-CM

## 2025-07-14 DIAGNOSIS — E66.01 CLASS 2 SEVERE OBESITY DUE TO EXCESS CALORIES WITH SERIOUS COMORBIDITY AND BODY MASS INDEX (BMI) OF 38.0 TO 38.9 IN ADULT (HCC): ICD-10-CM

## 2025-07-16 DIAGNOSIS — E66.812 CLASS 2 SEVERE OBESITY DUE TO EXCESS CALORIES WITH SERIOUS COMORBIDITY AND BODY MASS INDEX (BMI) OF 38.0 TO 38.9 IN ADULT (HCC): ICD-10-CM

## 2025-07-16 DIAGNOSIS — E66.01 CLASS 2 SEVERE OBESITY DUE TO EXCESS CALORIES WITH SERIOUS COMORBIDITY AND BODY MASS INDEX (BMI) OF 38.0 TO 38.9 IN ADULT (HCC): ICD-10-CM

## 2025-07-16 RX ORDER — TIRZEPATIDE 10 MG/.5ML
10 INJECTION, SOLUTION SUBCUTANEOUS WEEKLY
Qty: 2 ML | Refills: 0 | Status: SHIPPED | OUTPATIENT
Start: 2025-07-16 | End: 2025-07-17 | Stop reason: SDUPTHER

## 2025-07-16 RX ORDER — TIRZEPATIDE 7.5 MG/.5ML
7.5 INJECTION, SOLUTION SUBCUTANEOUS WEEKLY
Qty: 2 ML | Refills: 0 | OUTPATIENT
Start: 2025-07-16 | End: 2025-08-13

## 2025-07-17 DIAGNOSIS — E66.812 CLASS 2 SEVERE OBESITY DUE TO EXCESS CALORIES WITH SERIOUS COMORBIDITY AND BODY MASS INDEX (BMI) OF 38.0 TO 38.9 IN ADULT (HCC): ICD-10-CM

## 2025-07-17 DIAGNOSIS — E66.01 CLASS 2 SEVERE OBESITY DUE TO EXCESS CALORIES WITH SERIOUS COMORBIDITY AND BODY MASS INDEX (BMI) OF 38.0 TO 38.9 IN ADULT (HCC): ICD-10-CM

## 2025-07-17 RX ORDER — TIRZEPATIDE 10 MG/.5ML
10 INJECTION, SOLUTION SUBCUTANEOUS WEEKLY
Qty: 2 ML | Refills: 0 | Status: SHIPPED | OUTPATIENT
Start: 2025-07-17 | End: 2025-08-14

## 2025-07-17 NOTE — TELEPHONE ENCOUNTER
[facility] [place]   they are closed she needs it resent to [facility]  pharmacy [facility]   thank you

## 2025-07-25 ENCOUNTER — HOSPITAL ENCOUNTER (OUTPATIENT)
Dept: RADIOLOGY | Facility: HOSPITAL | Age: 50
Discharge: HOME | End: 2025-07-25
Payer: COMMERCIAL

## 2025-07-25 ENCOUNTER — RESULTS FOLLOW-UP (OUTPATIENT)
Dept: SURGICAL ONCOLOGY | Facility: CLINIC | Age: 50
End: 2025-07-25

## 2025-07-25 DIAGNOSIS — Z15.01 BRCA1 GENE MUTATION POSITIVE: ICD-10-CM

## 2025-07-25 DIAGNOSIS — R92.8 ABNORMAL MRI, BREAST: Primary | ICD-10-CM

## 2025-07-25 DIAGNOSIS — Z91.89 INCREASED RISK OF BREAST CANCER: ICD-10-CM

## 2025-07-25 DIAGNOSIS — Z15.09 BRCA1 GENE MUTATION POSITIVE: ICD-10-CM

## 2025-07-25 DIAGNOSIS — N64.4 PAIN OF LEFT BREAST: ICD-10-CM

## 2025-07-25 PROCEDURE — A9585 GADOBUTROL INJECTION: HCPCS

## 2025-07-25 PROCEDURE — C8937 CAD BREAST MRI: HCPCS

## 2025-07-25 PROCEDURE — C8908 MRI W/O FOL W/CONT, BREAST,: HCPCS

## 2025-07-25 RX ORDER — GADOBUTROL 604.72 MG/ML
11 INJECTION INTRAVENOUS
Status: COMPLETED | OUTPATIENT
Start: 2025-07-25 | End: 2025-07-25

## 2025-07-25 RX ADMIN — GADOBUTROL 11 ML: 604.72 INJECTION INTRAVENOUS at 08:11

## 2025-07-25 NOTE — TELEPHONE ENCOUNTER
Called patient to discuss breast mri showing left breast possible mass. I have ordered left breast u/s with possible u/s guided bx. Discussed possible etiology of persistent left breast pain. She was dismissed by plastic surgeon at outside network. We will work on getting u/s scheduled first. If biopsy is recommended and is benign, I recommend PT or seeking opinion from another plastic surgeon. She was agreeable.

## 2025-07-30 ENCOUNTER — HOSPITAL ENCOUNTER (OUTPATIENT)
Dept: RADIOLOGY | Facility: HOSPITAL | Age: 50
Discharge: HOME | End: 2025-07-30
Payer: COMMERCIAL

## 2025-07-30 ENCOUNTER — RESULTS FOLLOW-UP (OUTPATIENT)
Dept: SURGICAL ONCOLOGY | Facility: CLINIC | Age: 50
End: 2025-07-30

## 2025-07-30 DIAGNOSIS — R92.8 ABNORMAL MRI, BREAST: ICD-10-CM

## 2025-07-30 PROCEDURE — 76642 ULTRASOUND BREAST LIMITED: CPT

## 2025-07-31 ENCOUNTER — TELEMEDICINE (OUTPATIENT)
Age: 50
End: 2025-07-31
Payer: COMMERCIAL

## 2025-07-31 ENCOUNTER — TELEPHONE (OUTPATIENT)
Dept: RADIOLOGY | Facility: HOSPITAL | Age: 50
End: 2025-07-31

## 2025-07-31 DIAGNOSIS — K29.70 HELICOBACTER PYLORI GASTRITIS: ICD-10-CM

## 2025-07-31 DIAGNOSIS — E66.812 CLASS 2 OBESITY DUE TO EXCESS CALORIES WITHOUT SERIOUS COMORBIDITY WITH BODY MASS INDEX (BMI) OF 38.0 TO 38.9 IN ADULT: ICD-10-CM

## 2025-07-31 DIAGNOSIS — N95.1 MENOPAUSAL SYMPTOMS: Primary | ICD-10-CM

## 2025-07-31 DIAGNOSIS — B96.81 HELICOBACTER PYLORI GASTRITIS: ICD-10-CM

## 2025-07-31 DIAGNOSIS — E66.09 CLASS 2 OBESITY DUE TO EXCESS CALORIES WITHOUT SERIOUS COMORBIDITY WITH BODY MASS INDEX (BMI) OF 38.0 TO 38.9 IN ADULT: ICD-10-CM

## 2025-07-31 PROBLEM — Z00.00 ANNUAL PHYSICAL EXAM: Status: RESOLVED | Noted: 2025-04-23 | Resolved: 2025-07-31

## 2025-07-31 PROCEDURE — 99215 OFFICE O/P EST HI 40 MIN: CPT | Performed by: OBSTETRICS & GYNECOLOGY

## 2025-08-14 ENCOUNTER — APPOINTMENT (OUTPATIENT)
Dept: LAB | Facility: CLINIC | Age: 50
End: 2025-08-14

## 2025-08-14 DIAGNOSIS — A04.8 H. PYLORI INFECTION: ICD-10-CM

## 2025-08-18 ENCOUNTER — APPOINTMENT (OUTPATIENT)
Dept: LAB | Facility: CLINIC | Age: 50
End: 2025-08-18
Payer: COMMERCIAL

## 2025-08-18 PROCEDURE — 87338 HPYLORI STOOL AG IA: CPT

## 2025-08-20 LAB — H PYLORI AG STL QL IA: POSITIVE

## 2025-08-22 ENCOUNTER — TELEPHONE (OUTPATIENT)
Dept: OTHER | Facility: OTHER | Age: 50
End: 2025-08-22

## (undated) DEVICE — UTILITY MARKER,BLACK WITH LABELS: Brand: DEVON

## (undated) DEVICE — 1820 FOAM BLOCK NEEDLE COUNTER: Brand: DEVON

## (undated) DEVICE — SYRINGE 10ML LL

## (undated) DEVICE — SCD SEQUENTIAL COMPRESSION COMFORT SLEEVE MEDIUM KNEE LENGTH: Brand: KENDALL SCD

## (undated) DEVICE — SUT VICRYL 3-0 SH 27 IN J416H

## (undated) DEVICE — DRAPE SHEET THREE QUARTER

## (undated) DEVICE — SYRINGE 30ML LL

## (undated) DEVICE — SYRINGE 50ML LL

## (undated) DEVICE — GLOVE SRG BIOGEL 7.5

## (undated) DEVICE — PENCIL ELECTROSURG E-Z CLEAN -0035H

## (undated) DEVICE — BAG FAT FILTRATION PUREGRAFT 850

## (undated) DEVICE — DRAPE TOWEL: Brand: CONVERTORS

## (undated) DEVICE — PROXIMATE SKIN STAPLERS (35 WIDE) CONTAINS 35 STAINLESS STEEL STAPLES (FIXED HEAD): Brand: PROXIMATE

## (undated) DEVICE — BANDAGE ROLL,100% COTTON, 6 PLY, LARGE: Brand: KERLIX

## (undated) DEVICE — UNDERGLOVE PROTEXIS  BLUE SZ 7

## (undated) DEVICE — GLOVE PI ULTRA TOUCH SZ.7.0

## (undated) DEVICE — GLOVE INDICATOR PI UNDERGLOVE SZ 7 BLUE

## (undated) DEVICE — PACK UNIVERSAL DRAPES SUB-Q ICD

## (undated) DEVICE — SUT STRATAFIX SPIRAL 4-0 PGA/PCL 7 X 7 CM SXMD2B405

## (undated) DEVICE — PACK PBDS STERILE LAP LITHOTOMY RF

## (undated) DEVICE — ELECTRODE BLADE MOD E-Z CLEAN 2.5IN 6.4CM -0012M

## (undated) DEVICE — STERILE POLYISOPRENE POWDER-FREE SURGICAL GLOVES WITH EMOLLIENT COATING: Brand: PROTEXIS

## (undated) DEVICE — BAG DECANTER

## (undated) DEVICE — JACKSON-PRATT 100CC BULB RESERVOIR: Brand: CARDINAL HEALTH

## (undated) DEVICE — UNDYED MONOFILAMENT (POLYDIOXANONE), ABSORBABLE SURGICAL SUTURE: Brand: PDS

## (undated) DEVICE — SAFETY PINS KIT: Brand: CARDINAL HEALTH

## (undated) DEVICE — BLANKET HYPOTHERMIA ADULT GAYMAR

## (undated) DEVICE — STERILE POLYISOPRENE POWDER-FREE SURGICAL GLOVES: Brand: PROTEXIS

## (undated) DEVICE — GAUZE SPONGES,16 PLY: Brand: CURITY

## (undated) DEVICE — SKIN MARKER DUAL TIP WITH RULER CAP, FLEXIBLE RULER AND LABELS: Brand: DEVON

## (undated) DEVICE — SUT PDS II 1 XLH 96 IN LOOPED Z881G

## (undated) DEVICE — AIRSEAL TUBE SMOKE EVAC LUMENX3 FILTERED

## (undated) DEVICE — SUT ETHILON 3-0 PS-1 18 IN 1663G

## (undated) DEVICE — SUT CHROMIC 5-0 P-3 18 IN 687G

## (undated) DEVICE — SUT PROLENE 5-0 P-3 18 IN 8698G

## (undated) DEVICE — SUT STRATAFIX SPIRAL 3-0 PGA/PCL 30 X 30 CM SXMD2B408

## (undated) DEVICE — TRAY FOLEY 16FR URIMETER SILICONE SURESTEP

## (undated) DEVICE — LIGHT HANDLE COVER SLEEVE DISP BLUE STELLAR

## (undated) DEVICE — SUT STRATAFIX SPIRAL 2-0 CT-1 30 CM SXPP1B410

## (undated) DEVICE — CHLORAPREP HI-LITE 26ML ORANGE

## (undated) DEVICE — ADHESIVE SKIN HIGH VISCOSITY EXOFIN 1ML

## (undated) DEVICE — ELECTRODE BLADE MOD  E-Z CLEAN 6.5IN -0014M

## (undated) DEVICE — KERLIX BANDAGE ROLL: Brand: KERLIX

## (undated) DEVICE — YANKAUER,OPEN TIP, NO VENT: Brand: ARGYLE

## (undated) DEVICE — LIGHT GLOVE GREEN

## (undated) DEVICE — BOWL: 16OZ PEELPOUCH 75/CS 16/PLT: Brand: MEDEGEN MEDICAL PRODUCTS, LLC

## (undated) DEVICE — PENCIL SMOKE EVAC TELESCOPING W/TUBING

## (undated) DEVICE — GLOVE INDICATOR PI UNDERGLOVE SZ 8 BLUE

## (undated) DEVICE — Device

## (undated) DEVICE — SUT VICRYL 4-0 RB-1 27 IN J214H

## (undated) DEVICE — INTENDED FOR TISSUE SEPARATION, AND OTHER PROCEDURES THAT REQUIRE A SHARP SURGICAL BLADE TO PUNCTURE OR CUT.: Brand: BARD-PARKER SAFETY BLADES SIZE 15, STERILE

## (undated) DEVICE — KIT EXPANDER BRST UNIVERSAL FILL

## (undated) DEVICE — SUT VICRYL 0 CT-1 27 IN J260H

## (undated) DEVICE — FUNNEL E KELLER 2 DELIVERY DEVICE

## (undated) DEVICE — MAYO STAND COVER: Brand: CONVERTORS

## (undated) DEVICE — DRESSING BIOPATCH ANTIMICROBIAL 1 IN DISC

## (undated) DEVICE — 3M™ TRANSPORE™ WHITE SURGICAL TAPE 1534-2, 2 INCH X 10 YARD (5CM X 9,1M), 6 ROLLS/CARTON 10 CARTONS/CASE: Brand: 3M™ TRANSPORE™

## (undated) DEVICE — MEDI-VAC YANKAUER SUCTION HANDLE W/STRAIGHT TIP & CONTROL VENT: Brand: CARDINAL HEALTH

## (undated) DEVICE — CANNULA 4MM HELIXED TRI-PORT II 30CM 10MM PORT

## (undated) DEVICE — MEDI-VAC YANK SUCT HNDL W/TPRD BULBOUS TIP: Brand: CARDINAL HEALTH

## (undated) DEVICE — SUT MONOCRYL 3-0 PS-2 18 IN Y497G

## (undated) DEVICE — SUT VICRYL 0 REEL 54 IN J287G

## (undated) DEVICE — SUT MONOCRYL 4-0 PS-2 18 IN Y496G

## (undated) DEVICE — 3M™ STERI-STRIP™ REINFORCED ADHESIVE SKIN CLOSURES, R1547, 1/2 IN X 4 IN (12 MM X 100 MM), 6 STRIPS/ENVELOPE: Brand: 3M™ STERI-STRIP™

## (undated) DEVICE — NEEDLE 25G X 1 1/2

## (undated) DEVICE — GLOVE INDICATOR PI UNDERGLOVE SZ 7.5 BLUE

## (undated) DEVICE — VIAL DECANTER

## (undated) DEVICE — INTENDED FOR TISSUE SEPARATION, AND OTHER PROCEDURES THAT REQUIRE A SHARP SURGICAL BLADE TO PUNCTURE OR CUT.: Brand: BARD-PARKER ® SAFETYLOCK CARBON RIB-BACK BLADES

## (undated) DEVICE — BETHLEHEM UNIVERSAL LAPAROTOMY: Brand: CARDINAL HEALTH

## (undated) DEVICE — STRL COTTON TIP APPLCTR 6IN PK: Brand: CARDINAL HEALTH

## (undated) DEVICE — SPONGE LAP 18 X 18 IN STRL RFD

## (undated) DEVICE — TUBING ASPIRATION LIPOSUCTION SET 12FT

## (undated) DEVICE — CHEST/BREAST DRAPE: Brand: CONVERTORS

## (undated) DEVICE — SUT PDS II 2-0 CT-1 27 IN Z339H

## (undated) DEVICE — INTENDED FOR TISSUE SEPARATION, AND OTHER PROCEDURES THAT REQUIRE A SHARP SURGICAL BLADE TO PUNCTURE OR CUT.: Brand: BARD-PARKER SAFETY BLADES SIZE 11, STERILE

## (undated) DEVICE — NEEDLE BLUNT 18 G X 1 1/2IN

## (undated) DEVICE — SINGLE PORT MANIFOLD: Brand: NEPTUNE 2

## (undated) DEVICE — BULB SYRINGE,IRRIGATION WITH PROTECTIVE CAP: Brand: DOVER

## (undated) DEVICE — BRA SURGICAL SZ 3XL (45-48)

## (undated) DEVICE — SUT PLAIN 2-0 CTX 27 IN 872H

## (undated) DEVICE — SYRINGE 10ML LL CONTROL TOP

## (undated) DEVICE — TUBING INFILTRATION 9FT

## (undated) DEVICE — TROCAR: Brand: KII® SLEEVE

## (undated) DEVICE — TUBING SUCTION 5MM X 12 FT

## (undated) DEVICE — GLOVE SRG BIOGEL 7

## (undated) DEVICE — ELECTRODE BLADE MOD E-Z CLEAN  2.75IN 7CM -0012AM

## (undated) DEVICE — DISPOSABLE OR TOWEL: Brand: CARDINAL HEALTH

## (undated) DEVICE — 3000CC GUARDIAN II: Brand: GUARDIAN

## (undated) DEVICE — STANDARD SURGICAL GOWN, L: Brand: CONVERTORS

## (undated) DEVICE — 3M™ TEGADERM™ TRANSPARENT FILM DRESSING FRAME STYLE, 1626W, 4 IN X 4-3/4 IN (10 CM X 12 CM), 50/CT 4CT/CASE: Brand: 3M™ TEGADERM™

## (undated) DEVICE — DRAPE EQUIPMENT RF WAND

## (undated) DEVICE — JP CHAN DRN SIL HUBLESS 15FR W/TRO: Brand: CARDINAL HEALTH

## (undated) DEVICE — ADHESIVE SKIN CLSR DERMABOND NX

## (undated) DEVICE — TELFA NON-ADHERENT ABSORBENT DRESSING: Brand: TELFA

## (undated) DEVICE — LAPAROSCOPIC TROCAR SLEEVE/SINGLE USE: Brand: KII® OPTICAL ACCESS SYSTEM

## (undated) DEVICE — SUT PDS II 2-0 CT-2 27 IN Z333H

## (undated) DEVICE — ANTI-FOG SOLUTION WITH FOAM PAD: Brand: DEVON

## (undated) DEVICE — ABDOMINAL PAD: Brand: DERMACEA

## (undated) DEVICE — IRRIG ENDO FLO TUBING

## (undated) DEVICE — ELECTRODE LAP SPATULA CRV E-Z CLEAN 33CM -0018C

## (undated) DEVICE — PREP PAD BNS: Brand: CONVERTORS

## (undated) DEVICE — CHLORHEXIDINE 4PCT 4 OZ

## (undated) DEVICE — CANNISTER WASTE IMPLOSION PROOF

## (undated) DEVICE — PLUMEPEN PRO 10FT

## (undated) DEVICE — 450 ML BOTTLE OF 0.05% CHLORHEXIDINE GLUCONATE IN 99.95% STERILE WATER FOR IRRIGATION, USP AND APPLICATOR.: Brand: IRRISEPT ANTIMICROBIAL WOUND LAVAGE

## (undated) DEVICE — ROUND MODERATE PLUS PROFILE  SALINE BREAST IMPLANT SIZER, 800CC: Brand: MENTOR ROUND MODERATE PLUS PROFILE SINGLE USE SALINE BREAST IMPLANT SIZER

## (undated) DEVICE — INTENDED FOR TISSUE SEPARATION, AND OTHER PROCEDURES THAT REQUIRE A SHARP SURGICAL BLADE TO PUNCTURE OR CUT.: Brand: BARD-PARKER ® CARBON RIB-BACK BLADES

## (undated) DEVICE — TROCAR PORT ACCESS 5 X120MML W/BLDLS OPTICAL TIP AIRSEAL

## (undated) DEVICE — SUT MONOCRYL 4-0 PS-2 27 IN Y426H

## (undated) DEVICE — GLOVE PI ULTRA TOUCH SZ.7.5

## (undated) DEVICE — TUBING LIPOSUCTION ASPIRATION 12FT STERILE

## (undated) DEVICE — SUT VICRYL 2-0 SH 27 IN UNDYED J417H

## (undated) DEVICE — 4-PORT MANIFOLD: Brand: NEPTUNE 2

## (undated) DEVICE — BETHLEHEM UNIVERSAL MINOR GEN: Brand: CARDINAL HEALTH

## (undated) DEVICE — SYRINGE CATH TIP 50ML

## (undated) DEVICE — ASTOUND STANDARD SURGICAL GOWN, XL: Brand: CONVERTORS

## (undated) DEVICE — SUT SILK 2-0 SH 30 IN K833H

## (undated) DEVICE — PREMIUM DRY TRAY LF: Brand: MEDLINE INDUSTRIES, INC.

## (undated) DEVICE — 3M™ STERI-STRIP™ COMPOUND BENZOIN TINCTURE 40 BAGS/CARTON 4 CARTONS/CASE C1544: Brand: 3M™ STERI-STRIP™

## (undated) DEVICE — BETHLEHEM UNIVERSAL BREAST PK: Brand: CARDINAL HEALTH

## (undated) DEVICE — BINDER ABDOMINAL 63-74 IN

## (undated) DEVICE — SMOKE EVACUATION TUBING WITH 8 IN INTEGRAL WAND AND SPONGE GUARD: Brand: BUFFALO FILTER

## (undated) DEVICE — TOWEL SURG XR DETECT GREEN STRL RFD

## (undated) DEVICE — SUT VICRYL 2-0 REEL 54 IN J286G

## (undated) DEVICE — ENSEAL LAPAROSCOPIC TISSUE SEALER G2 CURVED JAW FOR USE WITH G2 GENERATOR 5MM DIAMETER 35CM SHAFT LENGTH: Brand: ENSEAL